# Patient Record
Sex: FEMALE | Race: WHITE | Employment: OTHER | URBAN - METROPOLITAN AREA
[De-identification: names, ages, dates, MRNs, and addresses within clinical notes are randomized per-mention and may not be internally consistent; named-entity substitution may affect disease eponyms.]

---

## 2017-07-17 PROBLEM — M54.41 MIDLINE LOW BACK PAIN WITH RIGHT-SIDED SCIATICA: Status: ACTIVE | Noted: 2017-07-17

## 2017-07-18 PROBLEM — R32 URINARY INCONTINENCE: Status: ACTIVE | Noted: 2017-07-18

## 2017-07-18 PROBLEM — I63.9 CVA (CEREBRAL VASCULAR ACCIDENT) (HCC): Status: ACTIVE | Noted: 2017-07-18

## 2017-07-18 PROBLEM — N39.0 UTI (URINARY TRACT INFECTION): Status: ACTIVE | Noted: 2017-07-18

## 2017-07-18 PROBLEM — E66.01 MORBID OBESITY WITH BMI OF 40.0-44.9, ADULT (HCC): Chronic | Status: ACTIVE | Noted: 2017-07-18

## 2017-10-21 PROBLEM — R41.82 ALTERED MENTAL STATUS: Status: ACTIVE | Noted: 2017-10-21

## 2018-03-10 ENCOUNTER — HOSPITAL ENCOUNTER (OUTPATIENT)
Age: 73
Setting detail: SPECIMEN
Discharge: HOME | End: 2018-03-10
Payer: MEDICARE

## 2018-03-10 LAB
INR BLD: 1.2
PROTHROMBIN TIME: 12.7 SEC (ref 9.3–12.4)

## 2018-03-10 PROCEDURE — 85610 PROTHROMBIN TIME: CPT

## 2018-03-10 PROCEDURE — 36415 COLL VENOUS BLD VENIPUNCTURE: CPT

## 2018-03-11 ENCOUNTER — HOSPITAL ENCOUNTER (OUTPATIENT)
Age: 73
Discharge: HOME | End: 2018-03-13
Payer: MEDICARE

## 2018-03-11 LAB
INR BLD: 1.2
PROTHROMBIN TIME: 12.8 SEC (ref 9.3–12.4)

## 2018-03-11 PROCEDURE — 85610 PROTHROMBIN TIME: CPT

## 2018-03-12 ENCOUNTER — HOSPITAL ENCOUNTER (OUTPATIENT)
Age: 73
Discharge: HOME | End: 2018-03-14
Payer: MEDICARE

## 2018-03-12 PROCEDURE — 85027 COMPLETE CBC AUTOMATED: CPT

## 2018-03-12 PROCEDURE — 85610 PROTHROMBIN TIME: CPT

## 2018-03-12 PROCEDURE — 36415 COLL VENOUS BLD VENIPUNCTURE: CPT

## 2018-03-15 ENCOUNTER — HOSPITAL ENCOUNTER (OUTPATIENT)
Age: 73
Discharge: HOME | End: 2018-03-17
Payer: MEDICARE

## 2018-03-15 LAB
INR BLD: 1.3
PROTHROMBIN TIME: 14.6 SEC (ref 9.3–12.4)

## 2018-03-15 PROCEDURE — 85610 PROTHROMBIN TIME: CPT

## 2018-03-15 PROCEDURE — 36415 COLL VENOUS BLD VENIPUNCTURE: CPT

## 2018-03-16 ENCOUNTER — HOSPITAL ENCOUNTER (OUTPATIENT)
Age: 73
Discharge: HOME | End: 2018-03-18
Payer: MEDICARE

## 2018-03-16 LAB
ANION GAP SERPL CALCULATED.3IONS-SCNC: 18 MMOL/L (ref 7–16)
BUN BLDV-MCNC: 32 MG/DL (ref 8–23)
CALCIUM SERPL-MCNC: 8.5 MG/DL (ref 8.6–10.2)
CHLORIDE BLD-SCNC: 101 MMOL/L (ref 98–107)
CO2: 25 MMOL/L (ref 22–29)
CREAT SERPL-MCNC: 1.9 MG/DL (ref 0.5–1)
FERRITIN: 117 NG/ML
GFR AFRICAN AMERICAN: 31
GFR NON-AFRICAN AMERICAN: 26 ML/MIN/1.73
GLUCOSE BLD-MCNC: 129 MG/DL (ref 74–109)
HCT VFR BLD CALC: 26.8 % (ref 34–48)
HCT VFR BLD CALC: 27.3 % (ref 34–48)
HEMOGLOBIN: 8.2 G/DL (ref 11.5–15.5)
HEMOGLOBIN: 8.8 G/DL (ref 11.5–15.5)
INR BLD: 1.2
IRON: 40 MCG/DL (ref 37–145)
MCH RBC QN AUTO: 28.4 PG (ref 26–35)
MCH RBC QN AUTO: 29 PG (ref 26–35)
MCHC RBC AUTO-ENTMCNC: 30.6 % (ref 32–34.5)
MCHC RBC AUTO-ENTMCNC: 32.2 % (ref 32–34.5)
MCV RBC AUTO: 90.1 FL (ref 80–99.9)
MCV RBC AUTO: 92.7 FL (ref 80–99.9)
PDW BLD-RTO: 15.3 FL (ref 11.5–15)
PDW BLD-RTO: 15.5 FL (ref 11.5–15)
PLATELET # BLD: 286 E9/L (ref 130–450)
PLATELET # BLD: 389 E9/L (ref 130–450)
PMV BLD AUTO: 8.9 FL (ref 7–12)
PMV BLD AUTO: 9.2 FL (ref 7–12)
POTASSIUM SERPL-SCNC: 4 MMOL/L (ref 3.5–5)
PROTHROMBIN TIME: 13.4 SEC (ref 9.3–12.4)
RBC # BLD: 2.89 E12/L (ref 3.5–5.5)
RBC # BLD: 3.03 E12/L (ref 3.5–5.5)
SODIUM BLD-SCNC: 144 MMOL/L (ref 132–146)
WBC # BLD: 7.2 E9/L (ref 4.5–11.5)
WBC # BLD: 7.2 E9/L (ref 4.5–11.5)

## 2018-03-16 PROCEDURE — 36415 COLL VENOUS BLD VENIPUNCTURE: CPT

## 2018-03-16 PROCEDURE — 82728 ASSAY OF FERRITIN: CPT

## 2018-03-16 PROCEDURE — 83540 ASSAY OF IRON: CPT

## 2018-03-16 PROCEDURE — 85027 COMPLETE CBC AUTOMATED: CPT

## 2018-03-16 PROCEDURE — 80048 BASIC METABOLIC PNL TOTAL CA: CPT

## 2018-03-19 ENCOUNTER — HOSPITAL ENCOUNTER (OUTPATIENT)
Age: 73
Discharge: HOME | End: 2018-03-21
Payer: MEDICARE

## 2018-03-19 LAB
ALBUMIN SERPL-MCNC: 3 G/DL (ref 3.5–5.2)
ALP BLD-CCNC: 111 U/L (ref 35–104)
ALT SERPL-CCNC: 10 U/L (ref 0–32)
ANION GAP SERPL CALCULATED.3IONS-SCNC: 14 MMOL/L (ref 7–16)
AST SERPL-CCNC: 12 U/L (ref 0–31)
BACTERIA: ABNORMAL /HPF
BASOPHILS ABSOLUTE: 0.05 E9/L (ref 0–0.2)
BASOPHILS RELATIVE PERCENT: 0.7 % (ref 0–2)
BILIRUB SERPL-MCNC: <0.2 MG/DL (ref 0–1.2)
BILIRUBIN URINE: NEGATIVE
BLOOD, URINE: NEGATIVE
BUN BLDV-MCNC: 33 MG/DL (ref 8–23)
CALCIUM SERPL-MCNC: 8.7 MG/DL (ref 8.6–10.2)
CHLORIDE BLD-SCNC: 102 MMOL/L (ref 98–107)
CLARITY: CLEAR
CO2: 29 MMOL/L (ref 22–29)
COLOR: YELLOW
CREAT SERPL-MCNC: 1.9 MG/DL (ref 0.5–1)
EOSINOPHILS ABSOLUTE: 0.5 E9/L (ref 0.05–0.5)
EOSINOPHILS RELATIVE PERCENT: 6.9 % (ref 0–6)
FERRITIN: 85 NG/ML
GFR AFRICAN AMERICAN: 31
GFR NON-AFRICAN AMERICAN: 26 ML/MIN/1.73
GLUCOSE BLD-MCNC: 116 MG/DL (ref 74–109)
GLUCOSE URINE: NEGATIVE MG/DL
HCT VFR BLD CALC: 27.9 % (ref 34–48)
HEMOGLOBIN: 8.8 G/DL (ref 11.5–15.5)
IMMATURE GRANULOCYTES #: 0.05 E9/L
IMMATURE GRANULOCYTES %: 0.7 % (ref 0–5)
INR BLD: 1.6
IRON SATURATION: 14 % (ref 15–50)
IRON: 34 MCG/DL (ref 37–145)
KETONES, URINE: NEGATIVE MG/DL
LEUKOCYTE ESTERASE, URINE: ABNORMAL
LYMPHOCYTES ABSOLUTE: 1.47 E9/L (ref 1.5–4)
LYMPHOCYTES RELATIVE PERCENT: 20.3 % (ref 20–42)
MAGNESIUM: 2 MG/DL (ref 1.6–2.6)
MCH RBC QN AUTO: 28.6 PG (ref 26–35)
MCHC RBC AUTO-ENTMCNC: 31.5 % (ref 32–34.5)
MCV RBC AUTO: 90.6 FL (ref 80–99.9)
MONOCYTES ABSOLUTE: 0.9 E9/L (ref 0.1–0.95)
MONOCYTES RELATIVE PERCENT: 12.4 % (ref 2–12)
NEUTROPHILS ABSOLUTE: 4.26 E9/L (ref 1.8–7.3)
NEUTROPHILS RELATIVE PERCENT: 59 % (ref 43–80)
NITRITE, URINE: POSITIVE
PDW BLD-RTO: 15.4 FL (ref 11.5–15)
PH UA: 6 (ref 5–9)
PHOSPHORUS: 4.3 MG/DL (ref 2.5–4.5)
PLATELET # BLD: 391 E9/L (ref 130–450)
PMV BLD AUTO: 9 FL (ref 7–12)
POTASSIUM SERPL-SCNC: 4.5 MMOL/L (ref 3.5–5)
PROTEIN PROTEIN: 21 MG/DL (ref 0–12)
PROTEIN UA: NEGATIVE MG/DL
PROTHROMBIN TIME: 17.9 SEC (ref 9.3–12.4)
RBC # BLD: 3.08 E12/L (ref 3.5–5.5)
RBC UA: ABNORMAL /HPF (ref 0–2)
SODIUM BLD-SCNC: 145 MMOL/L (ref 132–146)
SPECIFIC GRAVITY UA: 1.02 (ref 1–1.03)
TOTAL IRON BINDING CAPACITY: 241 MCG/DL (ref 250–450)
TOTAL PROTEIN: 6 G/DL (ref 6.4–8.3)
URIC ACID, SERUM: 8.1 MG/DL (ref 2.4–5.7)
UROBILINOGEN, URINE: 0.2 E.U./DL
VITAMIN D 25-HYDROXY: 34 NG/ML (ref 30–100)
WBC # BLD: 7.2 E9/L (ref 4.5–11.5)
WBC UA: ABNORMAL /HPF (ref 0–5)

## 2018-03-19 PROCEDURE — 84100 ASSAY OF PHOSPHORUS: CPT

## 2018-03-19 PROCEDURE — 36415 COLL VENOUS BLD VENIPUNCTURE: CPT

## 2018-03-19 PROCEDURE — 85025 COMPLETE CBC W/AUTO DIFF WBC: CPT

## 2018-03-19 PROCEDURE — 82306 VITAMIN D 25 HYDROXY: CPT

## 2018-03-19 PROCEDURE — 84156 ASSAY OF PROTEIN URINE: CPT

## 2018-03-19 PROCEDURE — 83540 ASSAY OF IRON: CPT

## 2018-03-19 PROCEDURE — 81001 URINALYSIS AUTO W/SCOPE: CPT

## 2018-03-19 PROCEDURE — 82728 ASSAY OF FERRITIN: CPT

## 2018-03-19 PROCEDURE — 85610 PROTHROMBIN TIME: CPT

## 2018-03-19 PROCEDURE — 83735 ASSAY OF MAGNESIUM: CPT

## 2018-03-19 PROCEDURE — 83550 IRON BINDING TEST: CPT

## 2018-03-19 PROCEDURE — 84550 ASSAY OF BLOOD/URIC ACID: CPT

## 2018-03-19 PROCEDURE — 80053 COMPREHEN METABOLIC PANEL: CPT

## 2018-03-22 ENCOUNTER — HOSPITAL ENCOUNTER (OUTPATIENT)
Age: 73
Discharge: HOME | End: 2018-03-24
Payer: MEDICARE

## 2018-03-22 LAB
INR BLD: 1.9
PARATHYROID HORMONE INTACT: 36 PG/ML (ref 15–65)
PROTHROMBIN TIME: 20.7 SEC (ref 9.3–12.4)

## 2018-03-22 PROCEDURE — 36415 COLL VENOUS BLD VENIPUNCTURE: CPT

## 2018-03-22 PROCEDURE — 83970 ASSAY OF PARATHORMONE: CPT

## 2018-03-22 PROCEDURE — 85610 PROTHROMBIN TIME: CPT

## 2018-03-24 ENCOUNTER — HOSPITAL ENCOUNTER (OUTPATIENT)
Age: 73
Discharge: HOME | End: 2018-03-26
Payer: MEDICARE

## 2018-03-24 LAB
INR BLD: 2.5
PROTHROMBIN TIME: 27.6 SEC (ref 9.3–12.4)

## 2018-03-24 PROCEDURE — 85610 PROTHROMBIN TIME: CPT

## 2018-03-24 PROCEDURE — 36415 COLL VENOUS BLD VENIPUNCTURE: CPT

## 2018-03-26 ENCOUNTER — HOSPITAL ENCOUNTER (OUTPATIENT)
Age: 73
Discharge: HOME | End: 2018-03-28
Payer: COMMERCIAL

## 2018-03-26 LAB
INR BLD: 2.6
PROTHROMBIN TIME: 29.2 SEC (ref 9.3–12.4)

## 2018-03-26 PROCEDURE — 85610 PROTHROMBIN TIME: CPT

## 2018-03-26 PROCEDURE — 36415 COLL VENOUS BLD VENIPUNCTURE: CPT

## 2018-03-29 ENCOUNTER — HOSPITAL ENCOUNTER (OUTPATIENT)
Age: 73
Discharge: HOME | End: 2018-03-31
Payer: MEDICARE

## 2018-03-29 LAB
INR BLD: 2.2
PROTHROMBIN TIME: 24.5 SEC (ref 9.3–12.4)

## 2018-03-29 PROCEDURE — 85610 PROTHROMBIN TIME: CPT

## 2018-03-29 PROCEDURE — 36415 COLL VENOUS BLD VENIPUNCTURE: CPT

## 2018-04-02 ENCOUNTER — HOSPITAL ENCOUNTER (OUTPATIENT)
Age: 73
Discharge: HOME | End: 2018-04-04
Payer: MEDICARE

## 2018-04-02 LAB
ANION GAP SERPL CALCULATED.3IONS-SCNC: 14 MMOL/L (ref 7–16)
BUN BLDV-MCNC: 29 MG/DL (ref 8–23)
CALCIUM SERPL-MCNC: 8.6 MG/DL (ref 8.6–10.2)
CHLORIDE BLD-SCNC: 97 MMOL/L (ref 98–107)
CO2: 30 MMOL/L (ref 22–29)
CREAT SERPL-MCNC: 1.7 MG/DL (ref 0.5–1)
GFR AFRICAN AMERICAN: 36
GFR NON-AFRICAN AMERICAN: 29 ML/MIN/1.73
GLUCOSE BLD-MCNC: 173 MG/DL (ref 74–109)
HCT VFR BLD CALC: 33.8 % (ref 34–48)
HEMOGLOBIN: 10.6 G/DL (ref 11.5–15.5)
INR BLD: 1.4
MAGNESIUM: 2.1 MG/DL (ref 1.6–2.6)
MCH RBC QN AUTO: 28.3 PG (ref 26–35)
MCHC RBC AUTO-ENTMCNC: 31.4 % (ref 32–34.5)
MCV RBC AUTO: 90.1 FL (ref 80–99.9)
PDW BLD-RTO: 14.7 FL (ref 11.5–15)
PLATELET # BLD: 246 E9/L (ref 130–450)
PMV BLD AUTO: 10.3 FL (ref 7–12)
POTASSIUM SERPL-SCNC: 3.6 MMOL/L (ref 3.5–5)
PROTHROMBIN TIME: 15.6 SEC (ref 9.3–12.4)
RBC # BLD: 3.75 E12/L (ref 3.5–5.5)
SODIUM BLD-SCNC: 141 MMOL/L (ref 132–146)
WBC # BLD: 6.7 E9/L (ref 4.5–11.5)

## 2018-04-02 PROCEDURE — 36415 COLL VENOUS BLD VENIPUNCTURE: CPT

## 2018-04-02 PROCEDURE — 85027 COMPLETE CBC AUTOMATED: CPT

## 2018-04-02 PROCEDURE — 85610 PROTHROMBIN TIME: CPT

## 2018-04-02 PROCEDURE — 80048 BASIC METABOLIC PNL TOTAL CA: CPT

## 2018-04-02 PROCEDURE — 83735 ASSAY OF MAGNESIUM: CPT

## 2018-04-05 ENCOUNTER — HOSPITAL ENCOUNTER (OUTPATIENT)
Age: 73
Discharge: HOME | End: 2018-04-07
Payer: MEDICARE

## 2018-04-05 LAB
ANION GAP SERPL CALCULATED.3IONS-SCNC: 14 MMOL/L (ref 7–16)
BUN BLDV-MCNC: 29 MG/DL (ref 8–23)
CALCIUM SERPL-MCNC: 8.7 MG/DL (ref 8.6–10.2)
CHLORIDE BLD-SCNC: 98 MMOL/L (ref 98–107)
CO2: 28 MMOL/L (ref 22–29)
CREAT SERPL-MCNC: 1.8 MG/DL (ref 0.5–1)
GFR AFRICAN AMERICAN: 33
GFR NON-AFRICAN AMERICAN: 28 ML/MIN/1.73
GLUCOSE BLD-MCNC: 112 MG/DL (ref 74–109)
INR BLD: 1.3
POTASSIUM SERPL-SCNC: 3.9 MMOL/L (ref 3.5–5)
PROTHROMBIN TIME: 14.6 SEC (ref 9.3–12.4)
SODIUM BLD-SCNC: 140 MMOL/L (ref 132–146)

## 2018-04-05 PROCEDURE — 80048 BASIC METABOLIC PNL TOTAL CA: CPT

## 2018-04-05 PROCEDURE — 85610 PROTHROMBIN TIME: CPT

## 2018-04-05 PROCEDURE — 36415 COLL VENOUS BLD VENIPUNCTURE: CPT

## 2018-04-09 ENCOUNTER — HOSPITAL ENCOUNTER (OUTPATIENT)
Age: 73
Discharge: HOME | End: 2018-04-11
Payer: MEDICARE

## 2018-04-09 LAB
INR BLD: 1.1
PROTHROMBIN TIME: 12.2 SEC (ref 9.3–12.4)

## 2018-04-09 PROCEDURE — 36415 COLL VENOUS BLD VENIPUNCTURE: CPT

## 2018-04-09 PROCEDURE — 85610 PROTHROMBIN TIME: CPT

## 2018-04-12 ENCOUNTER — HOSPITAL ENCOUNTER (OUTPATIENT)
Age: 73
Discharge: HOME | End: 2018-04-14
Payer: MEDICARE

## 2018-04-12 LAB
ANION GAP SERPL CALCULATED.3IONS-SCNC: 13 MMOL/L (ref 7–16)
BUN BLDV-MCNC: 30 MG/DL (ref 8–23)
CALCIUM SERPL-MCNC: 8.5 MG/DL (ref 8.6–10.2)
CHLORIDE BLD-SCNC: 102 MMOL/L (ref 98–107)
CO2: 27 MMOL/L (ref 22–29)
CREAT SERPL-MCNC: 1.8 MG/DL (ref 0.5–1)
GFR AFRICAN AMERICAN: 33
GFR NON-AFRICAN AMERICAN: 28 ML/MIN/1.73
GLUCOSE BLD-MCNC: 144 MG/DL (ref 74–109)
INR BLD: 1.2
POTASSIUM SERPL-SCNC: 4.1 MMOL/L (ref 3.5–5)
PROTHROMBIN TIME: 13.7 SEC (ref 9.3–12.4)
SODIUM BLD-SCNC: 142 MMOL/L (ref 132–146)

## 2018-04-12 PROCEDURE — 36415 COLL VENOUS BLD VENIPUNCTURE: CPT

## 2018-04-12 PROCEDURE — 85610 PROTHROMBIN TIME: CPT

## 2018-04-12 PROCEDURE — 80048 BASIC METABOLIC PNL TOTAL CA: CPT

## 2018-04-16 ENCOUNTER — HOSPITAL ENCOUNTER (OUTPATIENT)
Age: 73
Discharge: HOME | End: 2018-04-18
Payer: MEDICARE

## 2018-04-16 LAB
ANION GAP SERPL CALCULATED.3IONS-SCNC: 14 MMOL/L (ref 7–16)
BUN BLDV-MCNC: 29 MG/DL (ref 8–23)
CALCIUM SERPL-MCNC: 8.4 MG/DL (ref 8.6–10.2)
CHLORIDE BLD-SCNC: 99 MMOL/L (ref 98–107)
CO2: 28 MMOL/L (ref 22–29)
CREAT SERPL-MCNC: 1.7 MG/DL (ref 0.5–1)
GFR AFRICAN AMERICAN: 36
GFR NON-AFRICAN AMERICAN: 29 ML/MIN/1.73
GLUCOSE BLD-MCNC: 127 MG/DL (ref 74–109)
HCT VFR BLD CALC: 31.9 % (ref 34–48)
HEMOGLOBIN: 9.9 G/DL (ref 11.5–15.5)
INR BLD: 1.6
MCH RBC QN AUTO: 28 PG (ref 26–35)
MCHC RBC AUTO-ENTMCNC: 31 % (ref 32–34.5)
MCV RBC AUTO: 90.4 FL (ref 80–99.9)
PDW BLD-RTO: 14.3 FL (ref 11.5–15)
PLATELET # BLD: 244 E9/L (ref 130–450)
PMV BLD AUTO: 9.7 FL (ref 7–12)
POTASSIUM SERPL-SCNC: 3.9 MMOL/L (ref 3.5–5)
PROTHROMBIN TIME: 18.1 SEC (ref 9.3–12.4)
RBC # BLD: 3.53 E12/L (ref 3.5–5.5)
SODIUM BLD-SCNC: 141 MMOL/L (ref 132–146)
WBC # BLD: 6 E9/L (ref 4.5–11.5)

## 2018-04-16 PROCEDURE — 85027 COMPLETE CBC AUTOMATED: CPT

## 2018-04-16 PROCEDURE — 80048 BASIC METABOLIC PNL TOTAL CA: CPT

## 2018-04-16 PROCEDURE — 36415 COLL VENOUS BLD VENIPUNCTURE: CPT

## 2018-04-16 PROCEDURE — 85610 PROTHROMBIN TIME: CPT

## 2018-04-18 ENCOUNTER — HOSPITAL ENCOUNTER (OUTPATIENT)
Age: 73
Discharge: HOME | End: 2018-04-20
Payer: MEDICARE

## 2018-04-18 LAB
INR BLD: 2.1
PROTHROMBIN TIME: 23.6 SEC (ref 9.3–12.4)

## 2018-04-18 PROCEDURE — 36415 COLL VENOUS BLD VENIPUNCTURE: CPT

## 2018-04-18 PROCEDURE — 85610 PROTHROMBIN TIME: CPT

## 2018-04-19 ENCOUNTER — HOSPITAL ENCOUNTER (OUTPATIENT)
Age: 73
Discharge: HOME | End: 2018-04-21
Payer: MEDICARE

## 2018-04-19 LAB
ANION GAP SERPL CALCULATED.3IONS-SCNC: 11 MMOL/L (ref 7–16)
BASOPHILS ABSOLUTE: 0.05 E9/L (ref 0–0.2)
BASOPHILS RELATIVE PERCENT: 0.8 % (ref 0–2)
BUN BLDV-MCNC: 32 MG/DL (ref 8–23)
CALCIUM SERPL-MCNC: 8.4 MG/DL (ref 8.6–10.2)
CHLORIDE BLD-SCNC: 101 MMOL/L (ref 98–107)
CO2: 28 MMOL/L (ref 22–29)
CREAT SERPL-MCNC: 2 MG/DL (ref 0.5–1)
EOSINOPHILS ABSOLUTE: 0.44 E9/L (ref 0.05–0.5)
EOSINOPHILS RELATIVE PERCENT: 7 % (ref 0–6)
GFR AFRICAN AMERICAN: 29
GFR NON-AFRICAN AMERICAN: 24 ML/MIN/1.73
GLUCOSE BLD-MCNC: 172 MG/DL (ref 74–109)
HCT VFR BLD CALC: 31.4 % (ref 34–48)
HEMOGLOBIN: 9.6 G/DL (ref 11.5–15.5)
IMMATURE GRANULOCYTES #: 0.05 E9/L
IMMATURE GRANULOCYTES %: 0.8 % (ref 0–5)
LYMPHOCYTES ABSOLUTE: 1.27 E9/L (ref 1.5–4)
LYMPHOCYTES RELATIVE PERCENT: 20.1 % (ref 20–42)
MCH RBC QN AUTO: 27.4 PG (ref 26–35)
MCHC RBC AUTO-ENTMCNC: 30.6 % (ref 32–34.5)
MCV RBC AUTO: 89.7 FL (ref 80–99.9)
MONOCYTES ABSOLUTE: 0.95 E9/L (ref 0.1–0.95)
MONOCYTES RELATIVE PERCENT: 15.1 % (ref 2–12)
NEUTROPHILS ABSOLUTE: 3.55 E9/L (ref 1.8–7.3)
NEUTROPHILS RELATIVE PERCENT: 56.2 % (ref 43–80)
PDW BLD-RTO: 14.5 FL (ref 11.5–15)
PLATELET # BLD: 254 E9/L (ref 130–450)
PMV BLD AUTO: 9.7 FL (ref 7–12)
POTASSIUM SERPL-SCNC: 4.2 MMOL/L (ref 3.5–5)
RBC # BLD: 3.5 E12/L (ref 3.5–5.5)
SODIUM BLD-SCNC: 140 MMOL/L (ref 132–146)
WBC # BLD: 6.3 E9/L (ref 4.5–11.5)

## 2018-04-19 PROCEDURE — 80048 BASIC METABOLIC PNL TOTAL CA: CPT

## 2018-04-19 PROCEDURE — 85025 COMPLETE CBC W/AUTO DIFF WBC: CPT

## 2018-04-19 PROCEDURE — 36415 COLL VENOUS BLD VENIPUNCTURE: CPT

## 2018-04-21 ENCOUNTER — HOSPITAL ENCOUNTER (OUTPATIENT)
Age: 73
Discharge: HOME | End: 2018-04-23
Payer: MEDICARE

## 2018-04-21 LAB
INR BLD: 1.9
PROTHROMBIN TIME: 22.1 SEC (ref 9.3–12.4)

## 2018-04-21 PROCEDURE — 85610 PROTHROMBIN TIME: CPT

## 2018-04-21 PROCEDURE — 36415 COLL VENOUS BLD VENIPUNCTURE: CPT

## 2018-04-23 ENCOUNTER — HOSPITAL ENCOUNTER (OUTPATIENT)
Age: 73
Discharge: HOME | End: 2018-04-25
Payer: MEDICARE

## 2018-04-23 LAB
ANION GAP SERPL CALCULATED.3IONS-SCNC: 17 MMOL/L (ref 7–16)
BASOPHILS ABSOLUTE: 0.06 E9/L (ref 0–0.2)
BASOPHILS RELATIVE PERCENT: 1 % (ref 0–2)
BUN BLDV-MCNC: 31 MG/DL (ref 8–23)
CALCIUM SERPL-MCNC: 8.7 MG/DL (ref 8.6–10.2)
CHLORIDE BLD-SCNC: 99 MMOL/L (ref 98–107)
CO2: 27 MMOL/L (ref 22–29)
CREAT SERPL-MCNC: 1.8 MG/DL (ref 0.5–1)
EOSINOPHILS ABSOLUTE: 0.35 E9/L (ref 0.05–0.5)
EOSINOPHILS RELATIVE PERCENT: 5.9 % (ref 0–6)
GFR AFRICAN AMERICAN: 33
GFR NON-AFRICAN AMERICAN: 28 ML/MIN/1.73
GLUCOSE BLD-MCNC: 136 MG/DL (ref 74–109)
HCT VFR BLD CALC: 34.2 % (ref 34–48)
HEMOGLOBIN: 10.7 G/DL (ref 11.5–15.5)
IMMATURE GRANULOCYTES #: 0.05 E9/L
IMMATURE GRANULOCYTES %: 0.8 % (ref 0–5)
INR BLD: 2.4
LYMPHOCYTES ABSOLUTE: 1.17 E9/L (ref 1.5–4)
LYMPHOCYTES RELATIVE PERCENT: 19.8 % (ref 20–42)
MCH RBC QN AUTO: 28.1 PG (ref 26–35)
MCHC RBC AUTO-ENTMCNC: 31.3 % (ref 32–34.5)
MCV RBC AUTO: 89.8 FL (ref 80–99.9)
MONOCYTES ABSOLUTE: 0.82 E9/L (ref 0.1–0.95)
MONOCYTES RELATIVE PERCENT: 13.9 % (ref 2–12)
NEUTROPHILS ABSOLUTE: 3.46 E9/L (ref 1.8–7.3)
NEUTROPHILS RELATIVE PERCENT: 58.6 % (ref 43–80)
PDW BLD-RTO: 14.8 FL (ref 11.5–15)
PLATELET # BLD: 294 E9/L (ref 130–450)
PMV BLD AUTO: 9.4 FL (ref 7–12)
POTASSIUM SERPL-SCNC: 3.9 MMOL/L (ref 3.5–5)
PROTHROMBIN TIME: 27.1 SEC (ref 9.3–12.4)
RBC # BLD: 3.81 E12/L (ref 3.5–5.5)
SODIUM BLD-SCNC: 143 MMOL/L (ref 132–146)
WBC # BLD: 5.9 E9/L (ref 4.5–11.5)

## 2018-04-23 PROCEDURE — 85610 PROTHROMBIN TIME: CPT

## 2018-04-23 PROCEDURE — 85025 COMPLETE CBC W/AUTO DIFF WBC: CPT

## 2018-04-23 PROCEDURE — 80048 BASIC METABOLIC PNL TOTAL CA: CPT

## 2018-04-23 PROCEDURE — 36415 COLL VENOUS BLD VENIPUNCTURE: CPT

## 2018-04-26 ENCOUNTER — HOSPITAL ENCOUNTER (OUTPATIENT)
Age: 73
Discharge: HOME | End: 2018-04-28
Payer: MEDICARE

## 2018-04-26 LAB
INR BLD: 3
PROTHROMBIN TIME: 33.6 SEC (ref 9.3–12.4)

## 2018-04-26 PROCEDURE — 36415 COLL VENOUS BLD VENIPUNCTURE: CPT

## 2018-04-26 PROCEDURE — 85610 PROTHROMBIN TIME: CPT

## 2018-04-30 ENCOUNTER — HOSPITAL ENCOUNTER (OUTPATIENT)
Age: 73
Discharge: HOME | End: 2018-05-02
Payer: MEDICARE

## 2018-04-30 LAB
INR BLD: 2.2
PROTHROMBIN TIME: 24.8 SEC (ref 9.3–12.4)

## 2018-04-30 PROCEDURE — 85610 PROTHROMBIN TIME: CPT

## 2018-04-30 PROCEDURE — 36415 COLL VENOUS BLD VENIPUNCTURE: CPT

## 2018-05-03 ENCOUNTER — HOSPITAL ENCOUNTER (OUTPATIENT)
Age: 73
Discharge: HOME | End: 2018-05-05
Payer: MEDICARE

## 2018-09-26 PROBLEM — N39.0 UTI (URINARY TRACT INFECTION): Status: RESOLVED | Noted: 2017-07-18 | Resolved: 2018-09-26

## 2019-03-26 ENCOUNTER — HOSPITAL ENCOUNTER (OUTPATIENT)
Dept: INTERVENTIONAL RADIOLOGY/VASCULAR | Age: 74
Discharge: HOME | End: 2019-03-28
Payer: MEDICARE

## 2019-03-26 DIAGNOSIS — I73.9 PVD (PERIPHERAL VASCULAR DISEASE) (HCC): ICD-10-CM

## 2019-03-26 PROCEDURE — 93923 UPR/LXTR ART STDY 3+ LVLS: CPT

## 2019-08-08 ENCOUNTER — HOSPITAL ENCOUNTER (OUTPATIENT)
Age: 74
Discharge: HOME | End: 2019-08-10

## 2019-08-08 PROCEDURE — 88305 TISSUE EXAM BY PATHOLOGIST: CPT

## 2020-01-28 ENCOUNTER — HOSPITAL ENCOUNTER (EMERGENCY)
Age: 75
Discharge: HOME | End: 2020-01-28
Attending: EMERGENCY MEDICINE
Payer: MEDICARE

## 2020-01-28 ENCOUNTER — APPOINTMENT (OUTPATIENT)
Dept: CT IMAGING | Age: 75
End: 2020-01-28
Payer: MEDICARE

## 2020-01-28 VITALS
HEART RATE: 76 BPM | TEMPERATURE: 97.6 F | SYSTOLIC BLOOD PRESSURE: 141 MMHG | RESPIRATION RATE: 18 BRPM | OXYGEN SATURATION: 96 % | WEIGHT: 250 LBS | BODY MASS INDEX: 40.18 KG/M2 | DIASTOLIC BLOOD PRESSURE: 58 MMHG | HEIGHT: 66 IN

## 2020-01-28 LAB
ALBUMIN SERPL-MCNC: 3.7 G/DL (ref 3.5–5.2)
ALP BLD-CCNC: 167 U/L (ref 35–104)
ALT SERPL-CCNC: 27 U/L (ref 0–32)
ANION GAP SERPL CALCULATED.3IONS-SCNC: 11 MMOL/L (ref 7–16)
AST SERPL-CCNC: 26 U/L (ref 0–31)
BASOPHILS ABSOLUTE: 0.07 E9/L (ref 0–0.2)
BASOPHILS RELATIVE PERCENT: 0.7 % (ref 0–2)
BILIRUB SERPL-MCNC: 0.4 MG/DL (ref 0–1.2)
BUN BLDV-MCNC: 32 MG/DL (ref 8–23)
CALCIUM SERPL-MCNC: 8.7 MG/DL (ref 8.6–10.2)
CHLORIDE BLD-SCNC: 100 MMOL/L (ref 98–107)
CO2: 26 MMOL/L (ref 22–29)
CREAT SERPL-MCNC: 1.8 MG/DL (ref 0.5–1)
EOSINOPHILS ABSOLUTE: 0.36 E9/L (ref 0.05–0.5)
EOSINOPHILS RELATIVE PERCENT: 3.4 % (ref 0–6)
GFR AFRICAN AMERICAN: 33
GFR NON-AFRICAN AMERICAN: 27 ML/MIN/1.73
GLUCOSE BLD-MCNC: 158 MG/DL (ref 74–99)
HCT VFR BLD CALC: 42.2 % (ref 34–48)
HEMOGLOBIN: 13.3 G/DL (ref 11.5–15.5)
IMMATURE GRANULOCYTES #: 0.06 E9/L
IMMATURE GRANULOCYTES %: 0.6 % (ref 0–5)
LACTIC ACID: 1.4 MMOL/L (ref 0.5–2.2)
LIPASE: 17 U/L (ref 13–60)
LYMPHOCYTES ABSOLUTE: 1.24 E9/L (ref 1.5–4)
LYMPHOCYTES RELATIVE PERCENT: 11.6 % (ref 20–42)
MCH RBC QN AUTO: 28.2 PG (ref 26–35)
MCHC RBC AUTO-ENTMCNC: 31.5 % (ref 32–34.5)
MCV RBC AUTO: 89.4 FL (ref 80–99.9)
MONOCYTES ABSOLUTE: 1.09 E9/L (ref 0.1–0.95)
MONOCYTES RELATIVE PERCENT: 10.2 % (ref 2–12)
NEUTROPHILS ABSOLUTE: 7.87 E9/L (ref 1.8–7.3)
NEUTROPHILS RELATIVE PERCENT: 73.5 % (ref 43–80)
PDW BLD-RTO: 14.3 FL (ref 11.5–15)
PLATELET # BLD: 254 E9/L (ref 130–450)
PMV BLD AUTO: 9.4 FL (ref 7–12)
POTASSIUM SERPL-SCNC: 3.9 MMOL/L (ref 3.5–5)
RBC # BLD: 4.72 E12/L (ref 3.5–5.5)
SODIUM BLD-SCNC: 137 MMOL/L (ref 132–146)
TOTAL PROTEIN: 7.1 G/DL (ref 6.4–8.3)
WBC # BLD: 10.7 E9/L (ref 4.5–11.5)

## 2020-01-28 PROCEDURE — 74176 CT ABD & PELVIS W/O CONTRAST: CPT

## 2020-01-28 PROCEDURE — 85025 COMPLETE CBC W/AUTO DIFF WBC: CPT

## 2020-01-28 PROCEDURE — 83690 ASSAY OF LIPASE: CPT

## 2020-01-28 PROCEDURE — 99284 EMERGENCY DEPT VISIT MOD MDM: CPT

## 2020-01-28 PROCEDURE — 2580000003 HC RX 258: Performed by: PHYSICIAN ASSISTANT

## 2020-01-28 PROCEDURE — 83605 ASSAY OF LACTIC ACID: CPT

## 2020-01-28 PROCEDURE — 6360000002 HC RX W HCPCS: Performed by: PHYSICIAN ASSISTANT

## 2020-01-28 PROCEDURE — 80053 COMPREHEN METABOLIC PANEL: CPT

## 2020-01-28 PROCEDURE — 96374 THER/PROPH/DIAG INJ IV PUSH: CPT

## 2020-01-28 RX ORDER — POLYETHYLENE GLYCOL 3350 17 G/17G
17 POWDER, FOR SOLUTION ORAL 2 TIMES DAILY
Qty: 1000 G | Refills: 0 | Status: SHIPPED | OUTPATIENT
Start: 2020-01-28 | End: 2020-02-27

## 2020-01-28 RX ORDER — ACETAMINOPHEN 500 MG
1000 TABLET ORAL 3 TIMES DAILY
Qty: 42 TABLET | Refills: 0 | Status: ON HOLD | OUTPATIENT
Start: 2020-01-28 | End: 2020-11-01 | Stop reason: HOSPADM

## 2020-01-28 RX ORDER — DICYCLOMINE HYDROCHLORIDE 10 MG/1
20 CAPSULE ORAL
Qty: 15 CAPSULE | Refills: 0 | Status: ON HOLD | OUTPATIENT
Start: 2020-01-28 | End: 2020-11-01 | Stop reason: HOSPADM

## 2020-01-28 RX ORDER — 0.9 % SODIUM CHLORIDE 0.9 %
500 INTRAVENOUS SOLUTION INTRAVENOUS ONCE
Status: COMPLETED | OUTPATIENT
Start: 2020-01-28 | End: 2020-01-28

## 2020-01-28 RX ORDER — MORPHINE SULFATE 4 MG/ML
8 INJECTION, SOLUTION INTRAMUSCULAR; INTRAVENOUS ONCE
Status: COMPLETED | OUTPATIENT
Start: 2020-01-28 | End: 2020-01-28

## 2020-01-28 RX ADMIN — MORPHINE SULFATE 8 MG: 4 INJECTION, SOLUTION INTRAMUSCULAR; INTRAVENOUS at 15:05

## 2020-01-28 RX ADMIN — SODIUM CHLORIDE 500 ML: 9 INJECTION, SOLUTION INTRAVENOUS at 15:04

## 2020-01-28 ASSESSMENT — PAIN DESCRIPTION - ORIENTATION: ORIENTATION: LEFT;LOWER

## 2020-01-28 ASSESSMENT — PAIN SCALES - GENERAL
PAINLEVEL_OUTOF10: 5
PAINLEVEL_OUTOF10: 10
PAINLEVEL_OUTOF10: 8

## 2020-01-28 ASSESSMENT — PAIN DESCRIPTION - LOCATION
LOCATION: ABDOMEN
LOCATION: ABDOMEN

## 2020-01-28 ASSESSMENT — PAIN DESCRIPTION - DESCRIPTORS
DESCRIPTORS: ACHING;DISCOMFORT
DESCRIPTORS: ACHING

## 2020-01-28 ASSESSMENT — PAIN DESCRIPTION - FREQUENCY: FREQUENCY: CONTINUOUS

## 2020-01-28 ASSESSMENT — PAIN DESCRIPTION - PAIN TYPE
TYPE: ACUTE PAIN
TYPE: ACUTE PAIN

## 2020-01-28 NOTE — ED PROVIDER NOTES
ED Attending  CC: Margarita     Department of Emergency Medicine   ED  Provider Note  Admit Date/RoomTime: 1/28/2020  2:25 PM  ED Room: 11/11  Chief Complaint:       Abdominal Pain (LLQ pain started last week)    History of Present Illness   Source of history provided by:  patient. History/Exam Limitations: none. Lila Cornelius is a 75 y.o. old female who has a past medical history of:   Past Medical History:   Diagnosis Date    Asthma     Cerebral artery occlusion with cerebral infarction ([facility] [address] 07/2017    Degenerative disc disease     Degenerative joint disease     Diabetes mellitus ([facility] [address]     Glaucoma     HX OTHER MEDICAL 02/2017    right hip wound dehiscence; using rollator    Hypertension     Neurogenic bladder     Neuropathy     PONV (postoperative nausea and vomiting)     had trouble  waking up with past foot surgery    Sarcoidosis     Sleep apnea     cpap    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\"      presents to the emergency department by private vehicle, for complaints of gradual onset, still present, constant cramping, sharp pain in the LLQ without radiation which began 5 day(s) prior to arrival.   There has been no similar episodes in the past.  Since onset the symptoms have been persistent and worsening. The pain is associated with nothing pertinent. The pain is aggravated by palpation and relieved by nothing. There has been NO back pain, chills, cloudy urine, constipation, diarrhea, dysuria, headache, hematuria, sweating, urinary frequency, urinary incontinence, urinary urgency, vaginal discharge, vaginal itching or vomiting    ROS   Pertinent positives and negatives are stated within HPI, all other systems reviewed and are negative.     Past Surgical History:   Procedure Laterality Date    BACK SURGERY      laminectomy l3-4    BREAST SURGERY      biopsy    CHOLECYSTECTOMY      COLONOSCOPY      FOOT SURGERY Bilateral     right foot fracture; left patient has remained hemodynamically stable, improved and been closely monitored during their ED course. Plan   Discharge to home. Patient condition is good. New Medications     Discharge Medication List as of 1/28/2020  5:00 PM      START taking these medications    Details   dicyclomine (BENTYL) 10 MG capsule Take 2 capsules by mouth 4 times daily (before meals and nightly), Disp-15 capsule, R-nonePrint      acetaminophen (TYLENOL) 500 MG tablet Take 2 tablets by mouth 3 times daily for 7 days, Disp-42 tablet, R-0Print      polyethylene glycol (GLYCOLAX) powder Take 17 g by mouth 2 times daily, Disp-1000 g, R-0Print           Electronically signed by Nette Crisostomo PA-C   DD: 1/28/20  **This report was transcribed using voice recognition software. Every effort was made to ensure accuracy; however, inadvertent computerized transcription errors may be present.   END OF PROVIDER NOTE        Nette Crisostomo PA-C  01/28/20 6739

## 2020-02-12 ENCOUNTER — HOSPITAL ENCOUNTER (OUTPATIENT)
Dept: MAMMOGRAPHY | Age: 75
Discharge: HOME | End: 2020-02-14
Payer: MEDICARE

## 2020-02-12 PROCEDURE — 77063 BREAST TOMOSYNTHESIS BI: CPT

## 2020-10-23 PROBLEM — S82.831A CLOSED FRACTURE FIBULA, HEAD, RIGHT, INITIAL ENCOUNTER: Status: ACTIVE | Noted: 2020-10-23

## 2020-10-24 ENCOUNTER — HOSPITAL ENCOUNTER (INPATIENT)
Age: 75
LOS: 8 days | Discharge: NURSING, REHAB OR LONG-TERM CARE | DRG: 492 | End: 2020-11-01
Attending: INTERNAL MEDICINE | Admitting: FAMILY MEDICINE
Payer: MEDICARE

## 2020-10-24 ENCOUNTER — APPOINTMENT (OUTPATIENT)
Dept: GENERAL RADIOLOGY | Age: 75
DRG: 492 | End: 2020-10-24
Attending: INTERNAL MEDICINE
Payer: MEDICARE

## 2020-10-24 ENCOUNTER — ANESTHESIA (OUTPATIENT)
Dept: OPERATING ROOM | Age: 75
DRG: 492 | End: 2020-10-24
Payer: MEDICARE

## 2020-10-24 ENCOUNTER — ANESTHESIA EVENT (OUTPATIENT)
Dept: OPERATING ROOM | Age: 75
DRG: 492 | End: 2020-10-24
Payer: MEDICARE

## 2020-10-24 VITALS — SYSTOLIC BLOOD PRESSURE: 137 MMHG | DIASTOLIC BLOOD PRESSURE: 58 MMHG | OXYGEN SATURATION: 95 % | TEMPERATURE: 98.1 F

## 2020-10-24 PROBLEM — N18.4 CKD (CHRONIC KIDNEY DISEASE) STAGE 4, GFR 15-29 ML/MIN (HCC): Status: ACTIVE | Noted: 2017-01-01

## 2020-10-24 LAB
ANION GAP SERPL CALCULATED.3IONS-SCNC: 6 MMOL/L (ref 7–16)
BUN BLDV-MCNC: 35 MG/DL (ref 8–23)
CALCIUM SERPL-MCNC: 8.5 MG/DL (ref 8.6–10.2)
CHLORIDE BLD-SCNC: 105 MMOL/L (ref 98–107)
CO2: 27 MMOL/L (ref 22–29)
CREAT SERPL-MCNC: 1.9 MG/DL (ref 0.5–1)
FOLATE: 14.3 NG/ML (ref 4.8–24.2)
GFR AFRICAN AMERICAN: 31
GFR NON-AFRICAN AMERICAN: 26 ML/MIN/1.73
GLUCOSE BLD-MCNC: 143 MG/DL (ref 74–99)
HCT VFR BLD CALC: 41.3 % (ref 34–48)
HEMOGLOBIN: 12.9 G/DL (ref 11.5–15.5)
INR BLD: 1
MAGNESIUM: 2.3 MG/DL (ref 1.6–2.6)
MCH RBC QN AUTO: 28.1 PG (ref 26–35)
MCHC RBC AUTO-ENTMCNC: 31.2 % (ref 32–34.5)
MCV RBC AUTO: 90 FL (ref 80–99.9)
METER GLUCOSE: 108 MG/DL (ref 74–99)
METER GLUCOSE: 120 MG/DL (ref 74–99)
METER GLUCOSE: 148 MG/DL (ref 74–99)
METER GLUCOSE: 98 MG/DL (ref 74–99)
PDW BLD-RTO: 14.5 FL (ref 11.5–15)
PLATELET # BLD: 212 E9/L (ref 130–450)
PMV BLD AUTO: 9.6 FL (ref 7–12)
POTASSIUM SERPL-SCNC: 4.4 MMOL/L (ref 3.5–5)
PROTHROMBIN TIME: 11.3 SEC (ref 9.3–12.4)
RBC # BLD: 4.59 E12/L (ref 3.5–5.5)
SODIUM BLD-SCNC: 138 MMOL/L (ref 132–146)
VITAMIN B-12: 532 PG/ML (ref 211–946)
WBC # BLD: 7.1 E9/L (ref 4.5–11.5)

## 2020-10-24 PROCEDURE — 6370000000 HC RX 637 (ALT 250 FOR IP): Performed by: ORTHOPAEDIC SURGERY

## 2020-10-24 PROCEDURE — 2500000003 HC RX 250 WO HCPCS: Performed by: NURSE ANESTHETIST, CERTIFIED REGISTERED

## 2020-10-24 PROCEDURE — 2580000003 HC RX 258: Performed by: ORTHOPAEDIC SURGERY

## 2020-10-24 PROCEDURE — 36415 COLL VENOUS BLD VENIPUNCTURE: CPT

## 2020-10-24 PROCEDURE — 85610 PROTHROMBIN TIME: CPT

## 2020-10-24 PROCEDURE — 6360000002 HC RX W HCPCS: Performed by: INTERNAL MEDICINE

## 2020-10-24 PROCEDURE — 0QSJ04Z REPOSITION RIGHT FIBULA WITH INTERNAL FIXATION DEVICE, OPEN APPROACH: ICD-10-PCS | Performed by: ORTHOPAEDIC SURGERY

## 2020-10-24 PROCEDURE — 6360000002 HC RX W HCPCS: Performed by: ANESTHESIOLOGY

## 2020-10-24 PROCEDURE — 3600000014 HC SURGERY LEVEL 4 ADDTL 15MIN: Performed by: ORTHOPAEDIC SURGERY

## 2020-10-24 PROCEDURE — 87081 CULTURE SCREEN ONLY: CPT

## 2020-10-24 PROCEDURE — 2500000003 HC RX 250 WO HCPCS: Performed by: ORTHOPAEDIC SURGERY

## 2020-10-24 PROCEDURE — 7100000000 HC PACU RECOVERY - FIRST 15 MIN: Performed by: ORTHOPAEDIC SURGERY

## 2020-10-24 PROCEDURE — 1200000000 HC SEMI PRIVATE

## 2020-10-24 PROCEDURE — 6370000000 HC RX 637 (ALT 250 FOR IP): Performed by: INTERNAL MEDICINE

## 2020-10-24 PROCEDURE — 3700000000 HC ANESTHESIA ATTENDED CARE: Performed by: ORTHOPAEDIC SURGERY

## 2020-10-24 PROCEDURE — C1713 ANCHOR/SCREW BN/BN,TIS/BN: HCPCS | Performed by: ORTHOPAEDIC SURGERY

## 2020-10-24 PROCEDURE — 82962 GLUCOSE BLOOD TEST: CPT

## 2020-10-24 PROCEDURE — 2709999900 HC NON-CHARGEABLE SUPPLY: Performed by: ORTHOPAEDIC SURGERY

## 2020-10-24 PROCEDURE — 3700000001 HC ADD 15 MINUTES (ANESTHESIA): Performed by: ORTHOPAEDIC SURGERY

## 2020-10-24 PROCEDURE — 6360000002 HC RX W HCPCS: Performed by: ORTHOPAEDIC SURGERY

## 2020-10-24 PROCEDURE — 6360000002 HC RX W HCPCS: Performed by: NURSE ANESTHETIST, CERTIFIED REGISTERED

## 2020-10-24 PROCEDURE — 6360000002 HC RX W HCPCS: Performed by: PHYSICIAN ASSISTANT

## 2020-10-24 PROCEDURE — 2580000003 HC RX 258: Performed by: NURSE ANESTHETIST, CERTIFIED REGISTERED

## 2020-10-24 PROCEDURE — 93005 ELECTROCARDIOGRAM TRACING: CPT | Performed by: INTERNAL MEDICINE

## 2020-10-24 PROCEDURE — 82746 ASSAY OF FOLIC ACID SERUM: CPT

## 2020-10-24 PROCEDURE — 3209999900 FLUORO FOR SURGICAL PROCEDURES

## 2020-10-24 PROCEDURE — 73630 X-RAY EXAM OF FOOT: CPT

## 2020-10-24 PROCEDURE — 94640 AIRWAY INHALATION TREATMENT: CPT

## 2020-10-24 PROCEDURE — 7100000001 HC PACU RECOVERY - ADDTL 15 MIN: Performed by: ORTHOPAEDIC SURGERY

## 2020-10-24 PROCEDURE — 6370000000 HC RX 637 (ALT 250 FOR IP): Performed by: PHYSICIAN ASSISTANT

## 2020-10-24 PROCEDURE — 82607 VITAMIN B-12: CPT

## 2020-10-24 PROCEDURE — 80048 BASIC METABOLIC PNL TOTAL CA: CPT

## 2020-10-24 PROCEDURE — 73610 X-RAY EXAM OF ANKLE: CPT

## 2020-10-24 PROCEDURE — 2720000010 HC SURG SUPPLY STERILE: Performed by: ORTHOPAEDIC SURGERY

## 2020-10-24 PROCEDURE — 2580000003 HC RX 258: Performed by: PHYSICIAN ASSISTANT

## 2020-10-24 PROCEDURE — 94664 DEMO&/EVAL PT USE INHALER: CPT

## 2020-10-24 PROCEDURE — 3600000004 HC SURGERY LEVEL 4 BASE: Performed by: ORTHOPAEDIC SURGERY

## 2020-10-24 PROCEDURE — 83735 ASSAY OF MAGNESIUM: CPT

## 2020-10-24 PROCEDURE — 85027 COMPLETE CBC AUTOMATED: CPT

## 2020-10-24 DEVICE — LOCKING SCREW
Type: IMPLANTABLE DEVICE | Site: ANKLE | Status: FUNCTIONAL
Brand: VARIAX

## 2020-10-24 DEVICE — BONE SCREW
Type: IMPLANTABLE DEVICE | Site: ANKLE | Status: FUNCTIONAL
Brand: VARIAX

## 2020-10-24 DEVICE — DISTAL LATERAL FIBULA PLATE, 6 HOLE
Type: IMPLANTABLE DEVICE | Site: ANKLE | Status: FUNCTIONAL
Brand: VARIAX

## 2020-10-24 RX ORDER — ASPIRIN 81 MG/1
81 TABLET ORAL 2 TIMES DAILY
Status: DISCONTINUED | OUTPATIENT
Start: 2020-10-24 | End: 2020-10-27

## 2020-10-24 RX ORDER — IPRATROPIUM BROMIDE AND ALBUTEROL SULFATE 2.5; .5 MG/3ML; MG/3ML
1 SOLUTION RESPIRATORY (INHALATION)
Status: DISCONTINUED | OUTPATIENT
Start: 2020-10-24 | End: 2020-11-01 | Stop reason: HOSPADM

## 2020-10-24 RX ORDER — DICYCLOMINE HYDROCHLORIDE 10 MG/1
20 CAPSULE ORAL
Status: DISCONTINUED | OUTPATIENT
Start: 2020-10-24 | End: 2020-10-30

## 2020-10-24 RX ORDER — CELECOXIB 100 MG/1
200 CAPSULE ORAL DAILY
Status: DISCONTINUED | OUTPATIENT
Start: 2020-10-24 | End: 2020-10-25

## 2020-10-24 RX ORDER — SODIUM CHLORIDE 9 MG/ML
INJECTION, SOLUTION INTRAVENOUS CONTINUOUS
Status: DISCONTINUED | OUTPATIENT
Start: 2020-10-24 | End: 2020-10-27

## 2020-10-24 RX ORDER — BUDESONIDE 0.25 MG/2ML
250 INHALANT ORAL 2 TIMES DAILY
Status: DISCONTINUED | OUTPATIENT
Start: 2020-10-24 | End: 2020-11-01 | Stop reason: HOSPADM

## 2020-10-24 RX ORDER — ACETAMINOPHEN 650 MG/1
650 SUPPOSITORY RECTAL EVERY 6 HOURS PRN
Status: DISCONTINUED | OUTPATIENT
Start: 2020-10-24 | End: 2020-11-01 | Stop reason: HOSPADM

## 2020-10-24 RX ORDER — DEXTROSE MONOHYDRATE 25 G/50ML
12.5 INJECTION, SOLUTION INTRAVENOUS PRN
Status: DISCONTINUED | OUTPATIENT
Start: 2020-10-24 | End: 2020-11-01 | Stop reason: HOSPADM

## 2020-10-24 RX ORDER — NICOTINE POLACRILEX 4 MG
15 LOZENGE BUCCAL PRN
Status: DISCONTINUED | OUTPATIENT
Start: 2020-10-24 | End: 2020-11-01 | Stop reason: HOSPADM

## 2020-10-24 RX ORDER — MORPHINE SULFATE 2 MG/ML
2 INJECTION, SOLUTION INTRAMUSCULAR; INTRAVENOUS EVERY 4 HOURS PRN
Status: DISCONTINUED | OUTPATIENT
Start: 2020-10-24 | End: 2020-10-26

## 2020-10-24 RX ORDER — LATANOPROST 50 UG/ML
1 SOLUTION/ DROPS OPHTHALMIC NIGHTLY
Status: DISCONTINUED | OUTPATIENT
Start: 2020-10-24 | End: 2020-11-01 | Stop reason: HOSPADM

## 2020-10-24 RX ORDER — ROCURONIUM BROMIDE 10 MG/ML
INJECTION, SOLUTION INTRAVENOUS PRN
Status: DISCONTINUED | OUTPATIENT
Start: 2020-10-24 | End: 2020-10-24 | Stop reason: SDUPTHER

## 2020-10-24 RX ORDER — SODIUM CHLORIDE 0.9 % (FLUSH) 0.9 %
10 SYRINGE (ML) INJECTION EVERY 12 HOURS SCHEDULED
Status: DISCONTINUED | OUTPATIENT
Start: 2020-10-24 | End: 2020-11-01 | Stop reason: HOSPADM

## 2020-10-24 RX ORDER — ARFORMOTEROL TARTRATE 15 UG/2ML
15 SOLUTION RESPIRATORY (INHALATION) 2 TIMES DAILY
Status: DISCONTINUED | OUTPATIENT
Start: 2020-10-24 | End: 2020-11-01 | Stop reason: HOSPADM

## 2020-10-24 RX ORDER — SODIUM CHLORIDE 0.9 % (FLUSH) 0.9 %
10 SYRINGE (ML) INJECTION PRN
Status: DISCONTINUED | OUTPATIENT
Start: 2020-10-24 | End: 2020-10-24 | Stop reason: SDUPTHER

## 2020-10-24 RX ORDER — SUCCINYLCHOLINE CHLORIDE 20 MG/ML
INJECTION INTRAMUSCULAR; INTRAVENOUS PRN
Status: DISCONTINUED | OUTPATIENT
Start: 2020-10-24 | End: 2020-10-24 | Stop reason: SDUPTHER

## 2020-10-24 RX ORDER — ALBUTEROL SULFATE 2.5 MG/3ML
2.5 SOLUTION RESPIRATORY (INHALATION) EVERY 4 HOURS PRN
Status: DISCONTINUED | OUTPATIENT
Start: 2020-10-24 | End: 2020-10-24

## 2020-10-24 RX ORDER — BACLOFEN 10 MG/1
20 TABLET ORAL 3 TIMES DAILY
Status: DISCONTINUED | OUTPATIENT
Start: 2020-10-24 | End: 2020-10-31

## 2020-10-24 RX ORDER — SODIUM CHLORIDE 0.9 % (FLUSH) 0.9 %
10 SYRINGE (ML) INJECTION EVERY 12 HOURS SCHEDULED
Status: DISCONTINUED | OUTPATIENT
Start: 2020-10-24 | End: 2020-10-24 | Stop reason: SDUPTHER

## 2020-10-24 RX ORDER — OXYCODONE AND ACETAMINOPHEN 10; 325 MG/1; MG/1
1 TABLET ORAL EVERY 8 HOURS PRN
Status: DISCONTINUED | OUTPATIENT
Start: 2020-10-24 | End: 2020-10-25

## 2020-10-24 RX ORDER — ALBUTEROL SULFATE 90 UG/1
2 AEROSOL, METERED RESPIRATORY (INHALATION) PRN
Status: DISCONTINUED | OUTPATIENT
Start: 2020-10-24 | End: 2020-10-24 | Stop reason: CLARIF

## 2020-10-24 RX ORDER — PROMETHAZINE HYDROCHLORIDE 25 MG/ML
6.25 INJECTION, SOLUTION INTRAMUSCULAR; INTRAVENOUS
Status: DISCONTINUED | OUTPATIENT
Start: 2020-10-24 | End: 2020-10-24 | Stop reason: HOSPADM

## 2020-10-24 RX ORDER — LIDOCAINE HYDROCHLORIDE 20 MG/ML
INJECTION, SOLUTION EPIDURAL; INFILTRATION; INTRACAUDAL; PERINEURAL PRN
Status: DISCONTINUED | OUTPATIENT
Start: 2020-10-24 | End: 2020-10-24 | Stop reason: SDUPTHER

## 2020-10-24 RX ORDER — CLINDAMYCIN PHOSPHATE 900 MG/50ML
900 INJECTION INTRAVENOUS EVERY 8 HOURS
Status: COMPLETED | OUTPATIENT
Start: 2020-10-25 | End: 2020-10-25

## 2020-10-24 RX ORDER — DULOXETIN HYDROCHLORIDE 60 MG/1
60 CAPSULE, DELAYED RELEASE ORAL EVERY MORNING
Status: DISCONTINUED | OUTPATIENT
Start: 2020-10-24 | End: 2020-11-01 | Stop reason: HOSPADM

## 2020-10-24 RX ORDER — ATORVASTATIN CALCIUM 20 MG/1
20 TABLET, FILM COATED ORAL NIGHTLY
Status: DISCONTINUED | OUTPATIENT
Start: 2020-10-24 | End: 2020-10-27

## 2020-10-24 RX ORDER — HYDROXYCHLOROQUINE SULFATE 200 MG/1
200 TABLET, FILM COATED ORAL 2 TIMES DAILY
Status: DISCONTINUED | OUTPATIENT
Start: 2020-10-24 | End: 2020-11-01 | Stop reason: HOSPADM

## 2020-10-24 RX ORDER — ALBUTEROL SULFATE 90 UG/1
1 AEROSOL, METERED RESPIRATORY (INHALATION) EVERY 4 HOURS PRN
Status: DISCONTINUED | OUTPATIENT
Start: 2020-10-24 | End: 2020-10-24 | Stop reason: CLARIF

## 2020-10-24 RX ORDER — SODIUM CHLORIDE 0.9 % (FLUSH) 0.9 %
10 SYRINGE (ML) INJECTION PRN
Status: DISCONTINUED | OUTPATIENT
Start: 2020-10-24 | End: 2020-11-01 | Stop reason: HOSPADM

## 2020-10-24 RX ORDER — ACETAMINOPHEN 325 MG/1
650 TABLET ORAL EVERY 6 HOURS PRN
Status: DISCONTINUED | OUTPATIENT
Start: 2020-10-24 | End: 2020-11-01 | Stop reason: HOSPADM

## 2020-10-24 RX ORDER — POTASSIUM CHLORIDE 20 MEQ/1
40 TABLET, EXTENDED RELEASE ORAL PRN
Status: DISCONTINUED | OUTPATIENT
Start: 2020-10-24 | End: 2020-10-28

## 2020-10-24 RX ORDER — PREGABALIN 50 MG/1
100 CAPSULE ORAL 2 TIMES DAILY
Status: DISCONTINUED | OUTPATIENT
Start: 2020-10-24 | End: 2020-10-31

## 2020-10-24 RX ORDER — VERAPAMIL HYDROCHLORIDE 240 MG/1
240 TABLET, FILM COATED, EXTENDED RELEASE ORAL EVERY MORNING
Status: DISCONTINUED | OUTPATIENT
Start: 2020-10-24 | End: 2020-11-01 | Stop reason: HOSPADM

## 2020-10-24 RX ORDER — SODIUM CHLORIDE 9 MG/ML
INJECTION, SOLUTION INTRAVENOUS CONTINUOUS PRN
Status: DISCONTINUED | OUTPATIENT
Start: 2020-10-24 | End: 2020-10-24 | Stop reason: SDUPTHER

## 2020-10-24 RX ORDER — NEOSTIGMINE METHYLSULFATE 1 MG/ML
INJECTION, SOLUTION INTRAVENOUS PRN
Status: DISCONTINUED | OUTPATIENT
Start: 2020-10-24 | End: 2020-10-24 | Stop reason: SDUPTHER

## 2020-10-24 RX ORDER — DEXTROSE MONOHYDRATE 50 MG/ML
100 INJECTION, SOLUTION INTRAVENOUS PRN
Status: DISCONTINUED | OUTPATIENT
Start: 2020-10-24 | End: 2020-11-01 | Stop reason: HOSPADM

## 2020-10-24 RX ORDER — CLINDAMYCIN PHOSPHATE 900 MG/50ML
900 INJECTION INTRAVENOUS ONCE
Status: COMPLETED | OUTPATIENT
Start: 2020-10-24 | End: 2020-10-24

## 2020-10-24 RX ORDER — POTASSIUM CHLORIDE 7.45 MG/ML
10 INJECTION INTRAVENOUS PRN
Status: DISCONTINUED | OUTPATIENT
Start: 2020-10-24 | End: 2020-10-28

## 2020-10-24 RX ORDER — HYDROCODONE BITARTRATE AND ACETAMINOPHEN 5; 325 MG/1; MG/1
1 TABLET ORAL EVERY 4 HOURS PRN
Qty: 42 TABLET | Refills: 0 | Status: SHIPPED | OUTPATIENT
Start: 2020-10-24 | End: 2020-10-31

## 2020-10-24 RX ORDER — SENNA AND DOCUSATE SODIUM 50; 8.6 MG/1; MG/1
1 TABLET, FILM COATED ORAL 2 TIMES DAILY
Status: DISCONTINUED | OUTPATIENT
Start: 2020-10-24 | End: 2020-11-01 | Stop reason: HOSPADM

## 2020-10-24 RX ORDER — THEOPHYLLINE 300 MG/1
300 TABLET, EXTENDED RELEASE ORAL EVERY MORNING
Status: DISCONTINUED | OUTPATIENT
Start: 2020-10-24 | End: 2020-10-26

## 2020-10-24 RX ORDER — FENTANYL CITRATE 50 UG/ML
INJECTION, SOLUTION INTRAMUSCULAR; INTRAVENOUS PRN
Status: DISCONTINUED | OUTPATIENT
Start: 2020-10-24 | End: 2020-10-24 | Stop reason: SDUPTHER

## 2020-10-24 RX ORDER — OXYBUTYNIN CHLORIDE 10 MG/1
10 TABLET, EXTENDED RELEASE ORAL 2 TIMES DAILY
Status: DISCONTINUED | OUTPATIENT
Start: 2020-10-24 | End: 2020-10-30

## 2020-10-24 RX ORDER — GLYCOPYRROLATE 0.2 MG/ML
INJECTION INTRAMUSCULAR; INTRAVENOUS PRN
Status: DISCONTINUED | OUTPATIENT
Start: 2020-10-24 | End: 2020-10-24 | Stop reason: SDUPTHER

## 2020-10-24 RX ADMIN — IPRATROPIUM BROMIDE AND ALBUTEROL SULFATE 1 AMPULE: 2.5; .5 SOLUTION RESPIRATORY (INHALATION) at 20:24

## 2020-10-24 RX ADMIN — LATANOPROST 1 DROP: 50 SOLUTION OPHTHALMIC at 23:04

## 2020-10-24 RX ADMIN — OXYCODONE AND ACETAMINOPHEN 1 TABLET: 10; 325 TABLET ORAL at 21:10

## 2020-10-24 RX ADMIN — PREGABALIN 100 MG: 50 CAPSULE ORAL at 21:43

## 2020-10-24 RX ADMIN — ARFORMOTEROL TARTRATE 15 MCG: 15 SOLUTION RESPIRATORY (INHALATION) at 20:24

## 2020-10-24 RX ADMIN — FENTANYL CITRATE 50 MCG: 50 INJECTION, SOLUTION INTRAMUSCULAR; INTRAVENOUS at 17:29

## 2020-10-24 RX ADMIN — BACLOFEN 20 MG: 10 TABLET ORAL at 21:43

## 2020-10-24 RX ADMIN — ARFORMOTEROL TARTRATE 15 MCG: 15 SOLUTION RESPIRATORY (INHALATION) at 12:41

## 2020-10-24 RX ADMIN — Medication 3 MG: at 17:14

## 2020-10-24 RX ADMIN — ASPIRIN 81 MG: 81 TABLET, COATED ORAL at 21:44

## 2020-10-24 RX ADMIN — BUDESONIDE 250 MCG: 0.25 SUSPENSION RESPIRATORY (INHALATION) at 20:24

## 2020-10-24 RX ADMIN — BUDESONIDE 250 MCG: 0.25 SUSPENSION RESPIRATORY (INHALATION) at 12:41

## 2020-10-24 RX ADMIN — GLYCOPYRROLATE 0.6 MG: 0.2 INJECTION INTRAMUSCULAR; INTRAVENOUS at 17:14

## 2020-10-24 RX ADMIN — MORPHINE SULFATE 2 MG: 2 INJECTION, SOLUTION INTRAMUSCULAR; INTRAVENOUS at 10:42

## 2020-10-24 RX ADMIN — HYDROXYCHLOROQUINE SULFATE 200 MG: 200 TABLET ORAL at 21:43

## 2020-10-24 RX ADMIN — FENTANYL CITRATE 50 MCG: 50 INJECTION, SOLUTION INTRAMUSCULAR; INTRAVENOUS at 15:58

## 2020-10-24 RX ADMIN — VERAPAMIL HYDROCHLORIDE 240 MG: 240 TABLET, FILM COATED, EXTENDED RELEASE ORAL at 09:40

## 2020-10-24 RX ADMIN — Medication 10 ML: at 12:54

## 2020-10-24 RX ADMIN — DOCUSATE SODIUM AND SENNOSIDES 1 TABLET: 8.6; 5 TABLET, FILM COATED ORAL at 21:44

## 2020-10-24 RX ADMIN — SUCCINYLCHOLINE CHLORIDE 140 MG: 20 INJECTION, SOLUTION INTRAMUSCULAR; INTRAVENOUS at 15:51

## 2020-10-24 RX ADMIN — IPRATROPIUM BROMIDE AND ALBUTEROL SULFATE 1 AMPULE: 2.5; .5 SOLUTION RESPIRATORY (INHALATION) at 12:41

## 2020-10-24 RX ADMIN — SODIUM CHLORIDE: 9 INJECTION, SOLUTION INTRAVENOUS at 21:20

## 2020-10-24 RX ADMIN — FENTANYL CITRATE 50 MCG: 50 INJECTION, SOLUTION INTRAMUSCULAR; INTRAVENOUS at 15:42

## 2020-10-24 RX ADMIN — HYDROMORPHONE HYDROCHLORIDE 0.5 MG: 1 INJECTION, SOLUTION INTRAMUSCULAR; INTRAVENOUS; SUBCUTANEOUS at 18:05

## 2020-10-24 RX ADMIN — Medication 10 ML: at 09:45

## 2020-10-24 RX ADMIN — SODIUM CHLORIDE: 9 INJECTION, SOLUTION INTRAVENOUS at 15:43

## 2020-10-24 RX ADMIN — LIDOCAINE HYDROCHLORIDE 60 MG: 20 INJECTION, SOLUTION EPIDURAL; INFILTRATION; INTRACAUDAL; PERINEURAL at 15:51

## 2020-10-24 RX ADMIN — FENTANYL CITRATE 50 MCG: 50 INJECTION, SOLUTION INTRAMUSCULAR; INTRAVENOUS at 15:51

## 2020-10-24 RX ADMIN — CLINDAMYCIN IN 5 PERCENT DEXTROSE 900 MG: 18 INJECTION, SOLUTION INTRAVENOUS at 15:42

## 2020-10-24 RX ADMIN — ROCURONIUM BROMIDE 30 MG: 10 INJECTION, SOLUTION INTRAVENOUS at 16:04

## 2020-10-24 ASSESSMENT — PULMONARY FUNCTION TESTS
PIF_VALUE: 28
PIF_VALUE: 24
PIF_VALUE: 25
PIF_VALUE: 1
PIF_VALUE: 28
PIF_VALUE: 21
PIF_VALUE: 24
PIF_VALUE: 24
PIF_VALUE: 28
PIF_VALUE: 26
PIF_VALUE: 24
PIF_VALUE: 30
PIF_VALUE: 28
PIF_VALUE: 23
PIF_VALUE: 28
PIF_VALUE: 24
PIF_VALUE: 3
PIF_VALUE: 28
PIF_VALUE: 24
PIF_VALUE: 28
PIF_VALUE: 29
PIF_VALUE: 28
PIF_VALUE: 28
PIF_VALUE: 24
PIF_VALUE: 30
PIF_VALUE: 24
PIF_VALUE: 24
PIF_VALUE: 28
PIF_VALUE: 19
PIF_VALUE: 29
PIF_VALUE: 24
PIF_VALUE: 7
PIF_VALUE: 24
PIF_VALUE: 26
PIF_VALUE: 0
PIF_VALUE: 28
PIF_VALUE: 22
PIF_VALUE: 28
PIF_VALUE: 23
PIF_VALUE: 1
PIF_VALUE: 1
PIF_VALUE: 28
PIF_VALUE: 0
PIF_VALUE: 30
PIF_VALUE: 28
PIF_VALUE: 2
PIF_VALUE: 28
PIF_VALUE: 28
PIF_VALUE: 0
PIF_VALUE: 24
PIF_VALUE: 28
PIF_VALUE: 24
PIF_VALUE: 16
PIF_VALUE: 28
PIF_VALUE: 0
PIF_VALUE: 24
PIF_VALUE: 2
PIF_VALUE: 28
PIF_VALUE: 24
PIF_VALUE: 2
PIF_VALUE: 28
PIF_VALUE: 24
PIF_VALUE: 23
PIF_VALUE: 15
PIF_VALUE: 28
PIF_VALUE: 28
PIF_VALUE: 24
PIF_VALUE: 1
PIF_VALUE: 24
PIF_VALUE: 24
PIF_VALUE: 23
PIF_VALUE: 24
PIF_VALUE: 31
PIF_VALUE: 24
PIF_VALUE: 0
PIF_VALUE: 3
PIF_VALUE: 23
PIF_VALUE: 1
PIF_VALUE: 24
PIF_VALUE: 28
PIF_VALUE: 28
PIF_VALUE: 24
PIF_VALUE: 23
PIF_VALUE: 2
PIF_VALUE: 24
PIF_VALUE: 28
PIF_VALUE: 24
PIF_VALUE: 1
PIF_VALUE: 0
PIF_VALUE: 28
PIF_VALUE: 23
PIF_VALUE: 24
PIF_VALUE: 24
PIF_VALUE: 3
PIF_VALUE: 24
PIF_VALUE: 24
PIF_VALUE: 28
PIF_VALUE: 28
PIF_VALUE: 22
PIF_VALUE: 24
PIF_VALUE: 28
PIF_VALUE: 24
PIF_VALUE: 19
PIF_VALUE: 23

## 2020-10-24 ASSESSMENT — PAIN DESCRIPTION - PAIN TYPE
TYPE: SURGICAL PAIN
TYPE: SURGICAL PAIN
TYPE: ACUTE PAIN
TYPE: SURGICAL PAIN

## 2020-10-24 ASSESSMENT — PAIN DESCRIPTION - LOCATION
LOCATION: ANKLE

## 2020-10-24 ASSESSMENT — PAIN DESCRIPTION - DESCRIPTORS
DESCRIPTORS: ACHING;DISCOMFORT;SORE
DESCRIPTORS: DISCOMFORT
DESCRIPTORS: SORE;ACHING;DISCOMFORT
DESCRIPTORS: DISCOMFORT

## 2020-10-24 ASSESSMENT — PAIN DESCRIPTION - PROGRESSION
CLINICAL_PROGRESSION: GRADUALLY WORSENING
CLINICAL_PROGRESSION: GRADUALLY IMPROVING
CLINICAL_PROGRESSION: GRADUALLY WORSENING

## 2020-10-24 ASSESSMENT — PAIN DESCRIPTION - ONSET
ONSET: GRADUAL
ONSET: ON-GOING
ONSET: ON-GOING
ONSET: GRADUAL

## 2020-10-24 ASSESSMENT — PAIN DESCRIPTION - ORIENTATION
ORIENTATION: RIGHT

## 2020-10-24 ASSESSMENT — PAIN SCALES - GENERAL
PAINLEVEL_OUTOF10: 4
PAINLEVEL_OUTOF10: 4
PAINLEVEL_OUTOF10: 0
PAINLEVEL_OUTOF10: 4
PAINLEVEL_OUTOF10: 7
PAINLEVEL_OUTOF10: 0
PAINLEVEL_OUTOF10: 3
PAINLEVEL_OUTOF10: 7
PAINLEVEL_OUTOF10: 0
PAINLEVEL_OUTOF10: 0
PAINLEVEL_OUTOF10: 7
PAINLEVEL_OUTOF10: 7

## 2020-10-24 ASSESSMENT — PAIN - FUNCTIONAL ASSESSMENT
PAIN_FUNCTIONAL_ASSESSMENT: PREVENTS OR INTERFERES SOME ACTIVE ACTIVITIES AND ADLS
PAIN_FUNCTIONAL_ASSESSMENT: PREVENTS OR INTERFERES WITH MANY ACTIVE NOT PASSIVE ACTIVITIES
PAIN_FUNCTIONAL_ASSESSMENT: 0-10

## 2020-10-24 ASSESSMENT — PAIN DESCRIPTION - FREQUENCY
FREQUENCY: CONTINUOUS

## 2020-10-24 NOTE — ANESTHESIA POSTPROCEDURE EVALUATION
Department of Anesthesiology  Postprocedure Note    Patient: Patito Guevara  MRN: [id number]  [place]: 1945  Date of evaluation: 10/24/2020  Time:  6:22 PM     Procedure Summary     Date:  10/24/20 Room / Location:  [facility] 01 / [facility]    Anesthesia Start:  3322 Anesthesia Stop:  1329    Procedure:  RIGHT ANKLE OPEN REDUCTION INTERNAL FIXATION (Right ) Diagnosis:  (RIGHT ANKLE FRACTURE)    Surgeon:  Pal Alvarez MD Responsible Provider:  Severo Loser, MD    Anesthesia Type:  general ASA Status:  3          Anesthesia Type: general    Sharon Phase I: Sharon Score: 9    Sharon Phase II:      Last vitals: Reviewed and per EMR flowsheets.        Anesthesia Post Evaluation    Patient location during evaluation: PACU  Patient participation: complete - patient participated  Level of consciousness: awake and alert  Pain score: 4  Airway patency: patent  Nausea & Vomiting: no vomiting and no nausea  Complications: no  Cardiovascular status: hemodynamically stable  Respiratory status: spontaneous ventilation  Hydration status: stable

## 2020-10-24 NOTE — PROGRESS NOTES
Patient fell and fractured her right fibula and was  Transferred to [facility] from [facility]. Patient remained in good condition with stable vitals in the ER at [facility]. Dr Aidan Núñez was consulted and agreed to be consulted on the patient in [facility].

## 2020-10-24 NOTE — PROGRESS NOTES
Right ankle fracture  Needs clearance for surgery  NPO  NWB right leg  Likely surgery today with [facility]

## 2020-10-24 NOTE — ANESTHESIA PRE PROCEDURE
Breath 10/24/17   Trent Abdi MD   docusate sodium (COLACE) 100 MG capsule Take 100 mg by mouth daily    Historical Provider, MD   baclofen (LIORESAL) 20 MG tablet Take 20 mg by mouth 3 times daily    Historical Provider, MD   aspirin 81 MG EC tablet Take 1 tablet by mouth 2 times daily 7/19/17   Ailin Every, MD   atorvastatin (LIPITOR) 20 MG tablet Take 1 tablet by mouth nightly 7/19/17   Ailin Every, MD   metoprolol tartrate (LOPRESSOR) 25 MG tablet Take 1 tablet by mouth 2 times daily 7/19/17   Ailin Every, MD   latanoprost (XALATAN) 0.005 % ophthalmic solution Place 1 drop into both eyes nightly 2/9/17   Rhonda Christina MD   metFORMIN (GLUCOPHAGE) 1000 MG tablet Take 500 mg by mouth 2 times daily (with meals)     Historical Provider, MD   theophylline CR, 12 hour, (THEOCHRON) 300 MG CR tablet Take 300 mg by mouth every morning     Historical Provider, MD   Albuterol Sulfate (PROAIR HFA IN) Inhale 1 puff into the lungs as needed Use am dos, if needed and brin    Historical Provider, MD   multivitamin [facility]) per tablet Take 1 tablet by mouth every morning     Historical Provider, MD       Current medications:    Current Facility-Administered Medications   Medication Dose Route Frequency Provider Last Rate Last Dose    aspirin EC tablet 81 mg  81 mg Oral BID Donna Deeds, PA        atorvastatin (LIPITOR) tablet 20 mg  20 mg Oral Nightly Donna Deeds, PA        baclofen (LIORESAL) tablet 20 mg  20 mg Oral TID Donna Deeds, PA        celecoxib (CELEBREX) capsule 200 mg  200 mg Oral Daily Melba M Darrion, PA        dicyclomine (BENTYL) capsule 20 mg  20 mg Oral 4x Daily AC & HS Melba Jodene Brood, PA        DULoxetine (CYMBALTA) extended release capsule 60 mg  60 mg Oral QAM Donna Deeds, PA        hydroxychloroquine (PLAQUENIL) tablet 200 mg  200 mg Oral BID Melba M Darrion, PA        latanoprost (XALATAN) 0.005 % ophthalmic solution 1 drop  1 drop Both Eyes Nightly Kaycee Spoon M [place], [place]        metoprolol tartrate (LOPRESSOR) tablet 25 mg  25 mg Oral BID JUAN Edwards        oxybutynin (DITROPAN-XL) extended release tablet 10 mg  10 mg Oral BID JUAN Edwards   Stopped at 10/24/20 0944    oxyCODONE-acetaminophen (PERCOCET)  MG per tablet 1 tablet  1 tablet Oral Q8H PRN JUAN Edwards        pregabalin (LYRICA) capsule 100 mg  100 mg Oral BID JUAN Edwards        theophylline [place]) extended release tablet 300 mg  300 mg Oral QAM JUAN Edwards        verapamil (CALAN SR) extended release tablet 240 mg  240 mg Oral QAM JUAN Edwards   240 mg at 10/24/20 0940    morphine (PF) injection 2 mg  2 mg Intravenous Q4H PRN JUAN Edwards   2 mg at 10/24/20 1042    sodium chloride flush 0.9 % injection 10 mL  10 mL Intravenous 2 times per day JUAN Edwards   10 mL at 10/24/20 0945    sodium chloride flush 0.9 % injection 10 mL  10 mL Intravenous PRN JUAN Edwards   10 mL at 10/24/20 1254    potassium chloride (KLOR-CON M) extended release tablet 40 mEq  40 mEq Oral PRN JUAN Edwards        Or    potassium bicarb-citric acid (EFFER-K) effervescent tablet 40 mEq  40 mEq Oral PRN JUAN Edwards        Or    potassium chloride 10 mEq/100 mL IVPB (Peripheral Line)  10 mEq Intravenous PRN JUAN Edwards        acetaminophen (TYLENOL) tablet 650 mg  650 mg Oral Q6H PRN JUAN Edwards        Or    acetaminophen (TYLENOL) suppository 650 mg  650 mg Rectal Q6H PRN JUAN Edwards        magnesium hydroxide (MILK OF MAGNESIA) 400 MG/5ML suspension 30 mL  30 mL Oral Daily PRN JUAN Edwards        enoxaparin (LOVENOX) injection 40 mg  40 mg Subcutaneous Daily JUAN Edwards        insulin lispro (HUMALOG) injection vial 0-6 Units  0-6 Units Subcutaneous TID  JUAN Ceballos        insulin lispro (HUMALOG) injection vial 0-3 Units  0-3 Units Subcutaneous Nightly JUAN Edwards        glucose (GLUTOSE) 40 % oral gel 15 g  15 g Oral PRN Antonette Rasp, PA        dextrose 50 % IV solution  12.5 g Intravenous PRN Antonette Rasp, PA        glucagon (rDNA) injection 1 mg  1 mg Intramuscular PRN Antonette Rasp, PA        dextrose 5 % solution  100 mL/hr Intravenous PRN Antonette Rasp, PA        trimethobenzamide (TIGAN) injection 200 mg  200 mg Intramuscular Q6H PRN Antonette Rasp, PA        clindamycin (CLEOCIN) 900 mg in dextrose 5 % 50 mL IVPB  900 mg Intravenous Once Callie Lakhani MD        Arformoterol Tartrate [facility]) nebulizer solution 15 mcg  15 mcg Nebulization BID [place] Alex Mihok, DO   15 mcg at 10/24/20 1241    budesonide (PULMICORT) nebulizer suspension 250 mcg  250 mcg Nebulization BID [employer] Alex Mihok, DO   250 mcg at 10/24/20 1241    ipratropium-albuterol (DUONEB) nebulizer solution 1 ampule  1 ampule Inhalation Q4H [place] Jasper HERNANDEZ Mitaylak, DO   1 ampule at 10/24/20 1241       Allergies: Allergies   Allergen Reactions    Iodides Shortness Of Breath     CT Scan Dye (\"turned purple\")    Iodine Shortness Of Breath     Had trouble breathing and Skin color turned purple and stayed that way for  Hours.     Other Shortness Of Breath     Perfumes and smoke    Pcn [Penicillins] Itching       Problem List:    Patient Active Problem List   Diagnosis Code    Essential hypertension I10    PAULINA (obstructive sleep apnea) G47.33    Midline low back pain with right-sided sciatica M54.41    Morbid obesity with BMI of 40.0-44.9, adult ([facility]) E66.01, Z68.41    CVA (cerebral vascular accident) ([facility] [address] I63.9    Urinary incontinence R32    Altered mental status R41.82    Closed fracture fibula, head, right, initial encounter S82.831A    Diabetes mellitus ([facility] [address] E11.9    Neurogenic bladder N31.9    Sarcoidosis D86.9    Sleep apnea G47.30    Diffuse cerebral atrophy (HCC) G31.9    Asthma J45.909    Diabetic peripheral neuropathy (HCC) E11.42    Fibromyalgia M79.7    CKD (chronic

## 2020-10-24 NOTE — PROGRESS NOTES
[facility] Quality Flow/Interdisciplinary Rounds Progress Note        Quality Flow Rounds held on October 24, 2020    Disciplines Attending:  Bedside Nurse, ,  and Nursing Unit Leadership    Nevaeh Rader was admitted on 10/24/2020 12:34 AM    Anticipated Discharge Date:  Expected Discharge Date: 10/27/20    Disposition:    Jeremy Score:  Jeremy Scale Score: 19    Readmission Risk              Risk of Unplanned Readmission:        8           Discussed patient goal for the day, patient clinical progression, and barriers to discharge.   The following Goal(s) of the Day/Commitment(s) have been identified:  Pain control    [facility]  October 24, 2020

## 2020-10-24 NOTE — PROGRESS NOTES
[place] was ordered theophylline [place]) which is a nonformulary medication. The patient has indicated that the home supply of this medication will be brought in to the hospital for inpatient use. If the medication has not been administered by 1400 on the following day from the time the order was placed, a pharmacist will follow-up with the nurse of the patient to assess the capability of the patient to bring in the medication. If it is determined that the patient cannot supply the medication and it is not available to be dispensed from the pharmacy, a call will be placed to the ordering provider to discuss alternative options.     MARIAMA [facility]  10/24/2020  4:48 AM

## 2020-10-24 NOTE — PROGRESS NOTES
1743 admitted to pacu right foot dressing dry and intact , right foot elevated on pillows toes pink.  Warm  1755 x-rays done in pacu post op of left foot

## 2020-10-24 NOTE — PROGRESS NOTES
Patient requesting nursing staff to call Dr. Faustina Sutton because she has seen him in the past. Patient requesting to ask Dr. Faustina Sutton if he could do surgery today and if not she will stay with Dr. Abad Ornelas. Call placed to Dr. Keysha Wen answering service, awaiting call back.

## 2020-10-24 NOTE — CONSULTS
Consult Note      REASON FOR CONSULTATION: Medical management; clearance for surgery    HISTORY OF PRESENT ILLNESS: 70-year-old female patient of Dr. Elda Pride I am asked to manage medically and clear for surgery right ankle. Patient states she was visiting the [facility] area; had to present to the bathroom. Patient states following the above she stood but her legs \"gave way\" and she landed on the toilet seat with her buttocks however her right ankle became twisted beneath her. She presented to the [facility]; she requested transfer to this institution for corrective treatment. She is now scheduled for surgery approximately 1400 hrs. Today. --Patient states he has a history of asthma since  but seldom uses any nebulizer/inhaler for the last few years  --Patient did have previous CVA in 2017; still left with mild expressive aphasia; MRI revealed multiple infarctions likely embolic  --Patient diagnosed with chronic kidney disease stage IV 2017; she follows with local nephrologist Dr. Remedios John. Patient states she was never told to stop her Celebrex  --History of diabetic peripheral neuropathy   --History of sarcoidosis   --Chest pain which occurred in ; prompted stress test which was abnormal.  She then underwent cardiac catheterization which showed minimal disease in 2 vessels no intervention at that time. She denies any recent chest pain tightness shortness of breath  --She underwent right ASHTYN in 2016.   She was hospitalized seen by ID and believed to have a superficial right hip wound infection; incision and drainage with irrigation of seroma; placed on vancomycin and cefepime 2 to 3 weeks IV.  --Mother  80 pancreatic cancer; father  at 80 with Parkinson's  --She was in her normal state of health and felt well until the right ankle fracture  --X-ray done today with following results         Impression:          Mildly displaced distal fibular fracture involving the distal diaphysis   extending into the metaphysis. No other acute fracture is seen.         Past Medical History:   Diagnosis Date    Asthma 1993    Cerebral artery occlusion with cerebral infarction ([facility] [address] 07/2017    CKD (chronic kidney disease) stage 4, GFR 15-29 ml/min ([facility]) 2017    Degenerative disc disease     Degenerative joint disease     Diabetes mellitus ([facility] [address] 2008    Diabetic peripheral neuropathy ([facility] [address] 1998    Diffuse cerebral atrophy ([facility] [address] 2012    Fibromyalgia 01/2012    [facility]    Glaucoma     HX OTHER MEDICAL 02/2017    right hip wound dehiscence; using rollator    Hypertension     Neurogenic bladder     Neuropathy     PONV (postoperative nausea and vomiting)     had trouble  waking up with past foot surgery    Sarcoidosis 1993    Sleep apnea 2009    cpap; SEVERE    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\"           Past Surgical History:   Procedure Laterality Date    BACK SURGERY      laminectomy l3-4    BREAST SURGERY      biopsy    CARDIAC CATHETERIZATION  2012    MINIMAL CAD    CHOLECYSTECTOMY      COLONOSCOPY  08/2019    TUBULAR ADENOMA x 2    FOOT SURGERY Bilateral     right foot fracture; left foot charcot    HYSTERECTOMY      JOINT REPLACEMENT  12/16/2016    right hip arthroplasty    OTHER SURGICAL HISTORY Right 02/06/2017    I & D right hip wound vaccum placement    TOTAL KNEE ARTHROPLASTY Bilateral        Medications Prior to Admission:    Medications Prior to Admission: celecoxib (CELEBREX) 200 MG capsule, Take 200 mg by mouth daily  oxyCODONE-acetaminophen (PERCOCET)  MG per tablet, TAKE ONE TABLET BY MOUTH EVERY 8 HOURS AS NEEDED *PHARMACY RECOMMENDS MAX OF 3 GRAMS OF TYLENOL PER 24 HOURS*  pregabalin (LYRICA) 100 MG capsule, TAKE 1 CAPSULE BY MOUTH 3 TIMES A DAY SENT 14 DAY SUPPLY: **RE-ORDER ACCORDINGLY** (Patient taking differently: 100 mg 2 times daily TAKE 1 CAPSULE BY MOUTH 3 TIMES A DAY SENT 14 DAY SUPPLY: **RE-ORDER ACCORDINGLY**)  oxybutynin (DITROPAN-XL) 10 MG extended release tablet, Take 1 tablet by mouth 2 times daily  hydroxychloroquine (PLAQUENIL) 200 MG tablet, Take 200 mg by mouth 2 times daily   verapamil (CALAN SR) 240 MG extended release tablet, Take 240 mg by mouth every morning 240 mg in the Am and 120 mg Nightly  DULoxetine (CYMBALTA) 60 MG capsule, Take 60 mg by mouth every morning   dicyclomine (BENTYL) 10 MG capsule, Take 2 capsules by mouth 4 times daily (before meals and nightly)  acetaminophen (TYLENOL) 500 MG tablet, Take 2 tablets by mouth 3 times daily for 7 days  albuterol sulfate HFA (PROAIR HFA) 108 (90 Base) MCG/ACT inhaler, Inhale 1 puff into the lungs every 4 hours as needed for Wheezing or Shortness of Breath  docusate sodium (COLACE) 100 MG capsule, Take 100 mg by mouth daily  baclofen (LIORESAL) 20 MG tablet, Take 20 mg by mouth 3 times daily  aspirin 81 MG EC tablet, Take 1 tablet by mouth 2 times daily  atorvastatin (LIPITOR) 20 MG tablet, Take 1 tablet by mouth nightly  metoprolol tartrate (LOPRESSOR) 25 MG tablet, Take 1 tablet by mouth 2 times daily  latanoprost (XALATAN) 0.005 % ophthalmic solution, Place 1 drop into both eyes nightly  metFORMIN (GLUCOPHAGE) 1000 MG tablet, Take 500 mg by mouth 2 times daily (with meals)   theophylline CR, 12 hour, (THEOCHRON) 300 MG CR tablet, Take 300 mg by mouth every morning   Albuterol Sulfate (PROAIR HFA IN), Inhale 1 puff into the lungs as needed Use am dos, if needed and brin  multivitamin (THERAGRAN) per tablet, Take 1 tablet by mouth every morning     Allergies: Iodides; Iodine; Other; and Pcn [penicillins]    Social History:    reports that she has never smoked. She has never used smokeless tobacco. She reports that she does not drink alcohol or use drugs. Family History:   family history includes Cancer in her brother and mother; Diabetes in her maternal grandfather; Parkinsonism in her father.     REVIEW OF SYSTEMS:  As BASOSABS 0.07 01/28/2020     Hemoglobin/Hematocrit:    Lab Results   Component Value Date    HGB 13.3 01/28/2020    HCT 42.2 01/28/2020     CMP:    Lab Results   Component Value Date     01/28/2020    K 3.9 01/28/2020     01/28/2020    CO2 26 01/28/2020    BUN 32 01/28/2020    CREATININE 1.8 01/28/2020    GFRAA 33 01/28/2020    LABGLOM 27 01/28/2020    GLUCOSE 158 01/28/2020    GLUCOSE 82 12/09/2011    PROT 7.1 01/28/2020    LABALBU 3.7 01/28/2020    CALCIUM 8.7 01/28/2020    BILITOT 0.4 01/28/2020    ALKPHOS 167 01/28/2020    AST 26 01/28/2020    ALT 27 01/28/2020     BMP:    Lab Results   Component Value Date     01/28/2020    K 3.9 01/28/2020     01/28/2020    CO2 26 01/28/2020    BUN 32 01/28/2020    LABALBU 3.7 01/28/2020    CREATININE 1.8 01/28/2020    CALCIUM 8.7 01/28/2020    GFRAA 33 01/28/2020    LABGLOM 27 01/28/2020    GLUCOSE 158 01/28/2020    GLUCOSE 82 12/09/2011     Hepatic Function Panel:    Lab Results   Component Value Date    ALKPHOS 167 01/28/2020    ALT 27 01/28/2020    AST 26 01/28/2020    PROT 7.1 01/28/2020    BILITOT 0.4 01/28/2020    LABALBU 3.7 01/28/2020     Magnesium:    Lab Results   Component Value Date    MG 2.1 04/02/2018     Phosphorus:    Lab Results   Component Value Date    PHOS 4.3 03/19/2018     Uric Acid:    Lab Results   Component Value Date    LABURIC 8.1 03/19/2018     PT/INR:    Lab Results   Component Value Date    PROTIME 24.8 04/30/2018    PROTIME 12.0 12/09/2011    INR 2.2 04/30/2018     Warfarin PT/INR:  No components found for: PTPATWAR, PTINRWAR  U/A:    Lab Results   Component Value Date    COLORU Yellow 03/19/2018    PROTEINU Negative 03/19/2018    PHUR 6.0 03/19/2018    WBCUA 10-20 03/19/2018    RBCUA NONE 03/19/2018    BACTERIA MANY 03/19/2018    CLARITYU Clear 03/19/2018    SPECGRAV 1.025 03/19/2018    LEUKOCYTESUR SMALL 03/19/2018    UROBILINOGEN 0.2 03/19/2018    BILIRUBINUR Negative 03/19/2018    BLOODU Negative 03/19/2018 GLUCOSEU Negative 03/19/2018    AMORPHOUS RARE 12/19/2016     HgBA1c:  No results found for: LABA1C  FLP:    Lab Results   Component Value Date    TRIG 120 07/18/2017    HDL 45 07/18/2017    LDLCALC 57 07/18/2017    LABVLDL 24 07/18/2017     TSH:    Lab Results   Component Value Date    TSH 2.990 07/17/2017     VITAMIN B12: No components found for: B12  FOLATE:    Lab Results   Component Value Date    FOLATE 16.4 07/18/2017       ASSESSMENT:      Patient Active Problem List   Diagnosis    Essential hypertension    PAULINA (obstructive sleep apnea)    Midline low back pain with right-sided sciatica    Morbid obesity with BMI of 40.0-44.9, adult ([facility] [address]    CVA (cerebral vascular accident) ([facility] [address]    Urinary incontinence    Altered mental status    Closed fracture fibula, head, right, initial encounter    Diabetes mellitus ([facility] [address]    Neurogenic bladder    Sarcoidosis    Sleep apnea    Diffuse cerebral atrophy (HCC)    Asthma    Diabetic peripheral neuropathy (HCC)    Fibromyalgia    CKD (chronic kidney disease) stage 4, GFR 15-29 ml/min ([facility] [address]       Active Hospital Problems    Diagnosis    Diabetes mellitus ([facility] [address] [E11.9]    Neurogenic bladder [N31.9]    Sarcoidosis [D86.9]    Sleep apnea [G47.30]    Diffuse cerebral atrophy ([facility] [address] [G31.9]    Asthma [J45.909]    Closed fracture fibula, head, right, initial encounter [S82.831A]    Morbid obesity with BMI of 40.0-44.9, adult ([facility] [address] [E66.01, Z68.41]    CKD (chronic kidney disease) stage 4, GFR 15-29 ml/min (HCC) [N18.4]    Essential hypertension [I10]    Fibromyalgia [M79.7]    Diabetic peripheral neuropathy (HCC) [E11.42]       PLAN:  Medications discussed with patient  GI prophylaxis  DVT prophylaxis  Consultants notes reviewed  Medically stable for upcoming surgery  Nephrology consult, Dr. Baylee Grant regarding stage IV kidney disease and ongoing Celebrex  Aerosol treatments prophylactically  Pro time INR today, magnesium today  EKG has been ordered preop  Metformin has been discontinued  Sliding-scale insulin  Oxycodone 10 mg as needed pain  Morphine sulfate 2 mg IV every 4 hours as needed for severe pain  Potassium protocol  Appropriate labs for a.m.   Social service for discharge planning      Note that over 50 minutes was spent in evaluation of the patient, review of the chart and pertinent records, discussion with family/staff, etc    [address]  11:16 AM  10/24/2020    Voice recognition software use for dictation

## 2020-10-25 ENCOUNTER — APPOINTMENT (OUTPATIENT)
Dept: CT IMAGING | Age: 75
DRG: 492 | End: 2020-10-25
Attending: INTERNAL MEDICINE
Payer: MEDICARE

## 2020-10-25 PROBLEM — D50.9 IRON DEFICIENCY ANEMIA: Status: ACTIVE | Noted: 2020-10-25

## 2020-10-25 LAB
AADO2: 107.7 MMHG
ALBUMIN SERPL-MCNC: 3 G/DL (ref 3.5–5.2)
ALBUMIN SERPL-MCNC: 3.4 G/DL (ref 3.5–5.2)
ALP BLD-CCNC: 111 U/L (ref 35–104)
ALP BLD-CCNC: 133 U/L (ref 35–104)
ALT SERPL-CCNC: 26 U/L (ref 0–32)
ALT SERPL-CCNC: 41 U/L (ref 0–32)
ANION GAP SERPL CALCULATED.3IONS-SCNC: 8 MMOL/L (ref 7–16)
ANION GAP SERPL CALCULATED.3IONS-SCNC: 9 MMOL/L (ref 7–16)
APTT: 29.5 SEC (ref 24.5–35.1)
AST SERPL-CCNC: 26 U/L (ref 0–31)
AST SERPL-CCNC: 47 U/L (ref 0–31)
B.E.: -2.2 MMOL/L (ref -3–3)
B.E.: -3.4 MMOL/L (ref -3–3)
BASOPHILS ABSOLUTE: 0.04 E9/L (ref 0–0.2)
BASOPHILS RELATIVE PERCENT: 0.5 % (ref 0–2)
BILIRUB SERPL-MCNC: 0.8 MG/DL (ref 0–1.2)
BILIRUB SERPL-MCNC: 1.3 MG/DL (ref 0–1.2)
BILIRUBIN DIRECT: 0.4 MG/DL (ref 0–0.3)
BILIRUBIN, INDIRECT: 0.4 MG/DL (ref 0–1)
BUN BLDV-MCNC: 32 MG/DL (ref 8–23)
BUN BLDV-MCNC: 33 MG/DL (ref 8–23)
CALCIUM SERPL-MCNC: 8 MG/DL (ref 8.6–10.2)
CALCIUM SERPL-MCNC: 8.4 MG/DL (ref 8.6–10.2)
CHLORIDE BLD-SCNC: 102 MMOL/L (ref 98–107)
CHLORIDE BLD-SCNC: 105 MMOL/L (ref 98–107)
CHOLESTEROL, TOTAL: 146 MG/DL (ref 0–199)
CK MB: 3.6 NG/ML (ref 0–4.3)
CO2: 23 MMOL/L (ref 22–29)
CO2: 25 MMOL/L (ref 22–29)
COHB: 1.8 % (ref 0–1.5)
COHB: 2 % (ref 0–1.5)
CREAT SERPL-MCNC: 1.9 MG/DL (ref 0.5–1)
CREAT SERPL-MCNC: 2 MG/DL (ref 0.5–1)
CRITICAL: ABNORMAL
CRITICAL: ABNORMAL
DATE ANALYZED: ABNORMAL
DATE ANALYZED: ABNORMAL
DATE OF COLLECTION: ABNORMAL
DATE OF COLLECTION: ABNORMAL
EOSINOPHILS ABSOLUTE: 0.16 E9/L (ref 0.05–0.5)
EOSINOPHILS RELATIVE PERCENT: 1.8 % (ref 0–6)
FERRITIN: 168 NG/ML
FIO2: 35 %
GFR AFRICAN AMERICAN: 29
GFR AFRICAN AMERICAN: 31
GFR NON-AFRICAN AMERICAN: 24 ML/MIN/1.73
GFR NON-AFRICAN AMERICAN: 26 ML/MIN/1.73
GLUCOSE BLD-MCNC: 117 MG/DL (ref 74–99)
GLUCOSE BLD-MCNC: 118 MG/DL (ref 74–99)
HBA1C MFR BLD: 5.9 % (ref 4–5.6)
HCO3: 22.2 MMOL/L (ref 22–26)
HCO3: 24 MMOL/L (ref 22–26)
HCT VFR BLD CALC: 39.9 % (ref 34–48)
HCT VFR BLD CALC: 41.1 % (ref 34–48)
HDLC SERPL-MCNC: 62 MG/DL
HEMOGLOBIN: 12.2 G/DL (ref 11.5–15.5)
HEMOGLOBIN: 12.8 G/DL (ref 11.5–15.5)
HHB: 2.9 % (ref 0–5)
HHB: 3.7 % (ref 0–5)
IMMATURE GRANULOCYTES #: 0.05 E9/L
IMMATURE GRANULOCYTES %: 0.6 % (ref 0–5)
IRON SATURATION: 15 % (ref 15–50)
IRON: 37 MCG/DL (ref 37–145)
LAB: ABNORMAL
LAB: ABNORMAL
LDL CHOLESTEROL CALCULATED: 71 MG/DL (ref 0–99)
LYMPHOCYTES ABSOLUTE: 1.03 E9/L (ref 1.5–4)
LYMPHOCYTES RELATIVE PERCENT: 11.8 % (ref 20–42)
Lab: ABNORMAL
Lab: ABNORMAL
MAGNESIUM: 2.2 MG/DL (ref 1.6–2.6)
MAGNESIUM: 2.3 MG/DL (ref 1.6–2.6)
MCH RBC QN AUTO: 28.2 PG (ref 26–35)
MCH RBC QN AUTO: 28.4 PG (ref 26–35)
MCHC RBC AUTO-ENTMCNC: 30.6 % (ref 32–34.5)
MCHC RBC AUTO-ENTMCNC: 31.1 % (ref 32–34.5)
MCV RBC AUTO: 91.3 FL (ref 80–99.9)
MCV RBC AUTO: 92.1 FL (ref 80–99.9)
METER GLUCOSE: 104 MG/DL (ref 74–99)
METER GLUCOSE: 106 MG/DL (ref 74–99)
METER GLUCOSE: 119 MG/DL (ref 74–99)
METER GLUCOSE: 121 MG/DL (ref 74–99)
METER GLUCOSE: 90 MG/DL (ref 74–99)
METER GLUCOSE: 97 MG/DL (ref 74–99)
METHB: 0.3 % (ref 0–1.5)
METHB: 0.4 % (ref 0–1.5)
MODE: ABNORMAL
MODE: ABNORMAL
MONOCYTES ABSOLUTE: 1.19 E9/L (ref 0.1–0.95)
MONOCYTES RELATIVE PERCENT: 13.6 % (ref 2–12)
NEUTROPHILS ABSOLUTE: 6.29 E9/L (ref 1.8–7.3)
NEUTROPHILS RELATIVE PERCENT: 71.7 % (ref 43–80)
O2 CONTENT: 17.7 ML/DL
O2 CONTENT: 17.7 ML/DL
O2 SATURATION: 96.2 % (ref 92–98.5)
O2 SATURATION: 97 % (ref 92–98.5)
O2HB: 94.2 % (ref 94–97)
O2HB: 94.7 % (ref 94–97)
OPERATOR ID: 166
OPERATOR ID: ABNORMAL
PATIENT TEMP: 37 C
PATIENT TEMP: 37 C
PCO2: 42.1 MMHG (ref 35–45)
PCO2: 46.5 MMHG (ref 35–45)
PDW BLD-RTO: 14.4 FL (ref 11.5–15)
PDW BLD-RTO: 14.6 FL (ref 11.5–15)
PEEP/CPAP: 6 CMH2O
PFO2: 2.41 MMHG/%
PH BLOOD GAS: 7.33 (ref 7.35–7.45)
PH BLOOD GAS: 7.34 (ref 7.35–7.45)
PHOSPHORUS: 4.7 MG/DL (ref 2.5–4.5)
PHOSPHORUS: 4.8 MG/DL (ref 2.5–4.5)
PIP: 12 CMH2O
PLATELET # BLD: 197 E9/L (ref 130–450)
PLATELET # BLD: 203 E9/L (ref 130–450)
PMV BLD AUTO: 9.4 FL (ref 7–12)
PMV BLD AUTO: 9.4 FL (ref 7–12)
PO2: 84.2 MMHG (ref 75–100)
PO2: 93.8 MMHG (ref 75–100)
POTASSIUM SERPL-SCNC: 4.5 MMOL/L (ref 3.5–5)
POTASSIUM SERPL-SCNC: 4.6 MMOL/L (ref 3.5–5)
PRO-BNP: 967 PG/ML (ref 0–450)
RBC # BLD: 4.33 E12/L (ref 3.5–5.5)
RBC # BLD: 4.5 E12/L (ref 3.5–5.5)
RI(T): 128 %
SODIUM BLD-SCNC: 136 MMOL/L (ref 132–146)
SODIUM BLD-SCNC: 136 MMOL/L (ref 132–146)
SOURCE, BLOOD GAS: ABNORMAL
SOURCE, BLOOD GAS: ABNORMAL
THB: 13.2 G/DL (ref 11.5–16.5)
THB: 13.3 G/DL (ref 11.5–16.5)
TIME ANALYZED: 1344
TIME ANALYZED: 1659
TOTAL CK: 102 U/L (ref 20–180)
TOTAL IRON BINDING CAPACITY: 243 MCG/DL (ref 250–450)
TOTAL PROTEIN: 6.3 G/DL (ref 6.4–8.3)
TOTAL PROTEIN: 6.6 G/DL (ref 6.4–8.3)
TRIGL SERPL-MCNC: 66 MG/DL (ref 0–149)
TROPONIN: 0.05 NG/ML (ref 0–0.03)
TSH SERPL DL<=0.05 MIU/L-ACNC: 2.67 UIU/ML (ref 0.27–4.2)
URIC ACID, SERUM: 7.1 MG/DL (ref 2.4–5.7)
VLDLC SERPL CALC-MCNC: 13 MG/DL
WBC # BLD: 8.8 E9/L (ref 4.5–11.5)
WBC # BLD: 9 E9/L (ref 4.5–11.5)

## 2020-10-25 PROCEDURE — 2500000003 HC RX 250 WO HCPCS: Performed by: ORTHOPAEDIC SURGERY

## 2020-10-25 PROCEDURE — 80048 BASIC METABOLIC PNL TOTAL CA: CPT

## 2020-10-25 PROCEDURE — 6360000002 HC RX W HCPCS: Performed by: INTERNAL MEDICINE

## 2020-10-25 PROCEDURE — 84484 ASSAY OF TROPONIN QUANT: CPT

## 2020-10-25 PROCEDURE — 82550 ASSAY OF CK (CPK): CPT

## 2020-10-25 PROCEDURE — 80076 HEPATIC FUNCTION PANEL: CPT

## 2020-10-25 PROCEDURE — 83880 ASSAY OF NATRIURETIC PEPTIDE: CPT

## 2020-10-25 PROCEDURE — 6360000002 HC RX W HCPCS: Performed by: ORTHOPAEDIC SURGERY

## 2020-10-25 PROCEDURE — 6370000000 HC RX 637 (ALT 250 FOR IP): Performed by: ORTHOPAEDIC SURGERY

## 2020-10-25 PROCEDURE — 94640 AIRWAY INHALATION TREATMENT: CPT

## 2020-10-25 PROCEDURE — 82805 BLOOD GASES W/O2 SATURATION: CPT

## 2020-10-25 PROCEDURE — 6360000002 HC RX W HCPCS

## 2020-10-25 PROCEDURE — 87081 CULTURE SCREEN ONLY: CPT

## 2020-10-25 PROCEDURE — 85730 THROMBOPLASTIN TIME PARTIAL: CPT

## 2020-10-25 PROCEDURE — 84443 ASSAY THYROID STIM HORMONE: CPT

## 2020-10-25 PROCEDURE — 2580000003 HC RX 258: Performed by: ORTHOPAEDIC SURGERY

## 2020-10-25 PROCEDURE — 97161 PT EVAL LOW COMPLEX 20 MIN: CPT

## 2020-10-25 PROCEDURE — 83735 ASSAY OF MAGNESIUM: CPT

## 2020-10-25 PROCEDURE — 83550 IRON BINDING TEST: CPT

## 2020-10-25 PROCEDURE — 99291 CRITICAL CARE FIRST HOUR: CPT | Performed by: INTERNAL MEDICINE

## 2020-10-25 PROCEDURE — 36600 WITHDRAWAL OF ARTERIAL BLOOD: CPT

## 2020-10-25 PROCEDURE — 80053 COMPREHEN METABOLIC PANEL: CPT

## 2020-10-25 PROCEDURE — 84550 ASSAY OF BLOOD/URIC ACID: CPT

## 2020-10-25 PROCEDURE — 83540 ASSAY OF IRON: CPT

## 2020-10-25 PROCEDURE — 85025 COMPLETE CBC W/AUTO DIFF WBC: CPT

## 2020-10-25 PROCEDURE — 70450 CT HEAD/BRAIN W/O DYE: CPT

## 2020-10-25 PROCEDURE — 97530 THERAPEUTIC ACTIVITIES: CPT

## 2020-10-25 PROCEDURE — 2580000003 HC RX 258: Performed by: INTERNAL MEDICINE

## 2020-10-25 PROCEDURE — 36415 COLL VENOUS BLD VENIPUNCTURE: CPT

## 2020-10-25 PROCEDURE — 2000000000 HC ICU R&B

## 2020-10-25 PROCEDURE — 82553 CREATINE MB FRACTION: CPT

## 2020-10-25 PROCEDURE — 94660 CPAP INITIATION&MGMT: CPT

## 2020-10-25 PROCEDURE — 84100 ASSAY OF PHOSPHORUS: CPT

## 2020-10-25 PROCEDURE — 51702 INSERT TEMP BLADDER CATH: CPT

## 2020-10-25 PROCEDURE — 85027 COMPLETE CBC AUTOMATED: CPT

## 2020-10-25 PROCEDURE — 82728 ASSAY OF FERRITIN: CPT

## 2020-10-25 PROCEDURE — 83036 HEMOGLOBIN GLYCOSYLATED A1C: CPT

## 2020-10-25 PROCEDURE — 80061 LIPID PANEL: CPT

## 2020-10-25 PROCEDURE — 82962 GLUCOSE BLOOD TEST: CPT

## 2020-10-25 RX ORDER — NALOXONE HYDROCHLORIDE 0.4 MG/ML
0.4 INJECTION, SOLUTION INTRAMUSCULAR; INTRAVENOUS; SUBCUTANEOUS ONCE
Status: COMPLETED | OUTPATIENT
Start: 2020-10-25 | End: 2020-10-25

## 2020-10-25 RX ORDER — NALOXONE HYDROCHLORIDE 0.4 MG/ML
INJECTION, SOLUTION INTRAMUSCULAR; INTRAVENOUS; SUBCUTANEOUS
Status: COMPLETED
Start: 2020-10-25 | End: 2020-10-25

## 2020-10-25 RX ORDER — OXYCODONE HYDROCHLORIDE AND ACETAMINOPHEN 5; 325 MG/1; MG/1
1 TABLET ORAL EVERY 6 HOURS PRN
Status: DISCONTINUED | OUTPATIENT
Start: 2020-10-25 | End: 2020-10-26

## 2020-10-25 RX ADMIN — HYDROXYCHLOROQUINE SULFATE 200 MG: 200 TABLET ORAL at 09:45

## 2020-10-25 RX ADMIN — DOCUSATE SODIUM AND SENNOSIDES 1 TABLET: 8.6; 5 TABLET, FILM COATED ORAL at 09:43

## 2020-10-25 RX ADMIN — ASPIRIN 81 MG: 81 TABLET, COATED ORAL at 09:44

## 2020-10-25 RX ADMIN — IPRATROPIUM BROMIDE AND ALBUTEROL SULFATE 1 AMPULE: 2.5; .5 SOLUTION RESPIRATORY (INHALATION) at 12:02

## 2020-10-25 RX ADMIN — CLINDAMYCIN IN 5 PERCENT DEXTROSE 900 MG: 18 INJECTION, SOLUTION INTRAVENOUS at 09:50

## 2020-10-25 RX ADMIN — CLINDAMYCIN IN 5 PERCENT DEXTROSE 900 MG: 18 INJECTION, SOLUTION INTRAVENOUS at 18:56

## 2020-10-25 RX ADMIN — IPRATROPIUM BROMIDE AND ALBUTEROL SULFATE 1 AMPULE: 2.5; .5 SOLUTION RESPIRATORY (INHALATION) at 07:37

## 2020-10-25 RX ADMIN — BUDESONIDE 250 MCG: 0.25 SUSPENSION RESPIRATORY (INHALATION) at 19:40

## 2020-10-25 RX ADMIN — BACLOFEN 20 MG: 10 TABLET ORAL at 09:43

## 2020-10-25 RX ADMIN — SODIUM CHLORIDE 100 MG: 9 INJECTION, SOLUTION INTRAVENOUS at 13:47

## 2020-10-25 RX ADMIN — BUDESONIDE 250 MCG: 0.25 SUSPENSION RESPIRATORY (INHALATION) at 07:37

## 2020-10-25 RX ADMIN — ARFORMOTEROL TARTRATE 15 MCG: 15 SOLUTION RESPIRATORY (INHALATION) at 07:37

## 2020-10-25 RX ADMIN — NALOXONE HYDROCHLORIDE: 0.4 INJECTION, SOLUTION INTRAMUSCULAR; INTRAVENOUS; SUBCUTANEOUS at 17:00

## 2020-10-25 RX ADMIN — Medication 10 ML: at 21:26

## 2020-10-25 RX ADMIN — ENOXAPARIN SODIUM 40 MG: 40 INJECTION SUBCUTANEOUS at 09:45

## 2020-10-25 RX ADMIN — NALOXONE HYDROCHLORIDE 0.4 MG: 0.4 INJECTION, SOLUTION INTRAMUSCULAR; INTRAVENOUS; SUBCUTANEOUS at 17:15

## 2020-10-25 RX ADMIN — SODIUM CHLORIDE 25 MG: 9 INJECTION, SOLUTION INTRAVENOUS at 12:24

## 2020-10-25 RX ADMIN — IPRATROPIUM BROMIDE AND ALBUTEROL SULFATE 1 AMPULE: 2.5; .5 SOLUTION RESPIRATORY (INHALATION) at 19:40

## 2020-10-25 RX ADMIN — ARFORMOTEROL TARTRATE 15 MCG: 15 SOLUTION RESPIRATORY (INHALATION) at 19:40

## 2020-10-25 RX ADMIN — CLINDAMYCIN IN 5 PERCENT DEXTROSE 900 MG: 18 INJECTION, SOLUTION INTRAVENOUS at 00:36

## 2020-10-25 RX ADMIN — SODIUM CHLORIDE: 9 INJECTION, SOLUTION INTRAVENOUS at 09:41

## 2020-10-25 RX ADMIN — METOPROLOL TARTRATE 25 MG: 25 TABLET, FILM COATED ORAL at 09:43

## 2020-10-25 RX ADMIN — OXYCODONE AND ACETAMINOPHEN 1 TABLET: 10; 325 TABLET ORAL at 09:43

## 2020-10-25 RX ADMIN — BISACODYL 5 MG: 5 TABLET, COATED ORAL at 09:43

## 2020-10-25 RX ADMIN — OXYBUTYNIN CHLORIDE 10 MG: 10 TABLET, EXTENDED RELEASE ORAL at 09:46

## 2020-10-25 RX ADMIN — IPRATROPIUM BROMIDE AND ALBUTEROL SULFATE 1 AMPULE: 2.5; .5 SOLUTION RESPIRATORY (INHALATION) at 16:40

## 2020-10-25 RX ADMIN — DICYCLOMINE HYDROCHLORIDE 20 MG: 10 CAPSULE ORAL at 12:31

## 2020-10-25 RX ADMIN — DULOXETINE HYDROCHLORIDE 60 MG: 60 CAPSULE, DELAYED RELEASE ORAL at 09:42

## 2020-10-25 RX ADMIN — PREGABALIN 100 MG: 50 CAPSULE ORAL at 09:42

## 2020-10-25 RX ADMIN — VERAPAMIL HYDROCHLORIDE 240 MG: 240 TABLET, FILM COATED, EXTENDED RELEASE ORAL at 09:45

## 2020-10-25 ASSESSMENT — PAIN - FUNCTIONAL ASSESSMENT: PAIN_FUNCTIONAL_ASSESSMENT: PREVENTS OR INTERFERES WITH ALL ACTIVE AND SOME PASSIVE ACTIVITIES

## 2020-10-25 ASSESSMENT — PAIN DESCRIPTION - ONSET: ONSET: ON-GOING

## 2020-10-25 ASSESSMENT — PAIN DESCRIPTION - PAIN TYPE: TYPE: ACUTE PAIN

## 2020-10-25 ASSESSMENT — PAIN DESCRIPTION - ORIENTATION: ORIENTATION: RIGHT

## 2020-10-25 ASSESSMENT — PAIN DESCRIPTION - PROGRESSION: CLINICAL_PROGRESSION: GRADUALLY WORSENING

## 2020-10-25 ASSESSMENT — PAIN SCALES - GENERAL
PAINLEVEL_OUTOF10: 9
PAINLEVEL_OUTOF10: 0

## 2020-10-25 ASSESSMENT — PAIN DESCRIPTION - LOCATION: LOCATION: ANKLE

## 2020-10-25 ASSESSMENT — PAIN DESCRIPTION - FREQUENCY: FREQUENCY: INTERMITTENT

## 2020-10-25 ASSESSMENT — PAIN DESCRIPTION - DESCRIPTORS: DESCRIPTORS: TENDER;THROBBING;SORE

## 2020-10-25 NOTE — PROGRESS NOTES
Date: 10/25/2020    Time: 1:41 PM    Patient Placed On BIPAP/CPAP/ Non-Invasive Ventilation? Yes    If no must comment. Facial area red/color change? No           If YES are Blister/Lesion present? No   If yes must notify nursing staff  BIPAP/CPAP skin barrier?   No    Skin barrier type:mepilex       Comments:        Sampson Ormond

## 2020-10-25 NOTE — CONSULTS
[id number] [address]                                  CONSULTATION    PATIENT NAME: Nathan Maldonado                       :        1945  MED REC NO:   [id number]                            ROOM:       0720  ACCOUNT NO:   [de-identified]                           ADMIT DATE: 10/24/2020  PROVIDER:     Lawyer Alcaraz    CONSULT DATE:  10/24/2020    ORTHOPEDIC CONSULTATION    REASON FOR CONSULTATION:  Right ankle fracture. CHIEF COMPLAINT:  Right ankle pain. PAIN SCORE:  6/10. REQUESTING PROVIDER:  Wesley Ackerman MD.    HISTORY OF PRESENT ILLNESS:  This is a 67-year-old female who had a fall  yesterday. She presented to an outside hospital.  She had x-rays taken  at the ER and was diagnosed with right ankle fracture. She was  transferred to [facility].  She is admitted to Medicine. Orthopedics was consulted. She has a right lateral sided ankle pain. This began yesterday. She has pain with activities. She has less pain  at rest.  Her pain is an ache. Her pain is the same. She is taking  pain medication. She has been nonweightbearing. She has a history of a  left foot fracture that has been treated for nonoperatively. This  occurred a couple of weeks ago. MEDICATIONS:  See list.    PAST MEDICAL HISTORY:  Hypertension, sleep apnea, back pain, foot  fracture, diabetes, sarcoidosis, asthma, peripheral neuropathy,  fibromyalgia, kidney disease. PAST SURGICAL HISTORY:  Back surgery, breast surgery, cardiac  catheterization, cholecystectomy, colonoscopy, foot surgery,  hysterectomy, joint replacement. SOCIAL HISTORY:  Denies smoking. Denies alcohol. Denies drugs. PHYSICAL EXAMINATION:  GENERAL:  No acute distress. Awake, alert. Responds to commands. HEENT:  Head:  Normocephalic, atraumatic. Eyes:  Extraocular movements  intact. Sclerae clear. NECK:  Midline. Supple. CARDIOVASCULAR:  No apparent abnormalities. CHEST:  No acute distress. ABDOMEN:  Nontender to palpation. EXTREMITIES:  Right lower extremity is seen in a splint. She has  tenderness about the lateral aspect of her ankle. Sensation and motor  are grossly intact to her toes. She is able to move her toes up and  down. She is nontender about her knee or hip. Left lower extremity:   Some tenderness about her foot. She is nontender about her knee or hip. Sensation and motor are grossly intact distally. Her lower extremities  are warm and well perfused. Bilateral upper extremities demonstrate  sensation and motor grossly intact with nontender to palpation. Extremities are warm and well perfused. X-RAY:  Right ankle demonstrates displaced lateral malleolus ankle  fracture. ASSESSMENT:  This is a 77-year-old female with right lateral malleolus  fracture. PLAN:  The patient will be admitted to Medicine. Orthopedics was  consulted. She will be nonweightbearing, right leg. She will have  x-rays taken of the left foot to evaluate for weightbearing and healing  status. She will proceed with surgery on the right side. This will be  a right ankle open reduction and internal fixation. She will need  medical clearance. She will be given pain control. Of note, greater than 50 minutes was spent on the floor and with the  patient performing history and physical, reviewing labs and imaging, and  discussing plan. Half the time was spent counseling and coordinating  care.         Torin Torrez    D: 10/24/2020 15:35:44       T: 10/24/2020 15:46:29     [email or web address]  Job#: [id number]     Doc#: [id number]    CC:

## 2020-10-25 NOTE — CONSULTS
[facility] MD Tobin Mcleod MD Bevely Lord, MD Myla Munster, MD Durward Kand, TANJA Eagle  Consultation  Patient's Name: Merline Sinclair  12:54 PM  10/25/2020    Nephrologist: Tobin Luis MD    Reason for Consult: Chronic kidney disease  Requesting Physician:  Jeremy Zhang MD    Chief Complaint: Fractured ankle    History Obtained From: Chart, patient    History of Present Ilness:    Ms. Rosario Peguero is a pleasant 79-year-old woman known to me from the outpatient office for follow her for chronic kidney disease due to diabetic nephropathy. Her baseline serum creatinine is 1.8 to 1.9 mg/dL she is admitted on 10/23 status post fall in which she fractured her right ankle. She underwent right malleolar ORIF this morning per Dr. Phuc Dhillon. The surgery was without complication. She received IV normal saline preoperatively, which is currently running. At the time my evaluation she was resting comfortably, still somnolent and sedated from the anesthesia, though denies pain or dyspnea. BUN/creatinine presentation were 35 and 1.9, respectively, remaining stable at 33 and 1.9, respectively, as of this morning. Serum electrolytes are within normal limits.     Past Medical History:   Diagnosis Date    Asthma 1993    Cerebral artery occlusion with cerebral infarction ([address] 07/2017    CKD (chronic kidney disease) stage 4, GFR 15-29 ml/min ([address] 2017    Degenerative disc disease     Degenerative joint disease     Diabetes mellitus ([address] 2008    Diabetic peripheral neuropathy ([address] 1998    Diffuse cerebral atrophy ([address] 2012    Fibromyalgia 01/2012    [facility]    Glaucoma     HX OTHER MEDICAL 02/2017    right hip wound dehiscence; using rollator    Hypertension     Iron deficiency anemia 10/25/2020    Neurogenic bladder     Neuropathy     PONV (postoperative nausea and vomiting)     had trouble  waking up with past foot surgery    Sarcoidosis PRN  pregabalin (LYRICA) capsule 100 mg, BID  theophylline (THEODUR) extended release tablet 300 mg, QAM  verapamil (CALAN SR) extended release tablet 240 mg, QAM  morphine (PF) injection 2 mg, Q4H PRN  potassium chloride (KLOR-CON M) extended release tablet 40 mEq, PRN    Or  potassium bicarb-citric acid (EFFER-K) effervescent tablet 40 mEq, PRN    Or  potassium chloride 10 mEq/100 mL IVPB (Peripheral Line), PRN  acetaminophen (TYLENOL) tablet 650 mg, Q6H PRN    Or  acetaminophen (TYLENOL) suppository 650 mg, Q6H PRN  magnesium hydroxide (MILK OF MAGNESIA) 400 MG/5ML suspension 30 mL, Daily PRN  enoxaparin (LOVENOX) injection 40 mg, Daily  insulin lispro (HUMALOG) injection vial 0-6 Units, TID WC  insulin lispro (HUMALOG) injection vial 0-3 Units, Nightly  glucose (GLUTOSE) 40 % oral gel 15 g, PRN  dextrose 50 % IV solution, PRN  glucagon (rDNA) injection 1 mg, PRN  dextrose 5 % solution, PRN  trimethobenzamide (TIGAN) injection 200 mg, Q6H PRN  Arformoterol Tartrate (BROVANA) nebulizer solution 15 mcg, BID  budesonide (PULMICORT) nebulizer suspension 250 mcg, BID  ipratropium-albuterol (DUONEB) nebulizer solution 1 ampule, Q4H WA  0.9 % sodium chloride infusion, Continuous  sodium chloride flush 0.9 % injection 10 mL, 2 times per day  sodium chloride flush 0.9 % injection 10 mL, PRN  sennosides-docusate sodium (SENOKOT-S) 8.6-50 MG tablet 1 tablet, BID  clindamycin (CLEOCIN) 900 mg in dextrose 5 % 50 mL IVPB, Q8H  bisacodyl (DULCOLAX) EC tablet 5 mg, Daily PRN        Review of Systems:   Review of systems not obtained due to patient factors.   (Somnolent due to sedation post anesthesia for ankle surgery)    Physical exam:   BP (!) 126/59   Pulse 77   Temp 98.7 °F (37.1 °C) (Oral)   Resp 16   Ht 5' 6\" (1.676 m)   Wt 271 lb (122.9 kg)   SpO2 93%   BMI 43.74 kg/m²   Morbidly obese age-appropriate white woman laying supine breathing comfortably in no apparent distress  NC/AT EOMI sclera conjunctiva clear and anicteric mucous membranes are bit dry  Neck soft supple trachea midline no carotid bruit no JVD though she is laying supine  CTA bilaterally mostly anterior laterally is unable to rollover at this time  Regular rhythm normal S1 and S2 no murmur no rub  Abdomen soft nontender nondistended normal active bowel sounds could not appreciate hepatosplenomegaly or masses  Distal extremities: Left distal lower extremity without edema popliteal pulse 1+1 dorsalis pedis 1+. Right distal lower extremity has a thick splint, cast and dressing.   Popliteal pulse 1+  Moves all 4 extremities  Cranial nerves II through XII grossly intact does answer a few questions but not appropriately and nods off to sleep while attempting to answer them    Data:   Labs:  CBC with Differential:    Lab Results   Component Value Date    WBC 8.8 10/25/2020    RBC 4.33 10/25/2020    HGB 12.2 10/25/2020    HCT 39.9 10/25/2020     10/25/2020    MCV 92.1 10/25/2020    MCH 28.2 10/25/2020    MCHC 30.6 10/25/2020    RDW 14.4 10/25/2020    LYMPHOPCT 11.8 10/25/2020    MONOPCT 13.6 10/25/2020    BASOPCT 0.5 10/25/2020    MONOSABS 1.19 10/25/2020    LYMPHSABS 1.03 10/25/2020    EOSABS 0.16 10/25/2020    BASOSABS 0.04 10/25/2020     CMP:    Lab Results   Component Value Date     10/25/2020    K 4.6 10/25/2020     10/25/2020    CO2 23 10/25/2020    BUN 33 10/25/2020    CREATININE 1.9 10/25/2020    GFRAA 31 10/25/2020    LABGLOM 26 10/25/2020    GLUCOSE 118 10/25/2020    GLUCOSE 82 12/09/2011    PROT 6.3 10/25/2020    LABALBU 3.0 10/25/2020    CALCIUM 8.0 10/25/2020    BILITOT 0.8 10/25/2020    ALKPHOS 111 10/25/2020    AST 26 10/25/2020    ALT 26 10/25/2020     Ionized Calcium:  No results found for: IONCA  Magnesium:    Lab Results   Component Value Date    MG 2.2 10/25/2020     Phosphorus:    Lab Results   Component Value Date    PHOS 4.8 10/25/2020     U/A:    Lab Results   Component Value Date    COLORU Yellow 03/19/2018    PHUR 6.0 03/19/2018    WBCUA 10-20 03/19/2018    RBCUA NONE 03/19/2018    BACTERIA MANY 03/19/2018    CLARITYU Clear 03/19/2018    SPECGRAV 1.025 03/19/2018    LEUKOCYTESUR SMALL 03/19/2018    UROBILINOGEN 0.2 03/19/2018    BILIRUBINUR Negative 03/19/2018    BLOODU Negative 03/19/2018    GLUCOSEU Negative 03/19/2018    AMORPHOUS RARE 12/19/2016     Microalbumen/Creatinine ratio:  No components found for: RUCREAT  Iron Saturation:  No components found for: PERCENTFE  TIBC:    Lab Results   Component Value Date    TIBC 243 10/25/2020     FERRITIN:    Lab Results   Component Value Date    FERRITIN 168 10/25/2020        Imaging:  XR FOOT LEFT (MIN 3 VIEWS)   Final Result   1. No acute fracture. 2.  Chronic fracture involving 5th metatarsal bone. 3.  Stable, satisfactory alignment status post internal fixation at level of   distal calcaneus and tarsal navicular. FLUORO FOR SURGICAL PROCEDURES   Final Result   Intraprocedural fluoroscopic spot images as above. See separate procedure   report for more information. XR ANKLE RIGHT (MIN 3 VIEWS)   Final Result   Mildly displaced distal fibular fracture involving the distal diaphysis   extending into the metaphysis. No other acute fracture is seen. Assessment  1. Chronic kidney disease stage IV, baseline creatinine 1.8 to 1.9 mg/dL, current estimated GFR 26 mL/min secondary to diabetic nephropathy likely exacerbated by renal microvascular atherosclerotic disease in setting of longstanding hypertension, as well. Current creatinine is at her baseline, which has been stable now for a number of years. 2. Hypertension well-controlled on current medication regimen. ACE/ARB none. Her medication list.  I will check my records at the office to see why not. I would not start 1 at this time  3. Osteoarthritis, multifactorial  4. Morbid obesity, BMI 44  5. Diabetic retinopathy, diabetic neuropathy  6. Hyperphosphatemia, mild, secondary to CKD. Would not start phosphate binder at level     Recommendations  1. Stop Celebrex  2. Continue IV normal saline at current rate for now  3. When able, encourage oral intake  4. Otherwise continue current aspects of renal management  5. Follow labs, UO  6. Avoid nephrotoxins including contrast except as absolutely necessary      Thank you for the opportunity to participate in the care of your pleasant patient. We look forward to following along with you.       Electronically signed by Colleen Garibay MD on 10/25/2020 at 12:54 PM

## 2020-10-25 NOTE — PROGRESS NOTES
Patient lethargic, unable to stay awake to answer questions, Dr. Davey Swartz notified, see new orders.

## 2020-10-25 NOTE — PROGRESS NOTES
Placed patient on CPAP 5 per order, did not seem to tolerate cpap, patient's tidal volume 150 to 200s. Switched patient to BIPAP 12/6 to get tidal volumes of  400- 500s. Please advise. Thank you.

## 2020-10-25 NOTE — OP NOTE
[address] [address]                                OPERATIVE REPORT    PATIENT NAME: Karlos Aguilar                       :        1945  MED REC NO:   [id number]                            ROOM:       0720  ACCOUNT NO:   [de-identified]                           ADMIT DATE: 10/24/2020  PROVIDER:     Juan Borden    DATE OF PROCEDURE:  10/24/2020    PREOPERATIVE DIAGNOSIS:  Right lateral malleolus ankle fracture. POSTOPERATIVE DIAGNOSIS:  Right lateral malleolus ankle fracture. PROCEDURE PERFORMED:  Right lateral malleolus open reduction and  internal fixation. COMPLICATIONS:  None. ANTIBIOTICS:  See record. ESTIMATED BLOOD LOSS:  Minimal.    ANESTHESIA:  General.    TOURNIQUET TIME:  See record. EQUIPMENT USED:  Shen VariAx lateral malleolus anatomic plate. INDICATIONS:  This is a 74-year-old female diagnosed with a right ankle  fracture, displaced based on history and physical and x-ray. After  discussing the risks and benefits, she wished to undergo procedure  listed above. DESCRIPTION OF PROCEDURE:  The patient was brought to the operating room  on her cart. She was transferred to the operating room table. All  extremities were well padded. She underwent general anesthesia. She  was prepped and draped in sterile fashion using chlorhexidine naila wipes  and a scrub brush. Timeout was performed. Skin was incised with a  knife at the lateral aspect of the ankle. Subcutaneous tissues were  dissected with tenotomy scissors. Fibula was identified. Fracture site  was identified. Rather, fracture site was then debrided using a knife. Periosteum was incised with a knife. Fracture site was then manipulated  and reduced using a lion-jaw clamp. The fit was provisionally fixed  with a K-wire. Next, the plate was selected. This was a Landers VariAx  anatomic distal fibular plate.   This was then placed _____. This was  fixed proximally and distally with the K-wires. Next, appropriate  nonlocking and locking screws were placed proximal and distal to the  fracture site. Locking screws were placed across the fracture site. This was a sagittal split in the distal fibula. Images were taken  demonstrating good reduction of fracture site as well as good placement  of hardware. Wound underwent copious irrigation with saline. Periosteum was reapproximated using 2-0 Vicryl. Tourniquet was  deflated. Adequate hemostasis was obtained. Skin was then closed with  2-0 Vicryl and staples. Wound was dressed with Xeroform, 4x4s, Kerlix,  splint, and cast padding. Prior to splint, the ankle stability  assessment was performed. External rotation stress test did not result  in clear space widening. The patient was then awoken and brought to  PACU in good condition. POSTOPERATIVE PLAN:  The patient will be readmitted to Medicine. She  will be nonweightbearing, right leg. She will stay in her splint. She  is on antibiotics for 24 hours. She will be on aspirin as she is  previously prescribed for DVT prophylaxis. She will be given pain  control. She will follow up with Dr. Shilpi Del Valle in two weeks. Any  questions, feel free to call the office. X-rays of the left foot will  be checked to determine healing status of her prior fracture.         Maximo Zapien    D: 10/24/2020 17:38:05       T: 10/25/2020 2:30:15     [email or web address]  Job#: [id number]     Doc#: [id number]    CC:

## 2020-10-25 NOTE — PROGRESS NOTES
10/25/2020    MCH 28.2 10/25/2020    MCHC 30.6 10/25/2020    RDW 14.4 10/25/2020    LYMPHOPCT 11.8 10/25/2020    MONOPCT 13.6 10/25/2020    BASOPCT 0.5 10/25/2020    MONOSABS 1.19 10/25/2020    LYMPHSABS 1.03 10/25/2020    EOSABS 0.16 10/25/2020    BASOSABS 0.04 10/25/2020     Hemoglobin/Hematocrit:    Lab Results   Component Value Date    HGB 12.2 10/25/2020    HCT 39.9 10/25/2020     CMP:    Lab Results   Component Value Date     10/25/2020    K 4.6 10/25/2020     10/25/2020    CO2 23 10/25/2020    BUN 33 10/25/2020    CREATININE 1.9 10/25/2020    GFRAA 31 10/25/2020    LABGLOM 26 10/25/2020    GLUCOSE 118 10/25/2020    GLUCOSE 82 12/09/2011    PROT 6.3 10/25/2020    LABALBU 3.0 10/25/2020    CALCIUM 8.0 10/25/2020    BILITOT 0.8 10/25/2020    ALKPHOS 111 10/25/2020    AST 26 10/25/2020    ALT 26 10/25/2020     BMP:    Lab Results   Component Value Date     10/25/2020    K 4.6 10/25/2020     10/25/2020    CO2 23 10/25/2020    BUN 33 10/25/2020    LABALBU 3.0 10/25/2020    CREATININE 1.9 10/25/2020    CALCIUM 8.0 10/25/2020    GFRAA 31 10/25/2020    LABGLOM 26 10/25/2020    GLUCOSE 118 10/25/2020    GLUCOSE 82 12/09/2011     Hepatic Function Panel:    Lab Results   Component Value Date    ALKPHOS 111 10/25/2020    ALT 26 10/25/2020    AST 26 10/25/2020    PROT 6.3 10/25/2020    BILITOT 0.8 10/25/2020    BILIDIR 0.4 10/25/2020    IBILI 0.4 10/25/2020    LABALBU 3.0 10/25/2020     Ionized Calcium:  No results found for: IONCA  Magnesium:    Lab Results   Component Value Date    MG 2.2 10/25/2020     Phosphorus:    Lab Results   Component Value Date    PHOS 4.8 10/25/2020     Uric Acid:    Lab Results   Component Value Date    LABURIC 7.1 10/25/2020     PT/INR:    Lab Results   Component Value Date    PROTIME 11.3 10/24/2020    PROTIME 12.0 12/09/2011    INR 1.0 10/24/2020     U/A:    Lab Results   Component Value Date    COLORU Yellow 03/19/2018    PROTEINU Negative 03/19/2018    PHUR 6.0 03/19/2018    WBCUA 10-20 03/19/2018    RBCUA NONE 03/19/2018    BACTERIA MANY 03/19/2018    CLARITYU Clear 03/19/2018    SPECGRAV 1.025 03/19/2018    LEUKOCYTESUR SMALL 03/19/2018    UROBILINOGEN 0.2 03/19/2018    BILIRUBINUR Negative 03/19/2018    BLOODU Negative 03/19/2018    GLUCOSEU Negative 03/19/2018    AMORPHOUS RARE 12/19/2016     HgBA1c:    Lab Results   Component Value Date    LABA1C 5.9 10/25/2020     Microalbumen/Creatinine ratio:  No components found for: RUCREAT  FLP:    Lab Results   Component Value Date    TRIG 66 10/25/2020    HDL 62 10/25/2020    LDLCALC 71 10/25/2020    LABVLDL 13 10/25/2020     TSH:    Lab Results   Component Value Date    TSH 2.670 10/25/2020     VITAMIN B12: No components found for: B12  FOLATE:    Lab Results   Component Value Date    FOLATE 14.3 10/24/2020     IRON:    Lab Results   Component Value Date    IRON 37 10/25/2020     Iron Saturation:  No components found for: PERCENTFE  TIBC:    Lab Results   Component Value Date    TIBC 243 10/25/2020     FERRITIN:    Lab Results   Component Value Date    FERRITIN 168 10/25/2020     24 Hour Urine for Protein:  No components found for: RAWUPRO, UHRS3, HQIA51UB, UTV3  24 Hour Urine for Creatinine Clearance:  No components found for: CREAT4, UHRS10, UTV10     General appearance: Alert, Awake, Oriented times 3, no distress; mild expressive aphasia, somewhat slow with answers  Skin: Warm and dry ; no rashes  Head: Normocephalic. No masses, lesions or tenderness noted  Eyes: Conjunctivae pink, sclera white. PERRL,EOM-I  Ears: External ears normal  Nose/Sinuses: Nares normal. Septum midline. Mucosa normal. No drainage  Oropharynx: Oropharynx clear with no exudate seen  Neck: Supple. No jugular venous distension, lymphadenopathy or thyromegaly Trachea midline  Lungs: Clear to auscultation bilaterally. No rhonchi, crackles or wheezes  Heart: S1 S2  Regular rate and rhythm. No rub, murmur or gallop  Abdomen: Soft, non-tender.  BS normal. No masses, organomegaly; no rebound or guarding  Extremities: No edema, Peripheral pulses palpable; splint right ankle  Musculoskeletal: Muscular strength appears intact. Neuro:  No focal motor defects ; II-XII grossly intact . ANDERSENJJ schulz      ASSESSMENT:   Principal Problem:    Closed fracture fibula, head, right, initial encounter  Active Problems:    Essential hypertension    Morbid obesity with BMI of 40.0-44.9, adult ([facility])    Diabetes mellitus ([facility]    Neurogenic bladder    Sarcoidosis    Sleep apnea    Diffuse cerebral atrophy ([facility])    Asthma    Diabetic peripheral neuropathy ([facility])    Fibromyalgia    CKD (chronic kidney disease) stage 4, GFR 15-29 ml/min ([facility])  Resolved Problems:    * No resolved hospital problems. *      PLAN:  SEE ORDERS      RE  CHANGES AND FINDINGS   Medications reviewed with patient  GI prophylaxis  DVT prophylaxis  Consultants notes reviewed  Await nephrology input  Ferrlecit protocol due to iron deficiency  Cleocin 900 mg IV every 8 hours x2 days  Hydroxychloroquine 200 mg 3 times daily  Sliding-scale insulin  Continue aerosol treatments  We will likely need subacute rehab referral since she is nonweightbearing  Morphine 2 mg IV every 4 hours as needed for severe pain  Percocet 10, every 8 hours as needed      Discussed with patient and nursing. Terrie Dewitt DO     10:11 AM     10/25/2020    TIME > 25 MINUTES    >  50 %  OF  TIME  DISCUSSION               ------------  INFORMATION  -----------      DIET:Dietary Nutrition Supplements: Standard High Calorie Oral Supplement  DIET CARB CONTROL; Carb Control: 4 carbs/meal (approximate 1800 kcals/day)        Allergies   Allergen Reactions    Iodides Shortness Of Breath     CT Scan Dye (\"turned purple\")    Iodine Shortness Of Breath     Had trouble breathing and Skin color turned purple and stayed that way for  Hours.     Other Shortness Of Breath     Perfumes and smoke    Pcn [Penicillins] Itching         MEDICATION SIDE EFFECTS:none       SCHEDULED MEDS:                                 Scheduled Meds:   aspirin  81 mg Oral BID    atorvastatin  20 mg Oral Nightly    baclofen  20 mg Oral TID    [Held by provider] celecoxib  200 mg Oral Daily    dicyclomine  20 mg Oral 4x Daily AC & HS    DULoxetine  60 mg Oral QAM    hydroxychloroquine  200 mg Oral BID    latanoprost  1 drop Both Eyes Nightly    metoprolol tartrate  25 mg Oral BID    oxybutynin  10 mg Oral BID    pregabalin  100 mg Oral BID    theophylline CR (12 hour)  300 mg Oral QAM    verapamil  240 mg Oral QAM    enoxaparin  40 mg Subcutaneous Daily    insulin lispro  0-6 Units Subcutaneous TID WC    insulin lispro  0-3 Units Subcutaneous Nightly    Arformoterol Tartrate  15 mcg Nebulization BID    budesonide  250 mcg Nebulization BID    ipratropium-albuterol  1 ampule Inhalation Q4H WA    sodium chloride flush  10 mL Intravenous 2 times per day    sennosides-docusate sodium  1 tablet Oral BID    clindamycin (CLEOCIN) IV  900 mg Intravenous Q8H       Continuous Infusions:   dextrose      sodium chloride 75 mL/hr at 10/25/20 0941         Data:       Intake/Output Summary (Last 24 hours) at 10/25/2020 1011  Last data filed at 10/24/2020 1902  Gross per 24 hour   Intake 700 ml   Output 325 ml   Net 375 ml       Wt Readings from Last 3 Encounters:   10/24/20 271 lb (122.9 kg)   01/28/20 250 lb (113.4 kg)   10/24/17 265 lb (120.2 kg)       Labs: Additional    GLUCOSE:No results for input(s): POCGLU in the last 72 hours. BNP:No results found for: BNP    CRP: No results for input(s): CRP in the last 72 hours. ESR:No results for input(s): SEDRATE in the last 72 hours. RADIOLOGY: REVIEWED AVAILABLE REPORT  XR FOOT LEFT (MIN 3 VIEWS)   Final Result   1. No acute fracture. 2.  Chronic fracture involving 5th metatarsal bone. 3.  Stable, satisfactory alignment status post internal fixation at level of   distal calcaneus and tarsal navicular. FLUORO FOR SURGICAL PROCEDURES   Final Result   Intraprocedural fluoroscopic spot images as above. See separate procedure   report for more information. XR ANKLE RIGHT (MIN 3 VIEWS)   Final Result   Mildly displaced distal fibular fracture involving the distal diaphysis   extending into the metaphysis. No other acute fracture is seen.                    [address] [place] [facility]   10:11 AM     10/25/2020      Voice recognition software used for dictation

## 2020-10-25 NOTE — PROGRESS NOTES
Name: Yolanda Miguel  : 1945  MRN: [id number]    Referring Provider:  Dr Abad Ornelas    Date of Service: 10/25/2020    Evaluating PT:  Latrice Briggs PT     Room #:  1151/5486-J  Diagnosis:  RT lateral ankle Fx, closed fx RT fibular head  PMHx/PSHx:  DM, HTN, Fibro, B/L TKA, RT ASHTYN, Asthma, peripheral neuropathy, DJD, DDD, Neurogenic bladder, sarcoidosis, cerebral artery occlusion with infarction. Procedure/Surgery:  RT ankle ORIF  Precautions:  NWB RTLE, Can WT bear LTLE in CAM Boot, General, Falls,   Equipment Needs:  [employer], WC    SUBJECTIVE:    Pt lives with ? in a ? story home with ? stairs to enter and ? rail. Bed is on ? floor and bath is on ? floor. Pt ambulated with ? PTA. Diana Bolaños is alert to herself only. Uncertain to date, place, home setting, steps to from home, what AD if any she uses. OBJECTIVE:   Initial Evaluation  Date: 10/25/20 Treatment Short Term/ Long Term   Goals   AM-PAC 6 Clicks      Was pt agreeable to Eval/treatment? Yes     Does pt have pain? Yes RT leg/ankle     Bed Mobility  Rolling: Mod/Max  Supine to sit: Max  Sit to supine: Max/dep  Scooting: Max/dep   Improved all levels to Min-Mod x 2-1   Transfers Sit to stand: Max/dep assist attempted; unsuccessful-unacceptabrisk for injury       Mod x 2 transfers   Ambulation   SHAHRZAD    WW with Min/Mod x 2 assist 5'    Stair negotiation: ascended and descended SHAHRZAD        ROM  WFL x RT ankle NA     Strength  BLE 2-3-/5 hip, knee 3/5   Imp MMT 1 grade globally BLE   Balance Sitting EOB:  CGA  Dynamic Standing:  Zero  Sitting EOB:  Indep/S  Dynamic Standing: Mod x 2-1 [employer]     Pt is A & O x 1. Diana Bolaños is alert to herself only. Uncertain to date, place, home setting, steps to from home, what AD if any she uses.      Sensation:  Pt denies numbness and tingling to extremities  Edema:  RT distal LE    Therapeutic Exercises:  NA    Patient education  Pt educated on NWB status, bed mobility techniques, transfer NWB techniques, RTLE elevated 74086  [] Gait training 23420  minutes  [] Manual therapy 75570  minutes  [x] Therapeutic activities 87053 10 minutes  [] Therapeutic exercises 94540  minutes  [] Neuromuscular reeducation 77963  minutes     Laurence Cervantes   [id number]

## 2020-10-25 NOTE — SIGNIFICANT EVENT
RRT note    CTSP for change of status    Pt has been having difficulty maintaining wakefulness during the day but has become more obtunded. Attending has talked to nursing and obtained abg, head ct and placed on bipap. PHYSICAL EXAM:    Vitals:  BP (!) 149/87   Pulse 70   Temp 98.8 °F (37.1 °C)   Resp 16   Ht 5' 6\" (1.676 m)   Wt 271 lb (122.9 kg)   SpO2 93%   BMI 43.74 kg/m²     General:  Appears comfortable. HEENT:  Mucous membranes moist. No erythema, rhinorrhea, or post-nasal drip noted. Neck:  No carotid bruits. Heart:  Rhythm regular at rate of 72  Lungs:  CTA. No wheeze, rales, or rhonchi  Abdomen:  Positive bowel sounds positive. Soft. Non-tender. No guarding, rebound or rigidity. Breast/Rectal/Genitourinary: not pertinent. Extremities:  Negative for lower extremity edema  Skin:  Warm and dry  Vascular: 2/4 Dorsalis Pedis pulses bilaterally. Neuro:  Limited due to pt's status. Pt appearing to move extremities. Pt responds to noxious stimuli only prior to narcan but starting to make purposeful movement and open eyes spontaneously, and able to answer brief questions after narcan given. Chart reviewed  VS obtained. bs checked. Workup ordered. Pt noted to have narcotic somewhat remotely and creat was noted to be 1.9. decision made to give narcan. Pt had brief initial response and with a follow up dose of narcan, pt responded more and for longer. Discussed with Dr Lissette Hilton and noted to me that she has a hx of strokes in past. Discussed further workup and tx. Dr Maryanne Tovar to be consulted. Discussed event with Dr Maryanne Tovar who accepted pt to unit.  Possible need to monitor respiratory status with possible initiation of a narcan gtt with a background hx of cva    Encephalopathy possibly multifactorial with combination of narcotic, mild co2 retention on background of hx of cva.   ckd IV  htn  Morbid obesity  Sarcoid  Sleep apnea  Dm type 2 controlled      Critical care time is 35 min

## 2020-10-25 NOTE — PROGRESS NOTES
[facility] Quality Flow/Interdisciplinary Rounds Progress Note        Quality Flow Rounds held on October 25, 2020    Disciplines Attending:  Bedside Nurse, ,  and Nursing Unit Leadership    Shreyas Phan was admitted on 10/24/2020 12:34 AM    Anticipated Discharge Date:  Expected Discharge Date: 10/27/20    Disposition:    Jeremy Score:  Jeremy Scale Score: 19    Readmission Risk              Risk of Unplanned Readmission:        16           Discussed patient goal for the day, patient clinical progression, and barriers to discharge.   The following Goal(s) of the Day/Commitment(s) have been identified:  Pain control     [facility]  October 25, 2020

## 2020-10-26 ENCOUNTER — APPOINTMENT (OUTPATIENT)
Dept: GENERAL RADIOLOGY | Age: 75
DRG: 492 | End: 2020-10-26
Attending: INTERNAL MEDICINE
Payer: MEDICARE

## 2020-10-26 LAB
AADO2: 115.7 MMHG
ACETAMINOPHEN LEVEL: <5 MCG/ML (ref 10–30)
ADENOVIRUS BY PCR: NOT DETECTED
ALBUMIN SERPL-MCNC: 2.7 G/DL (ref 3.5–5.2)
ALP BLD-CCNC: 127 U/L (ref 35–104)
ALT SERPL-CCNC: 35 U/L (ref 0–32)
AMMONIA: 11.8 UMOL/L (ref 11–51)
AMPHETAMINE SCREEN, URINE: NOT DETECTED
ANION GAP SERPL CALCULATED.3IONS-SCNC: 10 MMOL/L (ref 7–16)
APTT: 32 SEC (ref 24.5–35.1)
AST SERPL-CCNC: 36 U/L (ref 0–31)
B.E.: -4.1 MMOL/L (ref -3–3)
BACTERIA: ABNORMAL /HPF
BARBITURATE SCREEN URINE: NOT DETECTED
BASOPHILS ABSOLUTE: 0.04 E9/L (ref 0–0.2)
BASOPHILS RELATIVE PERCENT: 0.5 % (ref 0–2)
BENZODIAZEPINE SCREEN, URINE: NOT DETECTED
BILIRUB SERPL-MCNC: 0.9 MG/DL (ref 0–1.2)
BILIRUBIN DIRECT: 0.3 MG/DL (ref 0–0.3)
BILIRUBIN URINE: NEGATIVE
BILIRUBIN, INDIRECT: 0.6 MG/DL (ref 0–1)
BLOOD, URINE: ABNORMAL
BORDETELLA PARAPERTUSSIS BY PCR: NOT DETECTED
BORDETELLA PERTUSSIS BY PCR: NOT DETECTED
BUN BLDV-MCNC: 27 MG/DL (ref 8–23)
CALCIUM SERPL-MCNC: 8.1 MG/DL (ref 8.6–10.2)
CANNABINOID SCREEN URINE: NOT DETECTED
CHLAMYDOPHILIA PNEUMONIAE BY PCR: NOT DETECTED
CHLORIDE BLD-SCNC: 108 MMOL/L (ref 98–107)
CLARITY: CLEAR
CO2: 21 MMOL/L (ref 22–29)
COCAINE METABOLITE SCREEN URINE: NOT DETECTED
COHB: 1.6 % (ref 0–1.5)
COLOR: YELLOW
CORONAVIRUS 229E BY PCR: NOT DETECTED
CORONAVIRUS HKU1 BY PCR: NOT DETECTED
CORONAVIRUS NL63 BY PCR: NOT DETECTED
CORONAVIRUS OC43 BY PCR: NOT DETECTED
CREAT SERPL-MCNC: 1.7 MG/DL (ref 0.5–1)
CRITICAL: ABNORMAL
DATE ANALYZED: ABNORMAL
DATE OF COLLECTION: ABNORMAL
EKG ATRIAL RATE: 71 BPM
EKG P AXIS: 43 DEGREES
EKG P-R INTERVAL: 190 MS
EKG Q-T INTERVAL: 430 MS
EKG QRS DURATION: 98 MS
EKG QTC CALCULATION (BAZETT): 467 MS
EKG R AXIS: 3 DEGREES
EKG T AXIS: 89 DEGREES
EKG VENTRICULAR RATE: 71 BPM
EOSINOPHILS ABSOLUTE: 0.23 E9/L (ref 0.05–0.5)
EOSINOPHILS RELATIVE PERCENT: 2.7 % (ref 0–6)
ETHANOL: <10 MG/DL (ref 0–0.08)
FENTANYL SCREEN, URINE: POSITIVE
FIO2: 35 %
GFR AFRICAN AMERICAN: 35
GFR NON-AFRICAN AMERICAN: 29 ML/MIN/1.73
GLUCOSE BLD-MCNC: 88 MG/DL (ref 74–99)
GLUCOSE URINE: NEGATIVE MG/DL
HCO3: 20.9 MMOL/L (ref 22–26)
HCT VFR BLD CALC: 37.4 % (ref 34–48)
HEMOGLOBIN: 11.4 G/DL (ref 11.5–15.5)
HHB: 4 % (ref 0–5)
HUMAN METAPNEUMOVIRUS BY PCR: NOT DETECTED
HUMAN RHINOVIRUS/ENTEROVIRUS BY PCR: NOT DETECTED
IMMATURE GRANULOCYTES #: 0.12 E9/L
IMMATURE GRANULOCYTES %: 1.4 % (ref 0–5)
INFLUENZA A BY PCR: NOT DETECTED
INFLUENZA B BY PCR: NOT DETECTED
KETONES, URINE: NEGATIVE MG/DL
LAB: ABNORMAL
LEUKOCYTE ESTERASE, URINE: ABNORMAL
LYMPHOCYTES ABSOLUTE: 1.12 E9/L (ref 1.5–4)
LYMPHOCYTES RELATIVE PERCENT: 13.1 % (ref 20–42)
Lab: ABNORMAL
Lab: ABNORMAL
MCH RBC QN AUTO: 28.1 PG (ref 26–35)
MCHC RBC AUTO-ENTMCNC: 30.5 % (ref 32–34.5)
MCV RBC AUTO: 92.3 FL (ref 80–99.9)
METER GLUCOSE: 69 MG/DL (ref 74–99)
METER GLUCOSE: 80 MG/DL (ref 74–99)
METER GLUCOSE: 81 MG/DL (ref 74–99)
METER GLUCOSE: 81 MG/DL (ref 74–99)
METER GLUCOSE: 82 MG/DL (ref 74–99)
METER GLUCOSE: 88 MG/DL (ref 74–99)
METHADONE SCREEN, URINE: NOT DETECTED
METHB: 0.1 % (ref 0–1.5)
MODE: ABNORMAL
MONOCYTES ABSOLUTE: 1.13 E9/L (ref 0.1–0.95)
MONOCYTES RELATIVE PERCENT: 13.2 % (ref 2–12)
MRSA CULTURE ONLY: NORMAL
MYCOPLASMA PNEUMONIAE BY PCR: NOT DETECTED
NEUTROPHILS ABSOLUTE: 5.89 E9/L (ref 1.8–7.3)
NEUTROPHILS RELATIVE PERCENT: 69.1 % (ref 43–80)
NITRITE, URINE: NEGATIVE
O2 CONTENT: 16.4 ML/DL
O2 SATURATION: 95.9 % (ref 92–98.5)
O2HB: 94.3 % (ref 94–97)
OPERATOR ID: ABNORMAL
OPIATE SCREEN URINE: NOT DETECTED
OXYCODONE URINE: POSITIVE
PARAINFLUENZA VIRUS 1 BY PCR: NOT DETECTED
PARAINFLUENZA VIRUS 2 BY PCR: NOT DETECTED
PARAINFLUENZA VIRUS 3 BY PCR: NOT DETECTED
PARAINFLUENZA VIRUS 4 BY PCR: NOT DETECTED
PATIENT TEMP: 37 C
PCO2: 38.1 MMHG (ref 35–45)
PDW BLD-RTO: 14.3 FL (ref 11.5–15)
PEEP/CPAP: 6 CMH2O
PFO2: 2.31 MMHG/%
PH BLOOD GAS: 7.36 (ref 7.35–7.45)
PH UA: 5 (ref 5–9)
PHENCYCLIDINE SCREEN URINE: NOT DETECTED
PHOSPHORUS: 4.3 MG/DL (ref 2.5–4.5)
PLATELET # BLD: 153 E9/L (ref 130–450)
PMV BLD AUTO: 9.7 FL (ref 7–12)
PO2: 80.8 MMHG (ref 75–100)
POTASSIUM SERPL-SCNC: 4.8 MMOL/L (ref 3.5–5)
PROTEIN UA: NEGATIVE MG/DL
PS: 12 CMH20
RBC # BLD: 4.05 E12/L (ref 3.5–5.5)
RBC UA: ABNORMAL /HPF (ref 0–2)
REASON FOR REJECTION: NORMAL
REJECTED TEST: NORMAL
RESPIRATORY SYNCYTIAL VIRUS BY PCR: NOT DETECTED
RI(T): 143 %
SALICYLATE, SERUM: <0.3 MG/DL (ref 0–30)
SARS-COV-2, PCR: NOT DETECTED
SODIUM BLD-SCNC: 139 MMOL/L (ref 132–146)
SOURCE, BLOOD GAS: ABNORMAL
SPECIFIC GRAVITY UA: >=1.03 (ref 1–1.03)
THB: 12.3 G/DL (ref 11.5–16.5)
THEOPHYLLINE LEVEL: <0.8 MCG/ML (ref 10–20)
TIME ANALYZED: 607
TOTAL PROTEIN: 6.3 G/DL (ref 6.4–8.3)
TRICYCLIC ANTIDEPRESSANTS SCREEN SERUM: NEGATIVE NG/ML
TROPONIN: 0.05 NG/ML (ref 0–0.03)
UROBILINOGEN, URINE: 1 E.U./DL
WBC # BLD: 8.5 E9/L (ref 4.5–11.5)
WBC UA: ABNORMAL /HPF (ref 0–5)

## 2020-10-26 PROCEDURE — C9113 INJ PANTOPRAZOLE SODIUM, VIA: HCPCS | Performed by: STUDENT IN AN ORGANIZED HEALTH CARE EDUCATION/TRAINING PROGRAM

## 2020-10-26 PROCEDURE — 80307 DRUG TEST PRSMV CHEM ANLYZR: CPT

## 2020-10-26 PROCEDURE — 84484 ASSAY OF TROPONIN QUANT: CPT

## 2020-10-26 PROCEDURE — 6360000002 HC RX W HCPCS: Performed by: INTERNAL MEDICINE

## 2020-10-26 PROCEDURE — 82140 ASSAY OF AMMONIA: CPT

## 2020-10-26 PROCEDURE — 36415 COLL VENOUS BLD VENIPUNCTURE: CPT

## 2020-10-26 PROCEDURE — 81001 URINALYSIS AUTO W/SCOPE: CPT

## 2020-10-26 PROCEDURE — 85730 THROMBOPLASTIN TIME PARTIAL: CPT

## 2020-10-26 PROCEDURE — 94660 CPAP INITIATION&MGMT: CPT

## 2020-10-26 PROCEDURE — 80053 COMPREHEN METABOLIC PANEL: CPT

## 2020-10-26 PROCEDURE — 80198 ASSAY OF THEOPHYLLINE: CPT

## 2020-10-26 PROCEDURE — 82805 BLOOD GASES W/O2 SATURATION: CPT

## 2020-10-26 PROCEDURE — 94640 AIRWAY INHALATION TREATMENT: CPT

## 2020-10-26 PROCEDURE — 2580000003 HC RX 258: Performed by: STUDENT IN AN ORGANIZED HEALTH CARE EDUCATION/TRAINING PROGRAM

## 2020-10-26 PROCEDURE — 6370000000 HC RX 637 (ALT 250 FOR IP): Performed by: ORTHOPAEDIC SURGERY

## 2020-10-26 PROCEDURE — 82962 GLUCOSE BLOOD TEST: CPT

## 2020-10-26 PROCEDURE — 97530 THERAPEUTIC ACTIVITIES: CPT

## 2020-10-26 PROCEDURE — 85025 COMPLETE CBC W/AUTO DIFF WBC: CPT

## 2020-10-26 PROCEDURE — 2580000003 HC RX 258: Performed by: INTERNAL MEDICINE

## 2020-10-26 PROCEDURE — 6360000002 HC RX W HCPCS: Performed by: ORTHOPAEDIC SURGERY

## 2020-10-26 PROCEDURE — 0202U NFCT DS 22 TRGT SARS-COV-2: CPT

## 2020-10-26 PROCEDURE — 2000000000 HC ICU R&B

## 2020-10-26 PROCEDURE — 6360000002 HC RX W HCPCS: Performed by: STUDENT IN AN ORGANIZED HEALTH CARE EDUCATION/TRAINING PROGRAM

## 2020-10-26 PROCEDURE — 84100 ASSAY OF PHOSPHORUS: CPT

## 2020-10-26 PROCEDURE — 71045 X-RAY EXAM CHEST 1 VIEW: CPT

## 2020-10-26 PROCEDURE — 82248 BILIRUBIN DIRECT: CPT

## 2020-10-26 PROCEDURE — G0480 DRUG TEST DEF 1-7 CLASSES: HCPCS

## 2020-10-26 PROCEDURE — 2580000003 HC RX 258: Performed by: ORTHOPAEDIC SURGERY

## 2020-10-26 PROCEDURE — 97165 OT EVAL LOW COMPLEX 30 MIN: CPT

## 2020-10-26 PROCEDURE — 87088 URINE BACTERIA CULTURE: CPT

## 2020-10-26 RX ORDER — PANTOPRAZOLE SODIUM 40 MG/10ML
40 INJECTION, POWDER, LYOPHILIZED, FOR SOLUTION INTRAVENOUS DAILY
Status: DISCONTINUED | OUTPATIENT
Start: 2020-10-26 | End: 2020-11-01 | Stop reason: HOSPADM

## 2020-10-26 RX ORDER — NALOXONE HYDROCHLORIDE 1 MG/ML
1 INJECTION INTRAMUSCULAR; INTRAVENOUS; SUBCUTANEOUS ONCE
Status: DISCONTINUED | OUTPATIENT
Start: 2020-10-26 | End: 2020-10-26 | Stop reason: CLARIF

## 2020-10-26 RX ORDER — NALOXONE HYDROCHLORIDE 0.4 MG/ML
1 INJECTION, SOLUTION INTRAMUSCULAR; INTRAVENOUS; SUBCUTANEOUS ONCE
Status: COMPLETED | OUTPATIENT
Start: 2020-10-26 | End: 2020-10-26

## 2020-10-26 RX ORDER — 0.9 % SODIUM CHLORIDE 0.9 %
500 INTRAVENOUS SOLUTION INTRAVENOUS ONCE
Status: COMPLETED | OUTPATIENT
Start: 2020-10-26 | End: 2020-10-26

## 2020-10-26 RX ADMIN — SODIUM CHLORIDE: 9 INJECTION, SOLUTION INTRAVENOUS at 17:58

## 2020-10-26 RX ADMIN — WATER 1 G: 1 INJECTION INTRAMUSCULAR; INTRAVENOUS; SUBCUTANEOUS at 12:42

## 2020-10-26 RX ADMIN — IPRATROPIUM BROMIDE AND ALBUTEROL SULFATE 1 AMPULE: 2.5; .5 SOLUTION RESPIRATORY (INHALATION) at 16:29

## 2020-10-26 RX ADMIN — SODIUM CHLORIDE, PRESERVATIVE FREE 10 ML: 5 INJECTION INTRAVENOUS at 13:07

## 2020-10-26 RX ADMIN — NALOXONE HYDROCHLORIDE 1 MG: 0.4 INJECTION, SOLUTION INTRAMUSCULAR; INTRAVENOUS; SUBCUTANEOUS at 11:08

## 2020-10-26 RX ADMIN — Medication 10 ML: at 20:52

## 2020-10-26 RX ADMIN — SODIUM CHLORIDE, PRESERVATIVE FREE 10 ML: 5 INJECTION INTRAVENOUS at 16:37

## 2020-10-26 RX ADMIN — IPRATROPIUM BROMIDE AND ALBUTEROL SULFATE 1 AMPULE: 2.5; .5 SOLUTION RESPIRATORY (INHALATION) at 13:37

## 2020-10-26 RX ADMIN — ARFORMOTEROL TARTRATE 15 MCG: 15 SOLUTION RESPIRATORY (INHALATION) at 20:27

## 2020-10-26 RX ADMIN — SODIUM CHLORIDE, PRESERVATIVE FREE 10 ML: 5 INJECTION INTRAVENOUS at 13:08

## 2020-10-26 RX ADMIN — SODIUM CHLORIDE 500 ML: 9 INJECTION, SOLUTION INTRAVENOUS at 01:48

## 2020-10-26 RX ADMIN — DEXTROSE MONOHYDRATE 12.5 G: 25 INJECTION, SOLUTION INTRAVENOUS at 16:34

## 2020-10-26 RX ADMIN — ENOXAPARIN SODIUM 40 MG: 40 INJECTION SUBCUTANEOUS at 09:00

## 2020-10-26 RX ADMIN — ARFORMOTEROL TARTRATE 15 MCG: 15 SOLUTION RESPIRATORY (INHALATION) at 08:10

## 2020-10-26 RX ADMIN — IPRATROPIUM BROMIDE AND ALBUTEROL SULFATE 1 AMPULE: 2.5; .5 SOLUTION RESPIRATORY (INHALATION) at 20:27

## 2020-10-26 RX ADMIN — PANTOPRAZOLE SODIUM 40 MG: 40 INJECTION, POWDER, FOR SOLUTION INTRAVENOUS at 13:07

## 2020-10-26 RX ADMIN — IPRATROPIUM BROMIDE AND ALBUTEROL SULFATE 1 AMPULE: 2.5; .5 SOLUTION RESPIRATORY (INHALATION) at 07:59

## 2020-10-26 RX ADMIN — BUDESONIDE 250 MCG: 0.25 SUSPENSION RESPIRATORY (INHALATION) at 20:27

## 2020-10-26 RX ADMIN — SODIUM CHLORIDE: 9 INJECTION, SOLUTION INTRAVENOUS at 06:20

## 2020-10-26 RX ADMIN — SODIUM CHLORIDE 125 MG: 900 INJECTION INTRAVENOUS at 10:15

## 2020-10-26 RX ADMIN — Medication 10 ML: at 09:00

## 2020-10-26 RX ADMIN — BUDESONIDE 250 MCG: 0.25 SUSPENSION RESPIRATORY (INHALATION) at 08:30

## 2020-10-26 ASSESSMENT — PAIN SCALES - GENERAL
PAINLEVEL_OUTOF10: 0
PAINLEVEL_OUTOF10: 2
PAINLEVEL_OUTOF10: 0
PAINLEVEL_OUTOF10: 2
PAINLEVEL_OUTOF10: 0
PAINLEVEL_OUTOF10: 2

## 2020-10-26 ASSESSMENT — PAIN DESCRIPTION - ORIENTATION: ORIENTATION: RIGHT

## 2020-10-26 ASSESSMENT — PAIN DESCRIPTION - LOCATION: LOCATION: ANKLE;LEG

## 2020-10-26 NOTE — PROGRESS NOTES
Critical Care Team - Daily Progress Note      Date and time: 10/26/2020 11:26 AM  Patient's name:  Frank Agee  Medical Record Number: [id number]  Patient's account/billing number: [de-identified]  Patient's YOB: 1945  Age: 67 y.o. Date of Admission: 10/24/2020 12:34 AM  Length of stay during current admission: 2      Primary Care Physician: Claria Scheuermann, MD  ICU Attending Physician: Dr. Yara Funes Status: Full Code    Reason for ICU admission: Decreased level of consciousness      SUBJECTIVE:     OVERNIGHT EVENTS:       Patient remains at her baseline of decreased level of consciousness since she was transferred here. No changes.     AWAKE & FOLLOWING COMMANDS:  [x] No   [] Yes    CURRENT VENTILATION STATUS:     [] Ventilator  [x] BIPAP  [] Nasal Cannula [] Room Air      IF INTUBATED, ET TUBE MARKING AT LOWER LIP:       cms    SECRETIONS Amount:  [] Small [] Moderate  [] Large  [] None  Color:     [] White [] Colored  [] Bloody    SEDATION:  RAAS Score:  [] Propofol gtt  [] Versed gtt  [] Ativan gtt   [x] No Sedation    PARALYZED:  [x] No    [] Yes    DIARRHEA:                [x] No                [] Yes  (C. Difficile status: [] positive                                                                                                                       [] negative                                                                                                                     [] pending)    VASOPRESSORS:  [x] No    [] Yes    If yes -   [] Levophed       [] Dopamine     [] Vasopressin       [] Dobutamine  [] Phenylephrine         [] Epinephrine    CENTRAL LINES:     [x] No   [] Yes   (Date of Insertion:   )           If yes -     [] Right IJ     [] Left IJ [] Right Femoral [] Left Femoral                   [] Right Subclavian [] Left Subclavian       ADAM'S CATHETER:   [] No   [x] Yes         OBJECTIVE:     VITAL SIGNS:  /61   Pulse 64   Temp 98.7 °F (37.1 °C) (Axillary)   Resp 27   Ht 5' 6\" (1.676 m)   Wt 271 lb (122.9 kg)   SpO2 97%   BMI 43.74 kg/m²   Tmax over 24 hours:  Temp (24hrs), Av.9 °F (37.2 °C), Min:98.7 °F (37.1 °C), Max:99.6 °F (37.6 °C)      Patient Vitals for the past 6 hrs:   BP Pulse Resp SpO2   10/26/20 1045 -- -- 27 --   10/26/20 1000 137/61 64 10 97 %   10/26/20 0900 113/63 69 18 97 %   10/26/20 0830 -- -- 9 (!) 79 %   10/26/20 0810 -- -- 14 --   10/26/20 0800 (!) 140/66 63 16 --   10/26/20 0755 -- -- 11 --   10/26/20 0700 (!) 142/56 67 14 99 %   10/26/20 0600 (!) 127/56 68 19 100 %         Intake/Output Summary (Last 24 hours) at 10/26/2020 1126  Last data filed at 10/26/2020 0800  Gross per 24 hour   Intake --   Output 663 ml   Net -663 ml     Wt Readings from Last 2 Encounters:   10/24/20 271 lb (122.9 kg)   20 250 lb (113.4 kg)     Body mass index is 43.74 kg/m². PHYSICAL EXAMINATION:    General appearance -patient sleeping in bed on BiPAP  Eyes - pupils equal and reactive,  sclera anicteric  Ears - external ear normal  Nose - normal and patent, no erythema, discharge or polyps  Mouth -next membranes dry no visible lesions  Neck - supple, no significant adenopathy  Chest - clear to auscultation, no wheezes,symmetric air entry  Heart - normal rate, regular rhythm,no murmurs, rubs, clicks or gallops  Abdomen - soft, nontender, nondistended, no masses or organomegaly  Neurological -patient opens eyes and grimaces to pain. Does not follow commands. Left upper and lower extremities. Extremities - peripheral pulses normal, no clubbing or cyanosis. No edema.   Skin - normal coloration and turgor, no rashes, no suspicious skin lesions noted     MEDICATIONS:    Scheduled Meds:   cefTRIAXone (ROCEPHIN) IV  1 g Intravenous Once    aspirin  81 mg Oral BID    atorvastatin  20 mg Oral Nightly    baclofen  20 mg Oral TID    dicyclomine  20 mg Oral 4x Daily AC & HS    DULoxetine  60 mg Oral QAM    hydroxychloroquine  200 mg Oral BID    latanoprost  1 drop Both Eyes Nightly    metoprolol tartrate  25 mg Oral BID    oxybutynin  10 mg Oral BID    pregabalin  100 mg Oral BID    theophylline CR (12 hour)  300 mg Oral QAM    verapamil  240 mg Oral QAM    enoxaparin  40 mg Subcutaneous Daily    insulin lispro  0-6 Units Subcutaneous TID WC    insulin lispro  0-3 Units Subcutaneous Nightly    Arformoterol Tartrate  15 mcg Nebulization BID    budesonide  250 mcg Nebulization BID    ipratropium-albuterol  1 ampule Inhalation Q4H WA    sodium chloride flush  10 mL Intravenous 2 times per day    sennosides-docusate sodium  1 tablet Oral BID     Continuous Infusions:   dextrose      sodium chloride 75 mL/hr at 10/26/20 0620     PRN Meds:   potassium chloride, 40 mEq, PRN    Or  potassium alternative oral replacement, 40 mEq, PRN    Or  potassium chloride, 10 mEq, PRN  acetaminophen, 650 mg, Q6H PRN    Or  acetaminophen, 650 mg, Q6H PRN  magnesium hydroxide, 30 mL, Daily PRN  glucose, 15 g, PRN  dextrose, 12.5 g, PRN  glucagon (rDNA), 1 mg, PRN  dextrose, 100 mL/hr, PRN  trimethobenzamide, 200 mg, Q6H PRN  sodium chloride flush, 10 mL, PRN  bisacodyl, 5 mg, Daily PRN          VENT SETTINGS (Comprehensive) (if applicable):  Vent Information  Skin Assessment: Clean, dry, & intact  Equipment Changed: Mask(changed to nonventedmask and  filtered ircuit)  FiO2 : 35 %  SpO2: 97 %  I Time/ I Time %: 0.9 s  Mask Type: Full face mask  Mask Size: Small  Additional Respiratory  Assessments  Pulse: 64  Resp: 27  SpO2: 97 %  Oral Care: Mouth swabbed    ABG  Lab Results   Component Value Date    PH 7.358 10/26/2020    PCO2 38.1 10/26/2020    PO2 80.8 10/26/2020    HCO3 20.9 10/26/2020    O2SAT 95.9 10/26/2020         Laboratory findings:    Complete Blood Count:   Recent Labs     10/25/20  0518 10/25/20  1710 10/26/20  0600   WBC 8.8 9.0 8.5   HGB 12.2 12.8 11.4*   HCT 39.9 41.1 37.4    203 153        Lactic Acid  Lab Results   Component Value Date    LACTA 1.4 01/28/2020 evidence of intracranial mass, hemorrhage, acute territorial infarction, or hydrocephalus. Intracranial arteries are symmetric in density. ORBITS: The visualized portion of the orbits demonstrate no acute abnormality. SINUSES: The visualized paranasal sinuses and mastoid air cells demonstrate no acute abnormality. SOFT TISSUES/SKULL:  No acute abnormality of the visualized skull or soft tissues. No acute intracranial abnormality. Chronic involutional changes and chronic microvascular ischemic changes are noted. Xr Chest Portable    Result Date: 10/26/2020  EXAMINATION: ONE XRAY VIEW OF THE CHEST 10/26/2020 7:09 am COMPARISON: 10/21/2017 HISTORY: ORDERING SYSTEM PROVIDED HISTORY: respiratory failure TECHNOLOGIST PROVIDED HISTORY: Reason for exam:->respiratory failure FINDINGS: The heart is enlarged. There are findings of mild vascular congestion. There is no lung consolidation to suggest pneumonia. There is no pleural effusion. Cardiomegaly with findings of mild CHF. Fluoro For Surgical Procedures    Result Date: 10/24/2020  EXAMINATION: SPOT FLUOROSCOPIC IMAGES 10/24/2020 5:12 pm TECHNIQUE: Fluoroscopy was provided by the radiology department for procedure. Radiologist was not present during examination. FLUOROSCOPY DOSE AND TYPE OR TIME AND EXPOSURES: Fluoroscopy time: 65.7 seconds Cumulative dose: 2.77 mGy COMPARISON: Right ankle injury radiographs 10/24/2020 at 8:14 a.m. HISTORY: ORDERING SYSTEM PROVIDED HISTORY: RT ankle TECHNOLOGIST PROVIDED HISTORY: Reason for exam:->RT ankle Intraprocedural imaging. FINDINGS: Three spot images of the right ankle were obtained. Interval lateral plate and screw fixation of distal fibular fracture. Alignment appears anatomic. Intraprocedural fluoroscopic spot images as above. See separate procedure report for more information. Chest Xray (10/26/2020): Impression    Cardiomegaly with findings of mild CHF.         SYSTEMS ASSESSMENT    Neuro History of CVA  Altered mental status  Patient is somnolent, opens eyes to voice and painful stimuli  Not following commands  -Opioids and sedative medications discontinued  -Multiple doses of Narcan given with some response yesterday  -Another dose of Narcan ordered today    Respiratory   Acidosis with CO2 retention  -Placed on BiPAP 12/6  -ABG this morning 7.358/38.1/80.8/20.9  -Repeat ABG and chest x-ray tomorrow  Wean oxygen as tolerated. Keep O2 sat 90-92%    Asthma  -Continue nebulizers    Cardiovascular   Hypertension  -Continue metoprolol    Hyperlipidemia  -Continue atorvastatin    Telemetry monitoring    Gastrointestinal   No acute issues  GI prophylaxis: Protonix    Renal   Chronic kidney disease  -Creatinine 1.7, BUN 27  -Decreased opiate metabolism  -Monitor CMP daily     Infectious Disease   Urinary tract infection, Anaya catheter associated  -Possible cause of decreased level of consciousness  -Start empiric Rocephin 1 g IV every 24 hours    Hematology/Oncology   No acute hematology issues  -Hemoglobin 11.4 stable  -Continue monitoring    DVT prophylaxis: Lovenox    Endocrine   History of diabetes  -Sliding scale insulin  -Keep glucose below 180  -Continue monitoring glucose    Social/Spiritual/DNR/Other   Full code  Status post open reduction internal fixation right malleolus D1    Jared Bennett DO, PGY1.                       10/26/2020, 11:26 AM    Attending Physician Attestation: Dr. Andrei Walton    Thank you very much for allowing me to see this patient in consultation and follow up. I personally saw, examined and provided care for the patient. Radiographs, labs and medication list were reviewed by me independently. I spoke with bedside nursing, respiratory therapists and consultants. Critical care services and times documented are independent of procedures and multidisciplinary rounds with Residents.  Additionally comprehensive, multidisciplinary rounds were conducted with the MICU team. The case was discussed in detail and plans for care were established. Review of Residents documentation was conducted and revisions were made as appropriate. I agree with the the above documented information. Physical Examination:  Vitals:   Vitals:    10/26/20 0830 10/26/20 0900 10/26/20 1000 10/26/20 1045   BP:  113/63 137/61    Pulse:  69 64    Resp: 9 18 10 27   Temp:       TempSrc:       SpO2: (!) 79% 97% 97%    Weight:       Height:          General: No Acute Distress  HEENT: normocephalic, atruamatic  CVS: RRR, S1, S2, No S3 or S4  Respiratory: decreased breath sounds at lung bases, no focal wheezes noted  Extremities: no clubbing/cyanosis/edema    ASSESSMENT:  1.) Acute Hypoxic and Hypercapnic Respiratory Failure   2.) Opiate-Induced Encephalopathy   3.) UTI  4.) Right Lateral Malleolus Ankle Fracture - s/p Right lateral malleolus open reduction and  internal fixation  5.) Acute Kidney Injury   6.) Hypoalbuminemia     In addition the following applies:    Check: theophylline level, respiratory panel    Medication Alterations: hold all opiates/sedatives, narcan 1 mg IV x 1  Procedures: N/A  Imaging: reviewed  New Consultations: N/A  VENT: N/A    Access: Peripheral   Consults: Nephrology, Orthopedic Surgery, Pulmonary   Drips: N/A  Nutrition: PO  ABX: N/A  Completed ABX:    Thank you for allowing me to participate in the care of this patient. Care reviewed with nursing staff, medical and surgical specialty care, primary care and the patient's family as available. Restraints are ordered when the patient can do harm to him/herself by pulling out devices. Critical Care Time: > 35 minutes excluding procedures    GLORIA Carter  10/26/2020  12:39 PM

## 2020-10-26 NOTE — PLAN OF CARE
Problem: Falls - Risk of:  Goal: Absence of physical injury  Description: Absence of physical injury  Outcome: Met This Shift     Problem: Pain:  Goal: Control of acute pain  Description: Control of acute pain  Outcome: Met This Shift     Problem: Skin Integrity:  Goal: Will show no infection signs and symptoms  Description: Will show no infection signs and symptoms  Outcome: Met This Shift

## 2020-10-26 NOTE — CARE COORDINATION
COVID pending 10/26. On continuous Bipap. POD # 2 ORIF R ankle. PT am-pac 6. Tentative referral made to [facility] as per JOCELYNN note.  Will follow William Keane

## 2020-10-26 NOTE — CARE COORDINATION
Social Work:    Social service reviewed chart notes. Mrs. Jade Ales Sharmaine Goldberg) was admitted due to a fall at home. She underwent an ORIF of her right lateral malleolus. Patient is presently only alert to herself per PT notes. Social service placed a call to James, patient's friend & POA. James advised that Josafat Bedoya resides with another friend/POA Gaurav Burks in a one-story home and used a rollator and CPAP prior to admission. James states that Josafat Bedoya has used [facility] and has gone into skilled rehab at [facility] in the past. James explained that Josafat Bedoya will prefer [facility], as she was not happy at [facility]. Social work made a tentative referral to [facility] and will follow-up with Josafat Bedoya when she is more alert. Voice message was left with Gaurav Burks as well.      Electronically signed by ANTHONY Teixeira on 10/26/2020 at 12:28 PM

## 2020-10-26 NOTE — PROGRESS NOTES
[facility] MD Dalton Mcledo MD Bevely Lord, MD Myla Munster, MD Durward Kand, AILIN Mckeon, TANJA  Progress Note    10/26/2020    SUBJECTIVE:   10/26:   MS declined yesterday evening -- unresponsive, RRT called, transferred to MICU. On cpap. Sleeping, appears comfortable. Difficult to arouse. Hypotensive in the early am -- improved, now BP stable x > 6 hrs. PROBLEM LIST:    Principal Problem:    Closed fracture fibula, head, right, initial encounter  Active Problems:    Essential hypertension    Morbid obesity with BMI of 40.0-44.9, adult (HCC)    Diabetes mellitus ([facility] [facility]    Neurogenic bladder    Sarcoidosis    Sleep apnea    Diffuse cerebral atrophy ([facility])    Asthma    Diabetic peripheral neuropathy ([facility])    Fibromyalgia    CKD (chronic kidney disease) stage 4, GFR 15-29 ml/min ([facility])    Iron deficiency anemia    Encephalopathy    Sarcoid  Resolved Problems:    * No resolved hospital problems.  *         DIET:    Dietary Nutrition Supplements: Standard High Calorie Oral Supplement  DIET CARB CONTROL; Carb Control: 4 carbs/meal (approximate 1800 kcals/day)     MEDS (scheduled):    cefTRIAXone (ROCEPHIN) IV  1 g Intravenous Q24H    pantoprazole  40 mg Intravenous Daily    aspirin  81 mg Oral BID    atorvastatin  20 mg Oral Nightly    baclofen  20 mg Oral TID    dicyclomine  20 mg Oral 4x Daily AC & HS    DULoxetine  60 mg Oral QAM    hydroxychloroquine  200 mg Oral BID    latanoprost  1 drop Both Eyes Nightly    metoprolol tartrate  25 mg Oral BID    oxybutynin  10 mg Oral BID    pregabalin  100 mg Oral BID    theophylline CR (12 hour)  300 mg Oral QAM    verapamil  240 mg Oral QAM    enoxaparin  40 mg Subcutaneous Daily    insulin lispro  0-6 Units Subcutaneous TID WC    insulin lispro  0-3 Units Subcutaneous Nightly    Arformoterol Tartrate  15 mcg Nebulization BID    budesonide  250 mcg Nebulization BID    ipratropium-albuterol 1 ampule Inhalation Q4H WA    sodium chloride flush  10 mL Intravenous 2 times per day    sennosides-docusate sodium  1 tablet Oral BID       MEDS (infusions):   dextrose      sodium chloride 75 mL/hr at 10/26/20 0620       MEDS (prn):  potassium chloride **OR** potassium alternative oral replacement **OR** potassium chloride, acetaminophen **OR** acetaminophen, magnesium hydroxide, glucose, dextrose, glucagon (rDNA), dextrose, trimethobenzamide, sodium chloride flush, bisacodyl    PHYSICAL EXAM:     Patient Vitals for the past 24 hrs:   BP Temp Temp src Pulse Resp SpO2   10/26/20 1045 -- -- -- -- 27 --   10/26/20 1000 137/61 -- -- 64 10 97 %   10/26/20 0900 113/63 -- -- 69 18 97 %   10/26/20 0830 -- -- -- -- 9 (!) 79 %   10/26/20 0810 -- -- -- -- 14 --   10/26/20 0800 (!) 140/66 -- -- 63 16 --   10/26/20 0755 -- -- -- -- 11 --   10/26/20 0700 (!) 142/56 -- -- 67 14 99 %   10/26/20 0600 (!) 127/56 -- -- 68 19 100 %   10/26/20 0500 (!) 127/56 -- -- 62 16 99 %   10/26/20 0400 (!) 135/53 -- -- 63 18 98 %   10/26/20 0355 -- -- -- -- 14 --   10/26/20 0300 133/63 -- -- 65 13 98 %   10/26/20 0200 133/63 -- -- 65 12 99 %   10/26/20 0100 (!) 82/34 -- -- 68 14 97 %   10/26/20 0020 -- -- -- -- 14 --   10/26/20 0000 (!) 89/39 98.7 °F (37.1 °C) Axillary 66 13 99 %   10/25/20 2300 (!) 86/34 -- -- 67 12 97 %   10/25/20 2200 (!) 81/46 -- -- 67 11 98 %   10/25/20 2100 (!) 92/38 -- -- 69 13 100 %   10/25/20 2000 (!) 92/38 98.8 °F (37.1 °C) Axillary 67 12 100 %   10/25/20 1942 -- -- -- -- 13 98 %   10/25/20 1941 -- -- -- -- 13 99 %   10/25/20 1940 -- -- -- -- 13 99 %   10/25/20 1935 -- -- -- -- 17 --   10/25/20 1900 128/77 -- -- 68 13 97 %   10/25/20 1800 128/77 99.6 °F (37.6 °C) Oral 69 12 99 %   10/25/20 1653 (!) 149/87 98.8 °F (37.1 °C) -- 70 16 --   10/25/20 1647 -- -- -- -- 18 --   10/25/20 1439 -- -- -- -- 12 --   10/25/20 1338 -- -- -- -- 12 --   @      Intake/Output Summary (Last 24 hours) at 10/26/2020 1217  Last data filed at 10/26/2020 0800  Gross per 24 hour   Intake --   Output 663 ml   Net -663 ml         Wt Readings from Last 3 Encounters:   10/24/20 271 lb (122.9 kg)   01/28/20 250 lb (113.4 kg)   10/24/17 265 lb (120.2 kg)       Constitutional:  in no acute distress, on cpap  HEENT: NC/AT, mucus membranes dry on cpap  Neck: Trachea midline, no JVD  Cardiovascular: S1, S2 regular rhythm, no murmur,or rub  Respiratory:  No crackles, no wheeze  Gastrointestinal:  Soft, nontender, nondistended, NABS  Ext: 1/2 dependent chronic-type edema, feet warm  Skin: dry, no rash  Neuro: awake, alert, interactive      DATA:    Recent Labs     10/25/20  0518 10/25/20  1710 10/26/20  0600   WBC 8.8 9.0 8.5   HGB 12.2 12.8 11.4*   HCT 39.9 41.1 37.4   MCV 92.1 91.3 92.3    203 153     Recent Labs     10/24/20  1120 10/25/20  0518 10/25/20  1710 10/26/20  0600    136 136 139   K 4.4 4.6 4.5 4.8    105 102 108*   CO2 27 23 25 21*   MG 2.3 2.2 2.3  --    PHOS  --  4.8* 4.7* 4.3   BUN 35* 33* 32* 27*   CREATININE 1.9* 1.9* 2.0* 1.7*   ALT  --  26 41* 35*   AST  --  26 47* 36*   BILIDIR  --  0.4*  --  0.3   BILITOT  --  0.8 1.3* 0.9   ALKPHOS  --  111* 133* 127*       Lab Results   Component Value Date    LABPROT 0.1 01/13/2018    LABPROT 0.1 01/13/2018          Assessment  1. Chronic kidney disease stage IV, baseline creatinine 1.7 - 1.9 mg/dL, current estimated GFR 26 mL/min secondary to diabetic nephropathy likely exacerbated by renal microvascular atherosclerotic disease in setting of longstanding hypertension, as well. Current creatinine is at her baseline, which has been stable now for a number of years. 2. Hypertension well-controlled on current medication regimen. ACE/ARB none. Her medication list.  I will check my records at the office to see why not. I would not start 1 at this time  3. Osteoarthritis, multifactorial  4. Morbid obesity, BMI 44  5. PAULINA, on cpap while asleep  6.  Diabetic retinopathy, diabetic neuropathy  7. Hyperphosphatemia, mild, secondary to CKD. Would not start phosphate binder at level      Recommendations  1. Stop Celebrex (done)  2. Continue IV normal saline at current conservative rate until po improves  3. When able, encourage oral intake  4. Otherwise continue current aspects of renal management  5. Follow labs, UO  6.  Avoid nephrotoxins including contrast except as absolutely necessary      Electronically signed by Kathi Estes MD on 10/26/2020

## 2020-10-26 NOTE — PROGRESS NOTES
Date: 10/25/2020    Time: 1935      Patient Placed On BIPAP/CPAP/ Non-Invasive Ventilation? No    If no must comment. Facial area red/color change? If YES are Blister/Lesion present? If yes must notify nursing staff  BIPAP/CPAP skin barrier?   Yes    Skin barrier type:mepilex       Comments:  RED OUTLET  ALREADY ON UNABLE TO SEE UNDER MEPILEX      Luciana Haskins RRT

## 2020-10-26 NOTE — PROGRESS NOTES
Occupational Therapy  OCCUPATIONAL THERAPY INITIAL EVALUATION      Date:10/26/2020  Patient Name: Connie Almazan  MRN: [id number]  : 1945  Room: [address] [place] [address]-A    OK per nursing for OT evaluation    Referring Provider: Christina Giles MD    Evaluating OT: Nabeel Zechariah OTR/L [id number]    AM-PAC Daily Activity Raw Score:     Recommended Adaptive Equipment: TBD    Diagnosis: R fibula fracture. Pt presents to this hospital post fall. Surgery: 10/25 Right lateral malleolus open reduction and  internal fixation   Pertinent Medical History: HTN, diabetic peripheral neuropathy, DM, DJD, asthma, neurogenic bladder, fibromyalgia, glaucoma, CKD   Precautions:  Falls, NWB R LE, WBAT L LE with CAM boot, Bipap     Home Living: Pt is a poor historian unable to provide PLOF. Pain Level: pt appears to be in pain with movement of R LE    Cognition: A&O: . Pt responds to her name and opens her eyes. Lethargic throughout session. No further interaction. Has pain response to movement of R LE. Nursing aware. Problem solving:  poor   Judgement/safety:  Poor   Direction following: Pt does not follow any 1 step commands, max verbal/tactile/visual cues     Functional Assessment:   Initial Eval Status  Date: 10/26/20 Treatment session:  Short Term Goals     Feeding Dependent  Mod A   Grooming Dependent  Mod A   UB Dressing Dependent  Mod A   LB Dressing Dependent  Mod A    Bathing Dependent  Mod A   Toileting Dependent  Mod A   Bed Mobility  Supine <> sit: Dependent x2  Rolling: Dependent     Functional Transfers STS: unable to complete d/t poor trunk control and lethargy  Max A   Functional Mobility NT  Max A during ADLs   Balance Sitting: poor, total assist to maintain sitting balance.  Max attempts to engage pt in task  Sitting EOB: x6 min    Standing: unable to attempt     Activity Tolerance Poor  bipap on throughout session  sitting chavez x10-15 min with fair/fair minus balance during self care tasks    Standing chavez x1-3 min with poor plus balance             Treatment: Patient educated on techniques for completion of ADL, safe functional transfers and functional mobility. Patient required cues for follow through with proper hand/foot placement, pacing, safety, attention, sequencing, precautions and technique in bed mobility, UE ROM and LB dressing in preparation for maximum independence in all self care tasks. Hand Dominance: Right []  Left []   Strength ROM Additional Info:    BUE  Does not follow formal commands for MMT   No functional attempts to utilize UEs PROM functional mild limitations end range shoulder  Keeps hand in flexion at rest but able to extend poor  and FMC/dexterity noted during ADL tasks         Hearing: appears functional  Vision: primarily keeps eyes closed. Does open minimally to verbal cues.  Does not attend or track in environment  Sensation:  No c/o numbness or tingling                             Long Term Goal (1-3 wks): Pt will maximize functional performance in all self care tasks/functional transfers with good follow through of all trained techniques for safe transition to next level of care    Assessment of current deficits   Functional mobility [x]  ADLs [x] Strength [x]  Cognition []  Functional transfers  [x] IADLs [x] Safety Awareness [x]  Endurance [x]  Fine Motor Coordination [] Balance [x] Vision/perception [] Sensation []   Gross Motor Coordination [] ROM [] Delirium []                  Motor Control []    Plan of Care: 2-5 days/week for 1-2 weeks PRN   [x]ADL retraining/adaptive techniques and AE recommendations to increase functional independence within precautions                    [x]Energy conservation techniques to improve tolerance for ADL/IADLs  [x]Functional transfer/mobility training/DME recommendations for increased independence, safety and fall prevention         [x]Patient/family education to increase safety and functional independence during daily routine [x]Environmental modifications for safe mobility and completion of ADLs                             []Cognitive retraining to improve problem solving skills & safe participation in ADLs/IADLs     []Sensory re-education techniques to improve extremity awareness, maintain skin integrity and improve hand function                             []Visual/Perceptual retraining to improve body awareness and safety during transfers and ADLs  []Splinting/positioning needs to maintain joint/skin integrity and contracture prevention  [x]Therapeutic activity to improve functional performance during ADLs                                        [x]Therapeutic exercise to improve tolerance and functional strength for ADLs   [x]Balance retraining/tolerance tasks for facilitation of postural control with dynamic challenges during ADLs  []Neuromuscular re-education to facilitate righting/equilibrium reactions, midline orientation, scapular stability/mobility, normalize muscle tone and facilitate active functional movement                        []Delirium prevention/treatment    [x]Positioning to improve functional independence and decrease risk of skin breakdown  []Other:     Rehab Potential: Guarded for established goals     Patient/Family Goal: Pt does not state goal.      Patient and/or family were instructed on functional diagnosis, prognosis/goals and OT plan of care. Pt verbalized poor understanding. Upon arrival, patient supine in bed. At end of session, patient supine in bed with call light and phone within reach, all lines and tubes intact. Pt would benefit from continued skilled OT to increase safety and independence with completion of ADL/IADL tasks for functional independence and quality of life.  Bed/chair alarm: ON    Low Evaluation 37982  Time In: 858   [facility]   Therapeutic Ex 20164     Therapeutic Activities 31014 10 1   ADL/Self Care [facility]     Neuro Re-Ed 08736 TOTAL TIMED TREATMENT 10 1       Evaluation time includes thorough review of current medical information, gathering information on past medical history/social history and prior level of function, completion of standardized testing/informal observation of tasks, assessment of data, and development of POC/Goals    Pattie Pierson OTR/L  [id number]

## 2020-10-26 NOTE — PROGRESS NOTES
Family Medicine    Subjective: The patient is lethargic this AM. BiPAP in place. Objective:  BP (!) 127/56   Pulse 68   Temp 98.7 °F (37.1 °C) (Axillary)   Resp 19   Ht 5' 6\" (1.676 m)   Wt 271 lb (122.9 kg)   SpO2 100%   BMI 43.74 kg/m²     HEENT: MMM, BiPAP. Heart: RRR,. Lungs: CTA bilaterally. Abd: bowel sounds present, nontender, nondistended, no masses. Extrem:  No clubbing, cyanosis, or edema. Neuro: Lethargic. No obvious deficits. CBC with Differential:    Lab Results   Component Value Date    WBC 8.5 10/26/2020    RBC 4.05 10/26/2020    HGB 11.4 10/26/2020    HCT 37.4 10/26/2020     10/26/2020    MCV 92.3 10/26/2020    MCH 28.1 10/26/2020    MCHC 30.5 10/26/2020    RDW 14.3 10/26/2020    LYMPHOPCT 13.1 10/26/2020    MONOPCT 13.2 10/26/2020    BASOPCT 0.5 10/26/2020    MONOSABS 1.13 10/26/2020    LYMPHSABS 1.12 10/26/2020    EOSABS 0.23 10/26/2020    BASOSABS 0.04 10/26/2020     CMP:    Lab Results   Component Value Date     10/26/2020    K 4.8 10/26/2020     10/26/2020    CO2 21 10/26/2020    BUN 27 10/26/2020    CREATININE 1.7 10/26/2020    GFRAA 35 10/26/2020    LABGLOM 29 10/26/2020    GLUCOSE 88 10/26/2020    GLUCOSE 82 12/09/2011    PROT 6.3 10/26/2020    LABALBU 2.7 10/26/2020    CALCIUM 8.1 10/26/2020    BILITOT 0.9 10/26/2020    ALKPHOS 127 10/26/2020    AST 36 10/26/2020    ALT 35 10/26/2020     Assessment/Plan:  1. Altered mental status - Appears to be secondary to CNS depression from narcotic medication. Stable. 2. Acute hypoxic respiratory failure - BiPAP. Stable. 3. Right fibula fracture status post ORIF - Orthopedics on this. Stable. 4. Hypertension - Stable. 5. Morbid obesity - Stable. 6. Diabetes mellitus type 2 - Stable. 7. Chronic kidney disease - Nephrology and case. Stable. 8. Obstructive sleep apnea - stable. 9. History of CVA - stable. 10. Sarcoidosis - stable. 11. Fibromyalgia - stable. 12. Asthma - stable.   13. Hyperlipidemia - statin. 14. UTI - on Rocephin. 15. Disposition - as above. Continue same.     Deann Cisneros  7:22 AM  10/26/2020

## 2020-10-26 NOTE — PROGRESS NOTES
Date: 10/26/2020    Time: 4109      Patient Placed On BIPAP/CPAP/ Non-Invasive Ventilation? No    If no must comment. Facial area red/color change? If YES are Blister/Lesion present? If yes must notify nursing staff  BIPAP/CPAP skin barrier?   Yes    Skin barrier type:mepilex       Comments:    Red outlet already on  Unable to see under mepilex    Luciana Alaniz

## 2020-10-26 NOTE — PROGRESS NOTES
Physical Therapy  Facility/Department: [facility] 3R ICU  Daily Treatment Note  NAME: Ines Zavala  : 1945  MRN: [id number]    Date of Service: 10/26/2020    Patient Diagnosis(es): The encounter diagnosis was Closed right ankle fracture, initial encounter. has a past medical history of Asthma, Cerebral artery occlusion with cerebral infarction ([address] CKD (chronic kidney disease) stage 4, GFR 15-29 ml/min (HCC), Degenerative disc disease, Degenerative joint disease, Diabetes mellitus ([address] Diabetic peripheral neuropathy ([address] Diffuse cerebral atrophy ([address] Fibromyalgia, Glaucoma, HX OTHER MEDICAL, Hypertension, Iron deficiency anemia, Neurogenic bladder, Neuropathy, PONV (postoperative nausea and vomiting), Sarcoidosis, Sleep apnea, and Spinal cord and nerve root disorder. has a past surgical history that includes Foot surgery (Bilateral); Hysterectomy; back surgery; Total knee arthroplasty (Bilateral); Cholecystectomy; Colonoscopy (2019); other surgical history (Right, 2017); Breast surgery; joint replacement (2016); Cardiac catheterization (); and Ankle fracture surgery (Right, 10/24/2020). Evaluating PT:  Alexis Hyatt PT      Room #:  2502/6171-G  Diagnosis:  RT lateral ankle Fx, closed fx RT fibular head  PMHx/PSHx:  DM, HTN, Fibro, B/L TKA, RT ASHTYN, Asthma, peripheral neuropathy, DJD, DDD, Neurogenic bladder, sarcoidosis, cerebral artery occlusion with infarction. Procedure/Surgery:  RT ankle ORIF  Precautions:  NWB RTLE, Can WT bear LTLE in CAM Boot, General, Falls,   Equipment Needs:  [employer], WC     SUBJECTIVE:     Pt lives with ? in a ? story home with ? stairs to enter and ? rail. Bed is on ? floor and bath is on ? floor. Pt ambulated with ? PTA. Francisca Landry is alert to herself only.   Uncertain to date, place, home setting, steps to from home, what AD if any she uses.      OBJECTIVE:    Initial Evaluation  Date: 10/25/20 Treatment  10/26 Short Term/ Long Term   Goals   AM-PAC 6 Clicks 0/38 4/50      Was pt agreeable to Eval/treatment? Yes       Does pt have pain? Yes RT leg/ankle Grimaces in pain with R LE moved      Bed Mobility  Rolling: Mod/Max  Supine to sit: Max  Sit to supine: Max/dep  Scooting: Max/dep Rolling dependent  Scooting dependent of 2  Supine <> sit dependent of 2   Improved all levels to Min-Mod x 2-1   Transfers Sit to stand: Max/dep assist attempted; unsuccessful-unacceptabrisk for injury      NT   Mod x 2 transfers   Ambulation   SHAHRZAD    NT  WW with Min/Mod x 2 assist 5'    Stair negotiation: ascended and descended SHAHRZAD   NT      ROM  WFL x RT ankle NA       Strength  BLE 2-3-/5 hip, knee 3/5    Imp MMT 1 grade globally BLE   Balance Sitting EOB:  CGA  Dynamic Standing:  Zero   Sitting EOB:  Indep/S  Dynamic Standing: Mod x 2-1 [employer]        Additional Comments/treatment: Pt minimally responsive during session. Does not follow commands. Did open eyes slightly when seated edge of bed. Sitting balance edge of bed dependent. Grimaces in pain when R LE moved    Pt was left in bed with call light left by patient. Time in: 0900  Time out: 0915    Total treatment time 15 minutes      Continue with physical therapy current plan of care.     Dionna PT [id number]      CPT codes:  [] Low Complexity PT evaluation 30629  [] Moderate Complexity PT evaluation 98708  [] High Complexity PT evaluation 20146  [] PT Re-evaluation 30907  [] Gait training 31153  minutes  [] Manual therapy 75654    minutes  [x] Therapeutic activities 62539   15  minutes  [] Therapeutic exercises 13890     minutes  [] Neuromuscular reeducation 05115     minutes

## 2020-10-26 NOTE — PROGRESS NOTES
Critical Care Team: Daily Progress Note         Date and time: 10/25/2020 8:54 PM    Patient's name:  Cynthia Rey    Medical Record Number: [id number]    Patient's account/billing number: [de-identified]    Patient's YOB: 1945    Age: 67 y.o. Date of Admission: 10/24/2020 12:34 AM    Length of stay during current admission: 1    Primary Care Physician: Cass Amaya MD    Previous Pulmonary: [facility] consult)    Code Status: Full Code    PMH:    Past Medical History:   Diagnosis Date    Asthma 1993    Cerebral artery occlusion with cerebral infarction ([address] 07/2017    CKD (chronic kidney disease) stage 4, GFR 15-29 ml/min ([address] 2017    Degenerative disc disease     Degenerative joint disease     Diabetes mellitus ([address] 2008    Diabetic peripheral neuropathy ([address] 1998    Diffuse cerebral atrophy ([address] 2012    Fibromyalgia 01/2012    [facility]    Glaucoma     HX OTHER MEDICAL 02/2017    right hip wound dehiscence; using rollator    Hypertension     Iron deficiency anemia 10/25/2020    Neurogenic bladder     Neuropathy     PONV (postoperative nausea and vomiting)     had trouble  waking up with past foot surgery    Sarcoidosis 1993    Sleep apnea 2009    cpap; SEVERE    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\"       PSH:   Past Surgical History:   Procedure Laterality Date    BACK SURGERY      laminectomy l3-4    BREAST SURGERY      biopsy    CARDIAC CATHETERIZATION  2012    MINIMAL CAD    CHOLECYSTECTOMY      COLONOSCOPY  08/2019    TUBULAR ADENOMA x 2    FOOT SURGERY Bilateral     right foot fracture; left foot charcot    HYSTERECTOMY      JOINT REPLACEMENT  12/16/2016    right hip arthroplasty    OTHER SURGICAL HISTORY Right 02/06/2017    I & D right hip wound vaccum placement    TOTAL KNEE ARTHROPLASTY Bilateral        Reason for ICU admission:   1.  Decreased level of consciousness      Subjective:     Events in the past 24 Hours:   1. The patient was admitted to the hospital for open reduction internal fixation of an ankle fracture. Postoperatively, she is recovered on the seventh floor. This p.m., the patient was noted to be poorly responsive and an RRT was called. Narcan was given by the responding physician after ABG showed mild hypercapnia with respiratory respiratory acidosis. Response was modest.  A second dose was given, arterial blood gases showed new correction of the respiratory acidosis but the patient's mentation remained poor. She had been seen earlier in the day by nephrology. She had received doses of Percocet. Other potentially sedating medicines were held by nursing. 2. Because of the decreased mentation the decision was made to monitor her closely in the intensive care unit. There was no hypoxia or hypotension with the transfer.       Current ventilation:     [] Ventilator  [x] BIPAP/AVAPS  [] Nasal Cannula [] Room Air      Secretions      Amount:  [] Small [] Moderate  _ Large  [x] None  Color:     - White - Colored  - Bloody    Sedation:  RAAS Score:  [] Propofol gtt  [] Fentanyl gtt  [] Ativan gtt   [x] No Sedation    Paralyzed?:  [x] No    [] Yes      Vasopressors:  [x] No    [] Yes    If yes -   [] Levophed       [] Dopamine     [] Vasopressin       [] Dobutamine  [] Phenylephrine         [] Epinephrine    Central line:     [x] No   [] Yes         If yes -       [] Right IJ     [] Left IJ [] Right Femoral [] Left Femoral                   [] Right Subclavian [] Left Subclavian       Anaya catheter:   [] No   [] Yes     Urine output:            [] Good   [] Low              [] Anuric      Objective:     Vital signs:  BP (!) 92/38   Pulse 67   Temp 99.6 °F (37.6 °C) (Oral)   Resp 12   Ht 5' 6\" (1.676 m)   Wt 271 lb (122.9 kg)   SpO2 100%   BMI 43.74 kg/m²   Tmax over 24 hours:  Temp (24hrs), Av.5 °F (36.9 °C), Min:98 °F (36.7 °C), Max:99.6 °F (37.6 °C)      Patient Vitals for the past 6 CR (12 hour)  300 mg Oral QAM    verapamil  240 mg Oral QAM    enoxaparin  40 mg Subcutaneous Daily    insulin lispro  0-6 Units Subcutaneous TID WC    insulin lispro  0-3 Units Subcutaneous Nightly    Arformoterol Tartrate  15 mcg Nebulization BID    budesonide  250 mcg Nebulization BID    ipratropium-albuterol  1 ampule Inhalation Q4H WA    sodium chloride flush  10 mL Intravenous 2 times per day    sennosides-docusate sodium  1 tablet Oral BID     Continuous Infusions:   dextrose      sodium chloride 75 mL/hr at 10/25/20 0941     PRN Meds:   oxyCODONE-acetaminophen, 1 tablet, Q6H PRN  morphine, 2 mg, Q4H PRN  potassium chloride, 40 mEq, PRN    Or  potassium alternative oral replacement, 40 mEq, PRN    Or  potassium chloride, 10 mEq, PRN  acetaminophen, 650 mg, Q6H PRN    Or  acetaminophen, 650 mg, Q6H PRN  magnesium hydroxide, 30 mL, Daily PRN  glucose, 15 g, PRN  dextrose, 12.5 g, PRN  glucagon (rDNA), 1 mg, PRN  dextrose, 100 mL/hr, PRN  trimethobenzamide, 200 mg, Q6H PRN  sodium chloride flush, 10 mL, PRN  bisacodyl, 5 mg, Daily PRN          Vent Settings (Comprehensive) (if applicable):  Vent Information  Skin Assessment: Clean, dry, & intact  FiO2 : 35 %  SpO2: 100 %  I Time/ I Time %: 0.9 s  Mask Type: Full face mask  Mask Size: Small  Additional Respiratory  Assessments  Pulse: 67  Resp: 12  SpO2: 100 %    ABGs:   Recent Labs     10/25/20  1659   PH 7.340*   PCO2 42.1   PO2 84.2   HCO3 22.2   BE -3.4*   O2SAT 96.2       Laboratory findings:    Complete Blood Count:   Recent Labs     10/24/20  1120 10/25/20  0518 10/25/20  1710   WBC 7.1 8.8 9.0   HGB 12.9 12.2 12.8   HCT 41.3 39.9 41.1    197 203        Last 3 Blood Glucose:   Recent Labs     10/24/20  1120 10/25/20  0518 10/25/20  1710   GLUCOSE 143* 118* 117*        PT/INR:    Lab Results   Component Value Date    PROTIME 11.3 10/24/2020    PROTIME 12.0 12/09/2011    INR 1.0 10/24/2020     PTT:    Lab Results   Component Value Date APTT 29.5 10/25/2020       Comprehensive Metabolic Profile:   Recent Labs     10/24/20  1120  10/25/20  0518 10/25/20  1710     --  136 136   K 4.4  --  4.6 4.5     --  105 102   CO2 27  --  23 25   BUN 35*  --  33* 32*   CREATININE 1.9*  --  1.9* 2.0*   GLUCOSE 143*  --  118* 117*   CALCIUM 8.5*  --  8.0* 8.4*   PROT  --    < > 6.3* 6.6   LABALBU  --    < > 3.0* 3.4*   BILITOT  --    < > 0.8 1.3*   ALKPHOS  --    < > 111* 133*   AST  --    < > 26 47*   ALT  --    < > 26 41*    < > = values in this interval not displayed. Magnesium:   Lab Results   Component Value Date    MG 2.3 10/25/2020     Phosphorus:   Lab Results   Component Value Date    PHOS 4.7 10/25/2020     Ionized Calcium: No results found for: CAION     Urinalysis:     Troponin:   Recent Labs     10/25/20  1710   TROPONINI 0.05*       Microbiology past 24h:    Blood cultures:                 [x] None drawn      [] Negative             []  Positive (Details:  )  Urine Culture:                   [x] None drawn      [] Negative             []  Positive (Details:  )  Sputum Culture:               [x] None drawn       [] Negative             []  Positive (Details:  )   Endotracheal aspirate:     [x] None drawn       [] Negative             []  Positive (Details:  )     Other Pertinent Labs and Pathology Results:   1. Radiology/Imaging:     CT HEAD WO CONTRAST   Final Result   No acute intracranial abnormality. Chronic involutional changes and chronic   microvascular ischemic changes are noted. XR FOOT LEFT (MIN 3 VIEWS)   Final Result   1. No acute fracture. 2.  Chronic fracture involving 5th metatarsal bone. 3.  Stable, satisfactory alignment status post internal fixation at level of   distal calcaneus and tarsal navicular. FLUORO FOR SURGICAL PROCEDURES   Final Result   Intraprocedural fluoroscopic spot images as above. See separate procedure   report for more information.          XR ANKLE RIGHT (MIN 3 VIEWS)   Final Result   Mildly displaced distal fibular fracture involving the distal diaphysis   extending into the metaphysis. No other acute fracture is seen. Assessment:     Principal Problem:    Closed fracture fibula, head, right, initial encounter  Active Problems:    Essential hypertension    Morbid obesity with BMI of 40.0-44.9, adult ([facility])    Diabetes mellitus ([facility]    Neurogenic bladder    Sarcoidosis    Sleep apnea    Diffuse cerebral atrophy ([facility])    Asthma    Diabetic peripheral neuropathy ([facility])    Fibromyalgia    CKD (chronic kidney disease) stage 4, GFR 15-29 ml/min ([facility])    Iron deficiency anemia    Encephalopathy    Sarcoid  Resolved Problems:    * No resolved hospital problems. *      Additional assessment:    1. Decreased responsiveness: CT of the head without any acute injury. Likely a combination of medications a lack of rest and possible underlying dementia. 2. Day 0 open reduction internal fixation of right malleolus fracture        Plan:     Wean per protocol:  [] No   [] Yes  [x] N/A    ICU Prophylaxis:  Stress ulcer:  [x] PPI Agent  [] [id number] [] Sucralfate  [] Other:  VTE:   [x] Enoxaparin  [] Unfract. Heparin Subcut  [] EPC Cuffs    Nutrition:  [] NPO [] Tube Feeding (Specify: ) [] TPN  [x] PO (Diet: Dietary Nutrition Supplements: Standard High Calorie Oral Supplement  DIET CARB CONTROL; Carb Control: 4 carbs/meal (approximate 1800 kcals/day))    Home medications reconciled: [] No  [x] Yes    Insulin drip:   [x] No   [] Yes    Family updated:    [] No   [x] Yes    Transfer Candidate?:   [x] No   [] Yes    Additional plans:  1. Observe in the ICU through the night  2. Avoid any sedating medications  3.  Vigilance for any neuro changes          Chart review/lab review/X-ray viewing/documentation: 10 minutes  Assessment: 10 minutes  Conversation patient/family re: prognosis, care options and any end of life issues: 10 minutes  Conversation with staff: 10

## 2020-10-26 NOTE — PROGRESS NOTES
Pulmonary Progress Note    Admit Date: 10/24/2020  Hospital day                               PCP: Fazal Bojorquez MD    Chief Complaint (s):  Patient Active Problem List   Diagnosis    Essential hypertension    PAULINA (obstructive sleep apnea)    Midline low back pain with right-sided sciatica    Morbid obesity with BMI of 40.0-44.9, adult ([facility]    CVA (cerebral vascular accident) ([facility]    Urinary incontinence    Altered mental status    Closed fracture fibula, head, right, initial encounter    Diabetes mellitus ([facility]    Neurogenic bladder    Sarcoidosis    Sleep apnea    Diffuse cerebral atrophy ([facility])    Asthma    Diabetic peripheral neuropathy ([facility])    Fibromyalgia    CKD (chronic kidney disease) stage 4, GFR 15-29 ml/min ([facility])    Iron deficiency anemia    Encephalopathy    Sarcoid       Subjective:  · On BiPAP this a.m. Toxicology screen noted.       Vitals:  VITALS:  BP (!) 145/75   Pulse 73   Temp 98.4 °F (36.9 °C) (Axillary)   Resp 15   Ht 5' 6\" (1.676 m)   Wt 271 lb (122.9 kg)   SpO2 97%   BMI 43.74 kg/m²     24HR INTAKE/OUTPUT:      Intake/Output Summary (Last 24 hours) at 10/26/2020 1338  Last data filed at 10/26/2020 1300  Gross per 24 hour   Intake --   Output 903 ml   Net -903 ml       24HR PULSE OXIMETRY RANGE:    SpO2  Av.2 %  Min: 92 %  Max: 100 %    Medications:  IV:   dextrose      sodium chloride 75 mL/hr at 10/26/20 0620       Scheduled Meds:   cefTRIAXone (ROCEPHIN) IV  1 g Intravenous Q24H    pantoprazole  40 mg Intravenous Daily    aspirin  81 mg Oral BID    atorvastatin  20 mg Oral Nightly    baclofen  20 mg Oral TID    dicyclomine  20 mg Oral 4x Daily AC & HS    DULoxetine  60 mg Oral QAM    hydroxychloroquine  200 mg Oral BID    latanoprost  1 drop Both Eyes Nightly    metoprolol tartrate  25 mg Oral BID    oxybutynin  10 mg Oral BID    pregabalin  100 mg Oral BID    theophylline CR (12 hour)  300 mg Oral QAM    verapamil  240 mg Oral QAM    enoxaparin  40 mg Subcutaneous Daily    insulin lispro  0-6 Units Subcutaneous TID WC    insulin lispro  0-3 Units Subcutaneous Nightly    Arformoterol Tartrate  15 mcg Nebulization BID    budesonide  250 mcg Nebulization BID    ipratropium-albuterol  1 ampule Inhalation Q4H WA    sodium chloride flush  10 mL Intravenous 2 times per day    sennosides-docusate sodium  1 tablet Oral BID       Diet:   Dietary Nutrition Supplements: Standard High Calorie Oral Supplement  DIET CARB CONTROL; Carb Control: 4 carbs/meal (approximate 1800 kcals/day)     EXAM:  General: No distress. Alert. Eyes: PERRL. No sclera icterus. No conjunctival injection. ENT: No discharge. Pharynx clear. Neck: Trachea midline. Normal thyroid. Resp: No accessory muscle use. No rales. No wheezing. No rhonchi. CV: Regular rate. Regular rhythm. No murmur or rub. Abd: Non-tender. Non-distended. No masses. No organomegaly. Normal bowel sounds. Skin: Warm and dry. No nodule on exposed extremities. No rash on exposed extremities. Ext: No cyanosis, clubbing, edema  Lymph: No cervical LAD. No supraclavicular LAD. M/S: No cyanosis. No joint deformity. No clubbing. Neuro: Awake. Follows commands. Positive pupils/gag/corneals. Normal pain response.        Results:  CBC:   Recent Labs     10/25/20  0518 10/25/20  1710 10/26/20  0600   WBC 8.8 9.0 8.5   HGB 12.2 12.8 11.4*   HCT 39.9 41.1 37.4   MCV 92.1 91.3 92.3    203 153     BMP:   Recent Labs     10/25/20  0518 10/25/20  1710 10/26/20  0600    136 139   K 4.6 4.5 4.8    102 108*   CO2 23 25 21*   PHOS 4.8* 4.7* 4.3   BUN 33* 32* 27*   CREATININE 1.9* 2.0* 1.7*     LIVER PROFILE:   Recent Labs     10/25/20  0518 10/25/20  1710 10/26/20  0600   AST 26 47* 36*   ALT 26 41* 35*   BILIDIR 0.4*  --  0.3   BILITOT 0.8 1.3* 0.9   ALKPHOS 111* 133* 127*     PT/INR:   Recent Labs     10/24/20  1245   PROTIME 11.3   INR 1.0     APTT:   Recent Labs     10/25/20  0518 10/26/20  0657   APTT 29.5 32.0         Pathology:  1. N/A      Microbiology:  1. None    Recent ABG:   Recent Labs     10/26/20  0607   PH 7.358   PO2 80.8   PCO2 38.1   HCO3 20.9*   BE -4.1*   O2SAT 95.9   METHB 0.1   O2HB 94.3   COHB 1.6*   O2CON 16.4   HHB 4.0   THB 12.3     FiO2 : 35 %       Recent Films:  XR CHEST PORTABLE   Preliminary Result   Cardiomegaly with findings of mild CHF. CT HEAD WO CONTRAST   Final Result   No acute intracranial abnormality. Chronic involutional changes and chronic   microvascular ischemic changes are noted. XR FOOT LEFT (MIN 3 VIEWS)   Final Result   1. No acute fracture. 2.  Chronic fracture involving 5th metatarsal bone. 3.  Stable, satisfactory alignment status post internal fixation at level of   distal calcaneus and tarsal navicular. FLUORO FOR SURGICAL PROCEDURES   Final Result   Intraprocedural fluoroscopic spot images as above. See separate procedure   report for more information. XR ANKLE RIGHT (MIN 3 VIEWS)   Final Result   Mildly displaced distal fibular fracture involving the distal diaphysis   extending into the metaphysis. No other acute fracture is seen. XR CHEST PORTABLE    (Results Pending)       Assessment:  1. 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 2. Day 1 ORIF    Plan:  1. Transfer soon    Time at the bedside, reviewing labs and radiographs, reviewing updated notes and consultations, discussing with staff and family was more than 35 minutes. Please note that voice recognition technology was used in the preparation of this note and make therefore it may contain inadvertent transcription errors. If the patient is a COVID 19 isolation patient, the above physical exam reflects that of the examining physician for the day. Luci Moon M.D., F.C.C.P.     [facility] [address] [facility], [address], [address], [facility], [place] [address]

## 2020-10-26 NOTE — PROGRESS NOTES
This nurse spoke with Dr. Jaz Samuel regarding patient blood pressure. New orders for a one time 500cc bolus of 0.9 NS.

## 2020-10-26 NOTE — PATIENT CARE CONFERENCE
Intensive Care Daily Quality Rounding Checklist      ICU Team Members: Dr. Adrian Mcconnell, Zheng Salazar, Radha Malloy (residents), clinical pharmacist, charge nurse, bedside nurse    ICU Day #: NUMBER: 2    Intubation Date: n/a     Ventilator Day #: n/a    Central Line Insertion Date:  n/a        Day #: n/a     Arterial Line Insertion Date:  n/a      Day #: n/a    DVT Prophylaxis: Lovenox     GI Prophylaxis: on diet    Anaya Catheter Insertion Date: October 25,2020       Day #: 2      Continued need (if yes, reason documented and discussed with physician): yes, strict Is and Os    Skin Issues/ Wounds and ordered treatment discussed on rounds: no issues, has surgical incision    Goals/ Plans for the Day: Daily labs, wean 02/ Bipap as able, wait for increased LOC

## 2020-10-26 NOTE — PROGRESS NOTES
When patient arrived to unit, this nurse looked over belongings. At this time patient has clothes, glasses, and hygiene products, and medical boot with her.

## 2020-10-26 NOTE — PLAN OF CARE
Problem: Falls - Risk of:  Goal: Will remain free from falls  Description: Will remain free from falls  Outcome: Met This Shift  Goal: Absence of physical injury  Description: Absence of physical injury  10/26/2020 1254 by Tyler Whitmore RN  Outcome: Met This Shift  10/26/2020 0332 by Kevin Kellogg RN  Outcome: Met This Shift     Problem: Pain:  Goal: Pain level will decrease  Description: Pain level will decrease  Outcome: Met This Shift  Goal: Control of acute pain  Description: Control of acute pain  10/26/2020 1254 by Tyler Whitmore RN  Outcome: Met This Shift  10/26/2020 0332 by Kevin Kellogg RN  Outcome: Met This Shift  Goal: Control of chronic pain  Description: Control of chronic pain  Outcome: Met This Shift     Problem: Skin Integrity:  Goal: Will show no infection signs and symptoms  Description: Will show no infection signs and symptoms  10/26/2020 1254 by Tyler Whitmore RN  Outcome: Met This Shift  10/26/2020 0332 by Kevin Kellogg RN  Outcome: Met This Shift  Goal: Absence of new skin breakdown  Description: Absence of new skin breakdown  Outcome: Met This Shift

## 2020-10-27 ENCOUNTER — APPOINTMENT (OUTPATIENT)
Dept: CT IMAGING | Age: 75
DRG: 492 | End: 2020-10-27
Attending: INTERNAL MEDICINE
Payer: MEDICARE

## 2020-10-27 ENCOUNTER — APPOINTMENT (OUTPATIENT)
Dept: GENERAL RADIOLOGY | Age: 75
DRG: 492 | End: 2020-10-27
Attending: INTERNAL MEDICINE
Payer: MEDICARE

## 2020-10-27 ENCOUNTER — APPOINTMENT (OUTPATIENT)
Dept: ULTRASOUND IMAGING | Age: 75
DRG: 492 | End: 2020-10-27
Attending: INTERNAL MEDICINE
Payer: MEDICARE

## 2020-10-27 LAB
AADO2: 110.3 MMHG
ALBUMIN SERPL-MCNC: 2.9 G/DL (ref 3.5–5.2)
ALP BLD-CCNC: 123 U/L (ref 35–104)
ALT SERPL-CCNC: 26 U/L (ref 0–32)
ANION GAP SERPL CALCULATED.3IONS-SCNC: 10 MMOL/L (ref 7–16)
AST SERPL-CCNC: 8 U/L (ref 0–31)
B.E.: -2.8 MMOL/L (ref -3–3)
BASOPHILS ABSOLUTE: 0.05 E9/L (ref 0–0.2)
BASOPHILS RELATIVE PERCENT: 0.6 % (ref 0–2)
BILIRUB SERPL-MCNC: 0.5 MG/DL (ref 0–1.2)
BUN BLDV-MCNC: 25 MG/DL (ref 8–23)
CALCIUM SERPL-MCNC: 8.4 MG/DL (ref 8.6–10.2)
CHLORIDE BLD-SCNC: 110 MMOL/L (ref 98–107)
CO2: 22 MMOL/L (ref 22–29)
COHB: 0.8 % (ref 0–1.5)
CREAT SERPL-MCNC: 1.6 MG/DL (ref 0.5–1)
CRITICAL: ABNORMAL
DATE ANALYZED: ABNORMAL
DATE OF COLLECTION: ABNORMAL
EOSINOPHILS ABSOLUTE: 0.26 E9/L (ref 0.05–0.5)
EOSINOPHILS RELATIVE PERCENT: 3.3 % (ref 0–6)
FIO2: 35 %
GFR AFRICAN AMERICAN: 38
GFR NON-AFRICAN AMERICAN: 31 ML/MIN/1.73
GLUCOSE BLD-MCNC: 152 MG/DL (ref 74–99)
HCO3: 21.4 MMOL/L (ref 22–26)
HCT VFR BLD CALC: 37.5 % (ref 34–48)
HEMOGLOBIN: 11.8 G/DL (ref 11.5–15.5)
HHB: 2.9 % (ref 0–5)
IMMATURE GRANULOCYTES #: 0.13 E9/L
IMMATURE GRANULOCYTES %: 1.6 % (ref 0–5)
LAB: ABNORMAL
LV EF: 63 %
LVEF MODALITY: NORMAL
LYMPHOCYTES ABSOLUTE: 0.81 E9/L (ref 1.5–4)
LYMPHOCYTES RELATIVE PERCENT: 10.2 % (ref 20–42)
Lab: ABNORMAL
MAGNESIUM: 2.1 MG/DL (ref 1.6–2.6)
MCH RBC QN AUTO: 28.5 PG (ref 26–35)
MCHC RBC AUTO-ENTMCNC: 31.5 % (ref 32–34.5)
MCV RBC AUTO: 90.6 FL (ref 80–99.9)
METER GLUCOSE: 105 MG/DL (ref 74–99)
METER GLUCOSE: 107 MG/DL (ref 74–99)
METER GLUCOSE: 79 MG/DL (ref 74–99)
METER GLUCOSE: 90 MG/DL (ref 74–99)
METER GLUCOSE: 92 MG/DL (ref 74–99)
METHB: 0.4 % (ref 0–1.5)
MODE: ABNORMAL
MONOCYTES ABSOLUTE: 1.04 E9/L (ref 0.1–0.95)
MONOCYTES RELATIVE PERCENT: 13.1 % (ref 2–12)
MRSA CULTURE ONLY: NORMAL
NEUTROPHILS ABSOLUTE: 5.66 E9/L (ref 1.8–7.3)
NEUTROPHILS RELATIVE PERCENT: 71.2 % (ref 43–80)
O2 CONTENT: 16.9 ML/DL
O2 SATURATION: 97.1 % (ref 92–98.5)
O2HB: 95.9 % (ref 94–97)
OPERATOR ID: 789
PATIENT TEMP: 37 C
PCO2: 35.2 MMHG (ref 35–45)
PDW BLD-RTO: 14.3 FL (ref 11.5–15)
PEEP/CPAP: 6 CMH2O
PFO2: 2.56 MMHG/%
PH BLOOD GAS: 7.4 (ref 7.35–7.45)
PHOSPHORUS: 3.6 MG/DL (ref 2.5–4.5)
PIP: 12 CMH2O
PLATELET # BLD: 196 E9/L (ref 130–450)
PMV BLD AUTO: 9.4 FL (ref 7–12)
PO2: 89.6 MMHG (ref 75–100)
POTASSIUM SERPL-SCNC: 4.4 MMOL/L (ref 3.5–5)
RBC # BLD: 4.14 E12/L (ref 3.5–5.5)
RI(T): 123 %
SODIUM BLD-SCNC: 142 MMOL/L (ref 132–146)
SOURCE, BLOOD GAS: ABNORMAL
THB: 12.5 G/DL (ref 11.5–16.5)
TIME ANALYZED: 617
TOTAL PROTEIN: 6 G/DL (ref 6.4–8.3)
WBC # BLD: 8 E9/L (ref 4.5–11.5)

## 2020-10-27 PROCEDURE — 94660 CPAP INITIATION&MGMT: CPT

## 2020-10-27 PROCEDURE — 93306 TTE W/DOPPLER COMPLETE: CPT

## 2020-10-27 PROCEDURE — 82962 GLUCOSE BLOOD TEST: CPT

## 2020-10-27 PROCEDURE — 99291 CRITICAL CARE FIRST HOUR: CPT | Performed by: PSYCHIATRY & NEUROLOGY

## 2020-10-27 PROCEDURE — 2580000003 HC RX 258: Performed by: ORTHOPAEDIC SURGERY

## 2020-10-27 PROCEDURE — 2000000000 HC ICU R&B

## 2020-10-27 PROCEDURE — 6360000002 HC RX W HCPCS: Performed by: INTERNAL MEDICINE

## 2020-10-27 PROCEDURE — 6370000000 HC RX 637 (ALT 250 FOR IP): Performed by: ORTHOPAEDIC SURGERY

## 2020-10-27 PROCEDURE — 94640 AIRWAY INHALATION TREATMENT: CPT

## 2020-10-27 PROCEDURE — 6360000002 HC RX W HCPCS: Performed by: ORTHOPAEDIC SURGERY

## 2020-10-27 PROCEDURE — 92523 SPEECH SOUND LANG COMPREHEN: CPT | Performed by: SPEECH-LANGUAGE PATHOLOGIST

## 2020-10-27 PROCEDURE — 85025 COMPLETE CBC W/AUTO DIFF WBC: CPT

## 2020-10-27 PROCEDURE — 93880 EXTRACRANIAL BILAT STUDY: CPT

## 2020-10-27 PROCEDURE — 71045 X-RAY EXAM CHEST 1 VIEW: CPT

## 2020-10-27 PROCEDURE — 92610 EVALUATE SWALLOWING FUNCTION: CPT | Performed by: SPEECH-LANGUAGE PATHOLOGIST

## 2020-10-27 PROCEDURE — 83735 ASSAY OF MAGNESIUM: CPT

## 2020-10-27 PROCEDURE — 92526 ORAL FUNCTION THERAPY: CPT | Performed by: SPEECH-LANGUAGE PATHOLOGIST

## 2020-10-27 PROCEDURE — C9113 INJ PANTOPRAZOLE SODIUM, VIA: HCPCS | Performed by: STUDENT IN AN ORGANIZED HEALTH CARE EDUCATION/TRAINING PROGRAM

## 2020-10-27 PROCEDURE — 92507 TX SP LANG VOICE COMM INDIV: CPT | Performed by: SPEECH-LANGUAGE PATHOLOGIST

## 2020-10-27 PROCEDURE — 80053 COMPREHEN METABOLIC PANEL: CPT

## 2020-10-27 PROCEDURE — 2580000003 HC RX 258: Performed by: STUDENT IN AN ORGANIZED HEALTH CARE EDUCATION/TRAINING PROGRAM

## 2020-10-27 PROCEDURE — 97530 THERAPEUTIC ACTIVITIES: CPT

## 2020-10-27 PROCEDURE — 82805 BLOOD GASES W/O2 SATURATION: CPT

## 2020-10-27 PROCEDURE — 6360000002 HC RX W HCPCS: Performed by: STUDENT IN AN ORGANIZED HEALTH CARE EDUCATION/TRAINING PROGRAM

## 2020-10-27 PROCEDURE — 84100 ASSAY OF PHOSPHORUS: CPT

## 2020-10-27 PROCEDURE — 70450 CT HEAD/BRAIN W/O DYE: CPT

## 2020-10-27 PROCEDURE — 36415 COLL VENOUS BLD VENIPUNCTURE: CPT

## 2020-10-27 PROCEDURE — 2580000003 HC RX 258: Performed by: INTERNAL MEDICINE

## 2020-10-27 RX ORDER — SODIUM CHLORIDE, SODIUM LACTATE, POTASSIUM CHLORIDE, CALCIUM CHLORIDE 600; 310; 30; 20 MG/100ML; MG/100ML; MG/100ML; MG/100ML
INJECTION, SOLUTION INTRAVENOUS CONTINUOUS
Status: DISCONTINUED | OUTPATIENT
Start: 2020-10-27 | End: 2020-10-28

## 2020-10-27 RX ORDER — KETOROLAC TROMETHAMINE 30 MG/ML
15 INJECTION, SOLUTION INTRAMUSCULAR; INTRAVENOUS EVERY 8 HOURS PRN
Status: COMPLETED | OUTPATIENT
Start: 2020-10-27 | End: 2020-10-28

## 2020-10-27 RX ORDER — ASPIRIN 81 MG/1
81 TABLET, CHEWABLE ORAL DAILY
Status: DISCONTINUED | OUTPATIENT
Start: 2020-10-28 | End: 2020-11-01 | Stop reason: HOSPADM

## 2020-10-27 RX ORDER — ATORVASTATIN CALCIUM 40 MG/1
40 TABLET, FILM COATED ORAL NIGHTLY
Status: DISCONTINUED | OUTPATIENT
Start: 2020-10-27 | End: 2020-11-01 | Stop reason: HOSPADM

## 2020-10-27 RX ORDER — CLOPIDOGREL BISULFATE 75 MG/1
75 TABLET ORAL DAILY
Status: DISCONTINUED | OUTPATIENT
Start: 2020-10-27 | End: 2020-11-01 | Stop reason: HOSPADM

## 2020-10-27 RX ADMIN — BUDESONIDE 250 MCG: 0.25 SUSPENSION RESPIRATORY (INHALATION) at 08:59

## 2020-10-27 RX ADMIN — ARFORMOTEROL TARTRATE 15 MCG: 15 SOLUTION RESPIRATORY (INHALATION) at 08:59

## 2020-10-27 RX ADMIN — SODIUM CHLORIDE, POTASSIUM CHLORIDE, SODIUM LACTATE AND CALCIUM CHLORIDE 110 ML/HR: 600; 310; 30; 20 INJECTION, SOLUTION INTRAVENOUS at 23:26

## 2020-10-27 RX ADMIN — IPRATROPIUM BROMIDE AND ALBUTEROL SULFATE 1 AMPULE: 2.5; .5 SOLUTION RESPIRATORY (INHALATION) at 20:16

## 2020-10-27 RX ADMIN — ARFORMOTEROL TARTRATE 15 MCG: 15 SOLUTION RESPIRATORY (INHALATION) at 20:16

## 2020-10-27 RX ADMIN — SODIUM CHLORIDE 75 ML/HR: 9 INJECTION, SOLUTION INTRAVENOUS at 06:18

## 2020-10-27 RX ADMIN — SODIUM CHLORIDE, PRESERVATIVE FREE 20 ML: 5 INJECTION INTRAVENOUS at 13:00

## 2020-10-27 RX ADMIN — IPRATROPIUM BROMIDE AND ALBUTEROL SULFATE 1 AMPULE: 2.5; .5 SOLUTION RESPIRATORY (INHALATION) at 08:59

## 2020-10-27 RX ADMIN — PANTOPRAZOLE SODIUM 40 MG: 40 INJECTION, POWDER, FOR SOLUTION INTRAVENOUS at 09:39

## 2020-10-27 RX ADMIN — Medication 10 ML: at 21:03

## 2020-10-27 RX ADMIN — ENOXAPARIN SODIUM 40 MG: 40 INJECTION SUBCUTANEOUS at 09:39

## 2020-10-27 RX ADMIN — IPRATROPIUM BROMIDE AND ALBUTEROL SULFATE 1 AMPULE: 2.5; .5 SOLUTION RESPIRATORY (INHALATION) at 16:41

## 2020-10-27 RX ADMIN — KETOROLAC TROMETHAMINE 15 MG: 30 INJECTION, SOLUTION INTRAMUSCULAR at 18:37

## 2020-10-27 RX ADMIN — WATER 1 G: 1 INJECTION INTRAMUSCULAR; INTRAVENOUS; SUBCUTANEOUS at 12:01

## 2020-10-27 RX ADMIN — SODIUM CHLORIDE, POTASSIUM CHLORIDE, SODIUM LACTATE AND CALCIUM CHLORIDE: 600; 310; 30; 20 INJECTION, SOLUTION INTRAVENOUS at 14:00

## 2020-10-27 RX ADMIN — Medication 10 ML: at 09:45

## 2020-10-27 RX ADMIN — BUDESONIDE 250 MCG: 0.25 SUSPENSION RESPIRATORY (INHALATION) at 20:16

## 2020-10-27 RX ADMIN — IPRATROPIUM BROMIDE AND ALBUTEROL SULFATE 1 AMPULE: 2.5; .5 SOLUTION RESPIRATORY (INHALATION) at 14:07

## 2020-10-27 ASSESSMENT — PAIN SCALES - GENERAL
PAINLEVEL_OUTOF10: 0

## 2020-10-27 ASSESSMENT — PAIN SCALES - WONG BAKER
WONGBAKER_NUMERICALRESPONSE: 0

## 2020-10-27 NOTE — PROGRESS NOTES
Orthopaedic Surgery Progress Note  Gregor Bueno MD    Date of Encounter: 10/27/20    Time of Encounter: 1500    Subjective: Unable to respond verbally. Objective:  General - NAD, awake  Extremity - right leg - in splint. Unable to evaluate sensation and motor due to mental status.     Assessment: 66 yo female POD 3 right ankle ORIF    Plan:  Pain control  NWB right leg  WBAT left leg in boot  PT  Follow up with Adryan in 2 weeks for staple removal    Plate and screws are made of titanium

## 2020-10-27 NOTE — PROGRESS NOTES
Pt. Remains awake, repeating some words. Following few commands, pt. Took water from a straw, still refusing meds, violently shaking head side to side. But willingly took water.  Swallowed without difficulty

## 2020-10-27 NOTE — PROGRESS NOTES
Critical Care Team - Daily Progress Note      Date and time: 10/27/2020 8:43 AM  Patient's name:  Nito Metz  Medical Record Number: [id number]  Patient's account/billing number: [de-identified]  Patient's YOB: 1945  Age: 67 y.o. Date of Admission: 10/24/2020 12:34 AM  Length of stay during current admission: 3      Primary Care Physician: Zi Cevallos MD  ICU Attending Physician: Dr. Barb Galvan Status: Full Code    Reason for ICU admission: Decreased level of consciousness      SUBJECTIVE:     OVERNIGHT EVENTS:      Remained stable overnight. This morning patient was taken off BiPAP and left on room air. Saturating well.     AWAKE & FOLLOWING COMMANDS:  [x] No   [] Yes    CURRENT VENTILATION STATUS:     [] Ventilator  [] BIPAP  [] Nasal Cannula [] Room Air      IF INTUBATED, ET TUBE MARKING AT LOWER LIP:       cms    SECRETIONS Amount:  [] Small [] Moderate  [] Large  [x] None  Color:     [] White [] Colored  [] Bloody    SEDATION:  RAAS Score:  [] Propofol gtt  [] Versed gtt  [] Ativan gtt   [x] No Sedation    PARALYZED:  [x] No    [] Yes    DIARRHEA:                [x] No                [] Yes  (C. Difficile status: [] positive                                                                                                                       [] negative                                                                                                                     [] pending)    VASOPRESSORS:  [x] No    [] Yes    If yes -   [] Levophed       [] Dopamine     [] Vasopressin       [] Dobutamine  [] Phenylephrine         [] Epinephrine    CENTRAL LINES:     [x] No   [] Yes   (Date of Insertion:   )           If yes -     [] Right IJ     [] Left IJ [] Right Femoral [] Left Femoral                   [] Right Subclavian [] Left Subclavian       ADAM'S CATHETER:   [] No   [x] Yes         OBJECTIVE:     VITAL SIGNS:  BP (!) 140/55   Pulse 68   Temp 99.4 °F (37.4 °C) (Oral)   Resp 17   Ht 5' 6\" (1.676 m)   Wt 291 lb 14.2 oz (132.4 kg)   SpO2 99%   BMI 47.11 kg/m²   Tmax over 24 hours:  Temp (24hrs), Av.8 °F (37.1 °C), Min:98.4 °F (36.9 °C), Max:99.4 °F (37.4 °C)      Patient Vitals for the past 6 hrs:   BP Temp Temp src Pulse Resp SpO2   10/27/20 0700 (!) 140/55 -- -- 68 17 99 %   10/27/20 0600 (!) 127/50 -- -- 66 20 100 %   10/27/20 0558 (!) 127/50 -- -- 68 16 99 %   10/27/20 0500 134/61 -- -- 68 16 99 %   10/27/20 0400 136/69 99.4 °F (37.4 °C) Oral 68 20 --   10/27/20 0343 -- -- -- -- 22 --   10/27/20 0300 (!) 154/61 -- -- 75 24 99 %         Intake/Output Summary (Last 24 hours) at 10/27/2020 0843  Last data filed at 10/27/2020 0558  Gross per 24 hour   Intake 1561 ml   Output 1080 ml   Net 481 ml     Wt Readings from Last 2 Encounters:   10/27/20 291 lb 14.2 oz (132.4 kg)   20 250 lb (113.4 kg)     Body mass index is 47.11 kg/m². PHYSICAL EXAMINATION:    General appearance -awake and alert  Eyes - pupils equal and reactive,  sclera anicteric  Ears - external ear normal  Nose - normal and patent, no erythema, discharge or polyps  Mouth -no visible lesions,mucous membranes moist.  Neck - supple, no significant adenopathy  Chest - clear to auscultation, no wheezes,symmetric air entry  Heart - normal rate, regular rhythm,no murmurs, rubs, clicks or gallops  Abdomen - soft, nontender, nondistended, no masses or organomegaly  Neurological -she is awake and alert. Was able to squeeze my fingers with right hand not left. Unable to follow any other commands. Unable to keep arms up against gravity. Aphasia. Extremities - peripheral pulses normal, no clubbing or cyanosis. No edema.   Skin - normal coloration and turgor, no rashes, no suspicious skin lesions noted     MEDICATIONS:    Scheduled Meds:   cefTRIAXone (ROCEPHIN) IV  1 g Intravenous Q24H    pantoprazole  40 mg Intravenous Daily    aspirin  81 mg Oral BID    atorvastatin  20 mg Oral Nightly    baclofen  20 mg Oral TID   Lucinda See dicyclomine  20 mg Oral 4x Daily AC & HS    DULoxetine  60 mg Oral QAM    hydroxychloroquine  200 mg Oral BID    latanoprost  1 drop Both Eyes Nightly    metoprolol tartrate  25 mg Oral BID    oxybutynin  10 mg Oral BID    pregabalin  100 mg Oral BID    verapamil  240 mg Oral QAM    enoxaparin  40 mg Subcutaneous Daily    insulin lispro  0-6 Units Subcutaneous TID WC    insulin lispro  0-3 Units Subcutaneous Nightly    Arformoterol Tartrate  15 mcg Nebulization BID    budesonide  250 mcg Nebulization BID    ipratropium-albuterol  1 ampule Inhalation Q4H WA    sodium chloride flush  10 mL Intravenous 2 times per day    sennosides-docusate sodium  1 tablet Oral BID     Continuous Infusions:   dextrose      sodium chloride 75 mL/hr (10/27/20 0618)     PRN Meds:   potassium chloride, 40 mEq, PRN    Or  potassium alternative oral replacement, 40 mEq, PRN    Or  potassium chloride, 10 mEq, PRN  acetaminophen, 650 mg, Q6H PRN    Or  acetaminophen, 650 mg, Q6H PRN  magnesium hydroxide, 30 mL, Daily PRN  glucose, 15 g, PRN  dextrose, 12.5 g, PRN  glucagon (rDNA), 1 mg, PRN  dextrose, 100 mL/hr, PRN  trimethobenzamide, 200 mg, Q6H PRN  sodium chloride flush, 10 mL, PRN  bisacodyl, 5 mg, Daily PRN          VENT SETTINGS (Comprehensive) (if applicable):  Vent Information  Skin Assessment: Clean, dry, & intact  Equipment Changed: Mask  FiO2 : 35 %  SpO2: 99 %  SpO2/FiO2 ratio: 285.71  I Time/ I Time %: 0.9 s  Mask Type: Full face mask  Mask Size: Small  Additional Respiratory  Assessments  Pulse: 68  Resp: 17  SpO2: 99 %  Oral Care: Mouth swabbed    ABG  Lab Results   Component Value Date    PH 7.402 10/27/2020    PCO2 35.2 10/27/2020    PO2 89.6 10/27/2020    HCO3 21.4 10/27/2020    O2SAT 97.1 10/27/2020         Laboratory findings:    Complete Blood Count:   Recent Labs     10/25/20  1710 10/26/20  0600 10/27/20  0500   WBC 9.0 8.5 8.0   HGB 12.8 11.4* 11.8   HCT 41.1 37.4 37.5    153 196        Lactic Acid  Lab Results   Component Value Date    LACTA 1.4 01/28/2020    LACTA 1.5 10/21/2017        Last 3 Blood Glucose:   Recent Labs     10/25/20  1710 10/26/20  0600 10/27/20  0500   GLUCOSE 117* 88 152*        PT/INR:    Lab Results   Component Value Date    PROTIME 11.3 10/24/2020    PROTIME 12.0 12/09/2011    INR 1.0 10/24/2020     PTT:    Lab Results   Component Value Date    APTT 32.0 10/26/2020       Comprehensive Metabolic Profile:   Recent Labs     10/25/20  1710 10/26/20  0600 10/27/20  0500    139 142   K 4.5 4.8 4.4    108* 110*   CO2 25 21* 22   BUN 32* 27* 25*   CREATININE 2.0* 1.7* 1.6*   GLUCOSE 117* 88 152*   CALCIUM 8.4* 8.1* 8.4*   PROT 6.6 6.3* 6.0*   LABALBU 3.4* 2.7* 2.9*   BILITOT 1.3* 0.9 0.5   ALKPHOS 133* 127* 123*   AST 47* 36* 8   ALT 41* 35* 26      Magnesium:   Lab Results   Component Value Date    MG 2.1 10/27/2020     Phosphorus:   Lab Results   Component Value Date    PHOS 3.6 10/27/2020     Ionized Calcium: No results found for: CAION       Urinalysis:     Troponin:   Recent Labs     10/25/20  1710 10/26/20  0600   TROPONINI 0.05* 0.05*         Cultures     Cultures during this admission:       No results for input(s): BC in the last 72 hours. No results for input(s): Alexis Ditty in the last 72 hours. No results for input(s): LABURIN in the last 72 hours. Other pertinent Labs:       Radiology/Imaging:   Xr Ankle Right (min 3 Views)    Result Date: 10/24/2020  EXAMINATION: THREE XRAY VIEWS OF THE RIGHT ANKLE 10/24/2020 8:31 am COMPARISON: None. HISTORY: ORDERING SYSTEM PROVIDED HISTORY: Fibula fracture TECHNOLOGIST PROVIDED HISTORY: With external rotation stress view Reason for exam:->Fibula fracture FINDINGS: Postoperative change seen related to cannulated screw fixation through the base of the 5th metatarsal.  There is a mildly displaced fracture of the distal fibular shaft extending into the proximal metaphysis.   Fine detail obscured by overlying cast material. No definite distal tibial fracture is identified. Diffuse osteopenia. Peripheral arterial disease is noted. Mildly displaced distal fibular fracture involving the distal diaphysis extending into the metaphysis. No other acute fracture is seen. Xr Foot Left (min 3 Views)    Result Date: 10/24/2020  EXAMINATION: THREE XRAY VIEWS OF THE LEFT FOOT 10/24/2020 5:50 pm COMPARISON: December 14, 2009 HISTORY: ORDERING SYSTEM PROVIDED HISTORY: left foot fracture TECHNOLOGIST PROVIDED HISTORY: Reason for exam:->left foot fracture FINDINGS: There is a healed fracture involving the distal aspect of the 5th metacarpal bone. This fracture appears new compared to prior from December 14, 2009. No acute fracture or dislocation. There is normal carpal bone alignment. Internal fixating hardware seen involving the medial aspect of the left foot at level of distal calcaneus and tarsal navicular bone. Internal fixating hardware appears intact. No erosive changes to suggest osteomyelitis. 1.  No acute fracture. 2.  Chronic fracture involving 5th metatarsal bone. 3.  Stable, satisfactory alignment status post internal fixation at level of distal calcaneus and tarsal navicular. Ct Head Wo Contrast    Result Date: 10/25/2020  EXAMINATION: CT OF THE HEAD WITHOUT CONTRAST  10/25/2020 1:34 pm TECHNIQUE: CT of the head was performed without the administration of intravenous contrast. Dose modulation, iterative reconstruction, and/or weight based adjustment of the mA/kV was utilized to reduce the radiation dose to as low as reasonably achievable. COMPARISON: 10/21/2017 HISTORY: ORDERING SYSTEM PROVIDED HISTORY: Rule out new CVA TECHNOLOGIST PROVIDED HISTORY: Reason for exam:->Rule out new CVA Has a \"code stroke\" or \"stroke alert\" been called? ->No FINDINGS: BRAIN/VENTRICLES: There are diffuse involutional changes, with prominence of the ventricles and sulci.   There is diffuse stable white matter low attenuation, compatible with chronic microvascular ischemic changes. There is no CT evidence of intracranial mass, hemorrhage, acute territorial infarction, or hydrocephalus. Intracranial arteries are symmetric in density. ORBITS: The visualized portion of the orbits demonstrate no acute abnormality. SINUSES: The visualized paranasal sinuses and mastoid air cells demonstrate no acute abnormality. SOFT TISSUES/SKULL:  No acute abnormality of the visualized skull or soft tissues. No acute intracranial abnormality. Chronic involutional changes and chronic microvascular ischemic changes are noted. Xr Chest Portable    Result Date: 10/26/2020  EXAMINATION: ONE XRAY VIEW OF THE CHEST 10/26/2020 7:09 am COMPARISON: 10/21/2017 HISTORY: ORDERING SYSTEM PROVIDED HISTORY: respiratory failure TECHNOLOGIST PROVIDED HISTORY: Reason for exam:->respiratory failure FINDINGS: The heart is enlarged. There are findings of mild vascular congestion. There is no lung consolidation to suggest pneumonia. There is no pleural effusion. Cardiomegaly with findings of mild CHF. Fluoro For Surgical Procedures    Result Date: 10/24/2020  EXAMINATION: SPOT FLUOROSCOPIC IMAGES 10/24/2020 5:12 pm TECHNIQUE: Fluoroscopy was provided by the radiology department for procedure. Radiologist was not present during examination. FLUOROSCOPY DOSE AND TYPE OR TIME AND EXPOSURES: Fluoroscopy time: 65.7 seconds Cumulative dose: 2.77 mGy COMPARISON: Right ankle injury radiographs 10/24/2020 at 8:14 a.m. HISTORY: ORDERING SYSTEM PROVIDED HISTORY: RT ankle TECHNOLOGIST PROVIDED HISTORY: Reason for exam:->RT ankle Intraprocedural imaging. FINDINGS: Three spot images of the right ankle were obtained. Interval lateral plate and screw fixation of distal fibular fracture. Alignment appears anatomic. Intraprocedural fluoroscopic spot images as above. See separate procedure report for more information. Chest Xray (10/26/20):   Impression    Cardiomegaly with findings of mild CHF. 10/27/2020  Impression    Borderline CHF, unchanged          SYSTEMS ASSESSMENT    Neuro   History of CVA  Altered mental status  Alert awake today but aphasic  Able to squeeze with right hand  -Opioids and sedative medications discontinued  -Multiple doses of Narcan given with some minimal response yesterday  -Another dose of Narcan ordered today no change    Respiratory   Acidosis with CO2 retention  -Placed on BiPAP 12/6 on 10/25--> switched to room air today saturating well  -ABG this morning 7.4/35.2/89.6/21.4  -Hold repeat ABG and chest x-ray  Wean oxygen as tolerated. Keep O2 sat 90-92%    Asthma  -Continue nebulizers  -Theophyline level <0.8    Cardiovascular   Hypertension  -Continue metoprolol    Hyperlipidemia  -Continue atorvastatin    Telemetry monitoring    Gastrointestinal   No acute issues  GI prophylaxis: Protonix    Renal   Chronic kidney disease  -Creatinine 1.6, BUN 25  -Monitor CMP daily     Infectious Disease   Urinary tract infection, Anaya catheter associated  -Possible cause of decreased level of consciousness, would not expect extremity weakness  -Rocephin day 2  -Negative respiratory panel    Hematology/Oncology   No acute hematology issues  -Hemoglobin 11.4 stable  -Continue monitoring    DVT prophylaxis: Lovenox    Endocrine   History of diabetes  -Sliding scale insulin  -Keep glucose below 180  -Continue monitoring glucose    Social/Spiritual/DNR/Other   Full code  Status post open reduction internal fixation right malleolus D2    Mehradd Benton DO, PGY1.                       10/27/2020, 8:43 AM    Attending Physician Attestation: Dr. Cecy Newman    Thank you very much for allowing me to see this patient in consultation and follow up. I personally saw, examined and provided care for the patient. Radiographs, labs and medication list were reviewed by me independently. I spoke with bedside nursing, respiratory therapists and consultants.  Critical care services and times documented are independent of procedures and multidisciplinary rounds with Residents. Additionally comprehensive, multidisciplinary rounds were conducted with the MICU team. The case was discussed in detail and plans for care were established. Review of Residents documentation was conducted and revisions were made as appropriate. I agree with the the above documented information. Physical Examination:  Vitals:   Vitals:    10/27/20 0900 10/27/20 0901 10/27/20 0902 10/27/20 1000   BP: (!) 160/77   (!) 122/49   Pulse: 76   85   Resp: 23 20 18 (!) 6   Temp:       TempSrc:       SpO2: 92% 94% 94% 96%   Weight:       Height:          General: No Acute Distress  HEENT: normocephalic, atruamatic  CVS: RRR, S1, S2, No S3 or S4  Respiratory: decreased breath sounds at lung bases, no focal wheezes noted  Extremities: no clubbing/cyanosis/edema  Neuro: 3/5 strength RUE with no movement APOLINAR, LLE, RLE, expressive aphasia     ASSESSMENT:  1.) Acute Hypoxic and Hypercapnic Respiratory Failure   2.) Acute Encephalopathy - Opiate-Induced Encephalopathy vs CVA  3.) UTI  4.) Right Lateral Malleolus Ankle Fracture - s/p Right lateral malleolus open reduction and  internal fixation  5.) Acute Kidney Injury   6.) Hypoalbuminemia      In addition the following applies:     Check: theophylline level, respiratory panel    Medication Alterations: hold all opiates/sedatives  Procedures: N/A  Imaging: reviewed, CT Head  New Consultations: N/A  VENT: N/A     Access: Peripheral   Consults: Nephrology, Orthopedic Surgery, Pulmonary, Neurology   Drips: N/A  Nutrition: PO  ABX: N/A    - will contact neurology to assess next steps in management  - patient to remain in ICU level of care at this time  - off NIV tolerating supplemental oxygen well at this time     Thank you for allowing me to participate in the care of this patient.     Care reviewed with nursing staff, medical and surgical specialty care, primary care and the patient's family as available. Restraints are ordered when the patient can do harm to him/herself by pulling out devices. Critical Care Time: > 35 minutes excluding procedures    Jose Alejandro Rushing M.D.     [employer]  10/27/2020  11:26 AM

## 2020-10-27 NOTE — PROGRESS NOTES
Pulmonary Progress Note    Admit Date: 10/24/2020  Hospital day                               PCP: Jeannette Coreas MD    Chief Complaint (s):  Patient Active Problem List   Diagnosis    Essential hypertension    PAULINA (obstructive sleep apnea)    Midline low back pain with right-sided sciatica    Morbid obesity with BMI of 40.0-44.9, adult [facility]    CVA (cerebral vascular accident) ([facility]    Urinary incontinence    Altered mental status    Closed fracture fibula, head, right, initial encounter    Diabetes mellitus ([facility] [address]    Neurogenic bladder    Sarcoidosis    Sleep apnea    Diffuse cerebral atrophy (HCC)    Asthma    Diabetic peripheral neuropathy (HCC)    Fibromyalgia    CKD (chronic kidney disease) stage 4, GFR 15-29 ml/min (HCC)    Iron deficiency anemia    Encephalopathy    Sarcoid       Subjective:  · Continues  to be somnolent on BiPAP. CT scan of the head without change.       Vitals:  VITALS:  BP (!) 154/70   Pulse 83   Temp 98.4 °F (36.9 °C) (Axillary)   Resp 28   Ht 5' 6\" (1.676 m)   Wt 291 lb 14.2 oz (132.4 kg)   SpO2 99%   BMI 47.11 kg/m²     24HR INTAKE/OUTPUT:      Intake/Output Summary (Last 24 hours) at 10/27/2020 1232  Last data filed at 10/27/2020 0558  Gross per 24 hour   Intake 1461 ml   Output 890 ml   Net 571 ml       24HR PULSE OXIMETRY RANGE:    SpO2  Av.4 %  Min: 92 %  Max: 100 %    Medications:  IV:   dextrose      sodium chloride 75 mL/hr (10/27/20 0618)       Scheduled Meds:   [START ON 10/28/2020] aspirin  81 mg Oral Daily    clopidogrel  75 mg Oral Daily    atorvastatin  40 mg Oral Nightly    cefTRIAXone (ROCEPHIN) IV  1 g Intravenous Q24H    pantoprazole  40 mg Intravenous Daily    baclofen  20 mg Oral TID    dicyclomine  20 mg Oral 4x Daily AC & HS    DULoxetine  60 mg Oral QAM    hydroxychloroquine  200 mg Oral BID    latanoprost  1 drop Both Eyes Nightly    metoprolol tartrate  25 mg Oral BID    oxybutynin  10 mg Oral BID    pregabalin  100 mg Oral BID    verapamil  240 mg Oral QAM    enoxaparin  40 mg Subcutaneous Daily    insulin lispro  0-6 Units Subcutaneous TID WC    insulin lispro  0-3 Units Subcutaneous Nightly    Arformoterol Tartrate  15 mcg Nebulization BID    budesonide  250 mcg Nebulization BID    ipratropium-albuterol  1 ampule Inhalation Q4H WA    sodium chloride flush  10 mL Intravenous 2 times per day    sennosides-docusate sodium  1 tablet Oral BID       Diet:   Dietary Nutrition Supplements: Standard High Calorie Oral Supplement  DIET CARB CONTROL; Carb Control: 4 carbs/meal (approximate 1800 kcals/day)     EXAM:  General: No distress. Alert. Eyes: PERRL. No sclera icterus. No conjunctival injection. ENT: No discharge. Pharynx clear. Neck: Trachea midline. Normal thyroid. Resp: No accessory muscle use. No rales. No wheezing. No rhonchi. CV: Regular rate. Regular rhythm. No murmur or rub. Abd: Non-tender. Non-distended. No masses. No organomegaly. Normal bowel sounds. Skin: Warm and dry. No nodule on exposed extremities. No rash on exposed extremities. Ext: No cyanosis, clubbing, edema  Lymph: No cervical LAD. No supraclavicular LAD. M/S: No cyanosis. No joint deformity. No clubbing. Neuro: Awake. Follows commands. Positive pupils/gag/corneals. Normal pain response.        Results:  CBC:   Recent Labs     10/25/20  1710 10/26/20  0600 10/27/20  0500   WBC 9.0 8.5 8.0   HGB 12.8 11.4* 11.8   HCT 41.1 37.4 37.5   MCV 91.3 92.3 90.6    153 196     BMP:   Recent Labs     10/25/20  1710 10/26/20  0600 10/27/20  0500    139 142   K 4.5 4.8 4.4    108* 110*   CO2 25 21* 22   PHOS 4.7* 4.3 3.6   BUN 32* 27* 25*   CREATININE 2.0* 1.7* 1.6*     LIVER PROFILE:   Recent Labs     10/25/20  0518 10/25/20  1710 10/26/20  0600 10/27/20  0500   AST 26 47* 36* 8   ALT 26 41* 35* 26   BILIDIR 0.4*  --  0.3  --    BILITOT 0.8 1.3* 0.9 0.5   ALKPHOS 111* 133* 127* 123*     PT/INR:   Recent Labs

## 2020-10-27 NOTE — PROGRESS NOTES
Physical Therapy  Facility/Department: [address] ICU  Daily Treatment Note  NAME: Rajani Melchor  : 1945  MRN: [id number]    Date of Service: 10/27/2020    Patient Diagnosis(es): The encounter diagnosis was Closed right ankle fracture, initial encounter. has a past medical history of Asthma, Cerebral artery occlusion with cerebral infarction ([address] CKD (chronic kidney disease) stage 4, GFR 15-29 ml/min (HCC), Degenerative disc disease, Degenerative joint disease, Diabetes mellitus ([address] Diabetic peripheral neuropathy ([address] Diffuse cerebral atrophy ([address] Fibromyalgia, Glaucoma, HX OTHER MEDICAL, Hypertension, Iron deficiency anemia, Neurogenic bladder, Neuropathy, PONV (postoperative nausea and vomiting), Sarcoidosis, Sleep apnea, and Spinal cord and nerve root disorder. has a past surgical history that includes Foot surgery (Bilateral); Hysterectomy; back surgery; Total knee arthroplasty (Bilateral); Cholecystectomy; Colonoscopy (2019); other surgical history (Right, 2017); Breast surgery; joint replacement (2016); Cardiac catheterization (); and Ankle fracture surgery (Right, 10/24/2020).        Evaluating PT:  Lalo Thomson PT      Room #:  0720/0720-A  Diagnosis:  RT lateral ankle Fx, closed fx RT fibular head  PMHx/PSHx:  DM, HTN, Fibro, B/L TKA, RT ASHTYN, Asthma, peripheral neuropathy, DJD, DDD, Neurogenic bladder, sarcoidosis, cerebral artery occlusion with infarction.   Procedure/Surgery:  RT ankle ORIF  Precautions:  NWB RTLE, Can WT bear LTLE in CAM Boot, General, Falls,   Equipment Needs:  WW, WC     SUBJECTIVE:     Pt lives with ? in a ? story home with ? stairs to enter and ? rail.  Bed is on ? floor and bath is on ? floor.  Pt ambulated with ?Frida Morrow is alert to herself only.  Uncertain to date, place, home setting, steps to from home, what AD if any she uses.      OBJECTIVE:    Initial Evaluation  Date: 10/25/20 Treatment  10/27 Short Term/ Long Term   Goals   AM-PAC 6 Clicks 4/75 3/92      Was pt agreeable to Eval/treatment? Yes       Does pt have pain? Yes RT leg/ankle Grimaces in pain with R LE moved      Bed Mobility  Rolling: Mod/Max  Supine to sit: Max  Sit to supine: Max/dep  Scooting: Max/dep Rolling dependent  Scooting dependent of 2  Supine <> sit dependent of 2   Improved all levels to Min-Mod x 2-1   Transfers Sit to stand: Max/dep assist attempted; unsuccessful-unacceptabrisk for injury      NT   Mod x 2 transfers   Ambulation   SHAHRZAD    NT  WW with Min/Mod x 2 assist 5'    Stair negotiation: ascended and descended SHAHRZAD   NT      ROM  WFL x RT ankle NA       Strength  BLE 2-3-/5 hip, knee 3/5    Imp MMT 1 grade globally BLE   Balance Sitting EOB:  CGA  Dynamic Standing:  Zero   Sitting EOB:  Indep/S  Dynamic Standing:  Mod x 2-1 [employer]          Additional Comments: Pt more alert today. Responds to name but does not follow commands. Sitting balance edge of bed max assist progressing to CGA    Pt was left in bed with call light left by patient. Time in: 0925  Time out: 0940    Total treatment time 15 minutes    Pt is making  progress toward established Physical Therapy goals. Continue with physical therapy current plan of care.     Dionna PT [id number]      CPT codes:  [] Low Complexity PT evaluation 58757  [] Moderate Complexity PT evaluation 38530  [] High Complexity PT evaluation 45214  [] PT Re-evaluation 65770  [] Gait training 87710  minutes  [] Manual therapy 43563    minutes  [x] Therapeutic activities 01673  15  minutes  [] Therapeutic exercises 20463     minutes  [] Neuromuscular reeducation 30544     minutes

## 2020-10-27 NOTE — PROGRESS NOTES
[facility] MD Jeramie Mcmullen, MD Yovany Boss, MD Ady Steele, AILIN Mckeon, TANJA  Progress Note    10/27/2020    SUBJECTIVE:   10/26:   MS declined yesterday evening -- unresponsive, RRT called, transferred to MICU. On cpap. Sleeping, appears comfortable. Difficult to arouse. Hypotensive in the early am -- improved, now BP stable x > 6 hrs. 10/27: Awake, minimally alert, eyes open, smiling, though does not answer questions nor follow commands. Sister at bedside notes this is been the case since she arrived this morning. Hemodynamically stable. Not eating or drinking. PROBLEM LIST:    Principal Problem:    Closed fracture fibula, head, right, initial encounter  Active Problems:    Essential hypertension    Morbid obesity with BMI of 40.0-44.9, adult (HCC)    Diabetes mellitus ([facility] [address]    Neurogenic bladder    Sarcoidosis    Sleep apnea    Diffuse cerebral atrophy (HCC)    Asthma    Diabetic peripheral neuropathy (HCC)    Fibromyalgia    CKD (chronic kidney disease) stage 4, GFR 15-29 ml/min (HCC)    Iron deficiency anemia    Encephalopathy    Sarcoid  Resolved Problems:    * No resolved hospital problems.  *         DIET:    Dietary Nutrition Supplements: Standard High Calorie Oral Supplement  DIET CARB CONTROL; Carb Control: 4 carbs/meal (approximate 1800 kcals/day)     MEDS (scheduled):    [START ON 10/28/2020] aspirin  81 mg Oral Daily    clopidogrel  75 mg Oral Daily    atorvastatin  40 mg Oral Nightly    cefTRIAXone (ROCEPHIN) IV  1 g Intravenous Q24H    pantoprazole  40 mg Intravenous Daily    baclofen  20 mg Oral TID    dicyclomine  20 mg Oral 4x Daily AC & HS    DULoxetine  60 mg Oral QAM    hydroxychloroquine  200 mg Oral BID    latanoprost  1 drop Both Eyes Nightly    metoprolol tartrate  25 mg Oral BID    oxybutynin  10 mg Oral BID    pregabalin  100 mg Oral BID    verapamil  240 mg Oral QAM    10/26/20 2029 -- -- -- -- 21 100 % --   10/26/20 2028 -- -- -- -- 25 100 % --   10/26/20 2027 -- -- -- -- 23 100 % --   10/26/20 2000 (!) 140/61 98.9 °F (37.2 °C) Axillary 68 20 100 % --   10/26/20 1900 (!) 147/81 -- -- 80 (!) 6 100 % --   10/26/20 1800 (!) 136/50 -- -- 70 16 100 % --   10/26/20 1740 (!) 136/50 -- -- 72 20 100 % --   10/26/20 1700 (!) 144/53 -- -- 68 19 100 % --   10/26/20 1600 (!) 134/52 98.6 °F (37 °C) Axillary 69 20 100 % --   10/26/20 1500 (!) 136/52 -- -- 68 10 99 % --   10/26/20 1400 (!) 146/58 -- -- 64 10 100 % --   10/26/20 1338 -- -- -- -- 17 -- --   10/26/20 1337 -- -- -- -- 15 97 % --   10/26/20 1300 (!) 145/75 -- -- 73 19 92 % --   @      Intake/Output Summary (Last 24 hours) at 10/27/2020 1237  Last data filed at 10/27/2020 0558  Gross per 24 hour   Intake 1461 ml   Output 890 ml   Net 571 ml         Wt Readings from Last 3 Encounters:   10/27/20 291 lb 14.2 oz (132.4 kg)   01/28/20 250 lb (113.4 kg)   10/24/17 265 lb (120.2 kg)       Constitutional:  in no acute distress, on cpap  HEENT: NC/AT, mucus membranes dry on cpap  Neck: Trachea midline, no JVD  Cardiovascular: S1, S2 regular rhythm, no murmur,or rub  Respiratory:  No crackles, no wheeze  Gastrointestinal:  Soft, nontender, nondistended, NABS  Ext: 1/2 dependent chronic-type edema, no change, feet warm  Skin: dry, no rash  Neuro: awake, alert, interactive      DATA:    Recent Labs     10/25/20  1710 10/26/20  0600 10/27/20  0500   WBC 9.0 8.5 8.0   HGB 12.8 11.4* 11.8   HCT 41.1 37.4 37.5   MCV 91.3 92.3 90.6    153 196     Recent Labs     10/25/20  0518 10/25/20  1710 10/26/20  0600 10/27/20  0500    136 139 142   K 4.6 4.5 4.8 4.4    102 108* 110*   CO2 23 25 21* 22   MG 2.2 2.3  --  2.1   PHOS 4.8* 4.7* 4.3 3.6   BUN 33* 32* 27* 25*   CREATININE 1.9* 2.0* 1.7* 1.6*   ALT 26 41* 35* 26   AST 26 47* 36* 8   BILIDIR 0.4*  --  0.3  --    BILITOT 0.8 1.3* 0.9 0.5   ALKPHOS 111* 133* 127* 123*       Lab Results   Component Value Date    LABPROT 0.1 01/13/2018    LABPROT 0.1 01/13/2018          Assessment  1. Chronic kidney disease stage IV, baseline creatinine 1.7 - 1.9 mg/dL, current estimated GFR 26 mL/min secondary to diabetic nephropathy likely exacerbated by renal microvascular atherosclerotic disease in setting of longstanding hypertension, as well. Current creatinine is at her baseline, which has been stable now for a number of years. 2. Hypertension well-controlled on current medication regimen. ACE/ARB none. Her medication list.  I will check my records at the office to see why not. I would not start 1 at this time  3. Osteoarthritis, multifactorial  4. Morbid obesity, BMI 44  5. PAULINA, on cpap while asleep  6. Diabetic retinopathy, diabetic neuropathy  7. Hyperphosphatemia, mild, secondary to CKD. Would not start phosphate binder at level      Azotemia stable  Not eating or drinking. Chloride mildly elevated indicative mild intravascular dehydration    Recommendations  1. Stop Celebrex (done)  2. Continue IVF, will change to LR and increase the rate  3. When able, encourage oral intake  4. Otherwise continue current aspects of renal management  5. Follow labs, UO  6.  Avoid nephrotoxins including contrast except as absolutely necessary      Electronically signed by Shala De MD on 10/27/2020

## 2020-10-27 NOTE — PROGRESS NOTES
In to assess pt. Bi-pap removed. Pt. Oxygen saturation on room air is 96% pt. In no acute distress. Pt. Eyes are open, however, she is unable to say her name, speak at all and only laughs inappropriately. Pt. Is unable to follow commands. She will look at you when you speak but is unable to follow your finger when checking neuro. Pupils are responsive. Bilateral arms are flaccid she is unable to hold arms up, or stop them from falling. She does slightly pull away with painful stimuli. Pt. Is not moving lower extremities at all. Cast intact to right lower leg. Discussed with resident. He consulted neuro and perfect served the Doctor. Attempted to give the pt. Water and she shakes head from side to side refusing. Pt. Has no noticeable facial droop.

## 2020-10-27 NOTE — PROGRESS NOTES
Occupational Therapy  OT BEDSIDE TREATMENT NOTE      Date:10/27/2020  Patient Name: Melo Ramírez  MRN: [id number]  : 1945  Room: [address] [place] [address]-A     Referring Provider: Ju Natarajan MD     Evaluating OT: Caren Trujillo OTR/L [id number]     AM-PAC Daily Activity Raw Score:      Recommended Adaptive Equipment: TBD     Diagnosis: R fibula fracture. Pt presents to this hospital post fall. Surgery: 10/25 Right lateral malleolus open reduction and  internal fixation   Pertinent Medical History: HTN, diabetic peripheral neuropathy, DM, DJD, asthma, neurogenic bladder, fibromyalgia, glaucoma, CKD   Precautions:  Falls, NWB R LE, WBAT L LE with CAM boot, Bipap     Home Living: Pt is a poor historian unable to provide PLOF.      Pain Level: Pt indicates pain with movement of R LE.      Cognition: Awake. Eyes open. Will turn her head when she hears her name being called. Did not follow any commands for exercise or ADL. Did not verbalize during this session. Functional Assessment:    Initial Eval Status  Date: 10/26/20 Treatment session:  Short Term Goals      Feeding Dependent   Mod A   Grooming Dependent Dependent   Mod A   UB Dressing Dependent   Mod A   LB Dressing Dependent   Mod A    Bathing Dependent   Mod A   Toileting Dependent   Mod A   Bed Mobility  Supine <> sit: Dependent x2  Rolling: Dependent Dependent x2 supine <> sit       Functional Transfers STS: unable to complete d/t poor trunk control and lethargy   Max A   Functional Mobility NT   Max A during ADLs   Balance Sitting: poor, total assist to maintain sitting balance.  Max attempts to engage pt in task  Sitting EOB: x6 min     Standing: unable to attempt Sit balance on the EOB max A initially however progressed to min A       Activity Tolerance Poor  bipap on throughout session Pt tolerated sitting on the EOB 10 minutes.   sitting chavez x10-15 min with fair/fair minus balance during self care tasks     Standing chavez x1-3 min with poor plus balance           Exercise: Pt does not demonstrate any functional movement of L UE during session. Did use R UE for support when sitting on the EOB. Also automatically used R UE to scratch face. Pt did not follow directions for ROM however did demonstrate movement of R UE during guided ROM to facilitate increased function for ADL activity. Education/treatment:  Therapeutic activity to address balance, strength, and endurance for ADL and core strengthening. Pt education of daily orientation. Other: cognition limiting progress. · Pt has made limited  progress towards set goals.        Time In: 9:25   Time Out: 9:40      Min Units   Therapeutic Ex [id number]    Therapeutic Activities [id number] 8   ADL/Self Care 30269     Orthotic Management 27626     Neuro Re-Ed [id number]     Non-Billable Time     TOTAL TIMED TREATMENT 15 [address] [facility] MARYAN/L [id number]

## 2020-10-27 NOTE — PROGRESS NOTES
I updated sister More Sampson (sister) at number [phone] with regards to questions. Lab work and imaging reviewed with patient. Conversation lasted about 20 minutes.     Andrei Walton  10/27/2020  3:16 PM

## 2020-10-27 NOTE — PROGRESS NOTES
Dr. Abhay Ibrahim at bedside to evaluate pt. Pt. Remains unchanged. VSS on room air in no acute distress. Life partner Braydon Mercado at bedside. Upset. Pt. Will not follow commands for her either, will not take liquids or allow mouth care by Braydon Mercado either. Called pt. Brother Erika Richards and updated him on her condition.   12:20 IV team at bedside to place new IV site

## 2020-10-27 NOTE — PROGRESS NOTES
Family Medicine    Subjective: The patient is awake and alert. Unable to answer questions and does not speak. Does not follow commands. Decreased movement of left UE. Objective:  BP (!) 127/50   Pulse 66   Temp 99.4 °F (37.4 °C) (Oral)   Resp 20   Ht 5' 6\" (1.676 m)   Wt 291 lb 14.2 oz (132.4 kg)   SpO2 100%   BMI 47.11 kg/m²     HEENT: MMM. Heart: RRR,. Lungs: CTA bilaterally. Abd: bowel sounds present, nontender, nondistended, no masses. Extrem:  No clubbing, cyanosis, or edema. Neuro: Awake. Not following commands. Nonverbal. Decreased use of left UE.     CBC with Differential:    Lab Results   Component Value Date    WBC 8.0 10/27/2020    RBC 4.14 10/27/2020    HGB 11.8 10/27/2020    HCT 37.5 10/27/2020     10/27/2020    MCV 90.6 10/27/2020    MCH 28.5 10/27/2020    MCHC 31.5 10/27/2020    RDW 14.3 10/27/2020    LYMPHOPCT 10.2 10/27/2020    MONOPCT 13.1 10/27/2020    BASOPCT 0.6 10/27/2020    MONOSABS 1.04 10/27/2020    LYMPHSABS 0.81 10/27/2020    EOSABS 0.26 10/27/2020    BASOSABS 0.05 10/27/2020     CMP:    Lab Results   Component Value Date     10/27/2020    K 4.4 10/27/2020     10/27/2020    CO2 22 10/27/2020    BUN 25 10/27/2020    CREATININE 1.6 10/27/2020    GFRAA 38 10/27/2020    LABGLOM 31 10/27/2020    GLUCOSE 152 10/27/2020    GLUCOSE 82 12/09/2011    PROT 6.0 10/27/2020    LABALBU 2.9 10/27/2020    CALCIUM 8.4 10/27/2020    BILITOT 0.5 10/27/2020    ALKPHOS 123 10/27/2020    AST 8 10/27/2020    ALT 26 10/27/2020     Assessment/Plan:  1. Altered mental status - etiology unclear. ? secondary to CNS depression from narcotic medication - however minimal improvement from Narcan. ? UTI. ? CVA. CT head WO contrast without acute changes. Neurology consulted. Will continue to monitor. 2. Acute hypoxic respiratory failure - Stable. 3. Right fibula fracture status post ORIF - Orthopedics on this. Stable. 4. Hypertension - Stable. 5. Morbid obesity - Stable.   6. Diabetes

## 2020-10-27 NOTE — PATIENT CARE CONFERENCE
Intensive Care Daily Quality Rounding Checklist        ICU Team Members: Dr. Rogers Ma, Constantino Kennedy (residents), clinical pharmacist, charge nurse, bedside nurse     ICU Day #: NUMBER: 3     Intubation Date: n/a      Ventilator Day #: n/a     Central Line Insertion Date:  n/a                                                    Day #: n/a      Arterial Line Insertion Date:  n/a                             Day #: n/a     DVT Prophylaxis: Lovenox     GI Prophylaxis: on diet     Anaya Catheter Insertion Date: October 25,2020                                        Day #: 3                             Continued need (if yes, reason documented and discussed with physician): yes, strict Is and Os     Skin Issues/ Wounds and ordered treatment discussed on rounds: no issues, has surgical incision     Goals/ Plans for the Day: Daily labs, wean 02/ Bipap as able, wait for increased LOC

## 2020-10-27 NOTE — PROGRESS NOTES
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 Oral groping noted. THERAPY RECOMMENDATIONS:   Speech Pathology intervention is recommended 3-6 times per week for LOS or when goals are met with emphasis on the following:     Expressive language skills to improve accuracy of completion of automatic speech tasks, object/picture naming, phrase completion, and phrasal expression of basic wants and needs with 50% accuracy    Receptive language skills for response to [facility]- questions and follow through of simple to multi-step directions with 50% accuracy. Continue to assess for apraxia and add goals as warranted. MOTOR SPEECH       Oral Peripheral Examination   Could not test    Parameters of Speech Production    No verbalizations during eval    RECEPTIVE LANGUAGE    Comprehension of Yes/No Questions:   Unable    Process  Simple Verbal Commands:   Unable with multiple reps or written and visual cues    Comprehension of Conversation:      Impaired      EXPRESSIVE LANGUAGE     Serials: Impaired    Imitation:  Words   Impaired   Sentences Impaired    Naming:  (Modality used:  Verbal)  Confrontation Naming  Impaired  Functional Description  Impaired  Response Naming: Impaired    Conversation:      No verbalizations, POA reports pt yelled \"ouch: today.  Possible \"yeah\" X1 and laughing noted during evaluation     Pt did not attempt singing as cued    COGNITION      Could not be assessed due to aphasia/apraxia         CLINICAL OBSERVATIONS NOTED DURING THE EVALUATION  Pt responds to name with eye contact                   The Speech Language Pathologist (SLP) completed education with the patient regarding results of evaluation. Explained that Speech Pathology intervention is warranted  at this time   Prognosis for improvements is fair +     This plan will be re-evaluated and revised in 1 week  if warranted. Patient stated goals: Did not state,   Treatment goals discussed with Patient and POA   The POA understand(s) the diagnosis, prognosis and plan of care       INTERVENTION/EDUCATION    Pt/POA educated on above results and plan of care. POA ways to encourage verbal language. POA encouraged to engage in question/answer session. All questions answered and pt verbalized understanding of above. CPT code:    16999  eval speech sound lang comprehension, Speech tx 15 mins      The admitting diagnosis and active problem list, as listed below have been reviewed prior to initiation of this evaluation.         ACTIVE PROBLEM LIST:   Patient Active Problem List   Diagnosis    Essential hypertension    PAULINA (obstructive sleep apnea)    Midline low back pain with right-sided sciatica    Morbid obesity with BMI of 40.0-44.9, adult ([address]    CVA (cerebral vascular accident) ([address]    Urinary incontinence    Altered mental status    Closed fracture fibula, head, right, initial encounter    Diabetes mellitus ([address]    Neurogenic bladder    Sarcoidosis    Sleep apnea    Diffuse cerebral atrophy (HCC)    Asthma    Diabetic peripheral neuropathy (HCC)    Fibromyalgia    CKD (chronic kidney disease) stage 4, GFR 15-29 ml/min (HCC)    Iron deficiency anemia    Encephalopathy    Sarcoid

## 2020-10-27 NOTE — PLAN OF CARE
Intensive Care Daily Quality Rounding Checklist      ICU Team Members:     ICU Day #: 3        Ventilator Day #:  Bipap    Central Line Insertion Date: n/a        Day #:      Arterial Line Insertion Date:  n/a      Day #:     DVT Prophylaxis: Lovenox    GI Prophylaxis:    Anyaa Catheter Insertion Date: Oct 25 2020       Day #: 3      Continued need (if yes, reason documented and discussed with physician):     Skin Issues/ Wounds and ordered treatment discussed on rounds:     Goals/ Plans for the Day:

## 2020-10-27 NOTE — CONSULTS
NEUROLOGY CONSULT NOTE       Requesting Physician: Lowell Elias MD / Danyelle Due, DO    Reason for Consult:  Evaluate for \"CVA\"  (I was notified this AM without being given a reason for the consult, nor was it called \"stat\")    History of Present Illness:  Abdifatah Martin is a 67 y.o. female admitted to [facility] on 10/24/2020. She came in to have an open reduction internal fixation of a right lateral malleolus fracture, which was done the day of her admission. She was obtunded later that day which seemed to increase in severity. She was given Narcan with minimal improvement. There was some CO2 retention and she was placed on BiPAP. This has resolved and the BiPAP is off today. She is more alert but is not speaking and she is exhibiting some extremity weakness. She has a history of a right anterior cerebral artery stroke. Her power of , Nathan Cunningham, says she had no residual deficits from this.     Past Medical History:        Diagnosis Date    Asthma 1993    Cerebral artery occlusion with cerebral infarction ([address] 07/2017    CKD (chronic kidney disease) stage 4, GFR 15-29 ml/min ([address] 2017    Degenerative disc disease     Degenerative joint disease     Diabetes mellitus ([address] 2008    Diabetic peripheral neuropathy ([address] 1998    Diffuse cerebral atrophy ([address] 2012    [facility]romyalgia 01/2012    [facility]    Glaucoma     HX OTHER MEDICAL 02/2017    right hip wound dehiscence; using rollator    Hypertension     Iron deficiency anemia 10/25/2020    Neurogenic bladder     Neuropathy     PONV (postoperative nausea and vomiting)     had trouble  waking up with past foot surgery    Sarcoidosis 1993    Sleep apnea 2009    cpap; SEVERE    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\"           Procedure Laterality Date    ANKLE FRACTURE SURGERY Right 10/24/2020    RIGHT ANKLE OPEN REDUCTION INTERNAL FIXATION performed by Andreina Melgar MD at [address] [place] [address] SURGERY      laminectomy l3-4    BREAST SURGERY      biopsy    CARDIAC CATHETERIZATION  2012    MINIMAL CAD    CHOLECYSTECTOMY      COLONOSCOPY  08/2019    TUBULAR ADENOMA x 2    FOOT SURGERY Bilateral     right foot fracture; left foot charcot    HYSTERECTOMY      JOINT REPLACEMENT  12/16/2016    right hip arthroplasty    OTHER SURGICAL HISTORY Right 02/06/2017    I & D right hip wound vaccum placement    TOTAL KNEE ARTHROPLASTY Bilateral        Social History:  Social History     Tobacco Use   Smoking Status Never Smoker   Smokeless Tobacco Never Used     Social History     Substance and Sexual Activity   Alcohol Use No    Alcohol/week: 0.0 standard drinks     Social History     Substance and Sexual Activity   Drug Use No     Single    Family History:       Problem Relation Age of Onset    Cancer Mother         pancreatic    Parkinsonism Father     Diabetes Maternal Grandfather     Cancer Brother         esophageal       Review of Systems:  Unable-patient is aphasic    Allergies: Allergies   Allergen Reactions    Iodides Shortness Of Breath     CT Scan Dye (\"turned purple\")    Iodine Shortness Of Breath     Had trouble breathing and Skin color turned purple and stayed that way for  Hours.     Other Shortness Of Breath     Perfumes and smoke    Pcn [Penicillins] Itching        Current Medications:   cefTRIAXone (ROCEPHIN) 1 g in sterile water 10 mL IV syringe, Q24H  pantoprazole (PROTONIX) injection 40 mg, Daily  aspirin EC tablet 81 mg, BID  atorvastatin (LIPITOR) tablet 20 mg, Nightly  baclofen (LIORESAL) tablet 20 mg, TID  dicyclomine (BENTYL) capsule 20 mg, 4x Daily AC & HS  DULoxetine (CYMBALTA) extended release capsule 60 mg, QAM  hydroxychloroquine (PLAQUENIL) tablet 200 mg, BID  latanoprost (XALATAN) 0.005 % ophthalmic solution 1 drop, Nightly  metoprolol tartrate (LOPRESSOR) tablet 25 mg, BID  oxybutynin (DITROPAN-XL) extended release tablet 10 mg, BID  pregabalin (LYRICA) capsule 100 mg, BID  verapamil (CALAN SR) extended release tablet 240 mg, QAM  potassium chloride (KLOR-CON M) extended release tablet 40 mEq, PRN    Or  potassium bicarb-citric acid (EFFER-K) effervescent tablet 40 mEq, PRN    Or  potassium chloride 10 mEq/100 mL IVPB (Peripheral Line), PRN  acetaminophen (TYLENOL) tablet 650 mg, Q6H PRN    Or  acetaminophen (TYLENOL) suppository 650 mg, Q6H PRN  magnesium hydroxide (MILK OF MAGNESIA) 400 MG/5ML suspension 30 mL, Daily PRN  enoxaparin (LOVENOX) injection 40 mg, Daily  insulin lispro (HUMALOG) injection vial 0-6 Units, TID WC  insulin lispro (HUMALOG) injection vial 0-3 Units, Nightly  glucose (GLUTOSE) 40 % oral gel 15 g, PRN  dextrose 50 % IV solution, PRN  glucagon (rDNA) injection 1 mg, PRN  dextrose 5 % solution, PRN  trimethobenzamide (TIGAN) injection 200 mg, Q6H PRN  Arformoterol Tartrate (BROVANA) nebulizer solution 15 mcg, BID  budesonide (PULMICORT) nebulizer suspension 250 mcg, BID  ipratropium-albuterol (DUONEB) nebulizer solution 1 ampule, Q4H WA  0.9 % sodium chloride infusion, Continuous  sodium chloride flush 0.9 % injection 10 mL, 2 times per day  sodium chloride flush 0.9 % injection 10 mL, PRN  sennosides-docusate sodium (SENOKOT-S) 8.6-50 MG tablet 1 tablet, BID  bisacodyl (DULCOLAX) EC tablet 5 mg, Daily PRN       Medications Prior to Admission: celecoxib (CELEBREX) 200 MG capsule, Take 200 mg by mouth daily  oxyCODONE-acetaminophen (PERCOCET)  MG per tablet, TAKE ONE TABLET BY MOUTH EVERY 8 HOURS AS NEEDED *PHARMACY RECOMMENDS MAX OF 3 GRAMS OF TYLENOL PER 24 HOURS*  pregabalin (LYRICA) 100 MG capsule, TAKE 1 CAPSULE BY MOUTH 3 TIMES A DAY SENT 14 DAY SUPPLY: **RE-ORDER ACCORDINGLY** (Patient taking differently: 100 mg 2 times daily TAKE 1 CAPSULE BY MOUTH 3 TIMES A DAY SENT 14 DAY SUPPLY: **RE-ORDER ACCORDINGLY**)  oxybutynin (DITROPAN-XL) 10 MG extended release tablet, Take 1 tablet by mouth 2 times daily  hydroxychloroquine (PLAQUENIL) 200 MG tablet, Take 200 mg by mouth 2 times daily   verapamil (CALAN SR) 240 MG extended release tablet, Take 240 mg by mouth every morning 240 mg in the Am and 120 mg Nightly  DULoxetine (CYMBALTA) 60 MG capsule, Take 60 mg by mouth every morning   dicyclomine (BENTYL) 10 MG capsule, Take 2 capsules by mouth 4 times daily (before meals and nightly)  acetaminophen (TYLENOL) 500 MG tablet, Take 2 tablets by mouth 3 times daily for 7 days  albuterol sulfate HFA (PROAIR HFA) 108 (90 Base) MCG/ACT inhaler, Inhale 1 puff into the lungs every 4 hours as needed for Wheezing or Shortness of Breath  docusate sodium (COLACE) 100 MG capsule, Take 100 mg by mouth daily  baclofen (LIORESAL) 20 MG tablet, Take 20 mg by mouth 3 times daily  aspirin 81 MG EC tablet, Take 1 tablet by mouth 2 times daily  atorvastatin (LIPITOR) 20 MG tablet, Take 1 tablet by mouth nightly  metoprolol tartrate (LOPRESSOR) 25 MG tablet, Take 1 tablet by mouth 2 times daily  latanoprost (XALATAN) 0.005 % ophthalmic solution, Place 1 drop into both eyes nightly  metFORMIN (GLUCOPHAGE) 1000 MG tablet, Take 500 mg by mouth 2 times daily (with meals)   theophylline CR, 12 hour, (THEOCHRON) 300 MG CR tablet, Take 300 mg by mouth every morning   Albuterol Sulfate (PROAIR HFA IN), Inhale 1 puff into the lungs as needed Use am dos, if needed and brin  multivitamin (THERAGRAN) per tablet, Take 1 tablet by mouth every morning     Physical Exam:  BP (!) 151/64   Pulse 82   Temp 98.6 °F (37 °C) (Axillary)   Resp 21   Ht 5' 6\" (1.676 m)   Wt 291 lb 14.2 oz (132.4 kg)   SpO2 96%   BMI 47.11 kg/m²  I Body mass index is 47.11 kg/m². I   Wt Readings from Last 1 Encounters:   10/27/20 291 lb 14.2 oz (132.4 kg)        GENERAL: Patient does not appear to be in any acute distress and appears her stated age. She said no words. Giggled readily. EYE:  Fundi not examined due to the Covid-19 outbreak  CARDIOVASCULAR:  Heart regular rate and rhythm.   No carotid bruits detected. NEUROLOGIC:  Level of Alertness: alert  Language: Patient would frequently laugh but said no words. Could not follow even simple midline commands. Cranial Nerves: pupils are equal; extraocular muscles intact; her face looks symmetric. Could not test sensation or hearing. Hearing is intact to soft voice; I chose not to gag her to test lower cranial nerves. Motor Exam: Normal mildly increased tone in all extremities. (Right lower extremity not tested due to surgery). She cannot cooperate for confrontational strength testing but to passively moving her limbs about it feels as if she is weaker in the left upper extremity than the right. Hard to tell with the lower extremity.   Sensory and cerebellar not testable due to patient's inability to cooperate  Deep Tendon Reflexes: Reflexes are intact in the upper extremities and bilaterally symmetric  Plantar Responses: No response left not tested right  Abnormal movements: none  Station and gait: Unsafe to get up; right leg surgery    Diagnostics:  CBC:   Lab Results   Component Value Date    WBC 8.0 10/27/2020    RBC 4.14 10/27/2020    HGB 11.8 10/27/2020    HCT 37.5 10/27/2020    MCV 90.6 10/27/2020    MCH 28.5 10/27/2020    MCHC 31.5 10/27/2020    RDW 14.3 10/27/2020     10/27/2020    MPV 9.4 10/27/2020     CMP:    Lab Results   Component Value Date     10/27/2020    K 4.4 10/27/2020     10/27/2020    CO2 22 10/27/2020    BUN 25 10/27/2020    CREATININE 1.6 10/27/2020    GFRAA 38 10/27/2020    LABGLOM 31 10/27/2020    GLUCOSE 152 10/27/2020    GLUCOSE 82 12/09/2011    PROT 6.0 10/27/2020    LABALBU 2.9 10/27/2020    CALCIUM 8.4 10/27/2020    BILITOT 0.5 10/27/2020    ALKPHOS 123 10/27/2020    AST 8 10/27/2020    ALT 26 10/27/2020     PT/INR:    Lab Results   Component Value Date    PROTIME 11.3 10/24/2020    PROTIME 12.0 12/09/2011    INR 1.0 10/24/2020       CT brain images reviewed and compared to her first CT this admission-there is significant small vessel disease and probable old infarctions but I cannot clearly see anything new. Impression:  Probable acute ischemic left cerebral artery stroke with aphasia. She does have a left hemiparesis, and I forgot to ask her friend if she is left-handed, so there is a chance this is a right MCA stroke. Possible the left hemiparesis is old from her prior right hemispheric infarction. Bilateral strokes unfortunately cannot be excluded. It's probably has been more than 24 hours since this occurred, so we do not really have an option of giving TPA or even doing a thrombectomy. As discussed, old deficits are sometimes brought out during medically stressful situations and there is a slight chance this is what we are seeing, though her friend denies any previous aphasia. Differential diagnosis would also include a seizure with Robert's paralysis. Recommendations :  Her nurse called MRI to see if we could possibly do a scan in light of the surgery she just had. They stated that she would have to be able to tell them if her ankle were getting hot, so we're going to defer this study. She has significant renal disease so we probably can't get a CTA, but we can get MR angiography with MRI at some point. It might be worthwhile to get an EEG. In the meantime we must treat her as if this is a CVA. I will add some Plavix to aspirin but without a loading dose due to her recent surgery. We can give her just 1 baby aspirin per day with that. Increase her statin and check a lipid profile. Keep her well-hydrated and avoid hypotension. DVT prophylaxis of course. Therapies when able medically; will add speech therapy for the aphasia and her swallowing. Will probably need rehab. I appreciate the opportunity to participate in this interesting patient's care, and will follow with you. The case was discussed with Dr. Margot Shabazz and the patient's power of .   Over 1 hour critical care time was spent on this patient's case today    Electronically signed by Keyla Parekh, DO on 10/27/2020 at 11:34 AM    (lipid panel already done with LDL 71; will increase statin only temporarily; speech assess swallowing -> NG if needed)

## 2020-10-27 NOTE — PROGRESS NOTES
Echo in progress, pt. Answered \"ok\" and said \"well\" and repeated it twice. Pt. Also was able to squeezed my hand with her right hand on command.

## 2020-10-28 ENCOUNTER — APPOINTMENT (OUTPATIENT)
Dept: NEUROLOGY | Age: 75
DRG: 492 | End: 2020-10-28
Attending: INTERNAL MEDICINE
Payer: MEDICARE

## 2020-10-28 ENCOUNTER — APPOINTMENT (OUTPATIENT)
Dept: GENERAL RADIOLOGY | Age: 75
DRG: 492 | End: 2020-10-28
Attending: INTERNAL MEDICINE
Payer: MEDICARE

## 2020-10-28 LAB
ALBUMIN SERPL-MCNC: 2.8 G/DL (ref 3.5–5.2)
ALP BLD-CCNC: 120 U/L (ref 35–104)
ALT SERPL-CCNC: 24 U/L (ref 0–32)
ANION GAP SERPL CALCULATED.3IONS-SCNC: 10 MMOL/L (ref 7–16)
AST SERPL-CCNC: 26 U/L (ref 0–31)
BASOPHILS ABSOLUTE: 0.05 E9/L (ref 0–0.2)
BASOPHILS RELATIVE PERCENT: 0.6 % (ref 0–2)
BILIRUB SERPL-MCNC: 0.7 MG/DL (ref 0–1.2)
BUN BLDV-MCNC: 27 MG/DL (ref 8–23)
CALCIUM SERPL-MCNC: 8.4 MG/DL (ref 8.6–10.2)
CHLORIDE BLD-SCNC: 109 MMOL/L (ref 98–107)
CO2: 22 MMOL/L (ref 22–29)
CREAT SERPL-MCNC: 1.4 MG/DL (ref 0.5–1)
EOSINOPHILS ABSOLUTE: 0.07 E9/L (ref 0.05–0.5)
EOSINOPHILS RELATIVE PERCENT: 0.8 % (ref 0–6)
GFR AFRICAN AMERICAN: 44
GFR NON-AFRICAN AMERICAN: 37 ML/MIN/1.73
GLUCOSE BLD-MCNC: 119 MG/DL (ref 74–99)
HCT VFR BLD CALC: 35.4 % (ref 34–48)
HEMOGLOBIN: 11.1 G/DL (ref 11.5–15.5)
IMMATURE GRANULOCYTES #: 0.12 E9/L
IMMATURE GRANULOCYTES %: 1.4 % (ref 0–5)
LYMPHOCYTES ABSOLUTE: 0.62 E9/L (ref 1.5–4)
LYMPHOCYTES RELATIVE PERCENT: 7 % (ref 20–42)
MAGNESIUM: 2.1 MG/DL (ref 1.6–2.6)
MCH RBC QN AUTO: 28.1 PG (ref 26–35)
MCHC RBC AUTO-ENTMCNC: 31.4 % (ref 32–34.5)
MCV RBC AUTO: 89.6 FL (ref 80–99.9)
METER GLUCOSE: 107 MG/DL (ref 74–99)
METER GLUCOSE: 110 MG/DL (ref 74–99)
METER GLUCOSE: 122 MG/DL (ref 74–99)
METER GLUCOSE: 130 MG/DL (ref 74–99)
METER GLUCOSE: 99 MG/DL (ref 74–99)
MONOCYTES ABSOLUTE: 1.09 E9/L (ref 0.1–0.95)
MONOCYTES RELATIVE PERCENT: 12.4 % (ref 2–12)
NEUTROPHILS ABSOLUTE: 6.86 E9/L (ref 1.8–7.3)
NEUTROPHILS RELATIVE PERCENT: 77.8 % (ref 43–80)
PDW BLD-RTO: 14.2 FL (ref 11.5–15)
PHOSPHORUS: 3.9 MG/DL (ref 2.5–4.5)
PLATELET # BLD: 192 E9/L (ref 130–450)
PMV BLD AUTO: 9.8 FL (ref 7–12)
POTASSIUM SERPL-SCNC: 4 MMOL/L (ref 3.5–5)
RBC # BLD: 3.95 E12/L (ref 3.5–5.5)
SODIUM BLD-SCNC: 141 MMOL/L (ref 132–146)
TOTAL PROTEIN: 6.1 G/DL (ref 6.4–8.3)
URINE CULTURE, ROUTINE: NORMAL
WBC # BLD: 8.8 E9/L (ref 4.5–11.5)

## 2020-10-28 PROCEDURE — 6370000000 HC RX 637 (ALT 250 FOR IP): Performed by: ORTHOPAEDIC SURGERY

## 2020-10-28 PROCEDURE — 99222 1ST HOSP IP/OBS MODERATE 55: CPT | Performed by: PHYSICAL MEDICINE & REHABILITATION

## 2020-10-28 PROCEDURE — 99232 SBSQ HOSP IP/OBS MODERATE 35: CPT | Performed by: PSYCHIATRY & NEUROLOGY

## 2020-10-28 PROCEDURE — 80053 COMPREHEN METABOLIC PANEL: CPT

## 2020-10-28 PROCEDURE — 94660 CPAP INITIATION&MGMT: CPT

## 2020-10-28 PROCEDURE — 84100 ASSAY OF PHOSPHORUS: CPT

## 2020-10-28 PROCEDURE — 95816 EEG AWAKE AND DROWSY: CPT

## 2020-10-28 PROCEDURE — 97530 THERAPEUTIC ACTIVITIES: CPT

## 2020-10-28 PROCEDURE — 1200000000 HC SEMI PRIVATE

## 2020-10-28 PROCEDURE — 2580000003 HC RX 258: Performed by: INTERNAL MEDICINE

## 2020-10-28 PROCEDURE — 6360000002 HC RX W HCPCS: Performed by: INTERNAL MEDICINE

## 2020-10-28 PROCEDURE — 85025 COMPLETE CBC W/AUTO DIFF WBC: CPT

## 2020-10-28 PROCEDURE — 92526 ORAL FUNCTION THERAPY: CPT | Performed by: SPEECH-LANGUAGE PATHOLOGIST

## 2020-10-28 PROCEDURE — 92610 EVALUATE SWALLOWING FUNCTION: CPT | Performed by: SPEECH-LANGUAGE PATHOLOGIST

## 2020-10-28 PROCEDURE — 82962 GLUCOSE BLOOD TEST: CPT

## 2020-10-28 PROCEDURE — 36415 COLL VENOUS BLD VENIPUNCTURE: CPT

## 2020-10-28 PROCEDURE — 6370000000 HC RX 637 (ALT 250 FOR IP): Performed by: INTERNAL MEDICINE

## 2020-10-28 PROCEDURE — 71045 X-RAY EXAM CHEST 1 VIEW: CPT

## 2020-10-28 PROCEDURE — 6370000000 HC RX 637 (ALT 250 FOR IP): Performed by: PSYCHIATRY & NEUROLOGY

## 2020-10-28 PROCEDURE — C9113 INJ PANTOPRAZOLE SODIUM, VIA: HCPCS | Performed by: STUDENT IN AN ORGANIZED HEALTH CARE EDUCATION/TRAINING PROGRAM

## 2020-10-28 PROCEDURE — 6360000002 HC RX W HCPCS: Performed by: ORTHOPAEDIC SURGERY

## 2020-10-28 PROCEDURE — 92507 TX SP LANG VOICE COMM INDIV: CPT | Performed by: SPEECH-LANGUAGE PATHOLOGIST

## 2020-10-28 PROCEDURE — 6360000002 HC RX W HCPCS: Performed by: STUDENT IN AN ORGANIZED HEALTH CARE EDUCATION/TRAINING PROGRAM

## 2020-10-28 PROCEDURE — 94640 AIRWAY INHALATION TREATMENT: CPT

## 2020-10-28 PROCEDURE — 83735 ASSAY OF MAGNESIUM: CPT

## 2020-10-28 PROCEDURE — 95816 EEG AWAKE AND DROWSY: CPT | Performed by: PSYCHIATRY & NEUROLOGY

## 2020-10-28 RX ORDER — FUROSEMIDE 10 MG/ML
20 INJECTION INTRAMUSCULAR; INTRAVENOUS ONCE
Status: COMPLETED | OUTPATIENT
Start: 2020-10-28 | End: 2020-10-28

## 2020-10-28 RX ADMIN — KETOROLAC TROMETHAMINE 15 MG: 30 INJECTION, SOLUTION INTRAMUSCULAR at 05:12

## 2020-10-28 RX ADMIN — ATORVASTATIN CALCIUM 40 MG: 40 TABLET, FILM COATED ORAL at 20:49

## 2020-10-28 RX ADMIN — BACLOFEN 20 MG: 10 TABLET ORAL at 09:38

## 2020-10-28 RX ADMIN — OXYBUTYNIN CHLORIDE 10 MG: 10 TABLET, EXTENDED RELEASE ORAL at 16:53

## 2020-10-28 RX ADMIN — ARFORMOTEROL TARTRATE 15 MCG: 15 SOLUTION RESPIRATORY (INHALATION) at 21:01

## 2020-10-28 RX ADMIN — DOCUSATE SODIUM AND SENNOSIDES 1 TABLET: 8.6; 5 TABLET, FILM COATED ORAL at 20:49

## 2020-10-28 RX ADMIN — PREGABALIN 100 MG: 50 CAPSULE ORAL at 09:38

## 2020-10-28 RX ADMIN — DICYCLOMINE HYDROCHLORIDE 20 MG: 10 CAPSULE ORAL at 16:54

## 2020-10-28 RX ADMIN — ENOXAPARIN SODIUM 40 MG: 40 INJECTION SUBCUTANEOUS at 09:00

## 2020-10-28 RX ADMIN — WATER 1 G: 1 INJECTION INTRAMUSCULAR; INTRAVENOUS; SUBCUTANEOUS at 11:50

## 2020-10-28 RX ADMIN — BACLOFEN 20 MG: 10 TABLET ORAL at 20:49

## 2020-10-28 RX ADMIN — HYDROXYCHLOROQUINE SULFATE 200 MG: 200 TABLET ORAL at 20:49

## 2020-10-28 RX ADMIN — IPRATROPIUM BROMIDE AND ALBUTEROL SULFATE 1 AMPULE: 2.5; .5 SOLUTION RESPIRATORY (INHALATION) at 21:01

## 2020-10-28 RX ADMIN — DOCUSATE SODIUM AND SENNOSIDES 1 TABLET: 8.6; 5 TABLET, FILM COATED ORAL at 09:41

## 2020-10-28 RX ADMIN — BACLOFEN 20 MG: 10 TABLET ORAL at 16:54

## 2020-10-28 RX ADMIN — IPRATROPIUM BROMIDE AND ALBUTEROL SULFATE 1 AMPULE: 2.5; .5 SOLUTION RESPIRATORY (INHALATION) at 12:50

## 2020-10-28 RX ADMIN — CLOPIDOGREL BISULFATE 75 MG: 75 TABLET ORAL at 09:38

## 2020-10-28 RX ADMIN — PREGABALIN 100 MG: 50 CAPSULE ORAL at 20:49

## 2020-10-28 RX ADMIN — DICYCLOMINE HYDROCHLORIDE 20 MG: 10 CAPSULE ORAL at 11:50

## 2020-10-28 RX ADMIN — FUROSEMIDE 20 MG: 20 INJECTION, SOLUTION INTRAMUSCULAR; INTRAVENOUS at 11:50

## 2020-10-28 RX ADMIN — ASPIRIN 81 MG: 81 TABLET, CHEWABLE ORAL at 09:00

## 2020-10-28 RX ADMIN — DICYCLOMINE HYDROCHLORIDE 20 MG: 10 CAPSULE ORAL at 20:49

## 2020-10-28 RX ADMIN — METOPROLOL TARTRATE 25 MG: 25 TABLET, FILM COATED ORAL at 20:49

## 2020-10-28 RX ADMIN — Medication 10 ML: at 20:50

## 2020-10-28 RX ADMIN — LATANOPROST 1 DROP: 50 SOLUTION OPHTHALMIC at 20:49

## 2020-10-28 RX ADMIN — HYDROXYCHLOROQUINE SULFATE 200 MG: 200 TABLET ORAL at 09:38

## 2020-10-28 RX ADMIN — PANTOPRAZOLE SODIUM 40 MG: 40 INJECTION, POWDER, FOR SOLUTION INTRAVENOUS at 09:54

## 2020-10-28 RX ADMIN — METOPROLOL TARTRATE 25 MG: 25 TABLET, FILM COATED ORAL at 09:38

## 2020-10-28 RX ADMIN — BUDESONIDE 250 MCG: 0.25 SUSPENSION RESPIRATORY (INHALATION) at 21:01

## 2020-10-28 RX ADMIN — VERAPAMIL HYDROCHLORIDE 240 MG: 240 TABLET, FILM COATED, EXTENDED RELEASE ORAL at 09:41

## 2020-10-28 RX ADMIN — IPRATROPIUM BROMIDE AND ALBUTEROL SULFATE 1 AMPULE: 2.5; .5 SOLUTION RESPIRATORY (INHALATION) at 16:02

## 2020-10-28 RX ADMIN — DULOXETINE HYDROCHLORIDE 60 MG: 60 CAPSULE, DELAYED RELEASE ORAL at 09:39

## 2020-10-28 RX ADMIN — OXYBUTYNIN CHLORIDE 10 MG: 10 TABLET, EXTENDED RELEASE ORAL at 09:41

## 2020-10-28 ASSESSMENT — PAIN SCALES - GENERAL
PAINLEVEL_OUTOF10: 7
PAINLEVEL_OUTOF10: 0

## 2020-10-28 ASSESSMENT — PAIN SCALES - WONG BAKER
WONGBAKER_NUMERICALRESPONSE: 0

## 2020-10-28 NOTE — PROCEDURES
ELECTROENCEPHALOGRAPHY REPORT     PATIENT NAME: Merline Sinclair   DATE:10/28/2020    MEDICAL RECORD NO: [id number]   ROOM NO: 0714/0714-[id number]   ACCOUNT NO: [de-identified]   REFERRING DOCTOR:  Bret Dowling MD       CLINICAL HISTORY:  This is a 67 y.o. female with confusion and aphasia. PMH:   has a past medical history of Asthma, Cerebral artery occlusion with cerebral infarction ([facility] [address] CKD (chronic kidney disease) stage 4, GFR 15-29 ml/min ([facility]), Degenerative disc disease, Degenerative joint disease, Diabetes mellitus ([address] Diabetic peripheral neuropathy ([address] Diffuse cerebral atrophy ([facility] [address] [facility] Fibromyalgia, Glaucoma, HX OTHER MEDICAL, Hypertension, Iron deficiency anemia, Neurogenic bladder, Neuropathy, PONV (postoperative nausea and vomiting), Sarcoidosis, Sleep apnea, and Spinal cord and nerve root disorder.       MEDICATIONS: aspirin chewable tablet 81 mg, Daily  clopidogrel (PLAVIX) tablet 75 mg, Daily  atorvastatin (LIPITOR) tablet 40 mg, Nightly  cefTRIAXone (ROCEPHIN) 1 g in sterile water 10 mL IV syringe, Q24H  pantoprazole (PROTONIX) injection 40 mg, Daily  baclofen (LIORESAL) tablet 20 mg, TID  dicyclomine (BENTYL) capsule 20 mg, 4x Daily AC & HS  DULoxetine (CYMBALTA) extended release capsule 60 mg, QAM  hydroxychloroquine (PLAQUENIL) tablet 200 mg, BID  latanoprost (XALATAN) 0.005 % ophthalmic solution 1 drop, Nightly  metoprolol tartrate (LOPRESSOR) tablet 25 mg, BID  oxybutynin (DITROPAN-XL) extended release tablet 10 mg, BID  pregabalin (LYRICA) capsule 100 mg, BID  verapamil (CALAN SR) extended release tablet 240 mg, QAM  acetaminophen (TYLENOL) tablet 650 mg, Q6H PRN    Or  acetaminophen (TYLENOL) suppository 650 mg, Q6H PRN  magnesium hydroxide (MILK OF MAGNESIA) 400 MG/5ML suspension 30 mL, Daily PRN  enoxaparin (LOVENOX) injection 40 mg, Daily  insulin lispro (HUMALOG) injection vial 0-6 Units, TID WC  insulin lispro (HUMALOG) injection vial 0-3 Units, Nightly  glucose (GLUTOSE) 40 % oral gel 15 g, PRN  dextrose 50 % IV solution, PRN  glucagon (rDNA) injection 1 mg, PRN  dextrose 5 % solution, PRN  trimethobenzamide (TIGAN) injection 200 mg, Q6H PRN  Arformoterol Tartrate (BROVANA) nebulizer solution 15 mcg, BID  budesonide (PULMICORT) nebulizer suspension 250 mcg, BID  ipratropium-albuterol (DUONEB) nebulizer solution 1 ampule, Q4H WA  sodium chloride flush 0.9 % injection 10 mL, 2 times per day  sodium chloride flush 0.9 % injection 10 mL, PRN  sennosides-docusate sodium (SENOKOT-S) 8.6-50 MG tablet 1 tablet, BID  bisacodyl (DULCOLAX) EC tablet 5 mg, Daily PRN         DESCRIPTION OF RECORD:    Waking background consisted of 5-6 Hertz theta activity, which was bilaterally symmetrical, and somewhat centrally predominant. Activity was sharply contoured at times. The patient had difficulty keeping her eyes closed, and eye movement artifacts were often superimposed on the tracing. Sleep was not achieved. Photic stimulation produced following responses at one of the flash frequencies. There were no areas of focal asymmetry. No clearly epileptiform activity was seen. No abnormalities were noted on the EKG lead. IMPRESSION:  This is an abnormal EEG. A moderate degree of diffuse slowing of the background activity was seen, consistent with an encephalopathic process of a generalized nature. Possible causes include toxic/metabolic and hypoxic/ischemic encephalopathies, among other things.         Baron Mcdaniel DO  10/28/2020   1:55 PM

## 2020-10-28 NOTE — PROGRESS NOTES
Critical Care Team - Daily Progress Note      Date and time: 10/28/2020 12:39 PM  Patient's name:  Elida Tabor  Medical Record Number: [id number]  Patient's account/billing number: [de-identified]  Patient's YOB: 1945  Age: 67 y.o. Date of Admission: 10/24/2020 12:34 AM  Length of stay during current admission: 4      Primary Care Physician: Mary Powell MD  ICU Attending Physician: Dr. Clovis Martinez Status: Full Code    Reason for ICU admission: Decreased level of consciousness      SUBJECTIVE:     OVERNIGHT EVENTS:      Remained stable overnight. Patient following commands and speaking few words. Appears to be improving.   AWAKE & FOLLOWING COMMANDS:  [] No   [x] Yes    CURRENT VENTILATION STATUS:     [] Ventilator  [] BIPAP  [] Nasal Cannula [] Room Air      IF INTUBATED, ET TUBE MARKING AT LOWER LIP:       cms    SECRETIONS Amount:  [] Small [] Moderate  [] Large  [x] None  Color:     [] White [] Colored  [] Bloody    SEDATION:  RAAS Score:  [] Propofol gtt  [] Versed gtt  [] Ativan gtt   [x] No Sedation    PARALYZED:  [x] No    [] Yes    DIARRHEA:                [x] No                [] Yes  (C. Difficile status: [] positive                                                                                                                       [] negative                                                                                                                     [] pending)    VASOPRESSORS:  [x] No    [] Yes    If yes -   [] Levophed       [] Dopamine     [] Vasopressin       [] Dobutamine  [] Phenylephrine         [] Epinephrine    CENTRAL LINES:     [x] No   [] Yes   (Date of Insertion:   )           If yes -     [] Right IJ     [] Left IJ [] Right Femoral [] Left Femoral                   [] Right Subclavian [] Left Subclavian       ADAM'S CATHETER:   [] No   [x] Yes         OBJECTIVE:     VITAL SIGNS:  BP (!) 168/64   Pulse 70   Temp 98.5 °F (36.9 °C) (Axillary)   Resp 24   Ht 5' 6\" (1.676 m)   Wt 291 lb 14.2 oz (132.4 kg)   SpO2 92%   BMI 47.11 kg/m²   Tmax over 24 hours:  Temp (24hrs), Av.8 °F (37.1 °C), Min:98.3 °F (36.8 °C), Max:99.3 °F (37.4 °C)      Patient Vitals for the past 6 hrs:   BP Temp Temp src Pulse Resp SpO2   10/28/20 1200 -- -- -- -- -- 92 %   10/28/20 1100 (!) 168/64 -- -- 70 24 96 %   10/28/20 1000 (!) 168/61 -- -- 73 12 93 %   10/28/20 0900 (!) 168/84 -- -- 73 10 --   10/28/20 0800 (!) 159/64 98.5 °F (36.9 °C) Axillary 76 23 94 %   10/28/20 0700 (!) 145/55 -- -- 68 (!) 6 --         Intake/Output Summary (Last 24 hours) at 10/28/2020 1239  Last data filed at 10/28/2020 0500  Gross per 24 hour   Intake 2231 ml   Output 1325 ml   Net 906 ml     Wt Readings from Last 2 Encounters:   10/28/20 291 lb 14.2 oz (132.4 kg)   20 250 lb (113.4 kg)     Body mass index is 47.11 kg/m². PHYSICAL EXAMINATION:    General appearance -awake and alert  Eyes - pupils equal and reactive,  sclera anicteric  Ears - external ear normal  Nose - normal and patent, no erythema, discharge or polyps  Mouth -no visible lesions,mucous membranes moist.  Neck - supple, no significant adenopathy  Chest - clear to auscultation, no wheezes,symmetric air entry  Heart - normal rate, regular rhythm,no murmurs, rubs, clicks or gallops  Abdomen - soft, nontender, nondistended, no masses or organomegaly  Neurological -she is awake and alert. Moving all 4 extremities. Following commands. Answering in few word sentences. Extremities - peripheral pulses normal, no clubbing or cyanosis. No edema.  Splint on right leg  Skin - normal coloration and turgor, no rashes, no suspicious skin lesions noted     MEDICATIONS:    Scheduled Meds:   aspirin  81 mg Oral Daily    clopidogrel  75 mg Oral Daily    atorvastatin  40 mg Oral Nightly    cefTRIAXone (ROCEPHIN) IV  1 g Intravenous Q24H    pantoprazole  40 mg Intravenous Daily    baclofen  20 mg Oral TID    dicyclomine  20 mg Oral 4x Daily AC & HS    DULoxetine  60 mg Oral QAM    hydroxychloroquine  200 mg Oral BID    latanoprost  1 drop Both Eyes Nightly    metoprolol tartrate  25 mg Oral BID    oxybutynin  10 mg Oral BID    pregabalin  100 mg Oral BID    verapamil  240 mg Oral QAM    enoxaparin  40 mg Subcutaneous Daily    insulin lispro  0-6 Units Subcutaneous TID WC    insulin lispro  0-3 Units Subcutaneous Nightly    Arformoterol Tartrate  15 mcg Nebulization BID    budesonide  250 mcg Nebulization BID    ipratropium-albuterol  1 ampule Inhalation Q4H WA    sodium chloride flush  10 mL Intravenous 2 times per day    sennosides-docusate sodium  1 tablet Oral BID     Continuous Infusions:   lactated ringers 110 mL/hr (10/27/20 8036)    dextrose       PRN Meds:   acetaminophen, 650 mg, Q6H PRN    Or  acetaminophen, 650 mg, Q6H PRN  magnesium hydroxide, 30 mL, Daily PRN  glucose, 15 g, PRN  dextrose, 12.5 g, PRN  glucagon (rDNA), 1 mg, PRN  dextrose, 100 mL/hr, PRN  trimethobenzamide, 200 mg, Q6H PRN  sodium chloride flush, 10 mL, PRN  bisacodyl, 5 mg, Daily PRN          VENT SETTINGS (Comprehensive) (if applicable):  Vent Information  Skin Assessment: Clean, dry, & intact  Equipment Changed: Mask  FiO2 : 35 %  SpO2: 92 %  SpO2/FiO2 ratio: 285.71  I Time/ I Time %: 0.9 s  Mask Type: Full face mask  Mask Size: Small  Additional Respiratory  Assessments  Pulse: 70  Resp: 24  SpO2: 92 %  Oral Care: Mouth swabbed(as best possible)    ABG  Lab Results   Component Value Date    PH 7.402 10/27/2020    PCO2 35.2 10/27/2020    PO2 89.6 10/27/2020    HCO3 21.4 10/27/2020    O2SAT 97.1 10/27/2020         Laboratory findings:    Complete Blood Count:   Recent Labs     10/26/20  0600 10/27/20  0500 10/28/20  0615   WBC 8.5 8.0 8.8   HGB 11.4* 11.8 11.1*   HCT 37.4 37.5 35.4    196 192        Lactic Acid  Lab Results   Component Value Date    LACTA 1.4 01/28/2020    LACTA 1.5 10/21/2017        Last 3 Blood Glucose:   Recent Labs     10/26/20  0600 10/27/20  0500 10/28/20  0615   GLUCOSE 88 152* 119*        PT/INR:    Lab Results   Component Value Date    PROTIME 11.3 10/24/2020    PROTIME 12.0 12/09/2011    INR 1.0 10/24/2020     PTT:    Lab Results   Component Value Date    APTT 32.0 10/26/2020       Comprehensive Metabolic Profile:   Recent Labs     10/26/20  0600 10/27/20  0500 10/28/20  0615    142 141   K 4.8 4.4 4.0   * 110* 109*   CO2 21* 22 22   BUN 27* 25* 27*   CREATININE 1.7* 1.6* 1.4*   GLUCOSE 88 152* 119*   CALCIUM 8.1* 8.4* 8.4*   PROT 6.3* 6.0* 6.1*   LABALBU 2.7* 2.9* 2.8*   BILITOT 0.9 0.5 0.7   ALKPHOS 127* 123* 120*   AST 36* 8 26   ALT 35* 26 24      Magnesium:   Lab Results   Component Value Date    MG 2.1 10/28/2020     Phosphorus:   Lab Results   Component Value Date    PHOS 3.9 10/28/2020     Ionized Calcium: No results found for: CAION       Urinalysis:     Troponin:   Recent Labs     10/25/20  1710 10/26/20  0600   TROPONINI 0.05* 0.05*         Cultures     Cultures during this admission:       No results for input(s): BC in the last 72 hours. No results for input(s): Tasia Karen in the last 72 hours. Recent Labs     10/26/20  1320   LABURIN Growth not present       Other pertinent Labs:       Radiology/Imaging:   Xr Ankle Right (min 3 Views)    Result Date: 10/24/2020  EXAMINATION: THREE XRAY VIEWS OF THE RIGHT ANKLE 10/24/2020 8:31 am COMPARISON: None. HISTORY: ORDERING SYSTEM PROVIDED HISTORY: Fibula fracture TECHNOLOGIST PROVIDED HISTORY: With external rotation stress view Reason for exam:->Fibula fracture FINDINGS: Postoperative change seen related to cannulated screw fixation through the base of the 5th metatarsal.  There is a mildly displaced fracture of the distal fibular shaft extending into the proximal metaphysis. Fine detail obscured by overlying cast material.  No definite distal tibial fracture is identified. Diffuse osteopenia. Peripheral arterial disease is noted.      Mildly displaced distal fibular fracture involving the distal diaphysis extending into the metaphysis. No other acute fracture is seen. Xr Foot Left (min 3 Views)    Result Date: 10/24/2020  EXAMINATION: THREE XRAY VIEWS OF THE LEFT FOOT 10/24/2020 5:50 pm COMPARISON: December 14, 2009 HISTORY: ORDERING SYSTEM PROVIDED HISTORY: left foot fracture TECHNOLOGIST PROVIDED HISTORY: Reason for exam:->left foot fracture FINDINGS: There is a healed fracture involving the distal aspect of the 5th metacarpal bone. This fracture appears new compared to prior from December 14, 2009. No acute fracture or dislocation. There is normal carpal bone alignment. Internal fixating hardware seen involving the medial aspect of the left foot at level of distal calcaneus and tarsal navicular bone. Internal fixating hardware appears intact. No erosive changes to suggest osteomyelitis. 1.  No acute fracture. 2.  Chronic fracture involving 5th metatarsal bone. 3.  Stable, satisfactory alignment status post internal fixation at level of distal calcaneus and tarsal navicular. Ct Head Wo Contrast    Result Date: 10/25/2020  EXAMINATION: CT OF THE HEAD WITHOUT CONTRAST  10/25/2020 1:34 pm TECHNIQUE: CT of the head was performed without the administration of intravenous contrast. Dose modulation, iterative reconstruction, and/or weight based adjustment of the mA/kV was utilized to reduce the radiation dose to as low as reasonably achievable. COMPARISON: 10/21/2017 HISTORY: ORDERING SYSTEM PROVIDED HISTORY: Rule out new CVA TECHNOLOGIST PROVIDED HISTORY: Reason for exam:->Rule out new CVA Has a \"code stroke\" or \"stroke alert\" been called? ->No FINDINGS: BRAIN/VENTRICLES: There are diffuse involutional changes, with prominence of the ventricles and sulci. There is diffuse stable white matter low attenuation, compatible with chronic microvascular ischemic changes.   There is no CT evidence of intracranial mass, hemorrhage, acute territorial infarction, or hydrocephalus. Intracranial arteries are symmetric in density. ORBITS: The visualized portion of the orbits demonstrate no acute abnormality. SINUSES: The visualized paranasal sinuses and mastoid air cells demonstrate no acute abnormality. SOFT TISSUES/SKULL:  No acute abnormality of the visualized skull or soft tissues. No acute intracranial abnormality. Chronic involutional changes and chronic microvascular ischemic changes are noted. Xr Chest Portable    Result Date: 10/26/2020  EXAMINATION: ONE XRAY VIEW OF THE CHEST 10/26/2020 7:09 am COMPARISON: 10/21/2017 HISTORY: ORDERING SYSTEM PROVIDED HISTORY: respiratory failure TECHNOLOGIST PROVIDED HISTORY: Reason for exam:->respiratory failure FINDINGS: The heart is enlarged. There are findings of mild vascular congestion. There is no lung consolidation to suggest pneumonia. There is no pleural effusion. Cardiomegaly with findings of mild CHF. Fluoro For Surgical Procedures    Result Date: 10/24/2020  EXAMINATION: SPOT FLUOROSCOPIC IMAGES 10/24/2020 5:12 pm TECHNIQUE: Fluoroscopy was provided by the radiology department for procedure. Radiologist was not present during examination. FLUOROSCOPY DOSE AND TYPE OR TIME AND EXPOSURES: Fluoroscopy time: 65.7 seconds Cumulative dose: 2.77 mGy COMPARISON: Right ankle injury radiographs 10/24/2020 at 8:14 a.m. HISTORY: ORDERING SYSTEM PROVIDED HISTORY: RT ankle TECHNOLOGIST PROVIDED HISTORY: Reason for exam:->RT ankle Intraprocedural imaging. FINDINGS: Three spot images of the right ankle were obtained. Interval lateral plate and screw fixation of distal fibular fracture. Alignment appears anatomic. Intraprocedural fluoroscopic spot images as above. See separate procedure report for more information. Chest Xray (10/26/20): Impression    Cardiomegaly with findings of mild CHF. 10/27/2020  Impression    Borderline CHF, unchanged      10/28/2020 EEG report  IMPRESSION:  This is an abnormal EEG. A moderate degree of diffuse slowing of the background activity was seen, consistent with an encephalopathic process of a generalized nature. Possible causes include toxic/metabolic and hypoxic/ischemic encephalopathies, among other things. SYSTEMS ASSESSMENT    Neuro   History of CVA  Altered mental status  -improving  -Alert awake following commands  -Speaking in short sentences  -As per neurology patient may have had another stroke  -Started plavix and ASA  -Negative bilateral carotid dopplers  -EEG completed    Respiratory   Acidosis with CO2 retention  -Placed on BiPAP 12/6 on 10/25  -Day 2 on room air saturating well    Asthma  -Continue nebulizers  -Theophyline level <0.8    Cardiovascular   Hypertension  -Continue metoprolol    Hyperlipidemia  -Continue atorvastatin    Telemetry monitoring    Gastrointestinal   No acute issues  GI prophylaxis: Protonix    Renal   Chronic kidney disease  -Creatinine 1.4, BUN 27  -Monitor CMP daily  -Nephrology consulted     Infectious Disease   Urinary tract infection, Anaya catheter associated  -Rocephin day 3  Negative respiratory panel    Hematology/Oncology   No acute hematology issues  -Hemoglobin 11.1 stable  -Continue monitoring    DVT prophylaxis: Lovenox    Endocrine   History of diabetes  -Sliding scale insulin  -Keep glucose below 180  -Continue monitoring glucose    Social/Spiritual/DNR/Other   Full code  Status post open reduction internal fixation right malleolus D3    Nathan Velásquez DO, PGY1.                       10/28/2020, 12:39 PM    Attending Physician Attestation: Dr. Hector Pederson     Thank you very much for allowing me to see this patient in consultation and follow up.     I personally saw, examined and provided care for the patient. Radiographs, labs and medication list were reviewed by me independently. I spoke with bedside nursing, respiratory therapists and consultants.  Critical care services and times documented are independent of procedures and multidisciplinary rounds with Residents. Additionally comprehensive, multidisciplinary rounds were conducted with the MICU team. The case was discussed in detail and plans for care were established. Review of Residents documentation was conducted and revisions were made as appropriate.  I agree with the the above documented information.      Physical Examination:  Vitals:   Vitals          Vitals:     10/28/20 1000 10/28/20 1100 10/28/20 1200 10/28/20 1250   BP: (!) 168/61 (!) 168/64       Pulse: 73 70       Resp: 12 24   16   Temp:           TempSrc:           SpO2: 93% 96% 92% 93%   Weight:           Height:                 General: No Acute Distress  HEENT: normocephalic, atruamatic  CVS: RRR, S1, S2, No S3 or S4  Respiratory: decreased breath sounds at lung bases, no focal wheezes noted  Extremities: no clubbing/cyanosis/edema  Neuro: phonates, awake, follows commands       ASSESSMENT:  1.) Acute Hypoxic and Hypercapnic Respiratory Failure   2.) Acute Encephalopathy - Opiate-Induced Encephalopathy vs CVA  3.) UTI  4.) Right Lateral Malleolus Ankle Fracture - s/p Right lateral malleolus open reduction and  internal fixation  5.) Acute Kidney Injury   6.) Hypoalbuminemia      In addition the following applies:     Check: N/A    Medication Alterations: hold all opiates/sedatives  Procedures: EEG  Imaging: reviewed  New Consultations: N/A  VENT: N/A     Access: Peripheral   Consults: Nephrology, Orthopedic Surgery, Pulmonary, Neurology   Drips: N/A  Nutrition: PO  ABX: N/A     - patient OK to transfer out of ICU level of care  - she is more responsive today and answers some questions appropriately  - oriented to person, place  - family to be updated   - patient to have further pulmonary management deferred to Dr. Yesica Daniels as patient is established with his services, thank you     Thank you for allowing me to participate in the care of this patient.     Care reviewed with nursing staff, medical and surgical specialty care, primary care and the patient's family as available. Restraints are ordered when the patient can do harm to him/herself by pulling out devices.     Magdalene Gallo M.D.  Ezequiel Gallo  10/28/2020  2:58 PM

## 2020-10-28 NOTE — PROGRESS NOTES
transferred to bed with alarm on, all lines and tubes intact and call light within reach. Treatment: Pt was able to be seen for OOB activity per RN. Pt displayed posterior lean upon transfer to EOB. Pt unable to correct posture by self requiring assistance, max vc and assistance with hand placement on EOB. Gently passive weight shifting exercises were performed to decrease tone and improve sitting balance. Pt appeared distressed but was unable to verbalize symptoms. Pt declined SOB however when transferred back to bed SpO2 was 87%. O2 recovered to 90% with rest break. Education: Techniques to improve sitting balance, benefits of EOB activity and safety training to maximize independence with ADLs. · Pt has made good progress towards set goals. Plan of Care: 2-5 days/week for 1-2 weeks PRN   [x]? ?ADL retraining/adaptive techniques and AE recommendations to increase functional independence within precautions                    [x]? ? Energy conservation techniques to improve tolerance for ADL/IADLs  [x]? ? Functional transfer/mobility training/DME recommendations for increased independence, safety and fall prevention         [x]? ?Patient/family education to increase safety and functional independence during daily routine          [x]? ? Environmental modifications for safe mobility and completion of ADLs                             []? ? Cognitive retraining to improve problem solving skills & safe participation in ADLs/IADLs     []? ?Sensory re-education techniques to improve extremity awareness, maintain skin integrity and improve hand function                             []? ? Visual/Perceptual retraining to improve body awareness and safety during transfers and ADLs  []? ? Splinting/positioning needs to maintain joint/skin integrity and contracture prevention  [x]? ? Therapeutic activity to improve functional performance during ADLs                                        [x]? ? Therapeutic exercise to improve

## 2020-10-28 NOTE — PLAN OF CARE
Problem: Falls - Risk of:  Goal: Will remain free from falls  Description: Will remain free from falls  10/28/2020 1713 by Joon Kat RN  Outcome: Met This Shift     Problem: Falls - Risk of:  Goal: Absence of physical injury  Description: Absence of physical injury  10/28/2020 1713 by Joon Kat RN  Outcome: Met This Shift     Problem: Pain:  Goal: Pain level will decrease  Description: Pain level will decrease  10/28/2020 1713 by Joon Kat RN  Outcome: Met This Shift

## 2020-10-28 NOTE — PROGRESS NOTES
Attending Physician Attestation: Dr. Shelley Bryant    Thank you very much for allowing me to see this patient in consultation and follow up. I personally saw, examined and provided care for the patient. Radiographs, labs and medication list were reviewed by me independently. I spoke with bedside nursing, respiratory therapists and consultants. Critical care services and times documented are independent of procedures and multidisciplinary rounds with Residents. Additionally comprehensive, multidisciplinary rounds were conducted with the MICU team. The case was discussed in detail and plans for care were established. Review of Residents documentation was conducted and revisions were made as appropriate. I agree with the the above documented information.      Physical Examination:  Vitals:   Vitals:    10/28/20 1000 10/28/20 1100 10/28/20 1200 10/28/20 1250   BP: (!) 168/61 (!) 168/64     Pulse: 73 70     Resp: 12 24  16   Temp:       TempSrc:       SpO2: 93% 96% 92% 93%   Weight:       Height:          General: No Acute Distress  HEENT: normocephalic, atruamatic  CVS: RRR, S1, S2, No S3 or S4  Respiratory: decreased breath sounds at lung bases, no focal wheezes noted  Extremities: no clubbing/cyanosis/edema  Neuro: phonates, awake, follows commands      ASSESSMENT:  1.) Acute Hypoxic and Hypercapnic Respiratory Failure   2.) Acute Encephalopathy - Opiate-Induced Encephalopathy vs CVA  3.) UTI  4.) Right Lateral Malleolus Ankle Fracture - s/p Right lateral malleolus open reduction and  internal fixation  5.) Acute Kidney Injury   6.) Hypoalbuminemia      In addition the following applies:     Check: N/A    Medication Alterations: hold all opiates/sedatives  Procedures: EEG  Imaging: reviewed  New Consultations: N/A  VENT: N/A     Access: Peripheral   Consults: Nephrology, Orthopedic Surgery, Pulmonary, Neurology   Drips: N/A  Nutrition: PO  ABX: N/A     - patient OK to transfer out of ICU level of care  - she is

## 2020-10-28 NOTE — PROGRESS NOTES
Physical Therapy  Facility/Department: [facility]  Daily Treatment Note  NAME: Ines Zavala  : 1945  MRN: [id number]    Date of Service: 10/28/2020    Patient Diagnosis(es): The encounter diagnosis was Closed right ankle fracture, initial encounter. has a past medical history of Asthma, Cerebral artery occlusion with cerebral infarction ([address] CKD (chronic kidney disease) stage 4, GFR 15-29 ml/min (HCC), Degenerative disc disease, Degenerative joint disease, Diabetes mellitus ([address] Diabetic peripheral neuropathy ([address] Diffuse cerebral atrophy ([address] Fibromyalgia, Glaucoma, HX OTHER MEDICAL, Hypertension, Iron deficiency anemia, Neurogenic bladder, Neuropathy, PONV (postoperative nausea and vomiting), Sarcoidosis, Sleep apnea, and Spinal cord and nerve root disorder. has a past surgical history that includes Foot surgery (Bilateral); Hysterectomy; back surgery; Total knee arthroplasty (Bilateral); Cholecystectomy; Colonoscopy (2019); other surgical history (Right, 2017); Breast surgery; joint replacement (2016); Cardiac catheterization (); and Ankle fracture surgery (Right, 10/24/2020).        Evaluating PT:  Lalo Thomson PT      Room #:  0720/0720-A  Diagnosis:  RT lateral ankle Fx, closed fx RT fibular head  PMHx/PSHx:  DM, HTN, Fibro, B/L TKA, RT ASHTYN, Asthma, peripheral neuropathy, DJD, DDD, Neurogenic bladder, sarcoidosis, cerebral artery occlusion with infarction.   Procedure/Surgery:  RT ankle ORIF  Precautions:  NWB RTLE, Can WT bear LTLE in CAM Boot, General, Falls,   Equipment Needs:  WW, WC     SUBJECTIVE:     Pt lives with ? in a ? story home with ? stairs to enter and ? rail.  Bed is on ? floor and bath is on ? floor.  Pt ambulated with ?Red Virgilio is alert to herself only.  Uncertain to date, place, home setting, steps to from home, what AD if any she uses.      OBJECTIVE:    Initial Evaluation  Date: 10/25/20 Treatment  10/28 Short Term/ Long Term   Goals   AM-PAC 6 Clicks 5/10 8/06      Was pt agreeable to Eval/treatment? Yes       Does pt have pain? Yes RT leg/ankle R LE      Bed Mobility  Rolling: Mod/Max  Supine to sit: Max  Sit to supine: Max/dep  Scooting: Max/dep Rolling dependent  Scooting dependent of 2  Supine <> sit dependent of 2   Improved all levels to Min-Mod x 2-1   Transfers Sit to stand: Max/dep assist attempted; unsuccessful-unacceptabrisk for injury      NT   Mod x 2 transfers   Ambulation   SHAHRZAD    NT  WW with Min/Mod x 2 assist 5'    Stair negotiation: ascended and descended SHAHRZAD   NT      ROM  WFL x RT ankle NA       Strength  BLE 2-3-/5 hip, knee 3/5    Imp MMT 1 grade globally BLE   Balance Sitting EOB:  CGA  Dynamic Standing:  Zero   Sitting EOB:  Indep/S  Dynamic Standing:  Mod x 2-1 [employer]      Patient education  Pt was educated on NWB R LE    Patient response to education:   Pt verbalized understanding Pt demonstrated skill Pt requires further education in this area   yes   yes     Additional Comments/treatment: Pt more alert, conversing. Confused but able to follow commands. Pt remains dependent of 2 to sit up to edge of bed. Posterior lean in sitting. Sitting balance edge of bed varies mod assist to dependent. Seated LAQ x10. Pt short of breath. SpO2 on room air when returned to supine 87%. Recovered to 90%    Pt was left in bed with call light left by patient. Time in: 1045  Time out: 1100    Total treatment time 15 minutes    Pt is making fair progress toward established Physical Therapy goals. Continue with physical therapy current plan of care.     Dionna PT [id number]      CPT codes:  [] Low Complexity PT evaluation 05893  [] Moderate Complexity PT evaluation 88318  [] High Complexity PT evaluation 99886  [] PT Re-evaluation 90404  [] Gait training 20372  minutes  [] Manual therapy 67289    minutes  [x] Therapeutic activities 26450  15  minutes  [] Therapeutic exercises 44562     minutes  [] Neuromuscular reeducation 83998     minutes

## 2020-10-28 NOTE — CARE COORDINATION
COVID negative 10/26. POD # 4  ORIF R ankle. Treating for possible CVA. PMR consulted to evaluate for possible acute rehab on discharge. COLEMAN Hickman reviewing  [facility] options( [facility] unable to accept). PT am-pac 6.  Will follow Liz Thomas

## 2020-10-28 NOTE — PROGRESS NOTES
Neurology Progress Note    Patient:  Jess Ervin      Unit/Bed:0714/0714-A    YOB: 1945    MRN: [id number]     Acct: [de-identified]     Admit date: 10/24/2020    No chief complaint on file. Patient Seen, Chart, Physician notes, Labs, Radiology studies reviewed. Subjective:  Patient unaware of the events. Typically is not answering. Often her response is \"Well. ... \". did not answer when asked about dyspnea. Did admit to a slight headache at times. She does not seem aware of any hemiparesis. Does admit to some language difficulties. Did not answer when I asked which side had been weak with her prior stroke. Past, Family, Social History unchanged from admission.     Diet:  DIET CARB CONTROL; Carb Control: 4 carbs/meal (approximate 1800 kcals/day)  Dietary Nutrition Supplements: Diabetic Oral Supplement    Medications:  Scheduled Meds:   aspirin  81 mg Oral Daily    clopidogrel  75 mg Oral Daily    atorvastatin  40 mg Oral Nightly    cefTRIAXone (ROCEPHIN) IV  1 g Intravenous Q24H    pantoprazole  40 mg Intravenous Daily    baclofen  20 mg Oral TID    dicyclomine  20 mg Oral 4x Daily AC & HS    DULoxetine  60 mg Oral QAM    hydroxychloroquine  200 mg Oral BID    latanoprost  1 drop Both Eyes Nightly    metoprolol tartrate  25 mg Oral BID    oxybutynin  10 mg Oral BID    pregabalin  100 mg Oral BID    verapamil  240 mg Oral QAM    enoxaparin  40 mg Subcutaneous Daily    insulin lispro  0-6 Units Subcutaneous TID WC    insulin lispro  0-3 Units Subcutaneous Nightly    Arformoterol Tartrate  15 mcg Nebulization BID    budesonide  250 mcg Nebulization BID    ipratropium-albuterol  1 ampule Inhalation Q4H WA    sodium chloride flush  10 mL Intravenous 2 times per day    sennosides-docusate sodium  1 tablet Oral BID     Continuous Infusions:   dextrose       PRN Meds:acetaminophen **OR** acetaminophen, magnesium hydroxide, glucose, dextrose, glucagon (rDNA), dextrose, trimethobenzamide, sodium chloride flush, bisacodyl    Objective:    Vitals: BP (!) 168/64   Pulse 70   Temp 98.5 °F (36.9 °C) (Axillary)   Resp 16   Ht 5' 6\" (1.676 m)   Wt 291 lb 14.2 oz (132.4 kg)   SpO2 93%   BMI 47.11 kg/m²   Physical Exam:  Looks alert but seems inattentive. A little tachypneic at this time. Puffs her cheeks out when she blows out. Correctly gave the month and able to say that Nina Owen is running for president. Often just looked at me instead of answering questions or said \"Well. ... \". able to repeat a short sentence. Facial strength seems to be intact. Extraocular muscles intact. Pupils equal.  She seems to be very slightly weaker in the left upper extremity than the right though hard to be sure because she could not cooperate for confrontational strength testing. Left plantar response equivocal.  Right not checked due to her surgery. 24 hour intake/output:    Intake/Output Summary (Last 24 hours) at 10/28/2020 1530  Last data filed at 10/28/2020 0500  Gross per 24 hour   Intake 1654 ml   Output 725 ml   Net 929 ml     Last 3 weights: Wt Readings from Last 3 Encounters:   10/28/20 291 lb 14.2 oz (132.4 kg)   01/28/20 250 lb (113.4 kg)   10/24/17 265 lb (120.2 kg)         CBC:   Recent Labs     10/26/20  0600 10/27/20  0500 10/28/20  0615   WBC 8.5 8.0 8.8   HGB 11.4* 11.8 11.1*    196 192     BMP:    Recent Labs     10/26/20  0600 10/27/20  0500 10/28/20  0615    142 141   K 4.8 4.4 4.0   * 110* 109*   CO2 21* 22 22   BUN 27* 25* 27*   CREATININE 1.7* 1.6* 1.4*   GLUCOSE 88 152* 119*     Calcium:  Recent Labs     10/28/20  0615   CALCIUM 8.4*     Magnesium:  Recent Labs     10/28/20  0615   MG 2.1       Radiology reports as per the Radiologist  Radiology: Xr Ankle Right (min 3 Views)    Result Date: 10/24/2020  EXAMINATION: THREE XRAY VIEWS OF THE RIGHT ANKLE 10/24/2020 8:31 am COMPARISON: None.  HISTORY: ORDERING SYSTEM PROVIDED HISTORY: Fibula fracture TECHNOLOGIST PROVIDED HISTORY: With external rotation stress view Reason for exam:->Fibula fracture FINDINGS: Postoperative change seen related to cannulated screw fixation through the base of the 5th metatarsal.  There is a mildly displaced fracture of the distal fibular shaft extending into the proximal metaphysis. Fine detail obscured by overlying cast material.  No definite distal tibial fracture is identified. Diffuse osteopenia. Peripheral arterial disease is noted. Mildly displaced distal fibular fracture involving the distal diaphysis extending into the metaphysis. No other acute fracture is seen. Ct Head Wo Contrast    Result Date: 10/25/2020  EXAMINATION: CT OF THE HEAD WITHOUT CONTRAST  10/25/2020 1:34 pm TECHNIQUE: CT of the head was performed without the administration of intravenous contrast. Dose modulation, iterative reconstruction, and/or weight based adjustment of the mA/kV was utilized to reduce the radiation dose to as low as reasonably achievable. COMPARISON: 10/21/2017 HISTORY: ORDERING SYSTEM PROVIDED HISTORY: Rule out new CVA TECHNOLOGIST PROVIDED HISTORY: Reason for exam:->Rule out new CVA Has a \"code stroke\" or \"stroke alert\" been called? ->No FINDINGS: BRAIN/VENTRICLES: There are diffuse involutional changes, with prominence of the ventricles and sulci. There is diffuse stable white matter low attenuation, compatible with chronic microvascular ischemic changes. There is no CT evidence of intracranial mass, hemorrhage, acute territorial infarction, or hydrocephalus. Intracranial arteries are symmetric in density. ORBITS: The visualized portion of the orbits demonstrate no acute abnormality. SINUSES: The visualized paranasal sinuses and mastoid air cells demonstrate no acute abnormality. SOFT TISSUES/SKULL:  No acute abnormality of the visualized skull or soft tissues. No acute intracranial abnormality.   Chronic involutional changes and chronic microvascular ischemic changes are noted. Assessment:    Principal Problem:    Closed fracture fibula, head, right, initial encounter  Active Problems:    Essential hypertension    Morbid obesity with BMI of 40.0-44.9, adult (HCC)    Diabetes mellitus ([facility]    Neurogenic bladder    Sarcoidosis    Sleep apnea    Diffuse cerebral atrophy (HCC)    Asthma    Diabetic peripheral neuropathy (HCC)    Fibromyalgia    CKD (chronic kidney disease) stage 4, GFR 15-29 ml/min (HCC)    Iron deficiency anemia    Encephalopathy    Sarcoid  Resolved Problems:    * No resolved hospital problems. *    Acute encephalopathy, with possible aphasia and perhaps mild left-sided weakness. Plan: At this point she should be able to indicate if she is having discomfort during an MRI scan. According to MRI, the only concern would be if she could communicate if she felt the hardware was getting hot. We will go ahead and proceed with that study, and consider further evaluation depending on those results.       Electronically signed by Tony Whitt DO on 10/28/2020 at 3:30 PM    Neurology

## 2020-10-28 NOTE — PROGRESS NOTES
[facility] MD Kia Weinstein MD Leonora Mooring, MD Bhavna Salguero, MD Brennon Vera, AILIN Mckeon, TANJA  Progress Note    10/28/2020    SUBJECTIVE:   10/26:   MS declined yesterday evening -- unresponsive, RRT called, transferred to MICU. On cpap. Sleeping, appears comfortable. Difficult to arouse. Hypotensive in the early am -- improved, now BP stable x > 6 hrs. 10/27: Awake, minimally alert, eyes open, smiling, though does not answer questions nor follow commands. Sister at bedside notes this is been the case since she arrived this morning. Hemodynamically stable. Not eating or drinking. 10/28: More awake and alert, cognitive function improved markedly though still having some difficulty with word finding. Hemodynamically stable. Mild peripheral edema. Denies dyspnea. No chest pain. No pain. Good appetite and intake. PROBLEM LIST:    Principal Problem:    Closed fracture fibula, head, right, initial encounter  Active Problems:    Essential hypertension    Morbid obesity with BMI of 40.0-44.9, adult (HCC)    Diabetes mellitus ([facility]    Neurogenic bladder    Sarcoidosis    Sleep apnea    Diffuse cerebral atrophy (HCC)    Asthma    Diabetic peripheral neuropathy (HCC)    Fibromyalgia    CKD (chronic kidney disease) stage 4, GFR 15-29 ml/min ([facility])    Iron deficiency anemia    Encephalopathy    Sarcoid  Resolved Problems:    * No resolved hospital problems.  *         DIET:    Dietary Nutrition Supplements: Standard High Calorie Oral Supplement  DIET CARB CONTROL; Carb Control: 4 carbs/meal (approximate 1800 kcals/day)     MEDS (scheduled):    aspirin  81 mg Oral Daily    clopidogrel  75 mg Oral Daily    atorvastatin  40 mg Oral Nightly    cefTRIAXone (ROCEPHIN) IV  1 g Intravenous Q24H    pantoprazole  40 mg Intravenous Daily    baclofen  20 mg Oral TID    dicyclomine  20 mg Oral 4x Daily AC & HS    DULoxetine  60 mg Oral QAM    hydroxychloroquine  200 mg Oral BID    latanoprost  1 drop Both Eyes Nightly    metoprolol tartrate  25 mg Oral BID    oxybutynin  10 mg Oral BID    pregabalin  100 mg Oral BID    verapamil  240 mg Oral QAM    enoxaparin  40 mg Subcutaneous Daily    insulin lispro  0-6 Units Subcutaneous TID WC    insulin lispro  0-3 Units Subcutaneous Nightly    Arformoterol Tartrate  15 mcg Nebulization BID    budesonide  250 mcg Nebulization BID    ipratropium-albuterol  1 ampule Inhalation Q4H WA    sodium chloride flush  10 mL Intravenous 2 times per day    sennosides-docusate sodium  1 tablet Oral BID       MEDS (infusions):   lactated ringers 110 mL/hr (10/27/20 2706)    dextrose         MEDS (prn):  acetaminophen **OR** acetaminophen, magnesium hydroxide, glucose, dextrose, glucagon (rDNA), dextrose, trimethobenzamide, sodium chloride flush, bisacodyl    PHYSICAL EXAM:     Patient Vitals for the past 24 hrs:   BP Temp Temp src Pulse Resp SpO2 Weight   10/28/20 1250 -- -- -- -- 16 93 % --   10/28/20 1200 -- -- -- -- -- 92 % --   10/28/20 1100 (!) 168/64 -- -- 70 24 96 % --   10/28/20 1000 (!) 168/61 -- -- 73 12 93 % --   10/28/20 0900 (!) 168/84 -- -- 73 10 -- --   10/28/20 0800 (!) 159/64 98.5 °F (36.9 °C) Axillary 76 23 94 % --   10/28/20 0700 (!) 145/55 -- -- 68 (!) 6 -- --   10/28/20 0600 (!) 134/49 -- -- 81 22 -- --   10/28/20 0500 (!) 140/50 -- -- 81 28 -- --   10/28/20 0430 (!) 140/50 -- -- 65 13 -- --   10/28/20 0400 (!) 132/47 98.8 °F (37.1 °C) Axillary 77 20 -- --   10/28/20 0300 (!) 145/50 -- -- 81 28 -- --   10/28/20 0200 (!) 119/42 -- -- 76 12 -- --   10/28/20 0100 (!) 106/35 -- -- 76 15 -- --   10/28/20 0031 (!) 106/35 -- -- 80 -- -- --   10/28/20 0000 (!) 151/58 99.3 °F (37.4 °C) Axillary 80 20 96 % 291 lb 14.2 oz (132.4 kg)   10/27/20 2300 (!) 146/70 -- -- 82 13 96 % --   10/27/20 2200 (!) 145/62 -- -- 90 16 94 % --   10/27/20 2100 (!) 152/75 -- -- 89 17 93 % --   10/27/20 2000 (!) 147/65 99.3 °F (37.4 °C) Axillary 84 17 92 % --   10/27/20 1700 (!) 168/77 -- -- 84 13 95 % --   10/27/20 1600 (!) 158/65 98.3 °F (36.8 °C) Axillary 81 17 94 % --   10/27/20 1500 (!) 158/67 -- -- 84 25 95 % --   10/27/20 1407 -- -- -- -- 22 -- --   10/27/20 1400 (!) 151/67 -- -- 78 16 96 % --   @      Intake/Output Summary (Last 24 hours) at 10/28/2020 1307  Last data filed at 10/28/2020 0500  Gross per 24 hour   Intake 2231 ml   Output 1325 ml   Net 906 ml         Wt Readings from Last 3 Encounters:   10/28/20 291 lb 14.2 oz (132.4 kg)   01/28/20 250 lb (113.4 kg)   10/24/17 265 lb (120.2 kg)       Constitutional:  in no acute distress, on cpap  HEENT: NC/AT, mucus membranes dry on cpap  Neck: Trachea midline, no JVD  Cardiovascular: S1, S2 regular rhythm, no murmur,or rub  Respiratory:  No crackles, no wheeze  Gastrointestinal:  Soft, nontender, nondistended, NABS  Ext: 1/2 dependent chronic-type edema, no change, feet warm  Skin: dry, no rash  Neuro: awake, alert, interactive      DATA:    Recent Labs     10/26/20  0600 10/27/20  0500 10/28/20  0615   WBC 8.5 8.0 8.8   HGB 11.4* 11.8 11.1*   HCT 37.4 37.5 35.4   MCV 92.3 90.6 89.6    196 192     Recent Labs     10/25/20  1710 10/26/20  0600 10/27/20  0500 10/28/20  0615    139 142 141   K 4.5 4.8 4.4 4.0    108* 110* 109*   CO2 25 21* 22 22   MG 2.3  --  2.1 2.1   PHOS 4.7* 4.3 3.6 3.9   BUN 32* 27* 25* 27*   CREATININE 2.0* 1.7* 1.6* 1.4*   ALT 41* 35* 26 24   AST 47* 36* 8 26   BILIDIR  --  0.3  --   --    BILITOT 1.3* 0.9 0.5 0.7   ALKPHOS 133* 127* 123* 120*       Lab Results   Component Value Date    LABPROT 0.1 01/13/2018    LABPROT 0.1 01/13/2018          Assessment  1. Chronic kidney disease stage IV, baseline creatinine 1.7 - 1.9 mg/dL, current estimated GFR 26 mL/min secondary to diabetic nephropathy likely exacerbated by renal microvascular atherosclerotic disease in setting of longstanding hypertension, as well.   Current creatinine is at her baseline, which has been stable now for a number of years. 2. Hypertension well-controlled on current medication regimen. ACE/ARB none. Her medication list.  I will check my records at the office to see why not. I would not start 1 at this time  3. Osteoarthritis, multifactorial  4. Morbid obesity, BMI 44  5. PAULINA, on cpap while asleep  6. Diabetic retinopathy, diabetic neuropathy  7. Hyperphosphatemia, mild, secondary to CKD. Would not start phosphate binder at level      Azotemia level improved  Ate all of her breakfast and lunch  Cognitive function improving    Recommendations  1. Stop Celebrex (done)  2. Stop IV fluid  3. encourage oral intake  4. Otherwise continue current aspects of renal management  5. Follow labs, UO  6.  Avoid nephrotoxins including contrast except as absolutely necessary      Electronically signed by Melvin Pierce MD on 10/28/2020

## 2020-10-28 NOTE — PROGRESS NOTES
SPEECH/LANGUAGE PATHOLOGY  CLINICAL ASSESSMENT OF SWALLOWING FUNCTION    PATIENT NAME:  [facility]      :  1945      TODAY'S DATE:  10/28/2020  ROOM:  9131/2572-E    SUMMARY OF EVALUATION    Repeat clinical swallow eval completed due to increased cooperation per staff. DYSPHAGIA DIAGNOSIS:  Oropharyngeal swallow WNL clinically at time of evaluation; silent aspiration can not be r/o bedside. If silent aspiration is suspected, recommend video swallow eval to further assess. DIET RECOMMENDATIONS:  Regular consistency solids with  thin liquids as tolerated     FEEDING RECOMMENDATIONS:       Assistance level:  No assistance needed      Compensatory strategies recommended: Small bites/sips    THERAPY RECOMMENDATIONS:      Meal endurance analysis 1-2 sessions to provide diet modification and compensatory strategy implementation due to need to ensure proper implementation of compensatory strategies during PO intake                    PROCEDURE     Consistencies Administered During the Evaluation   Liquids: thin liquid   Solids:  pureed foods and solid foods      Method of Intake:   cup, straw, spoon  Self fed, Fed by clinician      Position:   Seated, upright                  RESULTS     Oral Stage: The oral stage of swallowing was within functional limits      Pharyngeal Stage:      No signs of aspiration were noted during this evaluation however, silent aspiration cannot be ruled out at bedside. If silent aspiration is suspected, a Videofluoroscopic Study of Swallowing (MBS) is recommended and requires a physician order. The Speech Language Pathologist (SLP) completed education with the patient regarding results of evaluation. Explained that Speech Pathology intervention is warranted  at this time   Prognosis for improvements is good     This plan will be re-evaluated and revised in 1 week  if warranted.     Patient stated goals: Agreed with above,   Treatment goals discussed with Patient and POA  The POA understand(s) the diagnosis, prognosis and plan of care         INTERVENTION/EDUCATION    Pt/POA educated on above results and plan of care. Pt/POA trained on compensatory strategies for safe swallow with good outcome. Pt/POA encouraged to engage in question/answer session. All questions answered and pt verbalized understanding of above. CPT code:  32534  bedside swallow eval, 06757 dysphagia therapy 15 mins      [x]The admitting diagnosis and active problem list, as listed below have been reviewed prior to initiation of this evaluation.      ADMITTING DIAGNOSIS: Closed fracture fibula, head, right, initial encounter [S82.831A]     ACTIVE PROBLEM LIST:   Patient Active Problem List   Diagnosis    Essential hypertension    PAULINA (obstructive sleep apnea)    Midline low back pain with right-sided sciatica    Morbid obesity with BMI of 40.0-44.9, adult ([address]    CVA (cerebral vascular accident) ([address]    Urinary incontinence    Altered mental status    Closed fracture fibula, head, right, initial encounter    Diabetes mellitus ([address]    Neurogenic bladder    Sarcoidosis    Sleep apnea    Diffuse cerebral atrophy (HCC)    Asthma    Diabetic peripheral neuropathy (HCC)    Fibromyalgia    CKD (chronic kidney disease) stage 4, GFR 15-29 ml/min (HCC)    Iron deficiency anemia    Encephalopathy    Sarcoid

## 2020-10-28 NOTE — CONSULTS
PM&R Consult Note  Patient: Abdifatah Martin  Age/sex: 67 y.o. female  Medical Record #: [id number]      Referring physician: Lowell Elias MD  Consulting physician: Dr. Gricelda Weiss MD  Primary care provider: Lowell Elias MD        Chief complaint:   Impairments and acitivities limitations in ADLs, mobility, functional communication, and cognition secondary to probable acute CVA    HPI:   Abdifatah Martin is a 67 y.o. female with past medical history significant for prior right anterior cerebral artery stroke, DM, HTN, sarcoidosis, CKD IV, who presented to [facility] as a transfer from [address] on 10/24/2020 s/p ground level fall. She apparently was in the restroom and attempted to stand and her legs gave way. She was found to have a mildly displaced right distal fibular fracture. She underwent right lateral malleolus ORIF on 10/24/2020 by Dr. Brennon Gordon. She is NWB RLE. She was lethargic that day, felt to be likely due to medications. RRT was called on 10/25 due to change in mental status / decreased responsiveness. Head CT showed no acute intracranial process. She temporarily required BiPAP due to CO2 retention, now on room air. Hospital course noted for UTI, on Rocephin. She was evaluated by Neurology and left hemiparesis was noted. Per Neurology likely acute CVA, though MRI was deferred as she was not alert enough to tell them if her ankle was getting hot, in light of her recent surgery. She is on DAPT and statin. She is more alert this am and following commands. She participated in therapy evaluations.           Past Medical History:   Diagnosis Date    Asthma 1993    Cerebral artery occlusion with cerebral infarction ([address] 07/2017    CKD (chronic kidney disease) stage 4, GFR 15-29 ml/min ([address] 2017    Degenerative disc disease     Degenerative joint disease     Diabetes mellitus ([address] 2008    Diabetic peripheral neuropathy ([address] 1998    Diffuse cerebral atrophy ([address] 2012    Fibromyalgia 01/2012 [facility]    Glaucoma     HX OTHER MEDICAL 02/2017    right hip wound dehiscence; using rollator    Hypertension     Iron deficiency anemia 10/25/2020    Neurogenic bladder     Neuropathy     PONV (postoperative nausea and vomiting)     had trouble  waking up with past foot surgery    Sarcoidosis 1993    Sleep apnea 2009    cpap; SEVERE    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\"     Past Surgical History:   Procedure Laterality Date    ANKLE FRACTURE SURGERY Right 10/24/2020    RIGHT ANKLE OPEN REDUCTION INTERNAL FIXATION [facility]      laminectomy l3-4    BREAST SURGERY      biopsy    CARDIAC CATHETERIZATION  2012    MINIMAL CAD    CHOLECYSTECTOMY      COLONOSCOPY  08/2019    TUBULAR ADENOMA x 2    FOOT SURGERY Bilateral     right foot fracture; left foot charcot    HYSTERECTOMY      JOINT REPLACEMENT  12/16/2016    right hip arthroplasty    OTHER SURGICAL HISTORY Right 02/06/2017    I & D right hip wound vaccum placement    TOTAL KNEE ARTHROPLASTY Bilateral        Family History   Problem Relation Age of Onset    Cancer Mother         pancreatic    Parkinsonism Father     Diabetes Maternal Grandfather     Cancer Brother         esophageal       Allergies   Allergen Reactions    Iodides Shortness Of Breath     CT Scan Dye (\"turned purple\")    Iodine Shortness Of Breath     Had trouble breathing and Skin color turned purple and stayed that way for  Hours.     Other Shortness Of Breath     Perfumes and smoke    Pcn [Penicillins] Itching       Scheduled Meds:  furosemide, 20 mg, Once  aspirin, 81 mg, Daily  clopidogrel, 75 mg, Daily  atorvastatin, 40 mg, Nightly  cefTRIAXone (ROCEPHIN) IV, 1 g, Q24H  pantoprazole, 40 mg, Daily  baclofen, 20 mg, TID  dicyclomine, 20 mg, 4x Daily AC & HS  DULoxetine, 60 mg, QAM  hydroxychloroquine, 200 mg, BID  latanoprost, 1 drop, Nightly  metoprolol tartrate, 25 mg, BID  oxybutynin, 10 mg, BID  pregabalin, 100 mg, BID  verapamil, 240 mg, QAM  enoxaparin, 40 mg, Daily  insulin lispro, 0-6 Units, TID WC  insulin lispro, 0-3 Units, Nightly  Arformoterol Tartrate, 15 mcg, BID  budesonide, 250 mcg, BID  ipratropium-albuterol, 1 ampule, Q4H WA  sodium chloride flush, 10 mL, 2 times per day  sennosides-docusate sodium, 1 tablet, BID        PRN Meds  acetaminophen, 650 mg, Q6H PRN    Or  acetaminophen, 650 mg, Q6H PRN  magnesium hydroxide, 30 mL, Daily PRN  glucose, 15 g, PRN  dextrose, 12.5 g, PRN  glucagon (rDNA), 1 mg, PRN  dextrose, 100 mL/hr, PRN  trimethobenzamide, 200 mg, Q6H PRN  sodium chloride flush, 10 mL, PRN  bisacodyl, 5 mg, Daily PRN        Social History:  Social History     Socioeconomic History    Marital status: Single     Spouse name: Not on file    Number of children: Not on file    Years of education: Not on file    Highest education level: Not on file   Occupational History    Not on file   Social Needs    Financial resource strain: Not on file    Food insecurity     Worry: Not on file     Inability: Not on file    Transportation needs     Medical: Not on file     Non-medical: Not on file   Tobacco Use    Smoking status: Never Smoker    Smokeless tobacco: Never Used   Substance and Sexual Activity    Alcohol use: No     Alcohol/week: 0.0 standard drinks    Drug use: No    Sexual activity: Never   Lifestyle    Physical activity     Days per week: Not on file     Minutes per session: Not on file    Stress: Not on file   Relationships    Social connections     Talks on phone: Not on file     Gets together: Not on file     Attends [employer]ism service: Not on file     Active member of club or organization: Not on file     Attends meetings of clubs or organizations: Not on file     Relationship status: Not on file    Intimate partner violence     Fear of current or ex partner: Not on file     Emotionally abused: Not on file     Physically abused: Not on file     Forced sexual activity: Not on file   Other Topics Concern    Not on file   Social History Narrative    Not on file       Social supports: Patient reports she lives with a friend, but was unable to give additional details. Home situation: Unable to obtain from patient, due to aphasia      Functional History:  Premorbid ADL's: unable to obtain  Premorbid Mobility: Patient reports she ambulated with a rollator   Present: significant decrease in mobility and function and decreased cognition. Physical Therapy:  Bed mobility Dependent x2  Transfers: NT  Ambulation: NT    Occupational Therapy:  Feeding: Min A  Grooming: Dependent  UB dressing: Dependent  LB dressing: Dependent    Limited activity tolerance noted     Review Of Systems:   Limited ROS due to aphasia. Patient denied pain, denied CP, endorsed SOB. Physical Examination:  Vitals:   Vitals:    10/28/20 0800 10/28/20 0900 10/28/20 1000 10/28/20 1100   BP: (!) 159/64 (!) 168/84 (!) 168/61 (!) 168/64   Pulse: 76 73 73 70   Resp: 23 10 12 24   Temp: 98.5 °F (36.9 °C)      TempSrc: Axillary      SpO2: 94%  93% 96%   Weight:       Height:           GEN: NAD, resting in bed  HEENT: NC/AT, PERRL, EOMI  RESP: CTAB,  CV: +S1 S2, RRR  ABD: soft, NT, ND, normal BS   EXT: No erythema/clubbing/cyanosis. R ankle splinted. Skin: intact in visible areas  Psych: appropriate mood and affect     Neuro Exam:  -Alert. Oriented to self, to [facility]\", to [facility]". -No dysarthria  -There is moderate expressive and receptive aphasia  -Motor apraxia noted  -Follows 1 step commands about 70% of the time, sometimes requires demonstration      Cranial Nerves:  I: olfactory not tested  II visual fields grossly intact  III, IV, VI: extra occular muscles intact  Pupils (II, III): ERRL  V: Facial Sensation/Jaw clench: intact  VII: Facial Motor: intact  VIII.  Hearing: intact  XI/X: Palate: intact  XI: Shoulder shrug/SCM:intact  XII: Tongue: intact      Coordination  F - N: patient unable to follow commands for task    Sensory:    LT: diminished in LUE and LLE per patient report during sensory testing       Motor: Tone was normal    Strength was 5/5 throughout the RUE, 4/5 in the LUE. Strength 2/5 at bilateral hip flexors, 2/5 at distal LLE. Distal RLE motor testing not completed due to fracture and recent surgery    DTRs:  1+ at bilateral biceps, BR, Triceps, patellar. NT at ankles        Laboratory:    Lab Results   Component Value Date    WBC 8.8 10/28/2020    HGB 11.1 (L) 10/28/2020    HCT 35.4 10/28/2020    MCV 89.6 10/28/2020     10/28/2020     Lab Results   Component Value Date     10/28/2020    K 4.0 10/28/2020     10/28/2020    CO2 22 10/28/2020    BUN 27 10/28/2020    CREATININE 1.4 10/28/2020    GLUCOSE 119 10/28/2020    GLUCOSE 82 12/09/2011    CALCIUM 8.4 10/28/2020      Lab Results   Component Value Date    ALT 24 10/28/2020    AST 26 10/28/2020    ALKPHOS 120 (H) 10/28/2020    BILITOT 0.7 10/28/2020       Lab Results   Component Value Date    COLORU Yellow 10/26/2020    NITRU Negative 10/26/2020    GLUCOSEU Negative 10/26/2020    KETUA Negative 10/26/2020    UROBILINOGEN 1.0 10/26/2020    BILIRUBINUR Negative 10/26/2020     Lab Results   Component Value Date    LABA1C 5.9 (H) 10/25/2020           IMPRESSION:  Mary Joy is a 67 y.o. female with impairments and acitivities limitations in ADLs, mobility, functional communication, and cognition secondary to probable acute CVA    Demonstrating impaired cognition, aphasia, impaired strength, impaired ROM, impaired balance, impaired safety awareness, decreased endurance, impaired functional mobility and impaired self care. Recommendations:   Patient is at Dependent level for functional mobility at bed level and was unable to attempt out of bed activity today during therapy evaluation. Her activity tolerance is poor.  Patient does not meet criteria for Acute inpatient

## 2020-10-28 NOTE — PROGRESS NOTES
Acute Rehab Pre-Admission Screen      Referral date: 10/28/20  Onset/Hospital Admit Date: 10/24/2020 12:34 AM    Current Location: [address] [place] [address]    Name: Frank Agee  YOB: 1945  Age: 67 y.o. Admitting Diagnosis: fibula fx, CVA? Address: [facility]. [facility] [address]  Home Phone: [phone] (home)  Social Security #:     Sex: female  Race:   Marital Status: single   Ethnic/Cultural/Uatsdin Considerations: Christian    Advanced Directives: [x] Full Code  [] [facility] [] Medications only       [] Living Will  [] DPOA      []Organ donor      [] No mechanical breathing or ventilation     [] no tube feeding, nutrition or hydration      [x] Patient does not have advanced directives or living will         COVERAGE INFORMATION   Primary Insurer: medicare    Secondary Insurer: medical [place]  Medicare #: [id number]  Medicaid #:   Verified coverage: [] Patient  [] Family/caregiver    [x] financial department [] insurance carrier    COVID SCREEN DATE:  Result: (negative, positive)      MEDICAL UPDATE:  History of present admission: 67year old female fell and broke right ankle on 10/24 admitted to [facility]. Was alert and answering questions appropriately, mild aphasia noted from previous CVA. To OR on 10/26 where afterwards, alert to self only, receptive and expressive aphasia.        PHYSICIAN / REFERRAL INFORMATION  Referring Physician:   Attending Physician: Claria Scheuermann, MD  Primary Care Physician: Claria Scheuermann, MD  Consultants/Opinions (see full consult notes on chart):     SOCIAL INFORMATION  Primary  Contact: Radha CEE  Relationship: other  Primary Phone: [phone]  Secondary  Contact: Sonny Alonso CEE  Relationship: other  Primary Phone: [phone]    Previous Community Services:   Caregiver available: [] Yes [] No Hours per day available: tbd  Patient previously employed:  [] Yes [] Part Time [] Full Time [] No [] Retired  Occupation/Profession: retired  Prior living (chronic kidney disease) stage 4, GFR 15-29 ml/min ([facility] 2017    Degenerative disc disease     Degenerative joint disease     Diabetes mellitus [facility] 2008    Diabetic peripheral neuropathy [facility] 1998    Diffuse cerebral atrophy (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\"       Current Co-morbidities:  [] Alzheimer's   [x] Dysphasia     [] Parkinsonism  [] Amputation   [] GERD  [] Peripheral artery disease   [] Anemia      [] Encephalopathy  [] Peripheral vascular disease  [] Anxiety   [] Gangrene   [] Pneumonia  [x] Aphasia   [] Gout    [] Polyneuropathy  [] Asthma   [] Heart Failure (diastolic) [] Post-polio syndrome  [] Atrial fibrillation  [] Heart Failure (left-sided) [] Pseudomonas enteritis   [] Blind    [] Heart Failure (right-sided) [] Pulmonary embolism  [] Cellulitis     [] Heart Failure (systolic) [] Renal dialysis  [] Clostridium difficile  [x] Hemiparesis   [] Renal failure  [] Congestive heart failure [] Hypertension   [] Rheumatoid arthritis  [] COPD   [] Hypotension   [] Seizure disorder   [] Coronary Artery Disease [] Hypothyroidism  [] Septicemia   [] Deaf    [] Hyperlipidemia   [] Sleep apnea  [] Depression   [x] Morbid obesity  [] Spinal cord injury  [] Diabetes   [] MRSA   [x] Stroke  [] Diabetic nephropathy  [] Myocardial infarction  [] Tracheostomy  [] Diabetic neuropathy  [] Osteoarthritis  [] Traumatic brain injury   [] Diabetic retinopathy  [x] Osteoporosis   [] Urinary tract infection  [] DVT    [] Pancytopenia  [] Vocal cord paralysis  []  Spinal stenosis   []  kidney disease [] VRE  [] Post op    []    []        Medical/Functional Conditions requiring inpatient rehabilitation: Patient has  functional deficits in ADLS, mobility, speech, swallow and cognition, impaired balance, and needs ongoing medical management     Risk for Medical/Clinical Complications: Falls, injury, pain, skin breakdown, abnormal vitals, abnormal labs, DVT, PE, pneumonia, decreased mobility, neuro changes     CLINICAL DATA:     Height : 5'6     Weight:  291 lbs   BMI: 47.11       Date: 10/28/20 Date:  Date:    temperature 98.5     pulse 76     respirations 23     Blood pressure 159/64     Pulse oximeter 94% room air        ALLERGIES: Iodides; Iodine;  Other; and Pcn [penicillins]    DIET : Dietary Nutrition Supplements: Standard High Calorie Oral Supplement  DIET CARB CONTROL; Carb Control: 4 carbs/meal (approximate 1800 kcals/day)    Current Lab and Diagnostic Tests:   Recent Results (from the past 24 hour(s))   POCT Glucose    Collection Time: 10/27/20 12:39 PM   Result Value Ref Range    Meter Glucose 90 74 - 99 mg/dL   POCT Glucose    Collection Time: 10/27/20  5:53 PM   Result Value Ref Range    Meter Glucose 107 (H) 74 - 99 mg/dL   POCT Glucose    Collection Time: 10/27/20  8:47 PM   Result Value Ref Range    Meter Glucose 105 (H) 74 - 99 mg/dL   POCT Glucose    Collection Time: 10/28/20 12:03 AM   Result Value Ref Range    Meter Glucose 99 74 - 99 mg/dL   POCT Glucose    Collection Time: 10/28/20  6:14 AM   Result Value Ref Range    Meter Glucose 110 (H) 74 - 99 mg/dL   CBC Auto Differential    Collection Time: 10/28/20  6:15 AM   Result Value Ref Range    WBC 8.8 4.5 - 11.5 E9/L    RBC 3.95 3.50 - 5.50 E12/L    Hemoglobin 11.1 (L) 11.5 - 15.5 g/dL    Hematocrit 35.4 34.0 - 48.0 %    MCV 89.6 80.0 - 99.9 fL    MCH 28.1 26.0 - 35.0 pg    MCHC 31.4 (L) 32.0 - 34.5 %    RDW 14.2 11.5 - 15.0 fL    Platelets 044 640 - 574 E9/L    MPV 9.8 7.0 - 12.0 fL    Neutrophils % 77.8 43.0 - 80.0 %    Immature Surgical Procedures  Result Date: 10/24/2020    Intraprocedural fluoroscopic spot images as above. See separate procedure report for more information.        Additional labs or diagnostic studies needed before admission to rehabilitation unit:      Medications:   aspirin  81 mg Oral Daily    clopidogrel  75 mg Oral Daily    atorvastatin  40 mg Oral Nightly    cefTRIAXone (ROCEPHIN) IV  1 g Intravenous Q24H    pantoprazole  40 mg Intravenous Daily    baclofen  20 mg Oral TID    dicyclomine  20 mg Oral 4x Daily AC & HS    DULoxetine  60 mg Oral QAM    hydroxychloroquine  200 mg Oral BID    latanoprost  1 drop Both Eyes Nightly    metoprolol tartrate  25 mg Oral BID    oxybutynin  10 mg Oral BID    pregabalin  100 mg Oral BID    verapamil  240 mg Oral QAM    enoxaparin  40 mg Subcutaneous Daily    insulin lispro  0-6 Units Subcutaneous TID WC    insulin lispro  0-3 Units Subcutaneous Nightly    Arformoterol Tartrate  15 mcg Nebulization BID    budesonide  250 mcg Nebulization BID    ipratropium-albuterol  1 ampule Inhalation Q4H WA    sodium chloride flush  10 mL Intravenous 2 times per day    sennosides-docusate sodium  1 tablet Oral BID      lactated ringers 110 mL/hr (10/27/20 3493)    dextrose       potassium chloride **OR** potassium alternative oral replacement **OR** potassium chloride, acetaminophen **OR** acetaminophen, magnesium hydroxide, glucose, dextrose, glucagon (rDNA), dextrose, trimethobenzamide, sodium chloride flush, bisacodyl    SPECIAL PRECAUTIONS: [] No current precautions  [] Cardiac  [] Renal [] Sternal [] Respiratory      [] Neurological           [] Hip  [] Spinal [] Seizure  [] Aspiration  [] Isolation precautions:    [] Contact   [] Respiratory   [] Protective     [] Droplet    [x] Weight Bearing precautions:         [x] Non Weight Bearing : RLE        [] Toe Touch Weight Bearing :        [] Partial Weight Bearing :         [] Weight Bearing as Tolerated :         [x] Fall Risk:   [x] Recent history of falls [x] Falls risk level (Swan Scale): high      [x] Bed Alarm    [] Do not leave alone in the bathroom    [x] Chair Alarm    [x] Cognitive impairment      [] One to One supervision  [] Sitter / Tele sitter   [] Safety enclosure bed  [x] Decreased balance     SPECIAL REHABILITATION NEEDS:   [x] IV Therapy: [x] PRN Adapter  [] Midline  [] PICC      [] Central Line    [] TPN       [] Oxygen: [] Trach [] Bi-PAP [] CPAP  [] Nasal cannula  [] Liters:      [x] Wound Care:   [] Pressure ulcers(stage and location) -    [] Wound vac   [x] Wound or incision care    [x] Pain Management (level of pain, meds):      [] Incontinence Bladder [] Anaya  Insertion date:    []Hemodialysis and  Frequency:   [] Incontinence Bowel    [] Last bowel movement :     Substance use history: [] Yes  [x] No   [] Tobacco  [] Alcohol  [] Other     [] Ethnic  [] Cultural  [] Spiritual  [] Language [] Needs  [] Other than English  [] Hearing Impaired  [] Visually Impaired  [] Speaking Impaired  [] Blind  [] Special equipment:  [] Devices/Splints  [] Type   [] Brace   [] Type  [] Bariatric bed  [] Extra wide commode  [] Extra wide wheelchair [] Extra wide walker  [] Angelo walker  [] Angelo wheelchair  [] Transfer lift    [] Other equipment     FUNCTIONAL STATUS PT / [place] / Iwona Gallegos:  FIM / EVAL Discipline Initial:  Follow Up:  Current:    Eating OT      Grooming OT      Bathing OT      Dressing Upper Extremity OT      Dressing Lower Extremity OT      Toileting OT      Toilet Transfers OT      Tub/Shower Transfers OT      Homemaking OT      Bed Mobility PT      Bed/Wheelchair Transfers PT      Locomotion Walk / Wheelchair  Device:  Distance: PT      Endurance PT      Expression SP      Social Interaction SP      Problem Solving SP      Memory SP      Comprehension SP      Swallowing SP      Bowel Management NSG      Bladder Management NSG        Comments on Functional Status:     [x] Able to participate a minimum of 3 hours per day of therapy intervention    Required treatments/services: [x] Rehabilitation nursing [] Dietitian / nurtition                 [x] Case management  [] Respiratory Therapy      [x] Social work   [] Other     Required Therapy:  Therapy Hours per Day Days per Week Therapeutic Interventions Required   [x] Physical Therapy 1 5-7 Gait, transfers, Safety, strength, education, endurance   [x] Occupational Therapy 1 5-7 ADLs, IADLs, Safety, strength, education, endurance   [] Speech Pathology 1 5-7 Speech, cognition, swallowing, safety, education    [] Prosthetics / Orthotics       []         Anticipated Discharge Plan:   Anticipated DME Needs:  [] Home     [] Commode   [] Alone    [] Wheelchair   [] Supervised    [] Arvil McDonough   [] Assist    [] Oxygen        [] Hospital Bed  [] Assisted Living    [] Ramp        [x] To Be Determined    Anticipated Home Health Services:  Anticipated Outpatient Services:  [] PT       [] PT  [] OT      [] OT  [] Speech     [] Speech  [] Nursing     [] Dialysis  [] Aide      [x] To Be Determined  [x] To Be Determined    Anticipated support group:  [] Amputation  [] Multiple Sclerosis  [x] Stroke  [] Brain Injury  [] Spinal cord injury  [] Other     Barriers to discharge:     Discharge Support: [] Patient lives alone and does not have a caregiver available     [] Patient has a caregiver available     [] Discharge plan has been verified with patient's caregiver      [] Caregiver is in agreement with the discharge plan     Expected functional status for safe discharge:     Patient/support person goals:     Expected length of stay:     Discussed expected length of stay and agreeable to IRF plan: [] Yes   [] No    Impairment Group Category:     Etiological Diagnosis:     Primary Rehabilitation Diagnosis:     Electronically signed by Sean Mendez RN on 10/28/2020 at 9:53 AM    Prescreen completed __________________________________ (signature of prescreener)    Date:    Time: JUSTIFICATION FOR ADMISSION TO ACUTE REHABILITATION:  Patient has suffered decline in functional abilities for gait, transfers, speech, swallowing, cognition,  ADL's and IADL's as well as endurance. Patient has functional deficits requiring intensive therapy across multiple disciplines in order to return home safely. Patient will need physician oversight for respiratory issues, abnormal vital signs, nutritional and hydration status, safety issues, medications and therapy modalities. PT, OT and speech will work on deficits as noted in evaluations. Case management and social work will provide services for DME and management of a safe discharge home.         RECOMMEND LEVEL OF CARE  Recommend inpatient rehabilitation: [] Yes   [] No  If no indicate reason:  [] Functional level too high  [] Unmotivated  [] No insurance carrier approval [] Unlikely to return to community  [] No medical necessity  [] Patient or family chose other facility  [] Too medically complex  [] Inadequate discharge plan  [x] Rehabilitation bed unavailable [] Functional level too low  [] patient or family refused ARU    If patient not accepted for IRF admission, recommended level of care:  [] [facility]  [] [facility]  [] [place]   [] Home Care  [] Other      [] LTAC       Physician Assigned:  [x] Dr. Ramakrishna Lezama         [] Dr. Lilliana Gonzalez              [] Dr. Marcelo Vaughn [] Dr. Maria Elena Lange  [] Dr. Eden Mccrary:    ____________________________________________________________________  ____________________________________________________________________  ____________________________________________________________________  ____________________________________________________________________  ____________________________________________________________________      Physician Signature:_____________________________________    Print

## 2020-10-28 NOTE — PROGRESS NOTES
Pulmonary Progress Note    Admit Date: 10/24/2020  Hospital day                               PCP: Jeannette Coreas MD    Chief Complaint (s):  Patient Active Problem List   Diagnosis    Essential hypertension    PAULINA (obstructive sleep apnea)    Midline low back pain with right-sided sciatica    Morbid obesity with BMI of 40.0-44.9, adult ([facility]    CVA (cerebral vascular accident) ([facility]    Urinary incontinence    Altered mental status    Closed fracture fibula, head, right, initial encounter    Diabetes mellitus ([facility]    Neurogenic bladder    Sarcoidosis    Sleep apnea    Diffuse cerebral atrophy ([facility]    Asthma    Diabetic peripheral neuropathy (HCC)    Fibromyalgia    CKD (chronic kidney disease) stage 4, GFR 15-29 ml/min (HCC)    Iron deficiency anemia    Encephalopathy    Sarcoid       Subjective:  · Far more awake and alert and actually jovial this a.m. Blood pressure has increased and she is for transfer to a stepdown area.       Vitals:  VITALS:  BP (!) 168/64   Pulse 70   Temp 98.5 °F (36.9 °C) (Axillary)   Resp 24   Ht 5' 6\" (1.676 m)   Wt 291 lb 14.2 oz (132.4 kg)   SpO2 96%   BMI 47.11 kg/m²     24HR INTAKE/OUTPUT:      Intake/Output Summary (Last 24 hours) at 10/28/2020 1208  Last data filed at 10/28/2020 0500  Gross per 24 hour   Intake 2231 ml   Output 1325 ml   Net 906 ml       24HR PULSE OXIMETRY RANGE:    SpO2  Av.6 %  Min: 92 %  Max: 96 %    Medications:  IV:   lactated ringers 110 mL/hr (10/27/20 0256)    dextrose         Scheduled Meds:   aspirin  81 mg Oral Daily    clopidogrel  75 mg Oral Daily    atorvastatin  40 mg Oral Nightly    cefTRIAXone (ROCEPHIN) IV  1 g Intravenous Q24H    pantoprazole  40 mg Intravenous Daily    baclofen  20 mg Oral TID    dicyclomine  20 mg Oral 4x Daily AC & HS    DULoxetine  60 mg Oral QAM    hydroxychloroquine  200 mg Oral BID    latanoprost  1 drop Both Eyes Nightly    metoprolol tartrate  25 mg Oral BID results for input(s): PROTIME, INR in the last 72 hours. APTT:   Recent Labs     10/26/20  0657   APTT 32.0         Pathology:  1. N/A      Microbiology:  1. None    Recent ABG:   Recent Labs     10/27/20  0617   PH 7.402   PO2 89.6   PCO2 35.2   HCO3 21.4*   BE -2.8   O2SAT 97.1   METHB 0.4   O2HB 95.9   COHB 0.8   O2CON 16.9   HHB 2.9   THB 12.5     FiO2 : 35 %       Recent Films:  XR CHEST PORTABLE   Final Result   Pulmonary edema, increased since the comparison study. US CAROTID ARTERY BILATERAL   Final Result   The right internal carotid artery demonstrates 0-50% stenosis . The left internal carotid artery demonstrates 0-50% stenosis . Neither vertebral artery could be clearly visualized. CT HEAD WO CONTRAST   Final Result   No acute intracranial abnormality. Moderate generalized atrophy and chronic   ischemic/degenerative changes in the white matter         XR CHEST PORTABLE   Final Result   Borderline CHF, unchanged         XR CHEST PORTABLE   Final Result   Cardiomegaly with findings of mild CHF. CT HEAD WO CONTRAST   Final Result   No acute intracranial abnormality. Chronic involutional changes and chronic   microvascular ischemic changes are noted. XR FOOT LEFT (MIN 3 VIEWS)   Final Result   1. No acute fracture. 2.  Chronic fracture involving 5th metatarsal bone. 3.  Stable, satisfactory alignment status post internal fixation at level of   distal calcaneus and tarsal navicular. FLUORO FOR SURGICAL PROCEDURES   Final Result   Intraprocedural fluoroscopic spot images as above. See separate procedure   report for more information. XR ANKLE RIGHT (MIN 3 VIEWS)   Final Result   Mildly displaced distal fibular fracture involving the distal diaphysis   extending into the metaphysis. No other acute fracture is seen. Assessment:  1. 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 2. Day 2 ORIF    Plan:  1.  Agree with transfer and rehab. Time at the bedside, reviewing labs and radiographs, reviewing updated notes and consultations, discussing with staff and family was more than 35 minutes. Please note that voice recognition technology was used in the preparation of this note and make therefore it may contain inadvertent transcription errors. If the patient is a COVID 19 isolation patient, the above physical exam reflects that of the examining physician for the day. Dunia Bray M.D., [email or web address].     [facility], [address] [place] [address], [address] [place] [address], [facility]

## 2020-10-28 NOTE — PROGRESS NOTES
Family Medicine    Subjective: The patient is awake and much more alert this AM. Is talkative but not answering questions appropriately. Objective:  BP (!) 134/49   Pulse 81   Temp 98.8 °F (37.1 °C) (Axillary)   Resp 22   Ht 5' 6\" (1.676 m)   Wt 291 lb 14.2 oz (132.4 kg)   SpO2 96%   BMI 47.11 kg/m²     HEENT: MMM. Heart: RRR,. Lungs: CTA bilaterally. Abd: bowel sounds present, nontender, nondistended, no masses. Extrem:  No clubbing, cyanosis, or edema. Neuro: More alert. No focal deficits. CBC with Differential:    Lab Results   Component Value Date    WBC 8.0 10/27/2020    RBC 4.14 10/27/2020    HGB 11.8 10/27/2020    HCT 37.5 10/27/2020     10/27/2020    MCV 90.6 10/27/2020    MCH 28.5 10/27/2020    MCHC 31.5 10/27/2020    RDW 14.3 10/27/2020    LYMPHOPCT 10.2 10/27/2020    MONOPCT 13.1 10/27/2020    BASOPCT 0.6 10/27/2020    MONOSABS 1.04 10/27/2020    LYMPHSABS 0.81 10/27/2020    EOSABS 0.26 10/27/2020    BASOSABS 0.05 10/27/2020     CMP:    Lab Results   Component Value Date     10/28/2020    K 4.0 10/28/2020     10/28/2020    CO2 22 10/28/2020    BUN 27 10/28/2020    CREATININE 1.4 10/28/2020    GFRAA 44 10/28/2020    LABGLOM 37 10/28/2020    GLUCOSE 119 10/28/2020    GLUCOSE 82 12/09/2011    PROT 6.1 10/28/2020    LABALBU 2.8 10/28/2020    CALCIUM 8.4 10/28/2020    BILITOT 0.7 10/28/2020    ALKPHOS 120 10/28/2020    AST 26 10/28/2020    ALT 24 10/28/2020     Assessment/Plan:  1. Altered mental status - etiology unclear. ? secondary to CNS depression from narcotic medication - however minimal improvement from Narcan. ? UTI. ? CVA. CT head WO contrast without acute changes. Neurology on case. EEG today. MRI brain ordered. 2. Acute hypoxic respiratory failure - Stable. 3. Right fibula fracture status post ORIF - Orthopedics on this. Stable. 4. Hypertension - Stable. 5. Morbid obesity - Stable. 6. Diabetes mellitus type 2 - Stable.   7. Chronic kidney disease - Nephrology and case. Stable. 8. Obstructive sleep apnea - stable. 9. History of CVA - stable. 10. Sarcoidosis - stable. 11. Fibromyalgia - stable. 12. Asthma - stable. 13. Hyperlipidemia - statin. 14. UTI - on Rocephin. 15. Disposition - as above. Continue same.     Sabine Alberta  7:19 AM  10/28/2020

## 2020-10-28 NOTE — PROGRESS NOTES
SPEECH/LANGUAGE PATHOLOGY  SPEECH/LANGUAGE/COGNITIVE EVALUATION      PATIENT NAME:  Junior Simmons      :  1945          TODAY'S DATE:  10/28/2020 ROOM:  9479/2275-U     ADMITTING DIAGNOSIS: Closed fracture fibula, head, right, initial encounter [P43.053V]     Informal repeat speech/lang/cognitive evaluation completed due to significant improvement per staff. SPEECH PATHOLOGY DIAGNOSIS:    Moderate receptive/expresive aphasia; difficult to adequately assessed cognition due to aphasia, however cognitive linguistic deficit suspected    THERAPY RECOMMENDATIONS:   Speech Pathology intervention is recommended 3-6 times per week for LOS or when goals are met with emphasis on the following:     Attention to increase safety awareness during transfers/ ambulation and completion of ADL/iADL tasks with moderate. Orientation to spatial and temporal surroundings with use of external memory aides. Receptive and expressive language skills with adequate thought content, organization, and processing time to facilitate improved communication with moderate. Expressive language skills to improve accuracy of completion of automatic speech tasks, object/picture naming, phrase completion, and phrasal expression of basic wants and needs with 60% accuracy  Expressive Language- word finding and verbal fluency with phrase/sentence level, wh-questions and open ended conversation through incorporation of preparatory and circumlocution strategies (goal met when >90% accuracy obtained). Receptive language skills for response to [facility]- questions and follow through of simple to multi-step directions with 60% accuracy. Pt will demonstrate appropriate social language skills with mod cues for increased  social re-integration.                 MOTOR SPEECH       Oral Peripheral Examination   Adequate lingual/labial strength     Parameters of Speech Production  Respiration:  Adequate for speech production  Articulation:  Within functional limits  Resonance:  Within functional limits  Quality:   Within functional limits  Pitch: Within functional limits  Intensity: Within functional limits  Fluency:  Intact  Prosody Intact    RECEPTIVE LANGUAGE    Comprehension of Yes/No Questions:   Latent and Inconsistent    Process  Simple Verbal Commands:   Inconsistent  Process Intermediate Verbal Commands:   Inconsistent  Process Complex Verbal Commands:     Unable    Comprehension of Conversation:      Latent, Inconsistent and Cueing      EXPRESSIVE LANGUAGE     Serials: Functional, latent    Imitation:  Words   Functional   Sentences To be assessed    Naming:  (Modality used:  Verbal)  Confrontation Naming  inconsistent  Functional Description  Functional  Response Naming: To be assessed    Conversation:      Anomia was present and Semantic paraphasic errors were noted    COGNITION     Attention/Orientation  Attention: Sustained attention   Orientation:  Oriented to Person only        CLINICAL OBSERVATIONS NOTED DURING THE EVALUATION  Latent responses, Anomic errors, Paraphasic errors and Cueing was required    Lability observed                  The Speech Language Pathologist (SLP) completed education with the patient regarding results of evaluation. Explained that Speech Pathology intervention is warranted  at this time   Prognosis for improvements is fair +     This plan will be re-evaluated and revised in 1 week  if warranted. Patient stated goals: Agreed with above,   Treatment goals discussed with Patient/POA   The POA understand(s) the diagnosis, prognosis and plan of care             CPT code:    Speech/Language therapy      The admitting diagnosis and active problem list, as listed below have been reviewed prior to initiation of this evaluation.         ACTIVE PROBLEM LIST:   Patient Active Problem List   Diagnosis    Essential hypertension    PAULINA (obstructive sleep apnea)    Midline low back pain with right-sided sciatica    Morbid obesity with BMI of 40.0-44.9, adult ([facility]    CVA (cerebral vascular accident) ([facility]    Urinary incontinence    Altered mental status    Closed fracture fibula, head, right, initial encounter    Diabetes mellitus ([address]    Neurogenic bladder    Sarcoidosis    Sleep apnea    Diffuse cerebral atrophy (HCC)    Asthma    Diabetic peripheral neuropathy (HCC)    Fibromyalgia    CKD (chronic kidney disease) stage 4, GFR 15-29 ml/min (HCC)    Iron deficiency anemia    Encephalopathy    Sarcoid

## 2020-10-29 ENCOUNTER — APPOINTMENT (OUTPATIENT)
Dept: MRI IMAGING | Age: 75
DRG: 492 | End: 2020-10-29
Attending: INTERNAL MEDICINE
Payer: MEDICARE

## 2020-10-29 LAB
ALBUMIN SERPL-MCNC: 2.8 G/DL (ref 3.5–5.2)
ALP BLD-CCNC: 126 U/L (ref 35–104)
ALT SERPL-CCNC: 27 U/L (ref 0–32)
ANION GAP SERPL CALCULATED.3IONS-SCNC: 10 MMOL/L (ref 7–16)
AST SERPL-CCNC: 34 U/L (ref 0–31)
B.E.: -3.1 MMOL/L (ref -3–3)
BASOPHILS ABSOLUTE: 0.06 E9/L (ref 0–0.2)
BASOPHILS RELATIVE PERCENT: 0.6 % (ref 0–2)
BILIRUB SERPL-MCNC: 0.7 MG/DL (ref 0–1.2)
BUN BLDV-MCNC: 29 MG/DL (ref 8–23)
CALCIUM SERPL-MCNC: 8.5 MG/DL (ref 8.6–10.2)
CHLORIDE BLD-SCNC: 110 MMOL/L (ref 98–107)
CO2: 21 MMOL/L (ref 22–29)
COHB: 1.7 % (ref 0–1.5)
CREAT SERPL-MCNC: 1.4 MG/DL (ref 0.5–1)
CRITICAL: ABNORMAL
DATE ANALYZED: ABNORMAL
DATE OF COLLECTION: ABNORMAL
EOSINOPHILS ABSOLUTE: 0.28 E9/L (ref 0.05–0.5)
EOSINOPHILS RELATIVE PERCENT: 2.9 % (ref 0–6)
GFR AFRICAN AMERICAN: 44
GFR NON-AFRICAN AMERICAN: 37 ML/MIN/1.73
GLUCOSE BLD-MCNC: 134 MG/DL (ref 74–99)
HCO3: 21.2 MMOL/L (ref 22–26)
HCT VFR BLD CALC: 35.6 % (ref 34–48)
HEMOGLOBIN: 11.4 G/DL (ref 11.5–15.5)
HHB: 9.7 % (ref 0–5)
IMMATURE GRANULOCYTES #: 0.17 E9/L
IMMATURE GRANULOCYTES %: 1.8 % (ref 0–5)
LAB: ABNORMAL
LYMPHOCYTES ABSOLUTE: 0.92 E9/L (ref 1.5–4)
LYMPHOCYTES RELATIVE PERCENT: 9.7 % (ref 20–42)
Lab: ABNORMAL
MAGNESIUM: 2.2 MG/DL (ref 1.6–2.6)
MCH RBC QN AUTO: 28.4 PG (ref 26–35)
MCHC RBC AUTO-ENTMCNC: 32 % (ref 32–34.5)
MCV RBC AUTO: 88.6 FL (ref 80–99.9)
METER GLUCOSE: 104 MG/DL (ref 74–99)
METER GLUCOSE: 112 MG/DL (ref 74–99)
METER GLUCOSE: 115 MG/DL (ref 74–99)
METER GLUCOSE: 81 MG/DL (ref 74–99)
METER GLUCOSE: 98 MG/DL (ref 74–99)
METHB: 0.4 % (ref 0–1.5)
MODE: ABNORMAL
MONOCYTES ABSOLUTE: 1.32 E9/L (ref 0.1–0.95)
MONOCYTES RELATIVE PERCENT: 13.9 % (ref 2–12)
NEUTROPHILS ABSOLUTE: 6.75 E9/L (ref 1.8–7.3)
NEUTROPHILS RELATIVE PERCENT: 71.1 % (ref 43–80)
O2 CONTENT: 15.7 ML/DL
O2 SATURATION: 90.1 % (ref 92–98.5)
O2HB: 88.2 % (ref 94–97)
OPERATOR ID: 319
PATIENT TEMP: 37 C
PCO2: 35.9 MMHG (ref 35–45)
PDW BLD-RTO: 14.5 FL (ref 11.5–15)
PH BLOOD GAS: 7.39 (ref 7.35–7.45)
PHOSPHORUS: 3.8 MG/DL (ref 2.5–4.5)
PLATELET # BLD: 206 E9/L (ref 130–450)
PMV BLD AUTO: 9.4 FL (ref 7–12)
PO2: 57.3 MMHG (ref 75–100)
POTASSIUM SERPL-SCNC: 3.6 MMOL/L (ref 3.5–5)
RBC # BLD: 4.02 E12/L (ref 3.5–5.5)
SODIUM BLD-SCNC: 141 MMOL/L (ref 132–146)
SOURCE, BLOOD GAS: ABNORMAL
THB: 12.7 G/DL (ref 11.5–16.5)
TIME ANALYZED: 1009
TOTAL PROTEIN: 6.2 G/DL (ref 6.4–8.3)
WBC # BLD: 9.5 E9/L (ref 4.5–11.5)

## 2020-10-29 PROCEDURE — 92526 ORAL FUNCTION THERAPY: CPT | Performed by: SPEECH-LANGUAGE PATHOLOGIST

## 2020-10-29 PROCEDURE — C9113 INJ PANTOPRAZOLE SODIUM, VIA: HCPCS | Performed by: INTERNAL MEDICINE

## 2020-10-29 PROCEDURE — 82805 BLOOD GASES W/O2 SATURATION: CPT

## 2020-10-29 PROCEDURE — 84100 ASSAY OF PHOSPHORUS: CPT

## 2020-10-29 PROCEDURE — 97530 THERAPEUTIC ACTIVITIES: CPT

## 2020-10-29 PROCEDURE — 85025 COMPLETE CBC W/AUTO DIFF WBC: CPT

## 2020-10-29 PROCEDURE — 6360000002 HC RX W HCPCS: Performed by: INTERNAL MEDICINE

## 2020-10-29 PROCEDURE — 36415 COLL VENOUS BLD VENIPUNCTURE: CPT

## 2020-10-29 PROCEDURE — 82962 GLUCOSE BLOOD TEST: CPT

## 2020-10-29 PROCEDURE — 2580000003 HC RX 258: Performed by: INTERNAL MEDICINE

## 2020-10-29 PROCEDURE — 6370000000 HC RX 637 (ALT 250 FOR IP): Performed by: INTERNAL MEDICINE

## 2020-10-29 PROCEDURE — 83735 ASSAY OF MAGNESIUM: CPT

## 2020-10-29 PROCEDURE — 80053 COMPREHEN METABOLIC PANEL: CPT

## 2020-10-29 PROCEDURE — 94640 AIRWAY INHALATION TREATMENT: CPT

## 2020-10-29 PROCEDURE — 94660 CPAP INITIATION&MGMT: CPT

## 2020-10-29 PROCEDURE — 1200000000 HC SEMI PRIVATE

## 2020-10-29 PROCEDURE — 92507 TX SP LANG VOICE COMM INDIV: CPT | Performed by: SPEECH-LANGUAGE PATHOLOGIST

## 2020-10-29 RX ADMIN — DOCUSATE SODIUM AND SENNOSIDES 1 TABLET: 8.6; 5 TABLET, FILM COATED ORAL at 22:20

## 2020-10-29 RX ADMIN — ARFORMOTEROL TARTRATE 15 MCG: 15 SOLUTION RESPIRATORY (INHALATION) at 20:58

## 2020-10-29 RX ADMIN — BUDESONIDE 250 MCG: 0.25 SUSPENSION RESPIRATORY (INHALATION) at 10:00

## 2020-10-29 RX ADMIN — IPRATROPIUM BROMIDE AND ALBUTEROL SULFATE 1 AMPULE: 2.5; .5 SOLUTION RESPIRATORY (INHALATION) at 20:58

## 2020-10-29 RX ADMIN — BUDESONIDE 250 MCG: 0.25 SUSPENSION RESPIRATORY (INHALATION) at 20:58

## 2020-10-29 RX ADMIN — ARFORMOTEROL TARTRATE 15 MCG: 15 SOLUTION RESPIRATORY (INHALATION) at 10:00

## 2020-10-29 RX ADMIN — HYDROXYCHLOROQUINE SULFATE 200 MG: 200 TABLET ORAL at 22:19

## 2020-10-29 RX ADMIN — IPRATROPIUM BROMIDE AND ALBUTEROL SULFATE 1 AMPULE: 2.5; .5 SOLUTION RESPIRATORY (INHALATION) at 10:00

## 2020-10-29 RX ADMIN — PREGABALIN 100 MG: 50 CAPSULE ORAL at 22:19

## 2020-10-29 RX ADMIN — Medication 10 ML: at 10:34

## 2020-10-29 RX ADMIN — METOPROLOL TARTRATE 25 MG: 25 TABLET, FILM COATED ORAL at 22:19

## 2020-10-29 RX ADMIN — ENOXAPARIN SODIUM 40 MG: 40 INJECTION SUBCUTANEOUS at 10:32

## 2020-10-29 RX ADMIN — IPRATROPIUM BROMIDE AND ALBUTEROL SULFATE 1 AMPULE: 2.5; .5 SOLUTION RESPIRATORY (INHALATION) at 15:49

## 2020-10-29 RX ADMIN — DICYCLOMINE HYDROCHLORIDE 20 MG: 10 CAPSULE ORAL at 22:20

## 2020-10-29 RX ADMIN — Medication 10 ML: at 22:26

## 2020-10-29 RX ADMIN — Medication 10 ML: at 11:36

## 2020-10-29 RX ADMIN — ATORVASTATIN CALCIUM 40 MG: 40 TABLET, FILM COATED ORAL at 22:20

## 2020-10-29 RX ADMIN — Medication 10 ML: at 09:46

## 2020-10-29 RX ADMIN — WATER 1 G: 1 INJECTION INTRAMUSCULAR; INTRAVENOUS; SUBCUTANEOUS at 11:35

## 2020-10-29 RX ADMIN — LATANOPROST 1 DROP: 50 SOLUTION OPHTHALMIC at 22:20

## 2020-10-29 RX ADMIN — BACLOFEN 20 MG: 10 TABLET ORAL at 22:25

## 2020-10-29 RX ADMIN — PANTOPRAZOLE SODIUM 40 MG: 40 INJECTION, POWDER, FOR SOLUTION INTRAVENOUS at 10:31

## 2020-10-29 RX ADMIN — IPRATROPIUM BROMIDE AND ALBUTEROL SULFATE 1 AMPULE: 2.5; .5 SOLUTION RESPIRATORY (INHALATION) at 12:20

## 2020-10-29 ASSESSMENT — PAIN SCALES - GENERAL
PAINLEVEL_OUTOF10: 0

## 2020-10-29 ASSESSMENT — PAIN SCALES - WONG BAKER: WONGBAKER_NUMERICALRESPONSE: 0

## 2020-10-29 NOTE — PROGRESS NOTES
SPEECH LANGUAGE PATHOLOGY  DAILY PROGRESS NOTE        PATIENT NAME:  Jess Ervin      :  1945          TODAY'S DATE:  10/29/2020 ROOM:  [address] [place] [address]-A        SWALLOWING    Pt alert, POA present. Per RN pt was not alert enough most of the day for oral intake. SLP attempted puree, thin liquid trial. Pt presents as she did 10/27/20 when she would turn head to side in refusal however still open mouth at times. Pt did accept one 1/2 tsp puree, however held in oral cavity for >3 mins with max verbal, visual and tactile cues needed to initiate swallow. No overt s/s of aspiration observed, however silent aspiration can not be r/o bedside. Pt did not accept liquids for assessment. May want to hold PO until more responsive to directives. If do decide to feed and overt s/s of aspiration observed, recommend  hold diet until reassessment. Education- Pt/POA educated on results and POC. Pt trained on compensatory strategies for safe swallow with fair outcome. POA questions answered. SPEECH/COGNITION    Increased confusion this date. Pt did not reply to orientation questions. Was able to provide name and name of POA yesterday. Pt did not follow simple commands (yesterday was able to follow 1 and 2 step commands inconsistently) , no attempt at automatic tasks (was able yesterday). Pt did repeat \"Hi\" and continues to laugh throughout session. Attempted to initiate communication board with poor outcome. POA educated on board use and encouraged to attempt as able. Will continue SP intervention as per previously established POC. Lowell WAGGONER CCC/SLP [id number]  Speech Pathologist              CPT code(s) 79341  speech/language tx  [id number]  dysphagia tx  Total minutes :  30 minutes

## 2020-10-29 NOTE — PROGRESS NOTES
Physical Therapy  Facility/Department: [address] ORTHO SURGERY  Daily Treatment Note  NAME: Abdifatah Martin  : 1945  MRN: [id number]    Date of Service: 10/29/2020    Patient Diagnosis(es): The encounter diagnosis was Closed right ankle fracture, initial encounter. has a past medical history of Asthma, Cerebral artery occlusion with cerebral infarction ([address] CKD (chronic kidney disease) stage 4, GFR 15-29 ml/min (HCC), Degenerative disc disease, Degenerative joint disease, Diabetes mellitus ([address] Diabetic peripheral neuropathy ([address] Diffuse cerebral atrophy ([address] Fibromyalgia, Glaucoma, HX OTHER MEDICAL, Hypertension, Iron deficiency anemia, Neurogenic bladder, Neuropathy, PONV (postoperative nausea and vomiting), Sarcoidosis, Sleep apnea, and Spinal cord and nerve root disorder. has a past surgical history that includes Foot surgery (Bilateral); Hysterectomy; back surgery; Total knee arthroplasty (Bilateral); Cholecystectomy; Colonoscopy (2019); other surgical history (Right, 2017); Breast surgery; joint replacement (2016); Cardiac catheterization (); and Ankle fracture surgery (Right, 10/24/2020).     Evaluating PT:  Lalo Thomson PT      Room #:  0720/0720-A  Diagnosis:  RT lateral ankle Fx, closed fx RT fibular head  PMHx/PSHx:  DM, HTN, Fibro, B/L TKA, RT ASHTYN, Asthma, peripheral neuropathy, DJD, DDD, Neurogenic bladder, sarcoidosis, cerebral artery occlusion with infarction.   Procedure/Surgery:  RT ankle ORIF  Precautions:  NWB RTLE, Can WT bear LTLE in CAM Boot, General, Falls,   Equipment Needs:  WW, WC     SUBJECTIVE:     Pt lives with ? in a ? story home with ? stairs to enter and ? rail.  Bed is on ? floor and bath is on ? floor.  Pt ambulated with ?Brad Guest is alert to herself only.  Uncertain to date, place, home setting, steps to from home, what AD if any she uses.      OBJECTIVE:    Initial Evaluation  Date: 10/25/20 Treatment  10/29 Short Term/ Long Term   Goals AM-PAC 6 Clicks 0/87 2/24      Was pt agreeable to Eval/treatment? Yes       Does pt have pain? Yes RT leg/ankle R LE      Bed Mobility  Rolling: Mod/Max  Supine to sit: Max  Sit to supine: Max/dep  Scooting: Max/dep Rolling dependent  Scooting dependent of 2  Supine <> sit dependent of 2   Improved all levels to Min-Mod x 2-1   Transfers Sit to stand: Max/dep assist attempted; unsuccessful-unacceptabrisk for injury      NT   Mod x 2 transfers   Ambulation   SHAHRZAD    NT  WW with Min/Mod x 2 assist 5'    Stair negotiation: ascended and descended SHAHRZAD   NT      ROM  WFL x RT ankle NA       Strength  BLE 2-3-/5 hip, knee 3/5    Imp MMT 1 grade globally BLE   Balance Sitting EOB:  CGA  Dynamic Standing:  Zero   Sitting EOB:  Indep/S  Dynamic Standing:  Mod x 2-1 [employer]        Patient education  Pt was educated on bed mobility    Patient response to education:   Pt verbalized understanding Pt demonstrated skill Pt requires further education in this area   no         Additional Comments: Pt lethargic. Eyes closed for most of session. Not responding to name or following commands. Dependent to sit edge of bed    Time in: 0900  Time out: 0912    Total treatment time 12 minutes  Pt is making poot progress toward established Physical Therapy goals. Continue with physical therapy current plan of care.     Dionna PT [id number]      CPT codes:  [] Low Complexity PT evaluation 86179  [] Moderate Complexity PT evaluation 39535  [] High Complexity PT evaluation 02106  [] PT Re-evaluation 51297  [] Gait training 08742  minutes  [] Manual therapy 19052    minutes  [x] Therapeutic activities 83168  12  minutes  [] Therapeutic exercises 17340     minutes  [] Neuromuscular reeducation 53238     minutes

## 2020-10-29 NOTE — PLAN OF CARE
Problem: Falls - Risk of:  Goal: Will remain free from falls  Description: Will remain free from falls  10/28/2020 2155 by Azael Kaufman RN  Outcome: Met This Shift     Problem: Pain:  Goal: Pain level will decrease  Description: Pain level will decrease  10/28/2020 2155 by Azael Kaufman RN  Outcome: Met This Shift     Problem: Pain:  Goal: Control of acute pain  Description: Control of acute pain  10/28/2020 2155 by Azael Kaufman RN  Outcome: Met This Shift     Problem: Skin Integrity:  Goal: Will show no infection signs and symptoms  Description: Will show no infection signs and symptoms  Outcome: Met This Shift

## 2020-10-29 NOTE — PROGRESS NOTES
Patiently was alert momentarily. Patient was still disorientated to place, time and situation. She is alert to self and able to say \"she did not want to talk about it. \"   This is an improved skill in verbalization for her compared to her this morning.

## 2020-10-29 NOTE — HOME CARE
[facility] referral received, awaiting final orders. Spoke with patient's Agusto Leal and verified demographics. Will follow. Kayleigh Laboy, ROSALIA [facility].

## 2020-10-29 NOTE — PROGRESS NOTES
Critical Care to sign off at this time. Please let us know if our services are needed any further.     Astrid Morales  10/29/2020  3:18 PM

## 2020-10-29 NOTE — PROGRESS NOTES
Family Medicine    Subjective: The patient is lethargic again this AM. Not speaking or following direction. Objective:  BP (!) 161/68   Pulse 61   Temp 98.4 °F (36.9 °C) (Oral)   Resp 22   Ht 5' 6\" (1.676 m)   Wt 291 lb 14.2 oz (132.4 kg)   SpO2 93%   BMI 47.11 kg/m²     HEENT: MMM. Heart: RRR. Lungs: CTA bilaterally. Abd: bowel sounds present, nontender, nondistended, no masses. Extrem:  No clubbing, cyanosis, or edema. Neuro: More alert. No focal deficits. CBC with Differential:    Lab Results   Component Value Date    WBC 9.5 10/29/2020    RBC 4.02 10/29/2020    HGB 11.4 10/29/2020    HCT 35.6 10/29/2020     10/29/2020    MCV 88.6 10/29/2020    MCH 28.4 10/29/2020    MCHC 32.0 10/29/2020    RDW 14.5 10/29/2020    LYMPHOPCT 9.7 10/29/2020    MONOPCT 13.9 10/29/2020    BASOPCT 0.6 10/29/2020    MONOSABS 1.32 10/29/2020    LYMPHSABS 0.92 10/29/2020    EOSABS 0.28 10/29/2020    BASOSABS 0.06 10/29/2020     CMP:    Lab Results   Component Value Date     10/29/2020    K 3.6 10/29/2020     10/29/2020    CO2 21 10/29/2020    BUN 29 10/29/2020    CREATININE 1.4 10/29/2020    GFRAA 44 10/29/2020    LABGLOM 37 10/29/2020    GLUCOSE 134 10/29/2020    GLUCOSE 82 12/09/2011    PROT 6.2 10/29/2020    LABALBU 2.8 10/29/2020    CALCIUM 8.5 10/29/2020    BILITOT 0.7 10/29/2020    ALKPHOS 126 10/29/2020    AST 34 10/29/2020    ALT 27 10/29/2020     Assessment/Plan:  1. Altered mental status - etiology unclear. Most likely CVA given timeframe and persistent symptoms. CT head WO contrast without acute changes. Neurology on case. EEG abnormal. MRI brain ordered. Patient too large for MRI machine. Exploring other MRI facilities. Also ? contribution form CNS depression from narcotic medication - however minimal improvement from Narcan and ? UTI. 2. Acute hypoxic respiratory failure - Stable. 3. Right fibula fracture status post ORIF - Orthopedics on this. Stable. 4. Hypertension - Stable.   5. Morbid obesity - Stable. 6. Diabetes mellitus type 2 - Stable. 7. Chronic kidney disease - Nephrology and case. Stable. 8. Obstructive sleep apnea - stable. 9. History of CVA - stable. 10. Sarcoidosis - stable. 11. Fibromyalgia - stable. 12. Asthma - stable. 13. Hyperlipidemia - statin. 14. UTI - on Rocephin. 15. Disposition - as above. Continue same.     Lowell Elias  7:32 AM  10/29/2020

## 2020-10-29 NOTE — CARE COORDINATION
Social Work:    Social service attempted to meet with Bogdan this a.m. and afternoon. She was asleep this a.m. and out for testing this afternoon. RN advises that Bogdan is still slightly confused. Social work spoke with both [address] [place] [address] Rd twice today regarding rehab choice, as well as advised them about acute rehab denial.  Sharona Pedersenreg advised that she was on her way to hospital and we met this afternoon. Sharona Gallo expressed concern about skilled rehab & Covid and disappointment in [facility] snf not accepting patient's as that is the only one they would consider at this time. Social work discussed options with Sharona Gallo for [place] [address], [address], private duty, etc.  Sharona Cleo advised that she prefers to return Bogdan home via ambulance transfer with [facility] after choices were provided. Sharona Gallo also requested a hospital bed & wheelchair from [facility] after choices were provided.  Social work called a referral to [employer] at [address] at [facility] and prompted for orders.  (ambulance form in chart)    Electronically signed by Gian GaribayPhoebe Worth Medical Center on 10/29/2020 at 4:22 PM

## 2020-10-29 NOTE — PROGRESS NOTES
Called Dr. Natali Villagomez per VAISHALI [facility].  Reason ABG results.   [facility]ter  10/29/2020  10:23 AM

## 2020-10-29 NOTE — CARE COORDINATION
Social Work:    Transfer from ICU. Social service attempted to meet with Ian Trevino to obtain snf choices but she was sleeping soundly. A voice message with left with both Hodan Shaher, her friend & POA's. Referral to PM & R noted.     Electronically signed by ANTHONY Jacobson on 10/29/2020 at 10:05 AM

## 2020-10-29 NOTE — PROGRESS NOTES
Patient still very lethargic. Patient did not react to ABG's being done at bedside. Patient Sp02 was 89% on room air  Upon putting patient back on BIPAP machine setting of 12/6, 35% O2 patient's SP02 increased to 98%.   Will continue to monitor and reassess

## 2020-10-29 NOTE — PROGRESS NOTES
Per Dr. Ramakrishna Lezama, patient not appropriate at this time for ARU. If functional status improves, please re-consult for reconsideration. Recommending DONOVAN level of care at this time.

## 2020-10-29 NOTE — PROGRESS NOTES
Called Dr. Brittnee Fam per RN Claudius Klinefelter  Reason MRI at [facility].   [facility]  10/29/2020  5:16 PM

## 2020-10-29 NOTE — PLAN OF CARE
Problem: Falls - Risk of:  Goal: Will remain free from falls  Description: Will remain free from falls  10/29/2020 0956 by Demond Fitzgerald RN  Outcome: Met This Shift     Problem: Pain:  Goal: Pain level will decrease  Description: Pain level will decrease  10/29/2020 0956 by Demond Fitzgerald RN  Outcome: Met This Shift     Problem: Skin Integrity:  Goal: Absence of new skin breakdown  Description: Absence of new skin breakdown  Outcome: Met This Shift

## 2020-10-29 NOTE — PROGRESS NOTES
Occupational Therapy  OT BEDSIDE TREATMENT NOTE      Date:10/29/2020  Patient Name: Melo Ramírez  MRN: [id number]  : 1945  Room: [address] [place] [address]A     Referring Maria Del Carmen Rodriguez MD     Evaluating OT: Caren Ronnie OTR/L [id number]     AM-PAC Daily Activity Raw Score:      Recommended Adaptive Equipment: TBD     Diagnosis: R fibula fracture.  Pt presents to this hospital post fall. Surgery: 10/25 Right lateral malleolus open reduction and  internal fixation   Pertinent Medical History: HTN, diabetic peripheral neuropathy, DM, DJD, asthma, neurogenic bladder, fibromyalgia, glaucoma, CKD   Precautions:  Falls, NWB R LE, WBAT L LE with CAM boot, Bipap     Home Living: Pt is a poor historian unable to provide PLOF.      Pain Level: No c/o pain     Cognition: Alert to person. Pt lethargic today and difficult to arouse                 Functional Assessment:    Initial Eval Status  Date: 10/26/20 Treatment session: 10/29/20 Short Term Goals      Feeding Dependent  Mod A   Grooming Dependent Dep A  Pt unable to maintain grasp of wash cloth Mod A   UB Dressing Dependent  Mod A   LB Dressing Dependent  Mod A    Bathing Dependent  Mod A   Toileting Dependent   Mod A   Bed Mobility  Supine <> sit: Dependent x2  Rolling: Dependent Dependent x2 supine <> sit       Functional Transfers STS: unable to complete d/t poor trunk control and lethargy  NT due to poor sitting balance. Max A   Functional Mobility NT   Max A during ADLs   Balance Sitting: poor, total assist to maintain sitting balance. Max attempts to engage pt in task  Sitting EOB: x6 min     Standing: unable to attempt Sitting: Max-Dep A  With strong posterior lean demoed     Activity Tolerance Poor  bipap on throughout session Limited with minimal activity sitting chavez x10-15 min with fair/fair minus balance during self care tasks     Standing chavez x1-3 min with poor plus balance           Comments: Upon arrival pt was supine in bed.  At end of session pt was transferred back to bed with alarm on, all lines and tubes intact and call light within reach. Treatment: Pt very difficulty to arouse and appeared lethargic during tx session. Strong posterior lean was ehhibited at EOB and pt was unable to self correct. Pt did not keep eyes open throughout session despite cues. Education: Daily oritentation and benefits of EOB activity    · Pt has made limited progress towards set goals. Plan of Care: 2-5 days/week for 1-2 weeks PRN   [x]? ?? ADL retraining/adaptive techniques and AE recommendations to increase functional independence within precautions                    [x]? ? ? Energy conservation techniques to improve tolerance for ADL/IADLs  [x]? ? ? Functional transfer/mobility training/DME recommendations for increased independence, safety and fall prevention         [x]? ? ? Patient/family education to increase safety and functional independence during daily routine          [x]? ? ? Environmental modifications for safe mobility and completion of ADLs                             []? ?? Cognitive retraining to improve problem solving skills & safe participation in ADLs/IADLs     []? ? ?Sensory re-education techniques to improve extremity awareness, maintain skin integrity and improve hand function                             []? ? ? Visual/Perceptual retraining to improve body awareness and safety during transfers and ADLs  []? ?? Splinting/positioning needs to maintain joint/skin integrity and contracture prevention  [x]? ? ? Therapeutic activity to improve functional performance during ADLs                                        [x]? ? ? Therapeutic exercise to improve tolerance and functional strength for ADLs   [x]? ? ? Balance retraining/tolerance tasks for facilitation of postural control with dynamic challenges during ADLs  []? ? ?Neuromuscular re-education to facilitate righting/equilibrium reactions, midline orientation, scapular stability/mobility, normalize muscle tone and facilitate active functional movement                        []? ? ? Delirium prevention/treatment    [x]? ? Positioning to improve functional independence and decrease risk of skin breakdown  []? ??Other:        Treatment Charges: Mins Units   Ther Ex  [id number]     Manual Therapy Kris Mendoza [id number]   ADL/Home Mgt [id number]     Neuro Re-ed [id number]     Group Therapy      Orthotic manage/training  64555     Non-Billable Time       Time In: 8:00  Time Out: 8:10  Total Time: [address] [place] MARTIN/L [id number]

## 2020-10-29 NOTE — PROGRESS NOTES
[facility] Quality Flow/Interdisciplinary Rounds Progress Note        Quality Flow Rounds held on October 29, 2020    Disciplines Attending:  Bedside Nurse, ,  and Nursing Unit Leadership    Ken Zapien was admitted on 10/24/2020 12:34 AM    Anticipated Discharge Date:  Expected Discharge Date: 11/06/20    Disposition:    Jeremy Score:  Jeremy Scale Score: 16    Readmission Risk              Risk of Unplanned Readmission:        21           Discussed patient goal for the day, patient clinical progression, and barriers to discharge.   The following Goal(s) of the Day/Commitment(s) have been identified:  Discharge [address] [place]  October 29, 2020

## 2020-10-29 NOTE — PROGRESS NOTES
Called Dr. Marissa Camp per RN [facility].  Reason  Patient did not get MRI  Luella Paget  10/29/2020  3:15 PM

## 2020-10-29 NOTE — PROGRESS NOTES
Pulmonary Progress Note    Admit Date: 10/24/2020  Hospital day                               PCP: Maria Eugenia Henry MD    Chief Complaint (s):  Patient Active Problem List   Diagnosis    Essential hypertension    PAULINA (obstructive sleep apnea)    Midline low back pain with right-sided sciatica    Morbid obesity with BMI of 40.0-44.9, adult ([facility]    CVA (cerebral vascular accident) ([facility]    Urinary incontinence    Altered mental status    Closed fracture fibula, head, right, initial encounter    Diabetes mellitus ([facility]    Neurogenic bladder    Sarcoidosis    Sleep apnea    Diffuse cerebral atrophy (HCC)    Asthma    Diabetic peripheral neuropathy (HCC)    Fibromyalgia    CKD (chronic kidney disease) stage 4, GFR 15-29 ml/min (HCC)    Iron deficiency anemia    Encephalopathy    Sarcoid       Subjective:  · On BiPAP this a.m. Somewhat hypoxic on room air. .      Vitals:  VITALS:  /72 Comment: manual x 2  Pulse 61   Temp 98.3 °F (36.8 °C) (Axillary)   Resp 19   Ht 5' 6\" (1.676 m)   Wt 291 lb 14.2 oz (132.4 kg)   SpO2 96%   BMI 47.11 kg/m²     24HR INTAKE/OUTPUT:      Intake/Output Summary (Last 24 hours) at 10/29/2020 1051  Last data filed at 10/29/2020 0704  Gross per 24 hour   Intake --   Output 1250 ml   Net -1250 ml       24HR PULSE OXIMETRY RANGE:    SpO2  Av.8 %  Min: 92 %  Max: 96 %    Medications:  IV:   dextrose         Scheduled Meds:   aspirin  81 mg Oral Daily    clopidogrel  75 mg Oral Daily    atorvastatin  40 mg Oral Nightly    cefTRIAXone (ROCEPHIN) IV  1 g Intravenous Q24H    pantoprazole  40 mg Intravenous Daily    baclofen  20 mg Oral TID    dicyclomine  20 mg Oral 4x Daily AC & HS    DULoxetine  60 mg Oral QAM    hydroxychloroquine  200 mg Oral BID    latanoprost  1 drop Both Eyes Nightly    metoprolol tartrate  25 mg Oral BID    oxybutynin  10 mg Oral BID    pregabalin  100 mg Oral BID    verapamil  240 mg Oral QAM    enoxaparin  40 mg Subcutaneous Daily    insulin lispro  0-6 Units Subcutaneous TID     insulin lispro  0-3 Units Subcutaneous Nightly    Arformoterol Tartrate  15 mcg Nebulization BID    budesonide  250 mcg Nebulization BID    ipratropium-albuterol  1 ampule Inhalation Q4H WA    sodium chloride flush  10 mL Intravenous 2 times per day    sennosides-docusate sodium  1 tablet Oral BID       Diet:   DIET CARB CONTROL; Carb Control: 4 carbs/meal (approximate 1800 kcals/day)  Dietary Nutrition Supplements: Diabetic Oral Supplement     EXAM:  General: No distress. Alert. Sleepy. Eyes: PERRL. No sclera icterus. No conjunctival injection. ENT: No discharge. Pharynx clear. Neck: Trachea midline. Normal thyroid. Resp: No accessory muscle use. No rales. No wheezing. No rhonchi. CV: Regular rate. Regular rhythm. No murmur or rub. Abd: Non-tender. Non-distended. No masses. No organomegaly. Normal bowel sounds. Skin: Warm and dry. No nodule on exposed extremities. No rash on exposed extremities. Ext: No cyanosis, clubbing, edema  Lymph: No cervical LAD. No supraclavicular LAD. M/S: No cyanosis. No joint deformity. No clubbing. Neuro: Awake. Follows commands. Positive pupils/gag/corneals. Normal pain response. Results:  CBC:   Recent Labs     10/27/20  0500 10/28/20  0615 10/29/20  0500   WBC 8.0 8.8 9.5   HGB 11.8 11.1* 11.4*   HCT 37.5 35.4 35.6   MCV 90.6 89.6 88.6    192 206     BMP:   Recent Labs     10/27/20  0500 10/28/20  0615 10/29/20  0500    141 141   K 4.4 4.0 3.6   * 109* 110*   CO2 22 22 21*   PHOS 3.6 3.9 3.8   BUN 25* 27* 29*   CREATININE 1.6* 1.4* 1.4*     LIVER PROFILE:   Recent Labs     10/27/20  0500 10/28/20  0615 10/29/20  0500   AST 8 26 34*   ALT 26 24 27   BILITOT 0.5 0.7 0.7   ALKPHOS 123* 120* 126*     PT/INR:   No results for input(s): PROTIME, INR in the last 72 hours. APTT:   No results for input(s):  APTT in the last 72 hours. Pathology:  1. N/A      Microbiology:  1. None    Recent ABG:   Recent Labs     10/29/20  1009   PH 7.390   PO2 57.3*   PCO2 35.9   HCO3 21.2*   BE -3.1*   O2SAT 90.1*   METHB 0.4   O2HB 88.2*   COHB 1.7*   O2CON 15.7   HHB 9.7*   THB 12.7     FiO2 : 35 %       Recent Films:  XR CHEST PORTABLE   Final Result   Pulmonary edema, increased since the comparison study. US CAROTID ARTERY BILATERAL   Final Result   The right internal carotid artery demonstrates 0-50% stenosis . The left internal carotid artery demonstrates 0-50% stenosis . Neither vertebral artery could be clearly visualized. CT HEAD WO CONTRAST   Final Result   No acute intracranial abnormality. Moderate generalized atrophy and chronic   ischemic/degenerative changes in the white matter         XR CHEST PORTABLE   Final Result   Borderline CHF, unchanged         XR CHEST PORTABLE   Final Result   Cardiomegaly with findings of mild CHF. CT HEAD WO CONTRAST   Final Result   No acute intracranial abnormality. Chronic involutional changes and chronic   microvascular ischemic changes are noted. XR FOOT LEFT (MIN 3 VIEWS)   Final Result   1. No acute fracture. 2.  Chronic fracture involving 5th metatarsal bone. 3.  Stable, satisfactory alignment status post internal fixation at level of   distal calcaneus and tarsal navicular. FLUORO FOR SURGICAL PROCEDURES   Final Result   Intraprocedural fluoroscopic spot images as above. See separate procedure   report for more information. XR ANKLE RIGHT (MIN 3 VIEWS)   Final Result   Mildly displaced distal fibular fracture involving the distal diaphysis   extending into the metaphysis. No other acute fracture is seen. MRI BRAIN WO CONTRAST    (Results Pending)       Assessment:  1. 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 This is resolved. A.m. blood gases on room air show normal PCO2  2.  Day 2 ORIF    Plan:  1. Supplemental oxygen, BiPAP at night. Time at the bedside, reviewing labs and radiographs, reviewing updated notes and consultations, discussing with staff and family was more than 35 minutes. Please note that voice recognition technology was used in the preparation of this note and make therefore it may contain inadvertent transcription errors. If the patient is a COVID 19 isolation patient, the above physical exam reflects that of the examining physician for the day. Sascha Conner M.D., F.C.C.P.     [facility], [address] [place] [address], [address] [place] [address]

## 2020-10-30 ENCOUNTER — APPOINTMENT (OUTPATIENT)
Dept: CT IMAGING | Age: 75
DRG: 492 | End: 2020-10-30
Attending: INTERNAL MEDICINE
Payer: MEDICARE

## 2020-10-30 LAB
ALBUMIN SERPL-MCNC: 2.7 G/DL (ref 3.5–5.2)
ALP BLD-CCNC: 147 U/L (ref 35–104)
ALT SERPL-CCNC: 28 U/L (ref 0–32)
ANION GAP SERPL CALCULATED.3IONS-SCNC: 9 MMOL/L (ref 7–16)
AST SERPL-CCNC: 36 U/L (ref 0–31)
BASOPHILS ABSOLUTE: 0.06 E9/L (ref 0–0.2)
BASOPHILS RELATIVE PERCENT: 0.8 % (ref 0–2)
BILIRUB SERPL-MCNC: 0.5 MG/DL (ref 0–1.2)
BUN BLDV-MCNC: 28 MG/DL (ref 8–23)
CALCIUM SERPL-MCNC: 8.4 MG/DL (ref 8.6–10.2)
CHLORIDE BLD-SCNC: 111 MMOL/L (ref 98–107)
CO2: 25 MMOL/L (ref 22–29)
CREAT SERPL-MCNC: 1.4 MG/DL (ref 0.5–1)
EOSINOPHILS ABSOLUTE: 0.38 E9/L (ref 0.05–0.5)
EOSINOPHILS RELATIVE PERCENT: 5 % (ref 0–6)
GFR AFRICAN AMERICAN: 44
GFR NON-AFRICAN AMERICAN: 37 ML/MIN/1.73
GLUCOSE BLD-MCNC: 93 MG/DL (ref 74–99)
HCT VFR BLD CALC: 34.8 % (ref 34–48)
HEMOGLOBIN: 11 G/DL (ref 11.5–15.5)
IMMATURE GRANULOCYTES #: 0.09 E9/L
IMMATURE GRANULOCYTES %: 1.2 % (ref 0–5)
LYMPHOCYTES ABSOLUTE: 0.92 E9/L (ref 1.5–4)
LYMPHOCYTES RELATIVE PERCENT: 12.2 % (ref 20–42)
MAGNESIUM: 2.2 MG/DL (ref 1.6–2.6)
MCH RBC QN AUTO: 28.5 PG (ref 26–35)
MCHC RBC AUTO-ENTMCNC: 31.6 % (ref 32–34.5)
MCV RBC AUTO: 90.2 FL (ref 80–99.9)
METER GLUCOSE: 100 MG/DL (ref 74–99)
METER GLUCOSE: 119 MG/DL (ref 74–99)
METER GLUCOSE: 77 MG/DL (ref 74–99)
METER GLUCOSE: 97 MG/DL (ref 74–99)
MONOCYTES ABSOLUTE: 0.98 E9/L (ref 0.1–0.95)
MONOCYTES RELATIVE PERCENT: 13 % (ref 2–12)
NEUTROPHILS ABSOLUTE: 5.1 E9/L (ref 1.8–7.3)
NEUTROPHILS RELATIVE PERCENT: 67.8 % (ref 43–80)
PDW BLD-RTO: 14.3 FL (ref 11.5–15)
PHOSPHORUS: 4.1 MG/DL (ref 2.5–4.5)
PLATELET # BLD: 220 E9/L (ref 130–450)
PMV BLD AUTO: 9.6 FL (ref 7–12)
POTASSIUM SERPL-SCNC: 3.9 MMOL/L (ref 3.5–5)
RBC # BLD: 3.86 E12/L (ref 3.5–5.5)
SODIUM BLD-SCNC: 145 MMOL/L (ref 132–146)
TOTAL PROTEIN: 6 G/DL (ref 6.4–8.3)
WBC # BLD: 7.5 E9/L (ref 4.5–11.5)

## 2020-10-30 PROCEDURE — 6360000002 HC RX W HCPCS: Performed by: INTERNAL MEDICINE

## 2020-10-30 PROCEDURE — 83735 ASSAY OF MAGNESIUM: CPT

## 2020-10-30 PROCEDURE — 6370000000 HC RX 637 (ALT 250 FOR IP): Performed by: INTERNAL MEDICINE

## 2020-10-30 PROCEDURE — 2700000000 HC OXYGEN THERAPY PER DAY

## 2020-10-30 PROCEDURE — 80053 COMPREHEN METABOLIC PANEL: CPT

## 2020-10-30 PROCEDURE — 36415 COLL VENOUS BLD VENIPUNCTURE: CPT

## 2020-10-30 PROCEDURE — 70450 CT HEAD/BRAIN W/O DYE: CPT

## 2020-10-30 PROCEDURE — 2580000003 HC RX 258: Performed by: INTERNAL MEDICINE

## 2020-10-30 PROCEDURE — 84100 ASSAY OF PHOSPHORUS: CPT

## 2020-10-30 PROCEDURE — 82962 GLUCOSE BLOOD TEST: CPT

## 2020-10-30 PROCEDURE — 85025 COMPLETE CBC W/AUTO DIFF WBC: CPT

## 2020-10-30 PROCEDURE — 92526 ORAL FUNCTION THERAPY: CPT | Performed by: SPEECH-LANGUAGE PATHOLOGIST

## 2020-10-30 PROCEDURE — 92507 TX SP LANG VOICE COMM INDIV: CPT | Performed by: SPEECH-LANGUAGE PATHOLOGIST

## 2020-10-30 PROCEDURE — 94660 CPAP INITIATION&MGMT: CPT

## 2020-10-30 PROCEDURE — C9113 INJ PANTOPRAZOLE SODIUM, VIA: HCPCS | Performed by: INTERNAL MEDICINE

## 2020-10-30 PROCEDURE — 94640 AIRWAY INHALATION TREATMENT: CPT

## 2020-10-30 PROCEDURE — 97535 SELF CARE MNGMENT TRAINING: CPT

## 2020-10-30 PROCEDURE — 1200000000 HC SEMI PRIVATE

## 2020-10-30 PROCEDURE — 99232 SBSQ HOSP IP/OBS MODERATE 35: CPT | Performed by: PSYCHIATRY & NEUROLOGY

## 2020-10-30 PROCEDURE — 97530 THERAPEUTIC ACTIVITIES: CPT

## 2020-10-30 RX ADMIN — HYDROXYCHLOROQUINE SULFATE 200 MG: 200 TABLET ORAL at 10:52

## 2020-10-30 RX ADMIN — DOCUSATE SODIUM AND SENNOSIDES 1 TABLET: 8.6; 5 TABLET, FILM COATED ORAL at 10:48

## 2020-10-30 RX ADMIN — WATER 1 G: 1 INJECTION INTRAMUSCULAR; INTRAVENOUS; SUBCUTANEOUS at 12:00

## 2020-10-30 RX ADMIN — CLOPIDOGREL BISULFATE 75 MG: 75 TABLET ORAL at 10:49

## 2020-10-30 RX ADMIN — BACLOFEN 20 MG: 10 TABLET ORAL at 10:49

## 2020-10-30 RX ADMIN — BUDESONIDE 250 MCG: 0.25 SUSPENSION RESPIRATORY (INHALATION) at 09:51

## 2020-10-30 RX ADMIN — IPRATROPIUM BROMIDE AND ALBUTEROL SULFATE 1 AMPULE: 2.5; .5 SOLUTION RESPIRATORY (INHALATION) at 09:51

## 2020-10-30 RX ADMIN — ARFORMOTEROL TARTRATE 15 MCG: 15 SOLUTION RESPIRATORY (INHALATION) at 21:47

## 2020-10-30 RX ADMIN — VERAPAMIL HYDROCHLORIDE 240 MG: 240 TABLET, FILM COATED, EXTENDED RELEASE ORAL at 10:52

## 2020-10-30 RX ADMIN — ATORVASTATIN CALCIUM 40 MG: 40 TABLET, FILM COATED ORAL at 23:58

## 2020-10-30 RX ADMIN — ENOXAPARIN SODIUM 40 MG: 40 INJECTION SUBCUTANEOUS at 10:47

## 2020-10-30 RX ADMIN — IPRATROPIUM BROMIDE AND ALBUTEROL SULFATE 1 AMPULE: 2.5; .5 SOLUTION RESPIRATORY (INHALATION) at 16:38

## 2020-10-30 RX ADMIN — BUDESONIDE 250 MCG: 0.25 SUSPENSION RESPIRATORY (INHALATION) at 21:47

## 2020-10-30 RX ADMIN — PREGABALIN 100 MG: 50 CAPSULE ORAL at 23:59

## 2020-10-30 RX ADMIN — ASPIRIN 81 MG: 81 TABLET, CHEWABLE ORAL at 10:49

## 2020-10-30 RX ADMIN — DICYCLOMINE HYDROCHLORIDE 20 MG: 10 CAPSULE ORAL at 10:52

## 2020-10-30 RX ADMIN — Medication 10 ML: at 10:49

## 2020-10-30 RX ADMIN — OXYBUTYNIN CHLORIDE 10 MG: 10 TABLET, EXTENDED RELEASE ORAL at 16:32

## 2020-10-30 RX ADMIN — ARFORMOTEROL TARTRATE 15 MCG: 15 SOLUTION RESPIRATORY (INHALATION) at 09:51

## 2020-10-30 RX ADMIN — METOPROLOL TARTRATE 25 MG: 25 TABLET, FILM COATED ORAL at 10:48

## 2020-10-30 RX ADMIN — IPRATROPIUM BROMIDE AND ALBUTEROL SULFATE 1 AMPULE: 2.5; .5 SOLUTION RESPIRATORY (INHALATION) at 21:47

## 2020-10-30 RX ADMIN — DULOXETINE HYDROCHLORIDE 60 MG: 60 CAPSULE, DELAYED RELEASE ORAL at 10:49

## 2020-10-30 RX ADMIN — OXYBUTYNIN CHLORIDE 10 MG: 10 TABLET, EXTENDED RELEASE ORAL at 10:52

## 2020-10-30 RX ADMIN — BACLOFEN 20 MG: 10 TABLET ORAL at 14:56

## 2020-10-30 RX ADMIN — IPRATROPIUM BROMIDE AND ALBUTEROL SULFATE 1 AMPULE: 2.5; .5 SOLUTION RESPIRATORY (INHALATION) at 14:14

## 2020-10-30 RX ADMIN — DOCUSATE SODIUM AND SENNOSIDES 1 TABLET: 8.6; 5 TABLET, FILM COATED ORAL at 23:58

## 2020-10-30 RX ADMIN — PANTOPRAZOLE SODIUM 40 MG: 40 INJECTION, POWDER, FOR SOLUTION INTRAVENOUS at 10:49

## 2020-10-30 RX ADMIN — PREGABALIN 100 MG: 50 CAPSULE ORAL at 10:48

## 2020-10-30 ASSESSMENT — PAIN SCALES - WONG BAKER: WONGBAKER_NUMERICALRESPONSE: 0

## 2020-10-30 ASSESSMENT — PAIN SCALES - GENERAL: PAINLEVEL_OUTOF10: 0

## 2020-10-30 NOTE — PROGRESS NOTES
Physical Therapy  Facility/Department: [address] ORTHO SURGERY  Daily Treatment Note  NAME: Mia Yadav  : 1945  MRN: [id number]    Date of Service: 10/30/2020    Patient Diagnosis(es): The encounter diagnosis was Closed right ankle fracture, initial encounter. has a past medical history of Asthma, Cerebral artery occlusion with cerebral infarction ([address] CKD (chronic kidney disease) stage 4, GFR 15-29 ml/min (HCC), Degenerative disc disease, Degenerative joint disease, Diabetes mellitus ([address] Diabetic peripheral neuropathy ([address] Diffuse cerebral atrophy ([address] Fibromyalgia, Glaucoma, HX OTHER MEDICAL, Hypertension, Iron deficiency anemia, Neurogenic bladder, Neuropathy, PONV (postoperative nausea and vomiting), Sarcoidosis, Sleep apnea, and Spinal cord and nerve root disorder. has a past surgical history that includes Foot surgery (Bilateral); Hysterectomy; back surgery; Total knee arthroplasty (Bilateral); Cholecystectomy; Colonoscopy (2019); other surgical history (Right, 2017); Breast surgery; joint replacement (2016); Cardiac catheterization (); and Ankle fracture surgery (Right, 10/24/2020).        Evaluating PT:  Lalo Thomson PT      Room #:  0720/0720-A  Diagnosis:  RT lateral ankle Fx, closed fx RT fibular head  PMHx/PSHx:  DM, HTN, Fibro, B/L TKA, RT ASHTYN, Asthma, peripheral neuropathy, DJD, DDD, Neurogenic bladder, sarcoidosis, cerebral artery occlusion with infarction.   Procedure/Surgery:  RT ankle ORIF  Precautions:  NWB RTLE, Can WT bear LTLE in CAM Boot, General, Falls,   Equipment Needs:  WW, WC     SUBJECTIVE:     Pt lives with ? in a ? story home with ? stairs to enter and ? rail.  Bed is on ? floor and bath is on ? floor.  Pt ambulated with ?Kareen Bueno is alert to herself only.  Uncertain to date, place, home setting, steps to from home, what AD if any she uses.      OBJECTIVE:    Initial Evaluation  Date: 10/25/20 Treatment  10/30 Short Term/ Long Term   Goals AM-PAC 6 Clicks 0/87 9/60      Was pt agreeable to Eval/treatment? Yes       Does pt have pain? Yes RT leg/ankle R LE      Bed Mobility  Rolling: Mod/Max  Supine to sit: Max  Sit to supine: Max/dep  Scooting: Max/dep Rolling dependent  Scooting dependent of 2  Supine <> sit dependent of 2   Improved all levels to Min-Mod x 2-1   Transfers Sit to stand: Max/dep assist attempted; unsuccessful-unacceptabrisk for injury      NT   Mod x 2 transfers   Ambulation   SHAHRZAD    NT  WW with Min/Mod x 2 assist 5'    Stair negotiation: ascended and descended SHAHRZAD   NT      ROM  WFL x RT ankle NA       Strength  BLE 2-3-/5 hip, knee 3/5    Imp MMT 1 grade globally BLE   Balance Sitting EOB:  CGA  Dynamic Standing:  Zero   Sitting EOB:  Indep/S  Dynamic Standing:  Mod x 2-1 [employer]         Patient education  Pt was educated on importance of mobilityno    Patient response to education:   Pt verbalized understanding Pt demonstrated skill Pt requires further education in this area        yes     Additional Comments/treatment: Functional mobility as above. Pt more alert but continues to bed confused. Pt mod to dependent to sit edge of bed. Pushes posterior. Pt was left in bed with call light left by patient. Time in: 0805  Time out: 0820    Total treatment time 15 minutes  Pt is making poor progress toward established Physical Therapy goals. Continue with physical therapy current plan of care.     Dionna PT [id number]      CPT codes:  [] Low Complexity PT evaluation 19397  [] Moderate Complexity PT evaluation 41166  [] High Complexity PT evaluation 65268  [] PT Re-evaluation 73402  [] Gait training 97037  minutes  [] Manual therapy 19354    minutes  [x] Therapeutic activities 07805 15   minutes  [] Therapeutic exercises 26954     minutes  [] Neuromuscular reeducation 47567     minutes

## 2020-10-30 NOTE — DISCHARGE INSTR - COC
Continuity of Care Form    Patient Name: Marge Nguyen   :  1945  MRN:  [id number]    Admit date:  10/24/2020  Discharge date:  ***    Code Status Order: Full Code   Advance Directives:   Advance Care Flowsheet Documentation     Date/Time Healthcare Directive Type of Healthcare Directive Copy in [address] Agent's Name Healthcare Agent's Phone Number    10/24/20 1312  Yes, patient has an advance directive for healthcare treatment  Durable power of  for health care  Other (Comment) family to bring in   Healthcare power of   Brett Collins --    10/24/20 0057  No, patient does not have an advance directive for healthcare treatment  --  --  --  -- --          Admitting Physician:  Noah Duran MD  PCP: Noah Duran MD    Discharging Nurse: [facility] Unit/Room#: 1576/7400-O  Discharging Unit Phone Number: [phone]    Emergency Contact:   Extended Emergency Contact Information  Primary Emergency Contact: Marylou Campos [facility])  Address: [address] [place] [address] Phone: [phone]  Mobile Phone: [phone]  Relation: Other  Secondary Emergency Contact: Sharda [facility])  Home Phone: [phone]  Relation: Other    Past Surgical History:  Past Surgical History:   Procedure Laterality Date    ANKLE FRACTURE SURGERY Right 10/24/2020    RIGHT ANKLE OPEN REDUCTION INTERNAL FIXATION performed by Elizabet Song MD at [address] [place] [address]      laminectomy l3-4    BREAST SURGERY      biopsy    CARDIAC CATHETERIZATION      MINIMAL CAD    CHOLECYSTECTOMY      COLONOSCOPY  2019    TUBULAR ADENOMA x 2    FOOT SURGERY Bilateral     right foot fracture; left foot charcot    HYSTERECTOMY      JOINT REPLACEMENT  2016    right hip arthroplasty    OTHER SURGICAL HISTORY Right 2017    I & D right hip wound vaccum placement    TOTAL KNEE ARTHROPLASTY Bilateral Immunization History:   Immunization History   Administered Date(s) Administered    Influenza Vaccine, unspecified formulation 09/01/2016    Pneumococcal Conjugate Vaccine 09/01/2016       Active Problems:  Patient Active Problem List   Diagnosis Code    Essential hypertension I10    PAULINA (obstructive sleep apnea) G47.33    Midline low back pain with right-sided sciatica M54.41    Morbid obesity with BMI of 40.0-44.9, adult ([facility]) E66.01, Z68.41    CVA (cerebral vascular accident) ([facility] I63.9    Urinary incontinence R32    Altered mental status R41.82    Closed fracture fibula, head, right, initial encounter S82.831A    Diabetes mellitus ([facility] E11.9    Neurogenic bladder N31.9    Sarcoidosis D86.9    Sleep apnea G47.30    Diffuse cerebral atrophy ([facility]) G31.9    Asthma J45.909    Diabetic peripheral neuropathy ([facility]) E11.42    Fibromyalgia M79.7    CKD (chronic kidney disease) stage 4, GFR 15-29 ml/min ([facility]) N18.4    Iron deficiency anemia D50.9    Encephalopathy G93.40    Sarcoid D86.9       Isolation/Infection:   Isolation          No Isolation        Patient Infection Status     Infection Onset Added Last Indicated Last Indicated By Review Planned Expiration Resolved Resolved By    None active    Resolved    COVID-19 Rule Out 10/26/20 10/26/20 10/26/20 COVID-19 (Ordered)   10/26/20 Rule-Out Test Resulted          Nurse Assessment:  Last Vital Signs: BP (!) 150/74   Pulse 66   Temp 98.1 °F (36.7 °C) (Oral)   Resp 17   Ht 5' 6\" (1.676 m)   Wt 291 lb 14.2 oz (132.4 kg)   SpO2 94%   BMI 47.11 kg/m²     Last documented pain score (0-10 scale): Pain Level: 0  Last Weight:   Wt Readings from Last 1 Encounters:   10/28/20 291 lb 14.2 oz (132.4 kg)     Mental Status:  {IP PT MENTAL STATUS:20030}    IV Access:  { TO IV ACCESS:129526276}    Nursing Mobility/ADLs:  Walking   {CHP DME XDXX:202984416}  Transfer  {CHP DME SBUZ:942909124}  Bathing  {CHP DME XSIC:391817162}  Dressing  {CHP DME WJLS:030162527}  Toileting  {CHP DME TMLP:564894370}  Feeding  {P DME ZQKD:386446557}  Med Admin  {P DME BXKS:497571677}  Med Delivery   { TO MED Delivery:631158962}    Wound Care Documentation and Therapy:  Wound 10/21/17 Other (Comment) Groin Left pinhole (Active)   Number of days: 1105        Elimination:  Continence:   · Bowel: {YES / CH:39730}  · Bladder: {YES / DA:87029}  Urinary Catheter: {Urinary Catheter:159414188}   Colostomy/Ileostomy/Ileal Conduit: {YES / MN:93435}       Date of Last BM: ***    Intake/Output Summary (Last 24 hours) at 10/30/2020 1414  Last data filed at 10/30/2020 1322  Gross per 24 hour   Intake --   Output 1300 ml   Net -1300 ml     I/O last 3 completed shifts:  In: -   Out: 450 [Urine:450]    Safety Concerns:     [address] Safety Concerns:089986782}    Impairments/Disabilities:      [address] Impairments/Disabilities:080322046}    Nutrition Therapy:  Current Nutrition Therapy:   [address] [employer] Diet List:337181829}    Routes of Feeding: {[facility] DME Other Feedings:930467819}  Liquids: {Slp liquid thickness:69380}  Daily Fluid Restriction: {P DME Yes amt example:499274670}  Last Modified Barium Swallow with Video (Video Swallowing Test): {Done Not Done ENME:965994776}    Treatments at the Time of Hospital Discharge:   Respiratory Treatments: ***  Oxygen Therapy:  {Therapy; copd oxygen:49838}  Ventilator:    { CC Vent XVUE:697592676}    Rehab Therapies: Physical Therapy and Occupational Therapy  Weight Bearing Status/Restrictions: [employer] Weight Bearin}  Other Medical Equipment (for information only, NOT a DME order):  {EQUIPMENT:516477502}  Other Treatments: ***    Patient's personal belongings (please select all that are sent with patient):  {[facility] DME Belongings:059355356}    RN SIGNATURE:  {Esignature:474619610}    CASE MANAGEMENT/SOCIAL WORK SECTION    Inpatient Status Date: ***    Readmission Risk Assessment Score:  Readmission Risk              Risk of Unplanned Readmission:

## 2020-10-30 NOTE — PROGRESS NOTES
Family Medicine    Subjective: The patient is much more alert this AM. Is much more conversive. Recognizes me. A & O x 3. Able to answer questions. Still some confusion. Objective:  BP (!) 140/76   Pulse 57   Temp 97.9 °F (36.6 °C) (Axillary)   Resp 17   Ht 5' 6\" (1.676 m)   Wt 291 lb 14.2 oz (132.4 kg)   SpO2 100%   BMI 47.11 kg/m²     HEENT: MMM. Heart: RRR. Lungs: CTA bilaterally. Abd: bowel sounds present, nontender, nondistended, no masses. Extrem:  No clubbing, cyanosis, or edema. Neuro: More alert. No focal deficits. CBC with Differential:    Lab Results   Component Value Date    WBC 7.5 10/30/2020    RBC 3.86 10/30/2020    HGB 11.0 10/30/2020    HCT 34.8 10/30/2020     10/30/2020    MCV 90.2 10/30/2020    MCH 28.5 10/30/2020    MCHC 31.6 10/30/2020    RDW 14.3 10/30/2020    LYMPHOPCT 12.2 10/30/2020    MONOPCT 13.0 10/30/2020    BASOPCT 0.8 10/30/2020    MONOSABS 0.98 10/30/2020    LYMPHSABS 0.92 10/30/2020    EOSABS 0.38 10/30/2020    BASOSABS 0.06 10/30/2020     CMP:    Lab Results   Component Value Date     10/30/2020    K 3.9 10/30/2020     10/30/2020    CO2 25 10/30/2020    BUN 28 10/30/2020    CREATININE 1.4 10/30/2020    GFRAA 44 10/30/2020    LABGLOM 37 10/30/2020    GLUCOSE 93 10/30/2020    GLUCOSE 82 12/09/2011    PROT 6.0 10/30/2020    LABALBU 2.7 10/30/2020    CALCIUM 8.4 10/30/2020    BILITOT 0.5 10/30/2020    ALKPHOS 147 10/30/2020    AST 36 10/30/2020    ALT 28 10/30/2020     Assessment/Plan:  1. Altered mental status - etiology unclear. Most likely CVA given timeframe and persistent symptoms. CT head WO contrast without acute changes on 10/27. Repeat today also without acute changes. Neurology on case. EEG abnormal. MRI brain ordered. Patient too large for MRI machine. Exploring other MRI facilities. Also ? contribution form CNS depression from narcotic medication - however minimal improvement from Narcan.  Patient also on Bentyl and Oxybutinin which may contribute due to anticholinergic effects - will D/C these. Also ? Contribution from UTI. 2. Acute hypoxic respiratory failure - Stable. 3. Right fibula fracture status post ORIF - Orthopedics on case. Stable. Non weight bearing. PT/OT. DONOVAN at D/C.   4. Hypertension - Stable. 5. Morbid obesity - Stable. 6. Diabetes mellitus type 2 - Stable. 7. Chronic kidney disease - Nephrology on case. Stable. 8. Obstructive sleep apnea - stable. 9. History of CVA - stable. 10. Sarcoidosis - stable. 11. Fibromyalgia - stable. 12. Asthma - stable. 13. Hyperlipidemia - statin. 14. UTI - on Rocephin. 15. Disposition - as above. Continue same.     Germain Rangel  7:38 AM  10/30/2020

## 2020-10-30 NOTE — PROGRESS NOTES
Nutrition Assessment     Type and Reason for Visit: Initial, RD Nutrition Re-Screen/LOS(RD Re-Screen Negative)    Nutrition Assessment:  Pt assessed per LOS protocol. Chart reviewed. Pt currently w/ no noted poor jim/intake (per notes and per RN) and w/ no other significant nutritional issues noted at this time. Will Continue Glucerna Diabetic ONS to aid in PO intake/healing and will follow-up per policy. Please consult if RD needed.     Electronically signed by Pat Palacios RD, LD on 10/30/20 at 2:39 PM EDT    Contact: ext [phone]

## 2020-10-30 NOTE — PROGRESS NOTES
NEUROLOGY    CT brain images reviewed - there is evidence of severe small vessel disease, but I can't see anything clearly new comparing to previous CTs. See progress note from earlier today. Continue Tx as if this were a new stroke. An MRI would still be interesting. Consider reducing Ditropan and Bentyl. Consider an out-patient Neurology follow up, but unfortunately I, myself, do not have an office.

## 2020-10-30 NOTE — CARE COORDINATION
Social Work discharge 1 [facility] discussed discharge planning with CM. Plan is now [facility]. [facility] is contracted with [facility] phone [phone]. HENS done and copy placed in pt's folder with ambulance forms.   Electronically signed by Jose Interiano on 10/30/2020 at 2:26 PM

## 2020-10-30 NOTE — CARE COORDINATION
After lengthy conversion with pt and friend, Niall Aragon, pt has agreed to DONOVAN. [facility] has accepted the patient and Codie Rojo has completed all forms. Pt for ct of head today. Will need to plan for discharge tomorrow via [facility]. Facility aware.  Pt had COVID negative on 10/27-o

## 2020-10-30 NOTE — PROGRESS NOTES
Occupational Therapy  OT BEDSIDE TREATMENT NOTE      Date:10/30/2020  Patient Name: Ines Zavala  MRN: [id number]  : 1945  Room: [address] [place] [address]     Referring Geoff Byrd MD     Evaluating OT: Theda Cranker OTR/L [id number]     AM-PAC Daily Activity Raw Score:      Recommended Adaptive Equipment: TBD     Diagnosis: R fibula fracture.  Pt presents to this hospital post fall. Surgery: 10/25 Right lateral malleolus open reduction and  internal fixation   Pertinent Medical History: HTN, diabetic peripheral neuropathy, DM, DJD, asthma, neurogenic bladder, fibromyalgia, glaucoma, CKD   Precautions:  Falls, NWB R LE, WBAT L LE with CAM boot, Bipap     Home Living: Pt is a poor historian unable to provide PLOF.      Pain Level: No c/o pain     Cognition: Alert and conversing with poor safety awareness                 Functional Assessment:    Initial Eval Status  Date: 10/26/20 Treatment session: 10/30/20 Short Term Goals      Feeding Dependent Max A  Seated at EOB  Mod A   Grooming Dependent  Mod A   UB Dressing Dependent   Mod A   LB Dressing Dependent   Mod A    Bathing Dependent   Mod A   Toileting Dependent   Mod A   Bed Mobility  Supine <> sit: Dependent x2  Rolling: Dependent Dependent x2 supine <> sit       Functional Transfers STS: unable to complete d/t poor trunk control and lethargy  NT due to poor balance Max A   Functional Mobility NT   Max A during ADLs   Balance Sitting: poor, total assist to maintain sitting balance. Max attempts to engage pt in task  Sitting EOB: x6 min     Standing: unable to attempt Sitting: Max-Dep A  With strong posterior lean demoed     Activity Tolerance Poor  bipap on throughout session Limited sitting tolerance. sitting chavez x10-15 min with fair/fair minus balance during self care tasks     Standing chavez x1-3 min with poor plus balance         Comments: Upon arrival pt was supine in bed.  At end of session pt was transferred back to bed with alarm on, all lines and tubes intact and call light within reach. Treatment: Pt appeared more alert today than previous session; however, pt unable to follow commands to correct seated posture at EOB. Strong posterior lean present. ADL performed seated at EOB to improve balance and activity tolerance. Education: Safety training to maximize independence with ADLs. · Pt has made limited progress towards set goals. Plan of Care: 2-5 days/week for 1-2 weeks PRN   [x]????ADL retraining/adaptive techniques and AE recommendations to increase functional independence within precautions                    [x]? ? ??Energy conservation techniques to improve tolerance for ADL/IADLs  [x]???? Functional transfer/mobility training/DME recommendations for increased independence, safety and fall prevention         [x]????Patient/family education to increase safety and functional independence during daily routine          [x]? ? ??Environmental modifications for safe mobility and completion of ADLs                             []????Cognitive retraining to improve problem solving skills & safe participation in ADLs/IADLs     []?? ??Sensory re-education techniques to improve extremity awareness, maintain skin integrity and improve hand function                             []?? ??Visual/Perceptual retraining to improve body awareness and safety during transfers and ADLs  []????Splinting/positioning needs to maintain joint/skin integrity and contracture prevention  [x]?? ?? Therapeutic activity to improve functional performance during ADLs                                        [x]? ? ?? Therapeutic exercise to improve tolerance and functional strength for ADLs   [x]????Balance retraining/tolerance tasks for facilitation of postural control with dynamic challenges during ADLs  []????Neuromuscular re-education to facilitate righting/equilibrium reactions, midline orientation, scapular stability/mobility, normalize muscle tone and facilitate active functional movement                        []????Delirium prevention/treatment    [x]? ? ? Positioning to improve functional independence and decrease risk of skin breakdown  []????Other:        Treatment Charges: Mins Units   Ther Ex  [id number]     Manual Therapy [id number]     Thera Activities 99378 5 0   ADL/Home Mgt 44354 10 1   Neuro Re-ed 66662     Group Therapy      Orthotic manage/training  [id number]     Non-Billable Time       Time In: 8:00  Time Out: 8:15  Total Time: [address] MARTIN/L [id number]

## 2020-10-30 NOTE — PROGRESS NOTES
[facility] Quality Flow/Interdisciplinary Rounds Progress Note        Quality Flow Rounds held on October 30, 2020    Disciplines Attending:  Bedside Nurse, ,  and Nursing Unit Leadership    Mia Yadav was admitted on 10/24/2020 12:34 AM    Anticipated Discharge Date:  Expected Discharge Date: 11/06/20    Disposition:    Jeremy Score:  Jeremy Scale Score: 11    Readmission Risk              Risk of Unplanned Readmission:        21           Discussed patient goal for the day, patient clinical progression, and barriers to discharge.   The following Goal(s) of the Day/Commitment(s) have been identified: orientation baseline return/safety       [facility]  October 30, 2020

## 2020-10-30 NOTE — PROGRESS NOTES
Date: 10/29/2020    Time: 10:50 PM    Patient Placed On BIPAP/CPAP/ Non-Invasive Ventilation? Yes    If no must comment. Facial area red/color change? No           If YES are Blister/Lesion present? No   If yes must notify nursing staff  BIPAP/CPAP skin barrier?   Yes    Skin barrier type:mepilexlite       Comments:        Migel Guerrier

## 2020-10-30 NOTE — PROGRESS NOTES
[id number]  Speech Pathologist              CPT code(s) 64971  speech/language tx  30011  dysphagia tx  Total minutes :  30 minutes

## 2020-10-30 NOTE — PROGRESS NOTES
[facility] MD Demetria Bray, MD Everardo Pina, MD Valeria Silveira, CNP   Sofi Mckeon, TANJA  Progress Note    10/30/2020    SUBJECTIVE:   10/26:   MS declined yesterday evening -- unresponsive, RRT called, transferred to MICU. On cpap. Sleeping, appears comfortable. Difficult to arouse. Hypotensive in the early am -- improved, now BP stable x > 6 hrs. 10/27: Awake, minimally alert, eyes open, smiling, though does not answer questions nor follow commands. Sister at bedside notes this is been the case since she arrived this morning. Hemodynamically stable. Not eating or drinking. 10/28: More awake and alert, cognitive function improved markedly though still having some difficulty with word finding. Hemodynamically stable. Mild peripheral edema. Denies dyspnea. No chest pain. No pain. Good appetite and intake. 10/30: Continued expressive and receptive aphasia. Otherwise denies complaints. Denies dyspnea at rest on room air. Good appetite. No apparent peripheral swelling, though she tells me she feels swollen in her hands and arms. PROBLEM LIST:    Principal Problem:    Closed fracture fibula, head, right, initial encounter  Active Problems:    Essential hypertension    Morbid obesity with BMI of 40.0-44.9, adult (HCC)    Diabetes mellitus ([facility] [address]    Neurogenic bladder    Sarcoidosis    Sleep apnea    Diffuse cerebral atrophy (HCC)    Asthma    Diabetic peripheral neuropathy (HCC)    Fibromyalgia    CKD (chronic kidney disease) stage 4, GFR 15-29 ml/min (HCC)    Iron deficiency anemia    Encephalopathy    Sarcoid  Resolved Problems:    * No resolved hospital problems.  *         DIET:    DIET CARB CONTROL; Carb Control: 4 carbs/meal (approximate 1800 kcals/day)  Dietary Nutrition Supplements: Diabetic Oral Supplement     MEDS (scheduled):    aspirin  81 mg Oral Daily    clopidogrel  75 mg Oral Daily    atorvastatin  40 mg Oral Nightly    cefTRIAXone (ROCEPHIN) IV  1 g Intravenous Q24H    pantoprazole  40 mg Intravenous Daily    baclofen  20 mg Oral TID    dicyclomine  20 mg Oral 4x Daily AC & HS    DULoxetine  60 mg Oral QAM    hydroxychloroquine  200 mg Oral BID    latanoprost  1 drop Both Eyes Nightly    metoprolol tartrate  25 mg Oral BID    oxybutynin  10 mg Oral BID    pregabalin  100 mg Oral BID    verapamil  240 mg Oral QAM    enoxaparin  40 mg Subcutaneous Daily    insulin lispro  0-6 Units Subcutaneous TID WC    insulin lispro  0-3 Units Subcutaneous Nightly    Arformoterol Tartrate  15 mcg Nebulization BID    budesonide  250 mcg Nebulization BID    ipratropium-albuterol  1 ampule Inhalation Q4H WA    sodium chloride flush  10 mL Intravenous 2 times per day    sennosides-docusate sodium  1 tablet Oral BID       MEDS (infusions):   dextrose         MEDS (prn):  acetaminophen **OR** acetaminophen, magnesium hydroxide, glucose, dextrose, glucagon (rDNA), dextrose, trimethobenzamide, sodium chloride flush, bisacodyl    PHYSICAL EXAM:     Patient Vitals for the past 24 hrs:   BP Temp Temp src Pulse Resp SpO2   10/30/20 1215 -- 98.1 °F (36.7 °C) Oral 66 -- 94 %   10/30/20 0956 -- -- -- -- -- 96 %   10/30/20 0955 -- -- -- -- -- 96 %   10/30/20 0954 -- -- -- -- -- 96 %   10/30/20 0700 (!) 140/76 97.9 °F (36.6 °C) Axillary 57 17 100 %   10/30/20 0408 -- -- -- -- 18 --   10/30/20 0105 -- -- -- -- 16 --   10/30/20 0032 135/62 97.7 °F (36.5 °C) Axillary 64 20 --   10/29/20 2251 -- -- -- -- 19 --   10/29/20 2122 (!) 150/70 98.4 °F (36.9 °C) Axillary 74 18 97 %   10/29/20 1230 136/72 99.9 °F (37.7 °C) Axillary 63 19 99 %   @      Intake/Output Summary (Last 24 hours) at 10/30/2020 1228  Last data filed at 10/29/2020 1537  Gross per 24 hour   Intake --   Output 200 ml   Net -200 ml         Wt Readings from Last 3 Encounters:   10/28/20 291 lb 14.2 oz (132.4 kg)   01/28/20 250 lb (113.4 kg)   10/24/17 265 lb (120.2 kg) Constitutional:  in no acute distress, on cpap  HEENT: NC/AT, mucus membranes dry on cpap  Neck: Trachea midline, no JVD  Cardiovascular: S1, S2 regular rhythm, no murmur,or rub  Respiratory:  No crackles, no wheeze  Gastrointestinal:  Soft, nontender, nondistended, NABS  Ext: Very scant dependent chronic-type edema, no change, feet warm  Skin: dry, no rash  Neuro: awake, alert, interactive      DATA:    Recent Labs     10/28/20  0615 10/29/20  0500 10/30/20  0515   WBC 8.8 9.5 7.5   HGB 11.1* 11.4* 11.0*   HCT 35.4 35.6 34.8   MCV 89.6 88.6 90.2    206 220     Recent Labs     10/28/20  0615 10/29/20  0500 10/30/20  0515    141 145   K 4.0 3.6 3.9   * 110* 111*   CO2 22 21* 25   MG 2.1 2.2 2.2   PHOS 3.9 3.8 4.1   BUN 27* 29* 28*   CREATININE 1.4* 1.4* 1.4*   ALT 24 27 28   AST 26 34* 36*   BILITOT 0.7 0.7 0.5   ALKPHOS 120* 126* 147*       Lab Results   Component Value Date    LABPROT 0.1 01/13/2018    LABPROT 0.1 01/13/2018          Assessment  1. Chronic kidney disease stage IV, baseline creatinine 1.7 - 1.9 mg/dL, current estimated GFR 26 mL/min secondary to diabetic nephropathy likely exacerbated by renal microvascular atherosclerotic disease in setting of longstanding hypertension, as well. Current creatinine is at her baseline, which has been stable now for a number of years. 2. Hypertension well-controlled on current medication regimen. ACE/ARB none. Her medication list.  I will check my records at the office to see why not. I would not start 1 at this time  3. Osteoarthritis, multifactorial  4. Morbid obesity, BMI 44  5. PAULINA, on cpap while asleep  6. Diabetic retinopathy, diabetic neuropathy  7. Hyperphosphatemia, mild, secondary to CKD. Would not start phosphate binder at level      Azotemia improved, stable creatinine actually somewhat below her usual baseline.   Ate all of her breakfast and 75% lunch  Cognitive function improv improved, though seems to have plateaued over the last 2 days      Recommendations  1. Stop Celebrex (done) and leave off  2. Given [Na+] at upper limit of normal, and the mild hyperchloremia, would not resume diuretic therapy yet, but rather encourage oral fluid intake  3. Otherwise continue current aspects of renal management  4. Follow labs, UO  5.  Avoid nephrotoxins including contrast except as absolutely necessary      Electronically signed by Tadeo Harrison MD on 10/30/2020

## 2020-10-30 NOTE — PROGRESS NOTES
Pulmonary Progress Note    Admit Date: 10/24/2020  Hospital day                               PCP: Doe Elizabeth MD    Chief Complaint (s):  Patient Active Problem List   Diagnosis    Essential hypertension    PAULINA (obstructive sleep apnea)    Midline low back pain with right-sided sciatica    Morbid obesity with BMI of 40.0-44.9, adult ([facility] [address]    CVA (cerebral vascular accident) ([facility] [address]    Urinary incontinence    Altered mental status    Closed fracture fibula, head, right, initial encounter    Diabetes mellitus ([facility] [address]    Neurogenic bladder    Sarcoidosis    Sleep apnea    Diffuse cerebral atrophy ([facility] [address]    Asthma    Diabetic peripheral neuropathy (HCC)    Fibromyalgia    CKD (chronic kidney disease) stage 4, GFR 15-29 ml/min (HCC)    Iron deficiency anemia    Encephalopathy    Sarcoid       Subjective:  · Awake, alert and conversant this a.m., relatively dramatic change from yesterday.       Vitals:  VITALS:  BP (!) 150/74   Pulse 66   Temp 98.1 °F (36.7 °C) (Oral)   Resp 17   Ht 5' 6\" (1.676 m)   Wt 291 lb 14.2 oz (132.4 kg)   SpO2 94%   BMI 47.11 kg/m²     24HR INTAKE/OUTPUT:      Intake/Output Summary (Last 24 hours) at 10/30/2020 1324  Last data filed at 10/30/2020 1322  Gross per 24 hour   Intake --   Output 1300 ml   Net -1300 ml       24HR PULSE OXIMETRY RANGE:    SpO2  Av.5 %  Min: 94 %  Max: 100 %    Medications:  IV:   dextrose         Scheduled Meds:   aspirin  81 mg Oral Daily    clopidogrel  75 mg Oral Daily    atorvastatin  40 mg Oral Nightly    cefTRIAXone (ROCEPHIN) IV  1 g Intravenous Q24H    pantoprazole  40 mg Intravenous Daily    baclofen  20 mg Oral TID    dicyclomine  20 mg Oral 4x Daily AC & HS    DULoxetine  60 mg Oral QAM    hydroxychloroquine  200 mg Oral BID    latanoprost  1 drop Both Eyes Nightly    metoprolol tartrate  25 mg Oral BID    oxybutynin  10 mg Oral BID    pregabalin  100 mg Oral BID    verapamil  240 mg Oral QAM    hours. Pathology:  1. N/A      Microbiology:  1. None    Recent ABG:   Recent Labs     10/29/20  1009   PH 7.390   PO2 57.3*   PCO2 35.9   HCO3 21.2*   BE -3.1*   O2SAT 90.1*   METHB 0.4   O2HB 88.2*   COHB 1.7*   O2CON 15.7   HHB 9.7*   THB 12.7     FiO2 : 45 %       Recent Films:  XR CHEST PORTABLE   Final Result   Pulmonary edema, increased since the comparison study. US CAROTID ARTERY BILATERAL   Final Result   The right internal carotid artery demonstrates 0-50% stenosis . The left internal carotid artery demonstrates 0-50% stenosis . Neither vertebral artery could be clearly visualized. CT HEAD WO CONTRAST   Final Result   No acute intracranial abnormality. Moderate generalized atrophy and chronic   ischemic/degenerative changes in the white matter         XR CHEST PORTABLE   Final Result   Borderline CHF, unchanged         XR CHEST PORTABLE   Final Result   Cardiomegaly with findings of mild CHF. CT HEAD WO CONTRAST   Final Result   No acute intracranial abnormality. Chronic involutional changes and chronic   microvascular ischemic changes are noted. XR FOOT LEFT (MIN 3 VIEWS)   Final Result   1. No acute fracture. 2.  Chronic fracture involving 5th metatarsal bone. 3.  Stable, satisfactory alignment status post internal fixation at level of   distal calcaneus and tarsal navicular. FLUORO FOR SURGICAL PROCEDURES   Final Result   Intraprocedural fluoroscopic spot images as above. See separate procedure   report for more information. XR ANKLE RIGHT (MIN 3 VIEWS)   Final Result   Mildly displaced distal fibular fracture involving the distal diaphysis   extending into the metaphysis. No other acute fracture is seen. MRI BRAIN WO CONTRAST    (Results Pending)   CT HEAD WO CONTRAST    (Results Pending)       Assessment:  1. 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 This is resolved. Plan:  1.  Supplemental oxygen,

## 2020-10-30 NOTE — PROGRESS NOTES
Neurology Progress Note    Patient:  Malorie Gamboa      Unit/Bed:0714/0714-A    YOB: 1945    MRN: [id number]     Acct: [de-identified]     Admit date: 10/24/2020    Neurology follow-up regarding confusion with apparent aphasia    Patient Seen, Chart, Physician notes, Labs, Radiology studies reviewed. Subjective:   Patient thinks too much television watching here is causing her to go back to how she was a couple of days ago, she reported. Speech therapy has seen marked fluctuations in her performance day to day. Patient is having minimal pain in her right ankle. She is right-handed. She noted bruising on her hands and pointed out to me but did not think this was bruising. I asked about sensory loss and she does mention that she feels her hands are swollen. Past, Family, Social History unchanged from admission.     Diet:  DIET CARB CONTROL; Carb Control: 4 carbs/meal (approximate 1800 kcals/day)  Dietary Nutrition Supplements: Diabetic Oral Supplement    Medications:  Scheduled Meds:   aspirin  81 mg Oral Daily    clopidogrel  75 mg Oral Daily    atorvastatin  40 mg Oral Nightly    cefTRIAXone (ROCEPHIN) IV  1 g Intravenous Q24H    pantoprazole  40 mg Intravenous Daily    baclofen  20 mg Oral TID    dicyclomine  20 mg Oral 4x Daily AC & HS    DULoxetine  60 mg Oral QAM    hydroxychloroquine  200 mg Oral BID    latanoprost  1 drop Both Eyes Nightly    metoprolol tartrate  25 mg Oral BID    oxybutynin  10 mg Oral BID    pregabalin  100 mg Oral BID    verapamil  240 mg Oral QAM    enoxaparin  40 mg Subcutaneous Daily    insulin lispro  0-6 Units Subcutaneous TID WC    insulin lispro  0-3 Units Subcutaneous Nightly    Arformoterol Tartrate  15 mcg Nebulization BID    budesonide  250 mcg Nebulization BID    ipratropium-albuterol  1 ampule Inhalation Q4H WA    sodium chloride flush  10 mL Intravenous 2 times per day    sennosides-docusate sodium  1 tablet Oral BID     Continuous Infusions:   dextrose       PRN Meds:acetaminophen **OR** acetaminophen, magnesium hydroxide, glucose, dextrose, glucagon (rDNA), dextrose, trimethobenzamide, sodium chloride flush, bisacodyl     Reviewed patient's home meds with her but she is unsure what she takes. Objective:    Vitals: BP (!) 140/76   Pulse 57   Temp 97.9 °F (36.6 °C) (Axillary)   Resp 17   Ht 5' 6\" (1.676 m)   Wt 291 lb 14.2 oz (132.4 kg)   SpO2 96%   BMI 47.11 kg/m²   Physical Exam:  The patient was alert. She seemed fairly attentive. Language function was impaired. Spontaneous speech was not too bad but she had a lot of word finding difficulty otherwise. Some paraphasic errors. Some hesitancy with naming but she was able to name objects. Able to repeat a sentence. Extraocular muscles intact. No obvious facial asymmetry. Tone symmetric. Strength examined in the upper extremities only due to the recent right leg surgery. She still seems to be mildly weak in the left upper extremity compared to the right at grade 4. Unable to fully lift up her limbs at the shoulders but no clear pronator drift. She had trouble following commands to test sensation. No obvious sensory loss. 24 hour intake/output:    Intake/Output Summary (Last 24 hours) at 10/30/2020 1127  Last data filed at 10/29/2020 1537  Gross per 24 hour   Intake --   Output 200 ml   Net -200 ml     Last 3 weights:   Wt Readings from Last 3 Encounters:   10/28/20 291 lb 14.2 oz (132.4 kg)   01/28/20 250 lb (113.4 kg)   10/24/17 265 lb (120.2 kg)         CBC:   Recent Labs     10/28/20  0615 10/29/20  0500 10/30/20  0515   WBC 8.8 9.5 7.5   HGB 11.1* 11.4* 11.0*    206 220     BMP:    Recent Labs     10/28/20  0615 10/29/20  0500 10/30/20  0515    141 145   K 4.0 3.6 3.9   * 110* 111*   CO2 22 21* 25   BUN 27* 29* 28*   CREATININE 1.4* 1.4* 1.4*   GLUCOSE 119* 134* 93     Calcium:  Recent Labs     10/30/20  0515   CALCIUM 8.4*     Magnesium:  Recent Labs     10/30/20  0515   MG 2.2     LDL cholesterol 71      Radiology reports as per the Radiologist    Ct Head Wo Contrast    Result Date: 10/25/2020  EXAMINATION: CT OF THE HEAD WITHOUT CONTRAST  10/25/2020 1:34 pm TECHNIQUE: CT of the head was performed without the administration of intravenous contrast. Dose modulation, iterative reconstruction, and/or weight based adjustment of the mA/kV was utilized to reduce the radiation dose to as low as reasonably achievable. COMPARISON: 10/21/2017 HISTORY: ORDERING SYSTEM PROVIDED HISTORY: Rule out new CVA TECHNOLOGIST PROVIDED HISTORY: Reason for exam:->Rule out new CVA Has a \"code stroke\" or \"stroke alert\" been called? ->No FINDINGS: BRAIN/VENTRICLES: There are diffuse involutional changes, with prominence of the ventricles and sulci. There is diffuse stable white matter low attenuation, compatible with chronic microvascular ischemic changes. There is no CT evidence of intracranial mass, hemorrhage, acute territorial infarction, or hydrocephalus. Intracranial arteries are symmetric in density. ORBITS: The visualized portion of the orbits demonstrate no acute abnormality. SINUSES: The visualized paranasal sinuses and mastoid air cells demonstrate no acute abnormality. SOFT TISSUES/SKULL:  No acute abnormality of the visualized skull or soft tissues. No acute intracranial abnormality. Chronic involutional changes and chronic microvascular ischemic changes are noted.      Carotid ultrasound showed no significant stenosis  Echocardiogram was technically difficult but no clear embolic source  EKG-sinus rhythm                Assessment:    Principal Problem:    Closed fracture fibula, head, right, initial encounter  Active Problems:    Essential hypertension    Morbid obesity with BMI of 40.0-44.9, adult (HCC)    Diabetes mellitus ([facility]    Neurogenic bladder    Sarcoidosis    Sleep apnea    Diffuse cerebral atrophy (HCC)    Asthma    Diabetic peripheral neuropathy ([facility] [address]    Fibromyalgia    CKD (chronic kidney disease) stage 4, GFR 15-29 ml/min ([facility])    Iron deficiency anemia    Encephalopathy    Sarcoid  Resolved Problems:    * No resolved hospital problems. *    Confusion with apparent aphasia. Patient could not fit into the MRI scanner. I will repeat a CT today to see if we can see a stroke, although this can be difficult at times if the stroke is in the sylvian fissure region. She does seem to be weak on the left side, as well, and I could not get any history from her that this is pre-existing. If the CT is unremarkable for any new stroke I would still continue Plavix plus aspirin for a few weeks, and then thereafter just one antiplatelet agent. Continue Lipitor. Note that she is on a lot of anticholinergic medication. I do not know if she took this much Ditropan and Bentyl at home, but consideration could be made for decreasing these agents, which could certainly contribute to confusion.       Electronically signed by Burt Amador DO on 10/30/2020 at 11:27 AM    Neurology

## 2020-10-31 LAB
ALBUMIN SERPL-MCNC: 2.4 G/DL (ref 3.5–5.2)
ALP BLD-CCNC: 135 U/L (ref 35–104)
ALT SERPL-CCNC: 23 U/L (ref 0–32)
ANION GAP SERPL CALCULATED.3IONS-SCNC: 7 MMOL/L (ref 7–16)
AST SERPL-CCNC: 26 U/L (ref 0–31)
B.E.: -2 MMOL/L (ref -3–3)
BASOPHILS ABSOLUTE: 0.06 E9/L (ref 0–0.2)
BASOPHILS RELATIVE PERCENT: 0.9 % (ref 0–2)
BILIRUB SERPL-MCNC: 0.3 MG/DL (ref 0–1.2)
BUN BLDV-MCNC: 31 MG/DL (ref 8–23)
CALCIUM SERPL-MCNC: 8.2 MG/DL (ref 8.6–10.2)
CHLORIDE BLD-SCNC: 111 MMOL/L (ref 98–107)
CO2: 25 MMOL/L (ref 22–29)
COHB: 1.1 % (ref 0–1.5)
CREAT SERPL-MCNC: 1.6 MG/DL (ref 0.5–1)
CRITICAL: ABNORMAL
DATE ANALYZED: ABNORMAL
DATE OF COLLECTION: ABNORMAL
EOSINOPHILS ABSOLUTE: 0.32 E9/L (ref 0.05–0.5)
EOSINOPHILS RELATIVE PERCENT: 4.6 % (ref 0–6)
GFR AFRICAN AMERICAN: 38
GFR NON-AFRICAN AMERICAN: 31 ML/MIN/1.73
GLUCOSE BLD-MCNC: 111 MG/DL (ref 74–99)
HCO3: 22.9 MMOL/L (ref 22–26)
HCT VFR BLD CALC: 34.4 % (ref 34–48)
HEMOGLOBIN: 10.8 G/DL (ref 11.5–15.5)
HHB: 7.8 % (ref 0–5)
IMMATURE GRANULOCYTES #: 0.07 E9/L
IMMATURE GRANULOCYTES %: 1 % (ref 0–5)
LAB: ABNORMAL
LACTIC ACID, SEPSIS: 1.2 MMOL/L (ref 0.5–1.9)
LYMPHOCYTES ABSOLUTE: 1.11 E9/L (ref 1.5–4)
LYMPHOCYTES RELATIVE PERCENT: 16.1 % (ref 20–42)
Lab: ABNORMAL
MAGNESIUM: 2.3 MG/DL (ref 1.6–2.6)
MCH RBC QN AUTO: 28.2 PG (ref 26–35)
MCHC RBC AUTO-ENTMCNC: 31.4 % (ref 32–34.5)
MCV RBC AUTO: 89.8 FL (ref 80–99.9)
METER GLUCOSE: 110 MG/DL (ref 74–99)
METER GLUCOSE: 119 MG/DL (ref 74–99)
METER GLUCOSE: 91 MG/DL (ref 74–99)
METER GLUCOSE: 99 MG/DL (ref 74–99)
METHB: 0.2 % (ref 0–1.5)
MODE: ABNORMAL
MONOCYTES ABSOLUTE: 1.02 E9/L (ref 0.1–0.95)
MONOCYTES RELATIVE PERCENT: 14.8 % (ref 2–12)
NEUTROPHILS ABSOLUTE: 4.31 E9/L (ref 1.8–7.3)
NEUTROPHILS RELATIVE PERCENT: 62.6 % (ref 43–80)
O2 CONTENT: 15.6 ML/DL
O2 SATURATION: 92.1 % (ref 92–98.5)
O2HB: 90.9 % (ref 94–97)
OPERATOR ID: 3114
PATIENT TEMP: 37 C
PCO2: 39.4 MMHG (ref 35–45)
PDW BLD-RTO: 14.1 FL (ref 11.5–15)
PH BLOOD GAS: 7.38 (ref 7.35–7.45)
PHOSPHORUS: 4.1 MG/DL (ref 2.5–4.5)
PLATELET # BLD: 213 E9/L (ref 130–450)
PMV BLD AUTO: 9.4 FL (ref 7–12)
PO2: 64.2 MMHG (ref 75–100)
POTASSIUM SERPL-SCNC: 3.6 MMOL/L (ref 3.5–5)
RBC # BLD: 3.83 E12/L (ref 3.5–5.5)
SODIUM BLD-SCNC: 143 MMOL/L (ref 132–146)
SOURCE, BLOOD GAS: ABNORMAL
THB: 12.2 G/DL (ref 11.5–16.5)
TIME ANALYZED: 816
TOTAL PROTEIN: 5.8 G/DL (ref 6.4–8.3)
WBC # BLD: 6.9 E9/L (ref 4.5–11.5)

## 2020-10-31 PROCEDURE — 83605 ASSAY OF LACTIC ACID: CPT

## 2020-10-31 PROCEDURE — 36600 WITHDRAWAL OF ARTERIAL BLOOD: CPT

## 2020-10-31 PROCEDURE — 94660 CPAP INITIATION&MGMT: CPT

## 2020-10-31 PROCEDURE — 87040 BLOOD CULTURE FOR BACTERIA: CPT

## 2020-10-31 PROCEDURE — 36415 COLL VENOUS BLD VENIPUNCTURE: CPT

## 2020-10-31 PROCEDURE — 2580000003 HC RX 258: Performed by: INTERNAL MEDICINE

## 2020-10-31 PROCEDURE — 83735 ASSAY OF MAGNESIUM: CPT

## 2020-10-31 PROCEDURE — 94640 AIRWAY INHALATION TREATMENT: CPT

## 2020-10-31 PROCEDURE — 6370000000 HC RX 637 (ALT 250 FOR IP): Performed by: INTERNAL MEDICINE

## 2020-10-31 PROCEDURE — 6360000002 HC RX W HCPCS: Performed by: INTERNAL MEDICINE

## 2020-10-31 PROCEDURE — C9113 INJ PANTOPRAZOLE SODIUM, VIA: HCPCS | Performed by: INTERNAL MEDICINE

## 2020-10-31 PROCEDURE — 6370000000 HC RX 637 (ALT 250 FOR IP)

## 2020-10-31 PROCEDURE — 82962 GLUCOSE BLOOD TEST: CPT

## 2020-10-31 PROCEDURE — 84100 ASSAY OF PHOSPHORUS: CPT

## 2020-10-31 PROCEDURE — 80053 COMPREHEN METABOLIC PANEL: CPT

## 2020-10-31 PROCEDURE — 85025 COMPLETE CBC W/AUTO DIFF WBC: CPT

## 2020-10-31 PROCEDURE — 1200000000 HC SEMI PRIVATE

## 2020-10-31 PROCEDURE — 82805 BLOOD GASES W/O2 SATURATION: CPT

## 2020-10-31 RX ORDER — POTASSIUM CHLORIDE 20 MEQ/1
40 TABLET, EXTENDED RELEASE ORAL ONCE
Status: COMPLETED | OUTPATIENT
Start: 2020-10-31 | End: 2020-10-31

## 2020-10-31 RX ORDER — POTASSIUM CHLORIDE 20 MEQ/1
TABLET, EXTENDED RELEASE ORAL
Status: COMPLETED
Start: 2020-10-31 | End: 2020-10-31

## 2020-10-31 RX ORDER — SODIUM CHLORIDE 0.9 % (FLUSH) 0.9 %
10 SYRINGE (ML) INJECTION PRN
Status: DISCONTINUED | OUTPATIENT
Start: 2020-10-31 | End: 2020-11-01 | Stop reason: HOSPADM

## 2020-10-31 RX ORDER — SODIUM CHLORIDE 0.9 % (FLUSH) 0.9 %
10 SYRINGE (ML) INJECTION EVERY 12 HOURS SCHEDULED
Status: DISCONTINUED | OUTPATIENT
Start: 2020-10-31 | End: 2020-11-01 | Stop reason: HOSPADM

## 2020-10-31 RX ADMIN — BACLOFEN 20 MG: 10 TABLET ORAL at 00:00

## 2020-10-31 RX ADMIN — Medication 10 ML: at 09:46

## 2020-10-31 RX ADMIN — WATER 1 G: 1 INJECTION INTRAMUSCULAR; INTRAVENOUS; SUBCUTANEOUS at 12:00

## 2020-10-31 RX ADMIN — HYDROXYCHLOROQUINE SULFATE 200 MG: 200 TABLET ORAL at 00:02

## 2020-10-31 RX ADMIN — BUDESONIDE 250 MCG: 0.25 SUSPENSION RESPIRATORY (INHALATION) at 19:39

## 2020-10-31 RX ADMIN — ARFORMOTEROL TARTRATE 15 MCG: 15 SOLUTION RESPIRATORY (INHALATION) at 08:16

## 2020-10-31 RX ADMIN — IPRATROPIUM BROMIDE AND ALBUTEROL SULFATE 1 AMPULE: 2.5; .5 SOLUTION RESPIRATORY (INHALATION) at 17:13

## 2020-10-31 RX ADMIN — Medication 10 ML: at 00:05

## 2020-10-31 RX ADMIN — DOCUSATE SODIUM AND SENNOSIDES 1 TABLET: 8.6; 5 TABLET, FILM COATED ORAL at 21:18

## 2020-10-31 RX ADMIN — METOPROLOL TARTRATE 25 MG: 25 TABLET, FILM COATED ORAL at 21:18

## 2020-10-31 RX ADMIN — SODIUM CHLORIDE, PRESERVATIVE FREE 10 ML: 5 INJECTION INTRAVENOUS at 21:20

## 2020-10-31 RX ADMIN — IPRATROPIUM BROMIDE AND ALBUTEROL SULFATE 1 AMPULE: 2.5; .5 SOLUTION RESPIRATORY (INHALATION) at 08:16

## 2020-10-31 RX ADMIN — IPRATROPIUM BROMIDE AND ALBUTEROL SULFATE 1 AMPULE: 2.5; .5 SOLUTION RESPIRATORY (INHALATION) at 12:42

## 2020-10-31 RX ADMIN — ARFORMOTEROL TARTRATE 15 MCG: 15 SOLUTION RESPIRATORY (INHALATION) at 19:38

## 2020-10-31 RX ADMIN — PANTOPRAZOLE SODIUM 40 MG: 40 INJECTION, POWDER, FOR SOLUTION INTRAVENOUS at 09:53

## 2020-10-31 RX ADMIN — POTASSIUM CHLORIDE 40 MEQ: 20 TABLET, EXTENDED RELEASE ORAL at 17:10

## 2020-10-31 RX ADMIN — SODIUM CHLORIDE, PRESERVATIVE FREE 10 ML: 5 INJECTION INTRAVENOUS at 09:53

## 2020-10-31 RX ADMIN — POTASSIUM CHLORIDE 40 MEQ: 20 TABLET, EXTENDED RELEASE ORAL at 17:01

## 2020-10-31 RX ADMIN — IPRATROPIUM BROMIDE AND ALBUTEROL SULFATE 1 AMPULE: 2.5; .5 SOLUTION RESPIRATORY (INHALATION) at 19:38

## 2020-10-31 RX ADMIN — LATANOPROST 1 DROP: 50 SOLUTION OPHTHALMIC at 21:19

## 2020-10-31 RX ADMIN — LATANOPROST 1 DROP: 50 SOLUTION OPHTHALMIC at 00:01

## 2020-10-31 RX ADMIN — ATORVASTATIN CALCIUM 40 MG: 40 TABLET, FILM COATED ORAL at 21:18

## 2020-10-31 RX ADMIN — ENOXAPARIN SODIUM 40 MG: 40 INJECTION SUBCUTANEOUS at 09:52

## 2020-10-31 RX ADMIN — HYDROXYCHLOROQUINE SULFATE 200 MG: 200 TABLET ORAL at 21:18

## 2020-10-31 RX ADMIN — BUDESONIDE 250 MCG: 0.25 SUSPENSION RESPIRATORY (INHALATION) at 08:16

## 2020-10-31 ASSESSMENT — PAIN SCALES - WONG BAKER
WONGBAKER_NUMERICALRESPONSE: 0
WONGBAKER_NUMERICALRESPONSE: 0

## 2020-10-31 ASSESSMENT — PAIN SCALES - GENERAL
PAINLEVEL_OUTOF10: 0

## 2020-10-31 NOTE — PLAN OF CARE
Problem: Falls - Risk of:  Goal: Will remain free from falls  Description: Will remain free from falls  Outcome: Met This Shift  Goal: Absence of physical injury  Description: Absence of physical injury  Outcome: Met This Shift     Problem: Pain:  Goal: Pain level will decrease  Description: Pain level will decrease  Outcome: Met This Shift  Goal: Control of acute pain  Description: Control of acute pain  Outcome: Met This Shift     Problem: Skin Integrity:  Goal: Will show no infection signs and symptoms  Description: Will show no infection signs and symptoms  Outcome: Met This Shift  Goal: Absence of new skin breakdown  Description: Absence of new skin breakdown  Outcome: Met This Shift

## 2020-10-31 NOTE — PROGRESS NOTES
Patient is still very difficult to arouse post RRT. Only order from RRT was for ABG's and they were completed, and no new orders were placed. This has been her baseline for the entire morning. Patient is disorientated x 4. All oral medications were placed on hold because patient is unable to remain alert enough to take medications.

## 2020-10-31 NOTE — PROGRESS NOTES
Subjective:    Patient seen and examined at bedside, requesting discharge to skilled nursing facility. Had episode of unresponsiveness altered mental status this morning. RRT was called. Patient went down for CT head, read pending. Objective:    /60   Pulse 66   Temp 98.3 °F (36.8 °C) (Oral)   Resp 16   Ht 5' 6\" (1.676 m)   Wt 291 lb 14.2 oz (132.4 kg)   SpO2 92%   BMI 47.11 kg/m²     Current medications that patient is taking have been reviewed. Heart:  RRR, no murmurs, gallops, or rubs.   Lungs:  CTA bilaterally, no wheeze, rales or rhonchi  Abd: bowel sounds present, soft, nontender, nondistended, no masses  Extrem:  No cyanosis or edema    CBC:   Lab Results   Component Value Date    WBC 6.9 10/31/2020    RBC 3.83 10/31/2020    HGB 10.8 10/31/2020    HCT 34.4 10/31/2020    MCV 89.8 10/31/2020    MCH 28.2 10/31/2020    MCHC 31.4 10/31/2020    RDW 14.1 10/31/2020     10/31/2020    MPV 9.4 10/31/2020     BMP:    Lab Results   Component Value Date     10/31/2020    K 3.6 10/31/2020     10/31/2020    CO2 25 10/31/2020    BUN 31 10/31/2020    LABALBU 2.4 10/31/2020    CREATININE 1.6 10/31/2020    CALCIUM 8.2 10/31/2020    GFRAA 38 10/31/2020    LABGLOM 31 10/31/2020    GLUCOSE 111 10/31/2020    GLUCOSE 82 12/09/2011        Assessment:    Patient Active Problem List   Diagnosis    Essential hypertension    PAULINA (obstructive sleep apnea)    Midline low back pain with right-sided sciatica    Morbid obesity with BMI of 40.0-44.9, adult ([address]    CVA (cerebral vascular accident) ([address]    Urinary incontinence    Altered mental status    Closed fracture fibula, head, right, initial encounter    Diabetes mellitus ([address]    Neurogenic bladder    Sarcoidosis    Sleep apnea    Diffuse cerebral atrophy (HCC)    Asthma    Diabetic peripheral neuropathy (HCC)    Fibromyalgia    CKD (chronic kidney disease) stage 4, GFR 15-29 ml/min ([facility])    Iron deficiency anemia    Encephalopathy    Sarcoid       Plan:    1. Altered mental status - etiology unclear. Initial two CT head unremarkable. Repeat today pending   Previous Bentyl and oxybutynin held. Will hold gabapentin and baclofen today. Continue to monitor off centrally acting medications. Urine culture from 10/26 negative  2. Acute hypoxic respiratory failure - Stable. 3. Right fibula fracture status post ORIF - Orthopedics on case. Stable. Non weight bearing. PT/OT. DONOVAN at D/C.          Keyla Lange    1:28 PM  10/31/2020

## 2020-10-31 NOTE — PROGRESS NOTES
Patient found at shift change not arousable. Patient not responding to voice. Patient slightly responds to pain from sternal rub. Due to change in alertness RRT called.

## 2020-10-31 NOTE — PROGRESS NOTES
Patient is now awake and alert to person, place, time and circumstance. Patient is off BPAP and feeding herself lunch with no complaints of pain or concerns  Change in cognition from earlier today.

## 2020-10-31 NOTE — PROGRESS NOTES
[facility]  [address], [address]  [facility], [facility]      Pulmonary Progress Note      SUBJECTIVE:  Claims doing ok, on RA, used NIPPV last night. Eating lunch, feels well.     OBJECTIVE    Medications    Continuous Infusions:   dextrose         Scheduled Meds:   aspirin  81 mg Oral Daily    clopidogrel  75 mg Oral Daily    atorvastatin  40 mg Oral Nightly    cefTRIAXone (ROCEPHIN) IV  1 g Intravenous Q24H    pantoprazole  40 mg Intravenous Daily    DULoxetine  60 mg Oral QAM    hydroxychloroquine  200 mg Oral BID    latanoprost  1 drop Both Eyes Nightly    metoprolol tartrate  25 mg Oral BID    verapamil  240 mg Oral QAM    enoxaparin  40 mg Subcutaneous Daily    insulin lispro  0-6 Units Subcutaneous TID WC    insulin lispro  0-3 Units Subcutaneous Nightly    Arformoterol Tartrate  15 mcg Nebulization BID    budesonide  250 mcg Nebulization BID    ipratropium-albuterol  1 ampule Inhalation Q4H WA    sodium chloride flush  10 mL Intravenous 2 times per day    sennosides-docusate sodium  1 tablet Oral BID       PRN Meds:acetaminophen **OR** acetaminophen, magnesium hydroxide, glucose, dextrose, glucagon (rDNA), dextrose, trimethobenzamide, sodium chloride flush, bisacodyl    Physical    VITALS:  BP (!) 158/67   Pulse 50   Temp 97.3 °F (36.3 °C) (Axillary)   Resp 17   Ht 5' 6\" (1.676 m)   Wt 291 lb 14.2 oz (132.4 kg)   SpO2 92%   BMI 47.11 kg/m²     24HR INTAKE/OUTPUT:      Intake/Output Summary (Last 24 hours) at 10/31/2020 1211  Last data filed at 10/31/2020 0741  Gross per 24 hour   Intake --   Output 2700 ml   Net -2700 ml       24HR PULSE OXIMETRY RANGE:    SpO2  Av.8 %  Min: 92 %  Max: 96 %    General appearance: alert, appears stated age and cooperative  Lungs: clear to auscultation bilaterally, obese  Heart: regular rate and rhythm, S1, S2 normal, no murmur, click, rub or gallop  Abdomen: soft, non-tender; bowel sounds normal; no masses,  no organomegaly  Extremities: extremities normal, atraumatic, no cyanosis or edema  Neurologic: Mental status: Alert, oriented, thought content appropriate    Data    CBC:   Recent Labs     10/29/20  0500 10/30/20  0515 10/31/20  0430   WBC 9.5 7.5 6.9   HGB 11.4* 11.0* 10.8*   HCT 35.6 34.8 34.4   MCV 88.6 90.2 89.8    220 213       BMP:  Recent Labs     10/29/20  0500 10/30/20  0515 10/31/20  0430    145 143   K 3.6 3.9 3.6   * 111* 111*   CO2 21* 25 25   PHOS 3.8 4.1 4.1   BUN 29* 28* 31*   CREATININE 1.4* 1.4* 1.6*    ALB:3,BILIDIR:3,BILITOT:3,ALKPHOS:3)@    PT/INR: No results for input(s): PROTIME, INR in the last 72 hours. ABG:   Recent Labs     10/31/20  0816   PH 7.382   PO2 64.2*   PCO2 39.4   HCO3 22.9   BE -2.0   O2SAT 92.1   METHB 0.2   O2HB 90.9*   COHB 1.1   O2CON 15.6   HHB 7.8*   THB 12.2     FiO2 : 45 %       Radiology/Other tests reviewed: none    Assessment:     Principal Problem:    Closed fracture fibula, head, right, initial encounter  Active Problems:    Essential hypertension    Morbid obesity with BMI of 40.0-44.9, adult (HCC)    Diabetes mellitus (HCC)    Neurogenic bladder    Sarcoidosis    Sleep apnea    Diffuse cerebral atrophy (HCC)    Asthma    Diabetic peripheral neuropathy (HCC)    Fibromyalgia    CKD (chronic kidney disease) stage 4, GFR 15-29 ml/min (HCC)    Iron deficiency anemia    Encephalopathy    Sarcoid  Resolved Problems:    * No resolved hospital problems. *      Plan:       1. On RA, observe oxygenation  2. Cont with NIPPV use at night  3. Cont with anticoagulation  4. Cont with neb treatments, observe respiratory function  5. OOB to chair as tolerated      Time at the bedside, reviewing labs and radiographs, reviewing notes and consultations, discussing with staff and family was more than 35 minutes. Thanks for letting us see this patient in consultation. Please contact us with any questions.  Office () [phone] or after hours through [employer] customer insight, x [phone].

## 2020-11-01 VITALS
SYSTOLIC BLOOD PRESSURE: 159 MMHG | BODY MASS INDEX: 46.91 KG/M2 | HEIGHT: 66 IN | RESPIRATION RATE: 18 BRPM | DIASTOLIC BLOOD PRESSURE: 60 MMHG | HEART RATE: 59 BPM | TEMPERATURE: 97.8 F | OXYGEN SATURATION: 95 % | WEIGHT: 291.89 LBS

## 2020-11-01 LAB
ALBUMIN SERPL-MCNC: 2.9 G/DL (ref 3.5–5.2)
ALP BLD-CCNC: 146 U/L (ref 35–104)
ALT SERPL-CCNC: 24 U/L (ref 0–32)
ANION GAP SERPL CALCULATED.3IONS-SCNC: 7 MMOL/L (ref 7–16)
AST SERPL-CCNC: 27 U/L (ref 0–31)
BASOPHILS ABSOLUTE: 0.06 E9/L (ref 0–0.2)
BASOPHILS RELATIVE PERCENT: 0.9 % (ref 0–2)
BILIRUB SERPL-MCNC: 0.4 MG/DL (ref 0–1.2)
BUN BLDV-MCNC: 29 MG/DL (ref 8–23)
CALCIUM SERPL-MCNC: 8.4 MG/DL (ref 8.6–10.2)
CHLORIDE BLD-SCNC: 107 MMOL/L (ref 98–107)
CO2: 27 MMOL/L (ref 22–29)
CREAT SERPL-MCNC: 1.6 MG/DL (ref 0.5–1)
EOSINOPHILS ABSOLUTE: 0.36 E9/L (ref 0.05–0.5)
EOSINOPHILS RELATIVE PERCENT: 5.7 % (ref 0–6)
GFR AFRICAN AMERICAN: 38
GFR NON-AFRICAN AMERICAN: 31 ML/MIN/1.73
GLUCOSE BLD-MCNC: 111 MG/DL (ref 74–99)
HCT VFR BLD CALC: 38.7 % (ref 34–48)
HEMOGLOBIN: 12 G/DL (ref 11.5–15.5)
IMMATURE GRANULOCYTES #: 0.07 E9/L
IMMATURE GRANULOCYTES %: 1.1 % (ref 0–5)
LYMPHOCYTES ABSOLUTE: 0.85 E9/L (ref 1.5–4)
LYMPHOCYTES RELATIVE PERCENT: 13.4 % (ref 20–42)
MAGNESIUM: 2.3 MG/DL (ref 1.6–2.6)
MCH RBC QN AUTO: 28 PG (ref 26–35)
MCHC RBC AUTO-ENTMCNC: 31 % (ref 32–34.5)
MCV RBC AUTO: 90.4 FL (ref 80–99.9)
METER GLUCOSE: 105 MG/DL (ref 74–99)
METER GLUCOSE: 141 MG/DL (ref 74–99)
MONOCYTES ABSOLUTE: 0.85 E9/L (ref 0.1–0.95)
MONOCYTES RELATIVE PERCENT: 13.4 % (ref 2–12)
NEUTROPHILS ABSOLUTE: 4.14 E9/L (ref 1.8–7.3)
NEUTROPHILS RELATIVE PERCENT: 65.5 % (ref 43–80)
PDW BLD-RTO: 14.3 FL (ref 11.5–15)
PHOSPHORUS: 3.9 MG/DL (ref 2.5–4.5)
PLATELET # BLD: 256 E9/L (ref 130–450)
PMV BLD AUTO: 9.6 FL (ref 7–12)
POTASSIUM SERPL-SCNC: 4.4 MMOL/L (ref 3.5–5)
RBC # BLD: 4.28 E12/L (ref 3.5–5.5)
SODIUM BLD-SCNC: 141 MMOL/L (ref 132–146)
TOTAL PROTEIN: 6.3 G/DL (ref 6.4–8.3)
WBC # BLD: 6.3 E9/L (ref 4.5–11.5)

## 2020-11-01 PROCEDURE — 94640 AIRWAY INHALATION TREATMENT: CPT

## 2020-11-01 PROCEDURE — 6370000000 HC RX 637 (ALT 250 FOR IP): Performed by: INTERNAL MEDICINE

## 2020-11-01 PROCEDURE — 83735 ASSAY OF MAGNESIUM: CPT

## 2020-11-01 PROCEDURE — 2700000000 HC OXYGEN THERAPY PER DAY

## 2020-11-01 PROCEDURE — 82962 GLUCOSE BLOOD TEST: CPT

## 2020-11-01 PROCEDURE — 6360000002 HC RX W HCPCS: Performed by: INTERNAL MEDICINE

## 2020-11-01 PROCEDURE — 2580000003 HC RX 258: Performed by: INTERNAL MEDICINE

## 2020-11-01 PROCEDURE — 84100 ASSAY OF PHOSPHORUS: CPT

## 2020-11-01 PROCEDURE — 36415 COLL VENOUS BLD VENIPUNCTURE: CPT

## 2020-11-01 PROCEDURE — C9113 INJ PANTOPRAZOLE SODIUM, VIA: HCPCS | Performed by: INTERNAL MEDICINE

## 2020-11-01 PROCEDURE — 94660 CPAP INITIATION&MGMT: CPT

## 2020-11-01 PROCEDURE — 80053 COMPREHEN METABOLIC PANEL: CPT

## 2020-11-01 PROCEDURE — 85025 COMPLETE CBC W/AUTO DIFF WBC: CPT

## 2020-11-01 RX ORDER — PANTOPRAZOLE SODIUM 40 MG/1
40 TABLET, DELAYED RELEASE ORAL
Qty: 30 TABLET | Refills: 0 | Status: SHIPPED | OUTPATIENT
Start: 2020-11-01 | End: 2021-02-02

## 2020-11-01 RX ORDER — CLOPIDOGREL BISULFATE 75 MG/1
75 TABLET ORAL DAILY
Qty: 30 TABLET | Refills: 3 | Status: ON HOLD | OUTPATIENT
Start: 2020-11-02 | End: 2022-09-01

## 2020-11-01 RX ADMIN — ENOXAPARIN SODIUM 40 MG: 40 INJECTION SUBCUTANEOUS at 08:15

## 2020-11-01 RX ADMIN — DOCUSATE SODIUM AND SENNOSIDES 1 TABLET: 8.6; 5 TABLET, FILM COATED ORAL at 08:19

## 2020-11-01 RX ADMIN — HYDROXYCHLOROQUINE SULFATE 200 MG: 200 TABLET ORAL at 08:19

## 2020-11-01 RX ADMIN — VERAPAMIL HYDROCHLORIDE 240 MG: 240 TABLET, FILM COATED, EXTENDED RELEASE ORAL at 08:19

## 2020-11-01 RX ADMIN — PANTOPRAZOLE SODIUM 40 MG: 40 INJECTION, POWDER, FOR SOLUTION INTRAVENOUS at 08:16

## 2020-11-01 RX ADMIN — SODIUM CHLORIDE, PRESERVATIVE FREE 10 ML: 5 INJECTION INTRAVENOUS at 13:21

## 2020-11-01 RX ADMIN — CLOPIDOGREL BISULFATE 75 MG: 75 TABLET ORAL at 08:18

## 2020-11-01 RX ADMIN — DULOXETINE HYDROCHLORIDE 60 MG: 60 CAPSULE, DELAYED RELEASE ORAL at 08:18

## 2020-11-01 RX ADMIN — WATER 1 G: 1 INJECTION INTRAMUSCULAR; INTRAVENOUS; SUBCUTANEOUS at 13:20

## 2020-11-01 RX ADMIN — INSULIN LISPRO 1 UNITS: 100 INJECTION, SOLUTION INTRAVENOUS; SUBCUTANEOUS at 08:18

## 2020-11-01 RX ADMIN — IPRATROPIUM BROMIDE AND ALBUTEROL SULFATE 1 AMPULE: 2.5; .5 SOLUTION RESPIRATORY (INHALATION) at 13:41

## 2020-11-01 RX ADMIN — METOPROLOL TARTRATE 25 MG: 25 TABLET, FILM COATED ORAL at 08:19

## 2020-11-01 RX ADMIN — SODIUM CHLORIDE, PRESERVATIVE FREE 10 ML: 5 INJECTION INTRAVENOUS at 08:16

## 2020-11-01 RX ADMIN — Medication 10 ML: at 08:20

## 2020-11-01 ASSESSMENT — PAIN SCALES - GENERAL: PAINLEVEL_OUTOF10: 0

## 2020-11-01 ASSESSMENT — PAIN SCALES - WONG BAKER: WONGBAKER_NUMERICALRESPONSE: 0

## 2020-11-01 NOTE — PROGRESS NOTES
Date: 11/1/2020    Time: 12:51 AM    Patient Placed On BIPAP/CPAP/ Non-Invasive Ventilation? Yes    Facial area red/color change? No         BIPAP/CPAP skin barrier?   Yes    Skin barrier type:mepilexlite        11/01/20 0047   NIV Type   NIV Started/Stopped On   Equipment Type V60   Mode Bilevel   Mask Type Full face mask   Mask Size Small   Settings/Measurements   IPAP 12 cmH20   CPAP/EPAP 6 cmH2O   Rate Ordered 16   Resp 21   Insp Rise Time (%) 2 %   FiO2  45 %   I Time/ I Time % 1 s   Vt Exhaled 609 mL   Minute Volume 9.6 Liters   Mask Leak (lpm) 63 lpm   Comfort Level Good   Using Accessory Muscles No   SpO2 98         Lauren Pettit

## 2020-11-01 NOTE — PLAN OF CARE
Problem: Falls - Risk of:  Goal: Will remain free from falls  Description: Will remain free from falls  Outcome: Met This Shift  Goal: Absence of physical injury  Description: Absence of physical injury  Outcome: Met This Shift     Problem: Skin Integrity:  Goal: Will show no infection signs and symptoms  Description: Will show no infection signs and symptoms  Outcome: Met This Shift  Goal: Absence of new skin breakdown  Description: Absence of new skin breakdown  Outcome: Met This Shift     Problem: Confusion - Acute:  Goal: Absence of continued neurological deterioration signs and symptoms  Description: Absence of continued neurological deterioration signs and symptoms  Outcome: Met This Shift     Problem: Injury - Risk of, Physical Injury:  Goal: Will remain free from falls  Description: Will remain free from falls  Outcome: Met This Shift  Goal: Absence of physical injury  Description: Absence of physical injury  Outcome: Met This Shift

## 2020-11-01 NOTE — PROGRESS NOTES
Date: 10/31/2020    Time: 10:22 PM    Patient Placed On BIPAP/CPAP/ Non-Invasive Ventilation? No     Comments:  Pt. refusing the Bipap tonight. Machine remains in the room. Pt. informed to call if desire to use it.      Fan Coronel

## 2020-11-01 NOTE — PROGRESS NOTES
Attempted to give nurse to nurse report to Patricio Tavares in [place]. Nurse stated she would call back to nurses station. I did mention that the  time was set for 1530. Awaiting callback.

## 2020-11-01 NOTE — DISCHARGE SUMMARY
Physician Discharge Summary     Patient ID:  Elida Tabor  [id number]  1 y.o.  1945    Admit date: 10/24/2020    Discharge date and time:  11/1 3pm    Admission Diagnoses:   Patient Active Problem List   Diagnosis    Essential hypertension    PAULINA (obstructive sleep apnea)    Midline low back pain with right-sided sciatica    Morbid obesity with BMI of 40.0-44.9, adult ([facility] [address]    CVA (cerebral vascular accident) ([facility] [address]    Urinary incontinence    Altered mental status    Closed fracture fibula, head, right, initial encounter    Diabetes mellitus ([facility] [address]    Neurogenic bladder    Sarcoidosis    Sleep apnea    Diffuse cerebral atrophy (HCC)    Asthma    Diabetic peripheral neuropathy (HCC)    Fibromyalgia    CKD (chronic kidney disease) stage 4, GFR 15-29 ml/min (HCC)    Iron deficiency anemia    Encephalopathy    Sarcoid       Discharge Diagnoses: Right fibula fracture, AMS    Consults: pulmonary/intensive care, nephrology, neurology and orthopedic surgery    Procedures: ORIF right  fibula    Hospital Course:   1. Altered mental status - etiology unclear. Patient had multiple episodes of altered mental status with unremarkable CT head. Patient was too large to fit in MRI. Seen by neurology, empirically treated for CVA. With antiplatelet therapy. Of note centrally acting medications and anticholinergics including Bentyl oxybutynin gabapentin and baclofen discontinued. Patient's mental status at baseline at time of discharge. Discharged to skilled nursing facility. 2. Right fibula fracture status post ORIF - Orthopedics on case. Stable. Non weight bearing. PT/OT. DONOVAN at D/C.            Discharge Exam:  Gen - NAD AAOx3  CV - RRR s1/s2 no M/R/G  Pulm - Fair AE b/l no wheeze  Abd - soft NT ND  Ext - no LE edema      Condition:  Stable    Disposition: SNF    Patient Instructions:   Current Discharge Medication List      START taking these medications    Details   enoxaparin (LOVENOX) 40 MG/0.4ML injection Inject 0.4 mLs into the skin daily for 7 days  Qty: 2.8 mL, Refills: 0      clopidogrel (PLAVIX) 75 MG tablet Take 1 tablet by mouth daily  Qty: 30 tablet, Refills: 3      pantoprazole (PROTONIX) 40 MG tablet Take 1 tablet by mouth every morning (before breakfast)  Qty: 30 tablet, Refills: 0         CONTINUE these medications which have NOT CHANGED    Details   hydroxychloroquine (PLAQUENIL) 200 MG tablet Take 200 mg by mouth 2 times daily       verapamil (CALAN SR) 240 MG extended release tablet Take 240 mg by mouth every morning 240 mg in the Am and 120 mg Nightly      DULoxetine (CYMBALTA) 60 MG capsule Take 60 mg by mouth every morning       !! albuterol sulfate HFA (PROAIR HFA) 108 (90 Base) MCG/ACT inhaler Inhale 1 puff into the lungs every 4 hours as needed for Wheezing or Shortness of Breath  Qty: 1 Inhaler, Refills: 3      docusate sodium (COLACE) 100 MG capsule Take 100 mg by mouth daily      aspirin 81 MG EC tablet Take 1 tablet by mouth 2 times daily  Qty: 60 tablet, Refills: 0      atorvastatin (LIPITOR) 20 MG tablet Take 1 tablet by mouth nightly  Qty: 30 tablet, Refills: 0      metoprolol tartrate (LOPRESSOR) 25 MG tablet Take 1 tablet by mouth 2 times daily  Qty: 60 tablet, Refills: 0      latanoprost (XALATAN) 0.005 % ophthalmic solution Place 1 drop into both eyes nightly  Qty: 1 Bottle, Refills: 3      metFORMIN (GLUCOPHAGE) 1000 MG tablet Take 500 mg by mouth 2 times daily (with meals)       theophylline CR, 12 hour, (THEOCHRON) 300 MG CR tablet Take 300 mg by mouth every morning       !! Albuterol Sulfate (PROAIR HFA IN) Inhale 1 puff into the lungs as needed Use am dos, if needed and brin      multivitamin (THERAGRAN) per tablet Take 1 tablet by mouth every morning        !! - Potential duplicate medications found. Please discuss with provider.       STOP taking these medications       HYDROcodone-acetaminophen (NORCO) 5-325 MG per tablet Comments:   Reason for Stopping: dicyclomine (BENTYL) 10 MG capsule Comments:   Reason for Stopping:         acetaminophen (TYLENOL) 500 MG tablet Comments:   Reason for Stopping:         celecoxib (CELEBREX) 200 MG capsule Comments:   Reason for Stopping:         baclofen (LIORESAL) 20 MG tablet Comments:   Reason for Stopping:         oxyCODONE-acetaminophen (PERCOCET)  MG per tablet Comments:   Reason for Stopping:         pregabalin (LYRICA) 100 MG capsule Comments:   Reason for Stopping:         oxybutynin (DITROPAN-XL) 10 MG extended release tablet Comments:   Reason for Stopping:             Activity: with PT  Diet: diabetic diet    Follow-up with PCP 2 week    Note that over 30 minutes was spent in preparing discharge papers, discussing discharge with patient, medication review, etc.    Signed:  Jane Mercado    11/1/2020  3:26 PM

## 2020-11-01 NOTE — PATIENT CARE CONFERENCE
[facility] Quality Flow/Interdisciplinary Rounds Progress Note        Quality Flow Rounds held on November 1, 2020    Disciplines Attending:  Bedside Nurse, ,  and Nursing Unit Leadership    Marge Nguyen was admitted on 10/24/2020 12:34 AM    Anticipated Discharge Date:  Expected Discharge Date: 11/06/20    Disposition:    Jeremy Score:  Jeremy Scale Score: 11    Readmission Risk              Risk of Unplanned Readmission:        19           Discussed patient goal for the day, patient clinical progression, and barriers to discharge. The following Goal(s) of the Day/Commitment(s) have been identified:  Safety. Preservation of skin integrity.       Dorian Cavazos  November 1, 2020

## 2020-11-01 NOTE — PLAN OF CARE
Problem: Falls - Risk of:  Goal: Will remain free from falls  Description: Will remain free from falls  11/1/2020 1053 by Marlon Segal RN  Outcome: Met This Shift     Problem: Pain:  Goal: Pain level will decrease  Description: Pain level will decrease  Outcome: Met This Shift     Problem: Skin Integrity:  Goal: Will show no infection signs and symptoms  Description: Will show no infection signs and symptoms  11/1/2020 1053 by Marlon Segal RN  Outcome: Met This Shift

## 2020-11-01 NOTE — PROGRESS NOTES
Date: 11/1/2020    Time: 5:20 AM    Patient Placed On BIPAP/CPAP/ Non-Invasive Ventilation? Patient already on    If no must comment. Facial area red/color change? No           If YES are Blister/Lesion present? No   If yes must notify nursing staff  BIPAP/CPAP skin barrier?   Yes    Skin barrier type:mepilexlite       Comments:        Andreina Stewart

## 2020-11-04 LAB
SARS-COV-2: NOT DETECTED
SARS-COV-2: NOT DETECTED
SOURCE: NORMAL
SOURCE: NORMAL

## 2020-11-05 LAB
BLOOD CULTURE, ROUTINE: NORMAL
CULTURE, BLOOD 2: NORMAL

## 2020-11-10 LAB
SARS-COV-2: NOT DETECTED
SOURCE: NORMAL

## 2020-11-17 LAB — SARS-COV-2, PCR: NOT DETECTED

## 2020-11-18 LAB
SARS-COV-2: NOT DETECTED
SOURCE: NORMAL

## 2020-11-24 LAB
SARS-COV-2: NOT DETECTED
SOURCE: NORMAL

## 2020-12-01 LAB
SARS-COV-2: NOT DETECTED
SOURCE: NORMAL

## 2020-12-02 LAB
ALBUMIN SERPL-MCNC: 3.1 G/DL (ref 3.5–5.2)
ALP BLD-CCNC: 140 U/L (ref 35–104)
ALT SERPL-CCNC: 47 U/L (ref 0–32)
ANION GAP SERPL CALCULATED.3IONS-SCNC: 11 MMOL/L (ref 7–16)
AST SERPL-CCNC: 39 U/L (ref 0–31)
BASOPHILS ABSOLUTE: 0.07 E9/L (ref 0–0.2)
BASOPHILS RELATIVE PERCENT: 0.8 % (ref 0–2)
BILIRUB SERPL-MCNC: 0.3 MG/DL (ref 0–1.2)
BUN BLDV-MCNC: 26 MG/DL (ref 8–23)
CALCIUM SERPL-MCNC: 8.7 MG/DL (ref 8.6–10.2)
CHLORIDE BLD-SCNC: 101 MMOL/L (ref 98–107)
CO2: 27 MMOL/L (ref 22–29)
CREAT SERPL-MCNC: 1.3 MG/DL (ref 0.5–1)
EOSINOPHILS ABSOLUTE: 0.37 E9/L (ref 0.05–0.5)
EOSINOPHILS RELATIVE PERCENT: 4.4 % (ref 0–6)
FERRITIN: 308 NG/ML
GFR AFRICAN AMERICAN: 48
GFR NON-AFRICAN AMERICAN: 40 ML/MIN/1.73
GLUCOSE BLD-MCNC: 133 MG/DL (ref 74–99)
HCT VFR BLD CALC: 41 % (ref 34–48)
HEMOGLOBIN: 13.6 G/DL (ref 11.5–15.5)
IMMATURE GRANULOCYTES #: 0.05 E9/L
IMMATURE GRANULOCYTES %: 0.6 % (ref 0–5)
IRON SATURATION: 28 % (ref 15–50)
IRON: 53 MCG/DL (ref 37–145)
LYMPHOCYTES ABSOLUTE: 2.02 E9/L (ref 1.5–4)
LYMPHOCYTES RELATIVE PERCENT: 24 % (ref 20–42)
MAGNESIUM: 1.9 MG/DL (ref 1.6–2.6)
MCH RBC QN AUTO: 28.9 PG (ref 26–35)
MCHC RBC AUTO-ENTMCNC: 33.2 % (ref 32–34.5)
MCV RBC AUTO: 87.2 FL (ref 80–99.9)
MONOCYTES ABSOLUTE: 0.96 E9/L (ref 0.1–0.95)
MONOCYTES RELATIVE PERCENT: 11.4 % (ref 2–12)
NEUTROPHILS ABSOLUTE: 4.95 E9/L (ref 1.8–7.3)
NEUTROPHILS RELATIVE PERCENT: 58.8 % (ref 43–80)
PARATHYROID HORMONE INTACT: 43 PG/ML (ref 15–65)
PDW BLD-RTO: 13.9 FL (ref 11.5–15)
PHOSPHORUS: 3.4 MG/DL (ref 2.5–4.5)
PLATELET # BLD: 201 E9/L (ref 130–450)
PMV BLD AUTO: 10.2 FL (ref 7–12)
POTASSIUM SERPL-SCNC: 3.5 MMOL/L (ref 3.5–5)
RBC # BLD: 4.7 E12/L (ref 3.5–5.5)
SODIUM BLD-SCNC: 139 MMOL/L (ref 132–146)
TOTAL IRON BINDING CAPACITY: 190 MCG/DL (ref 250–450)
TOTAL PROTEIN: 6 G/DL (ref 6.4–8.3)
VITAMIN D 25-HYDROXY: 36 NG/ML (ref 30–100)
WBC # BLD: 8.4 E9/L (ref 4.5–11.5)

## 2020-12-08 LAB
SARS-COV-2: NOT DETECTED
SOURCE: NORMAL

## 2020-12-12 LAB
SARS-COV-2: NOT DETECTED
SOURCE: NORMAL

## 2020-12-16 LAB
SARS-COV-2: NOT DETECTED
SOURCE: 2

## 2021-01-03 ENCOUNTER — HOSPITAL ENCOUNTER (EMERGENCY)
Age: 76
Discharge: HOME | End: 2021-01-04
Attending: EMERGENCY MEDICINE
Payer: MEDICARE

## 2021-01-03 ENCOUNTER — APPOINTMENT (OUTPATIENT)
Dept: GENERAL RADIOLOGY | Age: 76
End: 2021-01-03
Payer: MEDICARE

## 2021-01-03 DIAGNOSIS — N30.00 ACUTE CYSTITIS WITHOUT HEMATURIA: Primary | ICD-10-CM

## 2021-01-03 DIAGNOSIS — R53.83 OTHER FATIGUE: ICD-10-CM

## 2021-01-03 LAB
ALBUMIN SERPL-MCNC: 3 G/DL (ref 3.5–5.2)
ALP BLD-CCNC: 134 U/L (ref 35–104)
ALT SERPL-CCNC: 15 U/L (ref 0–32)
ANION GAP SERPL CALCULATED.3IONS-SCNC: 12 MMOL/L (ref 7–16)
AST SERPL-CCNC: 24 U/L (ref 0–31)
BASOPHILS ABSOLUTE: 0.03 E9/L (ref 0–0.2)
BASOPHILS RELATIVE PERCENT: 0.5 % (ref 0–2)
BILIRUB SERPL-MCNC: 0.5 MG/DL (ref 0–1.2)
BUN BLDV-MCNC: 22 MG/DL (ref 8–23)
CALCIUM SERPL-MCNC: 8.7 MG/DL (ref 8.6–10.2)
CHLORIDE BLD-SCNC: 101 MMOL/L (ref 98–107)
CO2: 25 MMOL/L (ref 22–29)
CREAT SERPL-MCNC: 1.5 MG/DL (ref 0.5–1)
EOSINOPHILS ABSOLUTE: 0.11 E9/L (ref 0.05–0.5)
EOSINOPHILS RELATIVE PERCENT: 1.7 % (ref 0–6)
GFR AFRICAN AMERICAN: 41
GFR NON-AFRICAN AMERICAN: 34 ML/MIN/1.73
GLUCOSE BLD-MCNC: 122 MG/DL (ref 74–99)
HCT VFR BLD CALC: 42.3 % (ref 34–48)
HEMOGLOBIN: 14.4 G/DL (ref 11.5–15.5)
IMMATURE GRANULOCYTES #: 0.07 E9/L
IMMATURE GRANULOCYTES %: 1.1 % (ref 0–5)
LYMPHOCYTES ABSOLUTE: 1.27 E9/L (ref 1.5–4)
LYMPHOCYTES RELATIVE PERCENT: 19.2 % (ref 20–42)
MAGNESIUM: 2.1 MG/DL (ref 1.6–2.6)
MCH RBC QN AUTO: 28.3 PG (ref 26–35)
MCHC RBC AUTO-ENTMCNC: 34 % (ref 32–34.5)
MCV RBC AUTO: 83.3 FL (ref 80–99.9)
MONOCYTES ABSOLUTE: 1.29 E9/L (ref 0.1–0.95)
MONOCYTES RELATIVE PERCENT: 19.5 % (ref 2–12)
NEUTROPHILS ABSOLUTE: 3.83 E9/L (ref 1.8–7.3)
NEUTROPHILS RELATIVE PERCENT: 58 % (ref 43–80)
PDW BLD-RTO: 14.1 FL (ref 11.5–15)
PLATELET # BLD: 367 E9/L (ref 130–450)
PMV BLD AUTO: 8.7 FL (ref 7–12)
POTASSIUM REFLEX MAGNESIUM: 3 MMOL/L (ref 3.5–5)
RBC # BLD: 5.08 E12/L (ref 3.5–5.5)
SODIUM BLD-SCNC: 138 MMOL/L (ref 132–146)
TOTAL PROTEIN: 6.7 G/DL (ref 6.4–8.3)
WBC # BLD: 6.6 E9/L (ref 4.5–11.5)

## 2021-01-03 PROCEDURE — 83735 ASSAY OF MAGNESIUM: CPT

## 2021-01-03 PROCEDURE — 93005 ELECTROCARDIOGRAM TRACING: CPT | Performed by: EMERGENCY MEDICINE

## 2021-01-03 PROCEDURE — 71045 X-RAY EXAM CHEST 1 VIEW: CPT

## 2021-01-03 PROCEDURE — 85025 COMPLETE CBC W/AUTO DIFF WBC: CPT

## 2021-01-03 PROCEDURE — 2580000003 HC RX 258: Performed by: EMERGENCY MEDICINE

## 2021-01-03 PROCEDURE — 81001 URINALYSIS AUTO W/SCOPE: CPT

## 2021-01-03 PROCEDURE — 96374 THER/PROPH/DIAG INJ IV PUSH: CPT

## 2021-01-03 PROCEDURE — 99285 EMERGENCY DEPT VISIT HI MDM: CPT

## 2021-01-03 PROCEDURE — 80053 COMPREHEN METABOLIC PANEL: CPT

## 2021-01-03 PROCEDURE — 36415 COLL VENOUS BLD VENIPUNCTURE: CPT

## 2021-01-03 RX ORDER — 0.9 % SODIUM CHLORIDE 0.9 %
1000 INTRAVENOUS SOLUTION INTRAVENOUS ONCE
Status: COMPLETED | OUTPATIENT
Start: 2021-01-03 | End: 2021-01-04

## 2021-01-03 RX ADMIN — SODIUM CHLORIDE 1000 ML: 9 INJECTION, SOLUTION INTRAVENOUS at 23:22

## 2021-01-03 ASSESSMENT — PAIN DESCRIPTION - PAIN TYPE: TYPE: ACUTE PAIN

## 2021-01-03 ASSESSMENT — PAIN SCALES - GENERAL: PAINLEVEL_OUTOF10: 4

## 2021-01-04 VITALS
HEIGHT: 66 IN | SYSTOLIC BLOOD PRESSURE: 142 MMHG | HEART RATE: 69 BPM | TEMPERATURE: 98 F | BODY MASS INDEX: 46.77 KG/M2 | OXYGEN SATURATION: 96 % | RESPIRATION RATE: 20 BRPM | WEIGHT: 291 LBS | DIASTOLIC BLOOD PRESSURE: 92 MMHG

## 2021-01-04 LAB
BACTERIA: ABNORMAL /HPF
BILIRUBIN URINE: ABNORMAL
BLOOD, URINE: NEGATIVE
CLARITY: CLEAR
COLOR: YELLOW
EKG ATRIAL RATE: 82 BPM
EKG P AXIS: 53 DEGREES
EKG P-R INTERVAL: 142 MS
EKG Q-T INTERVAL: 454 MS
EKG QRS DURATION: 86 MS
EKG QTC CALCULATION (BAZETT): 530 MS
EKG R AXIS: 19 DEGREES
EKG T AXIS: 116 DEGREES
EKG VENTRICULAR RATE: 82 BPM
EPITHELIAL CELLS, UA: ABNORMAL /HPF
GLUCOSE URINE: NEGATIVE MG/DL
KETONES, URINE: 15 MG/DL
LEUKOCYTE ESTERASE, URINE: ABNORMAL
NITRITE, URINE: POSITIVE
PH UA: 5.5 (ref 5–9)
PROTEIN UA: 30 MG/DL
RBC UA: ABNORMAL /HPF (ref 0–2)
SPECIFIC GRAVITY UA: >=1.03 (ref 1–1.03)
UROBILINOGEN, URINE: 1 E.U./DL
WBC UA: ABNORMAL /HPF (ref 0–5)

## 2021-01-04 PROCEDURE — 6360000002 HC RX W HCPCS: Performed by: EMERGENCY MEDICINE

## 2021-01-04 PROCEDURE — 2580000003 HC RX 258: Performed by: EMERGENCY MEDICINE

## 2021-01-04 PROCEDURE — 93010 ELECTROCARDIOGRAM REPORT: CPT | Performed by: INTERNAL MEDICINE

## 2021-01-04 PROCEDURE — 6370000000 HC RX 637 (ALT 250 FOR IP): Performed by: EMERGENCY MEDICINE

## 2021-01-04 RX ORDER — 0.9 % SODIUM CHLORIDE 0.9 %
1000 INTRAVENOUS SOLUTION INTRAVENOUS ONCE
Status: COMPLETED | OUTPATIENT
Start: 2021-01-04 | End: 2021-01-04

## 2021-01-04 RX ORDER — CEPHALEXIN 500 MG/1
500 CAPSULE ORAL 3 TIMES DAILY
Qty: 15 CAPSULE | Refills: 0 | Status: SHIPPED | OUTPATIENT
Start: 2021-01-04 | End: 2021-01-09

## 2021-01-04 RX ORDER — BENZONATATE 200 MG/1
200 CAPSULE ORAL EVERY 8 HOURS PRN
Qty: 20 CAPSULE | Refills: 0 | Status: SHIPPED | OUTPATIENT
Start: 2021-01-04 | End: 2021-07-16 | Stop reason: ALTCHOICE

## 2021-01-04 RX ORDER — GUAIFENESIN 100 MG/5ML
200 SOLUTION ORAL ONCE
Status: COMPLETED | OUTPATIENT
Start: 2021-01-04 | End: 2021-01-04

## 2021-01-04 RX ADMIN — SODIUM CHLORIDE 1000 ML: 9 INJECTION, SOLUTION INTRAVENOUS at 02:24

## 2021-01-04 RX ADMIN — CEFTRIAXONE SODIUM 2 G: 2 INJECTION, POWDER, FOR SOLUTION INTRAMUSCULAR; INTRAVENOUS at 02:25

## 2021-01-04 RX ADMIN — GUAIFENESIN 200 MG: 200 SOLUTION ORAL at 03:37

## 2021-01-04 NOTE — ED PROVIDER NOTES
HPI:  1/4/21,   Time: 3:31 AM TRACY Jasso is a 67 y.o. female presenting to the ED for generalized fatigue and weakness. Patient states she began having a dry cough approximately 1 week ago on Tuesday, that has since improved, states cough is very mild almost resolved at this time. She denies any fevers or chills, no chest pain or shortness of breath, no abdominal pain nausea or vomiting. Patient states she has just felt weak and tired over the past week with decreased appetite. No other complaints. ROS:   GEN: no fevers, no chills, + fatique  HENT: No trauma, no sore throat, no swelling throat or oral mucosa  EYES: No changes in vision, no pain  CARDIO: No chest pain, no palpitations  PULM: No trouble breathing, no wheezing  ABD: No pain, no nausea, no vomiting  MSK: No pain, no trauma, no deformities  SKIN: No rashes, no abrasions, no lacerations  NEURO: No changes in mentation, no headache, no weakness     --------------------------------------------- PAST HISTORY ---------------------------------------------  Past Medical History:  has a past medical history of Asthma, Cerebral artery occlusion with cerebral infarction ([facility] [address] CKD (chronic kidney disease) stage 4, GFR 15-29 ml/min ([facility]), Degenerative disc disease, Degenerative joint disease, Diabetes mellitus ([facility] [address] Diabetic peripheral neuropathy ([facility] [address] Diffuse cerebral atrophy ([facility]), Fibromyalgia, Glaucoma, HX OTHER MEDICAL, Hypertension, Iron deficiency anemia, Neurogenic bladder, Neuropathy, PONV (postoperative nausea and vomiting), Sarcoidosis, Sleep apnea, and Spinal cord and nerve root disorder. Past Surgical History:  has a past surgical history that includes Foot surgery (Bilateral); Hysterectomy; back surgery; Total knee arthroplasty (Bilateral); Cholecystectomy; Colonoscopy (08/2019); other surgical history (Right, 02/06/2017); Breast surgery; joint replacement (12/16/2016);  Cardiac catheterization (2012); and Ankle fracture  41     Calcium 8.7 8.6 - 10.2 mg/dL    Total Protein 6.7 6.4 - 8.3 g/dL    Alb 3.0 (L) 3.5 - 5.2 g/dL    Total Bilirubin 0.5 0.0 - 1.2 mg/dL    Alkaline Phosphatase 134 (H) 35 - 104 U/L    ALT 15 0 - 32 U/L    AST 24 0 - 31 U/L   Urinalysis, reflex to microscopic   Result Value Ref Range    Color, UA Yellow Straw/Yellow    Clarity, UA Clear Clear    Glucose, Ur Negative Negative mg/dL    Bilirubin Urine SMALL (A) Negative    Ketones, Urine 15 (A) Negative mg/dL    Specific Gravity, UA >=1.030 1.005 - 1.030    Blood, Urine Negative Negative    pH, UA 5.5 5.0 - 9.0    Protein, UA 30 (A) Negative mg/dL    Urobilinogen, Urine 1.0 <2.0 E.U./dL    Nitrite, Urine POSITIVE (A) Negative    Leukocyte Esterase, Urine TRACE (A) Negative   Magnesium   Result Value Ref Range    Magnesium 2.1 1.6 - 2.6 mg/dL   Microscopic Urinalysis   Result Value Ref Range    WBC, UA 5-10 (A) 0 - 5 /HPF    RBC, UA NONE 0 - 2 /HPF    Epithelial Cells, UA FEW /HPF    Bacteria, UA MANY (A) None Seen /HPF   EKG 12 Lead   Result Value Ref Range    Ventricular Rate 82 BPM    Atrial Rate 82 BPM    P-R Interval 142 ms    QRS Duration 86 ms    Q-T Interval 454 ms    QTc Calculation (Bazett) 530 ms    P Axis 53 degrees    R Axis 19 degrees    T Axis 116 degrees       RADIOLOGY:  Interpreted by Radiologist.  XR CHEST PORTABLE   Final Result   Pulmonary vascular congestive changes. No evidence for pneumonia.          ------------------------- NURSING NOTES AND VITALS REVIEWED ---------------------------   The nursing notes within the ED encounter and vital signs as below have been reviewed.    BP (!) 165/70   Pulse 76   Temp 97.9 °F (36.6 °C) (Oral)   Resp 17   Ht 5' 6\" (1.676 m)   Wt 291 lb (132 kg)   SpO2 96%   BMI 46.97 kg/m²   Oxygen Saturation Interpretation: Normal    ---------------------------------------------------PHYSICAL EXAM--------------------------------------    GEN: No acute distress, well-appearing, well nourished HENT: Normocephalic, atraumatic, oral mucosa moist  EYES: No scleral injection, no scleral icterus, PERRL   PULM: Lungs clear to ascultation bilaterally, no wheezes, no crackles  CARDIO: Regular rate, regular rhythm, normal S1/S2  ABD: Soft, non-tender, no rigidity, no guarding, no distention  MSK: No deformities, no edema, palpable pulses all extremities   SKIN: No rashes, no lacerations, no abrasions   NEURO: Awake, alert, appropriate         ------------------------------ ED COURSE/MEDICAL DECISION MAKING----------------------  Medications   guaiFENesin (ROBITUSSIN) 100 MG/5ML oral solution 200 mg (has no administration in time range)   0.9 % sodium chloride bolus (0 mLs Intravenous Stopped 1/4/21 0028)   cefTRIAXone (ROCEPHIN) 2 g in sterile water 20 mL IV syringe (2 g Intravenous Given 1/4/21 0225)   0.9 % sodium chloride bolus (0 mLs Intravenous Stopped 1/4/21 0325)         ED Course and Medical Decision Making:   Patient presents with main complaint of generalized weakness and fatigue. Patient does admit to mild cough but that has been improving since onset. No chest pain or shortness of breath. Patient hemodynamically stable. Lung sounds clear with no respiratory distress. Chest x-ray unchanged from previous. Work-up reviewed, urinalysis consistent with UTI. Patient was rehydrated with IV fluids and first dose of antibiotics were given in emergency department, Rocephin IV. Patient was discharged home with prescription for antibiotics for UTI. Afebrile, no leukocytosis. Recommended follow-up with primary care physician. Appropriate recommendations provided and return precautions discussed.   Patient states understanding and agrees to plan.       --------------------------------- ADDITIONAL PROVIDER NOTES ---------------------------------        EKG Interpretation  Interpreted by emergency department physician    Rhythm: normal sinus   Rate: normal  Axis: normal  Conduction: normal  ST Segments: normal  T Waves: no acute change    Clinical Impression: no acute changes        This patient's ED course included: re-evaluation prior to disposition and a personal history and physicial eaxmination    This patient has remained hemodynamically stable during their ED course. Counseling: The emergency provider has spoken with the patient and discussed todays results, in addition to providing specific details for the plan of care and counseling regarding the diagnosis and prognosis. Questions are answered at this time and they are agreeable with the plan.      --------------------------------- IMPRESSION AND DISPOSITION ---------------------------------    IMPRESSION  1. Acute cystitis without hematuria    2.  Other fatigue        DISPOSITION  Disposition: Discharge to home  Patient condition is good        Demetrio Shin DO  01/04/21 0335

## 2021-02-01 ENCOUNTER — HOSPITAL ENCOUNTER (INPATIENT)
Age: 76
LOS: 1 days | Discharge: HOME HEALTH | DRG: 641 | End: 2021-02-04
Attending: EMERGENCY MEDICINE | Admitting: INTERNAL MEDICINE
Payer: MEDICARE

## 2021-02-01 ENCOUNTER — APPOINTMENT (OUTPATIENT)
Dept: CT IMAGING | Age: 76
DRG: 641 | End: 2021-02-01
Payer: MEDICARE

## 2021-02-01 ENCOUNTER — APPOINTMENT (OUTPATIENT)
Dept: GENERAL RADIOLOGY | Age: 76
DRG: 641 | End: 2021-02-01
Payer: MEDICARE

## 2021-02-01 DIAGNOSIS — I95.1 ORTHOSTATIC HYPOTENSION: Primary | ICD-10-CM

## 2021-02-01 LAB
ANION GAP SERPL CALCULATED.3IONS-SCNC: 13 MMOL/L (ref 7–16)
BASOPHILS ABSOLUTE: 0.07 E9/L (ref 0–0.2)
BASOPHILS RELATIVE PERCENT: 0.8 % (ref 0–2)
BUN BLDV-MCNC: 30 MG/DL (ref 8–23)
CALCIUM SERPL-MCNC: 8.8 MG/DL (ref 8.6–10.2)
CHLORIDE BLD-SCNC: 102 MMOL/L (ref 98–107)
CO2: 23 MMOL/L (ref 22–29)
CREAT SERPL-MCNC: 1.5 MG/DL (ref 0.5–1)
EOSINOPHILS ABSOLUTE: 0.35 E9/L (ref 0.05–0.5)
EOSINOPHILS RELATIVE PERCENT: 3.9 % (ref 0–6)
GFR AFRICAN AMERICAN: 41
GFR NON-AFRICAN AMERICAN: 34 ML/MIN/1.73
GLUCOSE BLD-MCNC: 191 MG/DL (ref 74–99)
HCT VFR BLD CALC: 42.5 % (ref 34–48)
HEMOGLOBIN: 13.8 G/DL (ref 11.5–15.5)
IMMATURE GRANULOCYTES #: 0.06 E9/L
IMMATURE GRANULOCYTES %: 0.7 % (ref 0–5)
LYMPHOCYTES ABSOLUTE: 1.58 E9/L (ref 1.5–4)
LYMPHOCYTES RELATIVE PERCENT: 17.8 % (ref 20–42)
MCH RBC QN AUTO: 29.5 PG (ref 26–35)
MCHC RBC AUTO-ENTMCNC: 32.5 % (ref 32–34.5)
MCV RBC AUTO: 90.8 FL (ref 80–99.9)
MONOCYTES ABSOLUTE: 0.91 E9/L (ref 0.1–0.95)
MONOCYTES RELATIVE PERCENT: 10.2 % (ref 2–12)
NEUTROPHILS ABSOLUTE: 5.93 E9/L (ref 1.8–7.3)
NEUTROPHILS RELATIVE PERCENT: 66.6 % (ref 43–80)
PDW BLD-RTO: 16.7 FL (ref 11.5–15)
PLATELET # BLD: 325 E9/L (ref 130–450)
PMV BLD AUTO: 9.2 FL (ref 7–12)
POTASSIUM REFLEX MAGNESIUM: 4.1 MMOL/L (ref 3.5–5)
RBC # BLD: 4.68 E12/L (ref 3.5–5.5)
REASON FOR REJECTION: NORMAL
REASON FOR REJECTION: NORMAL
REJECTED TEST: NORMAL
REJECTED TEST: NORMAL
SODIUM BLD-SCNC: 138 MMOL/L (ref 132–146)
TROPONIN: <0.01 NG/ML (ref 0–0.03)
WBC # BLD: 8.9 E9/L (ref 4.5–11.5)

## 2021-02-01 PROCEDURE — 73030 X-RAY EXAM OF SHOULDER: CPT

## 2021-02-01 PROCEDURE — 2500000003 HC RX 250 WO HCPCS

## 2021-02-01 PROCEDURE — 93005 ELECTROCARDIOGRAM TRACING: CPT | Performed by: STUDENT IN AN ORGANIZED HEALTH CARE EDUCATION/TRAINING PROGRAM

## 2021-02-01 PROCEDURE — 36415 COLL VENOUS BLD VENIPUNCTURE: CPT

## 2021-02-01 PROCEDURE — 90715 TDAP VACCINE 7 YRS/> IM: CPT | Performed by: STUDENT IN AN ORGANIZED HEALTH CARE EDUCATION/TRAINING PROGRAM

## 2021-02-01 PROCEDURE — 2580000003 HC RX 258: Performed by: STUDENT IN AN ORGANIZED HEALTH CARE EDUCATION/TRAINING PROGRAM

## 2021-02-01 PROCEDURE — 71045 X-RAY EXAM CHEST 1 VIEW: CPT

## 2021-02-01 PROCEDURE — 6360000002 HC RX W HCPCS: Performed by: STUDENT IN AN ORGANIZED HEALTH CARE EDUCATION/TRAINING PROGRAM

## 2021-02-01 PROCEDURE — 99285 EMERGENCY DEPT VISIT HI MDM: CPT

## 2021-02-01 PROCEDURE — 90471 IMMUNIZATION ADMIN: CPT | Performed by: STUDENT IN AN ORGANIZED HEALTH CARE EDUCATION/TRAINING PROGRAM

## 2021-02-01 PROCEDURE — 72125 CT NECK SPINE W/O DYE: CPT

## 2021-02-01 PROCEDURE — 12011 RPR F/E/E/N/L/M 2.5 CM/<: CPT

## 2021-02-01 PROCEDURE — 85025 COMPLETE CBC W/AUTO DIFF WBC: CPT

## 2021-02-01 PROCEDURE — 84484 ASSAY OF TROPONIN QUANT: CPT

## 2021-02-01 PROCEDURE — 70450 CT HEAD/BRAIN W/O DYE: CPT

## 2021-02-01 PROCEDURE — 80048 BASIC METABOLIC PNL TOTAL CA: CPT

## 2021-02-01 PROCEDURE — 12001 RPR S/N/AX/GEN/TRNK 2.5CM/<: CPT

## 2021-02-01 PROCEDURE — 0HQ0XZZ REPAIR SCALP SKIN, EXTERNAL APPROACH: ICD-10-PCS | Performed by: INTERNAL MEDICINE

## 2021-02-01 RX ORDER — LIDOCAINE HYDROCHLORIDE 10 MG/ML
20 INJECTION, SOLUTION EPIDURAL; INFILTRATION; INTRACAUDAL; PERINEURAL ONCE
Status: DISCONTINUED | OUTPATIENT
Start: 2021-02-01 | End: 2021-02-01

## 2021-02-01 RX ORDER — LIDOCAINE HYDROCHLORIDE 10 MG/ML
INJECTION, SOLUTION INFILTRATION; PERINEURAL
Status: COMPLETED
Start: 2021-02-01 | End: 2021-02-01

## 2021-02-01 RX ORDER — ACETAMINOPHEN 500 MG
500 TABLET ORAL EVERY 6 HOURS PRN
COMMUNITY
End: 2022-02-23

## 2021-02-01 RX ORDER — LIDOCAINE HYDROCHLORIDE 10 MG/ML
20 INJECTION, SOLUTION INFILTRATION; PERINEURAL ONCE
Status: COMPLETED | OUTPATIENT
Start: 2021-02-01 | End: 2021-02-01

## 2021-02-01 RX ORDER — 0.9 % SODIUM CHLORIDE 0.9 %
1000 INTRAVENOUS SOLUTION INTRAVENOUS ONCE
Status: COMPLETED | OUTPATIENT
Start: 2021-02-02 | End: 2021-02-02

## 2021-02-01 RX ADMIN — LIDOCAINE HYDROCHLORIDE: 10 INJECTION, SOLUTION INFILTRATION; PERINEURAL at 23:59

## 2021-02-01 RX ADMIN — TETANUS TOXOID, REDUCED DIPHTHERIA TOXOID AND ACELLULAR PERTUSSIS VACCINE, ADSORBED 0.5 ML: 5; 2.5; 8; 8; 2.5 SUSPENSION INTRAMUSCULAR at 22:42

## 2021-02-01 RX ADMIN — SODIUM CHLORIDE 1000 ML: 9 INJECTION, SOLUTION INTRAVENOUS at 23:58

## 2021-02-01 ASSESSMENT — ENCOUNTER SYMPTOMS
COUGH: 0
BACK PAIN: 0
RHINORRHEA: 0
NAUSEA: 0
VOMITING: 0
SORE THROAT: 0
ABDOMINAL PAIN: 0
DIARRHEA: 0
SHORTNESS OF BREATH: 0

## 2021-02-01 ASSESSMENT — PAIN DESCRIPTION - PROGRESSION: CLINICAL_PROGRESSION_2: GRADUALLY WORSENING

## 2021-02-01 ASSESSMENT — PAIN DESCRIPTION - INTENSITY
RATING_2: 7
RATING_3: 4

## 2021-02-01 ASSESSMENT — PAIN DESCRIPTION - ONSET
ONSET_2: GRADUAL
ONSET: GRADUAL

## 2021-02-01 ASSESSMENT — PAIN - FUNCTIONAL ASSESSMENT: PAIN_FUNCTIONAL_ASSESSMENT: ACTIVITIES ARE NOT PREVENTED

## 2021-02-01 ASSESSMENT — PAIN DESCRIPTION - DURATION: DURATION_2: CONTINUOUS

## 2021-02-01 ASSESSMENT — PAIN DESCRIPTION - PAIN TYPE: TYPE_2: ACUTE PAIN

## 2021-02-01 ASSESSMENT — PAIN DESCRIPTION - DESCRIPTORS
DESCRIPTORS: ACHING
DESCRIPTORS_3: ACHING;SORE

## 2021-02-01 ASSESSMENT — PAIN DESCRIPTION - LOCATION: LOCATION_2: NECK

## 2021-02-01 ASSESSMENT — PAIN SCALES - GENERAL: PAINLEVEL_OUTOF10: 7

## 2021-02-01 NOTE — ED NOTES
Bed:  [place] [address]  Expected date:   Expected time:   Means of arrival:   Comments:  BRODERICK Aguirre RN  02/01/21 1966

## 2021-02-01 NOTE — ED PROVIDER NOTES
Arya Fernandez is a 67 y.o. female with a PMHx significant for obesity, HTN, CVA, DM, neuropathy, CKD who presents for evaluation of fall, beginning prior to arrival.  The complaint has been persistent, moderate in severity, and worsened by nothing. The patient states that she was transferring from her walker to her wheelchair when she all of a sudden fell backwards. States she did not trip on anything, it was not mechanical fall. Denies any dizziness, lightheadedness, chest pain, shortness of breath, nausea, vomiting. She has blood to the back of her head, last tetanus was greater than five years ago. The history is provided by the patient and medical records. Review of Systems   Constitutional: Negative for chills, diaphoresis and fever. HENT: Negative for congestion, rhinorrhea and sore throat. Eyes: Negative for visual disturbance. Respiratory: Negative for cough and shortness of breath. Cardiovascular: Negative for chest pain. Gastrointestinal: Negative for abdominal pain, diarrhea, nausea and vomiting. Genitourinary: Negative for dysuria and urgency. Musculoskeletal: Positive for neck pain. Negative for back pain. Left shoulder pain     Skin: Positive for wound. Negative for rash. Neurological: Positive for headaches. Negative for dizziness, light-headedness and numbness. Physical Exam  Vitals signs and nursing note reviewed. Constitutional:       General: She is not in acute distress. Appearance: Normal appearance. She is well-developed. She is not ill-appearing. HENT:      Head: Normocephalic. Comments: Pressure dressing; Wound to the posterior scalp       Right Ear: External ear normal.      Left Ear: External ear normal.   Eyes:      Extraocular Movements: Extraocular movements intact. Conjunctiva/sclera: Conjunctivae normal.   Neck:      Musculoskeletal: Normal range of motion and neck supple.    Cardiovascular:      Rate and Rhythm: Normal rate and regular rhythm. Heart sounds: Normal heart sounds. No murmur. Pulmonary:      Effort: Pulmonary effort is normal. No respiratory distress. Breath sounds: Normal breath sounds. No stridor. No wheezing or rhonchi. Abdominal:      General: There is no distension. Palpations: Abdomen is soft. There is no mass. Tenderness: There is no abdominal tenderness. Hernia: No hernia is present. Skin:     General: Skin is warm and dry. Coloration: Skin is not jaundiced or pale. Findings: Lesion present. Neurological:      General: No focal deficit present. Mental Status: She is alert and oriented to person, place, and time. Procedures     Laceration Repair Procedure Note  Indication: Laceration  Procedure: The patient was placed in the appropriate position and anesthesia around the laceration was obtained by infiltration using 1% Lidocaine without epinephrine. The area was then cleansed with Shur-Clens and draped in a sterile fashion. The laceration was closed with staples. There were no additional lacerations requiring repair. The wound area was then dressed with bacitracin. Minimal debridement was preformed, flaps were aligned. No foreign body was identified. Total repaired wound length: 2 cm. Other Items: Staple count: 3  The patient tolerated the procedure well. Complications: None        MDM     ED Course as of Feb 02 2018   Mon Feb 01, 2021   3130 EKG: This EKG is signed and interpreted by me. Rate: 80  Rhythm: Sinus  Interpretation: non-specific EKG, ME of 168, QRS of 94, QTc of 486, no ST-Elevations or Depressions, T-wave inversions in I and aVL  Comparison: No significant changes compared to prior      [BB]   Tue Feb 02, 2021   2381 Spoke with April for Dr. Yamilet Welsh, discussed the patient. She will admit the patient. [BB]      ED Course User Index  [BB] DO Radha Merlos presents to the ED for evaluation of fall.  Patient notes she hit her head and had dizziness. Workup in the ED revealed negative imaging, labs that were hemolyzed multiple times. Patient was orthostatic positive, given a liter of fluids and was orthostatic positive still. Decision was made to then admit her to the hospital for observation. Patient requires continued workup and management of their symptoms and will be admitted to the hospital for further evaluation and treatment.      --------------------------------------------- PAST HISTORY ---------------------------------------------  Past Medical History:  has a past medical history of Asthma, Cerebral artery occlusion with cerebral infarction ([address] CKD (chronic kidney disease) stage 4, GFR 15-29 ml/min ([address] Degenerative disc disease, Degenerative joint disease, Diabetes mellitus ([address] Diabetic peripheral neuropathy ([address] Diffuse cerebral atrophy ([address] Fibromyalgia, Glaucoma, HX OTHER MEDICAL, Hypertension, Iron deficiency anemia, Neurogenic bladder, Neuropathy, PONV (postoperative nausea and vomiting), Sarcoidosis, Sleep apnea, and Spinal cord and nerve root disorder. Past Surgical History:  has a past surgical history that includes Foot surgery (Bilateral); Hysterectomy; back surgery; Total knee arthroplasty (Bilateral); Cholecystectomy; Colonoscopy (08/2019); other surgical history (Right, 02/06/2017); Breast surgery; joint replacement (12/16/2016); Cardiac catheterization (2012); and Ankle fracture surgery (Right, 10/24/2020). Social History:  reports that she has never smoked. She has never used smokeless tobacco. She reports that she does not drink alcohol or use drugs. Family History: family history includes Cancer in her brother and mother; Diabetes in her maternal grandfather; Parkinsonism in her father. The patients home medications have been reviewed. Allergies:  Iodides, Iodine, Other, and Pcn [penicillins]    -------------------------------------------------- RESULTS -------------------------------------------------    LABS:  Results for orders placed or performed during the hospital encounter of 02/01/21   CBC Auto Differential   Result Value Ref Range    WBC 8.9 4.5 - 11.5 E9/L    RBC 4.68 3.50 - 5.50 E12/L    Hemoglobin 13.8 11.5 - 15.5 g/dL    Hematocrit 42.5 34.0 - 48.0 %    MCV 90.8 80.0 - 99.9 fL    MCH 29.5 26.0 - 35.0 pg    MCHC 32.5 32.0 - 34.5 %    RDW 16.7 (H) 11.5 - 15.0 fL    Platelets 832 852 - 924 E9/L    MPV 9.2 7.0 - 12.0 fL    Neutrophils % 66.6 43.0 - 80.0 %    Immature Granulocytes % 0.7 0.0 - 5.0 %    Lymphocytes % 17.8 (L) 20.0 - 42.0 %    Monocytes % 10.2 2.0 - 12.0 %    Eosinophils % 3.9 0.0 - 6.0 %    Basophils % 0.8 0.0 - 2.0 %    Neutrophils Absolute 5.93 1.80 - 7.30 E9/L    Immature Granulocytes # 0.06 E9/L    Lymphocytes Absolute 1.58 1.50 - 4.00 E9/L    Monocytes Absolute 0.91 0.10 - 0.95 E9/L    Eosinophils Absolute 0.35 0.05 - 0.50 E9/L    Basophils Absolute 0.07 0.00 - 0.20 E9/L   Urinalysis, reflex to microscopic   Result Value Ref Range    Color, UA Yellow Straw/Yellow    Clarity, UA Clear Clear    Glucose, Ur Negative Negative mg/dL    Bilirubin Urine Negative Negative    Ketones, Urine Negative Negative mg/dL    Specific Gravity, UA >=1.030 1.005 - 1.030    Blood, Urine Negative Negative    pH, UA 5.0 5.0 - 9.0    Protein, UA Negative Negative mg/dL    Urobilinogen, Urine 0.2 <2.0 E.U./dL    Nitrite, Urine POSITIVE (A) Negative    Leukocyte Esterase, Urine TRACE (A) Negative   SPECIMEN REJECTION   Result Value Ref Range    Rejected Test bmpx,trop     Reason for Rejection see below    Basic Metabolic Panel w/ Reflex to MG   Result Value Ref Range    Sodium 138 132 - 146 mmol/L    Potassium reflex Magnesium 4.1 3.5 - 5.0 mmol/L    Chloride 102 98 - 107 mmol/L    CO2 23 22 - 29 mmol/L    Anion Gap 13 7 - 16 mmol/L    Glucose 191 (H) 74 - 99 mg/dL    BUN 30 (H) 8 - 23 mg/dL    CREATININE 1.5 (H) 0.5 - 1.0 mg/dL    GFR Non-African American 34 >=60 mL/min/1.73    GFR African American 41     Calcium 8.8 8.6 - 10.2 mg/dL   Troponin   Result Value Ref Range    Troponin <0.01 0.00 - 0.03 ng/mL   SPECIMEN REJECTION   Result Value Ref Range    Rejected Test trop     Reason for Rejection see below    Microscopic Urinalysis   Result Value Ref Range    WBC, UA 5-10 (A) 0 - 5 /HPF    RBC, UA 1-3 0 - 2 /HPF    Epithelial Cells, UA FEW /HPF    Bacteria, UA MANY (A) None Seen /HPF   Basic Metabolic Panel w/ Reflex to MG   Result Value Ref Range    Sodium 140 132 - 146 mmol/L    Potassium reflex Magnesium 3.8 3.5 - 5.0 mmol/L    Chloride 107 98 - 107 mmol/L    CO2 22 22 - 29 mmol/L    Anion Gap 11 7 - 16 mmol/L    Glucose 136 (H) 74 - 99 mg/dL    BUN 24 (H) 8 - 23 mg/dL    CREATININE 1.3 (H) 0.5 - 1.0 mg/dL    GFR Non-African American 40 >=60 mL/min/1.73    GFR African American 48     Calcium 8.2 (L) 8.6 - 10.2 mg/dL   CBC   Result Value Ref Range    WBC 8.2 4.5 - 11.5 E9/L    RBC 4.14 3.50 - 5.50 E12/L    Hemoglobin 12.1 11.5 - 15.5 g/dL    Hematocrit 38.0 34.0 - 48.0 %    MCV 91.8 80.0 - 99.9 fL    MCH 29.2 26.0 - 35.0 pg    MCHC 31.8 (L) 32.0 - 34.5 %    RDW 16.1 (H) 11.5 - 15.0 fL    Platelets 628 227 - 254 E9/L    MPV 9.2 7.0 - 12.0 fL   Hemoglobin A1C   Result Value Ref Range    Hemoglobin A1C 6.9 (H) 4.0 - 5.6 %   POCT Glucose   Result Value Ref Range    Meter Glucose 208 (H) 74 - 99 mg/dL   POCT Glucose   Result Value Ref Range    Meter Glucose 258 (H) 74 - 99 mg/dL   POCT Glucose   Result Value Ref Range    Meter Glucose 106 (H) 74 - 99 mg/dL   EKG 12 Lead   Result Value Ref Range    Ventricular Rate 80 BPM    Atrial Rate 80 BPM    P-R Interval 168 ms    QRS Duration 94 ms    Q-T Interval 422 ms    QTc Calculation (Bazett) 486 ms    P Axis 51 degrees    R Axis 9 degrees    T Axis 93 degrees       RADIOLOGY:  CT Head WO Contrast   Final Result   No acute displaced fracture. Diffuse degenerative changes from C3-T1 with multilevel disc bulges.       CT Cervical Spine WO Contrast   Final Result   No acute displaced fracture. Diffuse degenerative changes from C3-T1 with multilevel disc bulges. XR SHOULDER LEFT (MIN 2 VIEWS)   Final Result   No acute fracture or dislocation of the left shoulder. Chronic and degenerative changes as described. XR CHEST PORTABLE   Final Result   No acute process. Sign report        ------------------------- NURSING NOTES AND VITALS REVIEWED ---------------------------  Date / Time Roomed:  2/1/2021  6:19 PM  ED Bed Assignment:  [id number]    The nursing notes within the ED encounter and vital signs as below have been reviewed. Patient Vitals for the past 24 hrs:   BP Temp Temp src Pulse Resp SpO2   02/02/21 1450 (!) 185/71 97.9 °F (36.6 °C) Oral 79 18 96 %   02/02/21 1030     18 97 %   02/02/21 1006 (!) 169/79   75     02/02/21 0627 (!) 155/70   73 16 100 %   02/02/21 0416 (!) 131/49   74 12    02/02/21 0254     18 96 %   02/02/21 0144 (!) 111/56   85         Oxygen Saturation Interpretation: Normal    ------------------------------------------ PROGRESS NOTES ------------------------------------------  Counseling:  I have spoken with the patient and discussed todays results, in addition to providing specific details for the plan of care and counseling regarding the diagnosis and prognosis. Their questions are answered at this time and they are agreeable with the plan of admission.    --------------------------------- ADDITIONAL PROVIDER NOTES ---------------------------------  Consultations:  Spoke with April for Dr. Remigio Bolton. Discussed case. They will admit the patient.   This patient's ED course included: a personal history and physicial examination, re-evaluation prior to disposition, multiple bedside re-evaluations, IV medications, cardiac monitoring, continuous pulse oximetry and complex medical decision making and emergency management    This patient has remained hemodynamically stable during their ED course. Diagnosis:  1. Orthostatic hypotension        Disposition:  Patient's disposition: Admit to telemetry  Patient's condition is stable.              Denver Eis, DO  Resident  02/02/21 2020

## 2021-02-02 PROBLEM — Z86.73 HISTORY OF CVA (CEREBROVASCULAR ACCIDENT): Status: ACTIVE | Noted: 2021-02-02

## 2021-02-02 PROBLEM — I95.1 ORTHOSTATIC HYPOTENSION: Status: ACTIVE | Noted: 2021-02-02

## 2021-02-02 LAB
ANION GAP SERPL CALCULATED.3IONS-SCNC: 11 MMOL/L (ref 7–16)
BACTERIA: ABNORMAL /HPF
BILIRUBIN URINE: NEGATIVE
BLOOD, URINE: NEGATIVE
BUN BLDV-MCNC: 24 MG/DL (ref 8–23)
CALCIUM SERPL-MCNC: 8.2 MG/DL (ref 8.6–10.2)
CHLORIDE BLD-SCNC: 107 MMOL/L (ref 98–107)
CLARITY: CLEAR
CO2: 22 MMOL/L (ref 22–29)
COLOR: YELLOW
CREAT SERPL-MCNC: 1.3 MG/DL (ref 0.5–1)
EKG ATRIAL RATE: 80 BPM
EKG P AXIS: 51 DEGREES
EKG P-R INTERVAL: 168 MS
EKG Q-T INTERVAL: 422 MS
EKG QRS DURATION: 94 MS
EKG QTC CALCULATION (BAZETT): 486 MS
EKG R AXIS: 9 DEGREES
EKG T AXIS: 93 DEGREES
EKG VENTRICULAR RATE: 80 BPM
EPITHELIAL CELLS, UA: ABNORMAL /HPF
GFR AFRICAN AMERICAN: 48
GFR NON-AFRICAN AMERICAN: 40 ML/MIN/1.73
GLUCOSE BLD-MCNC: 136 MG/DL (ref 74–99)
GLUCOSE URINE: NEGATIVE MG/DL
HBA1C MFR BLD: 6.9 % (ref 4–5.6)
HCT VFR BLD CALC: 38 % (ref 34–48)
HEMOGLOBIN: 12.1 G/DL (ref 11.5–15.5)
KETONES, URINE: NEGATIVE MG/DL
LEUKOCYTE ESTERASE, URINE: ABNORMAL
MCH RBC QN AUTO: 29.2 PG (ref 26–35)
MCHC RBC AUTO-ENTMCNC: 31.8 % (ref 32–34.5)
MCV RBC AUTO: 91.8 FL (ref 80–99.9)
METER GLUCOSE: 106 MG/DL (ref 74–99)
METER GLUCOSE: 139 MG/DL (ref 74–99)
METER GLUCOSE: 208 MG/DL (ref 74–99)
METER GLUCOSE: 258 MG/DL (ref 74–99)
NITRITE, URINE: POSITIVE
PDW BLD-RTO: 16.1 FL (ref 11.5–15)
PH UA: 5 (ref 5–9)
PLATELET # BLD: 271 E9/L (ref 130–450)
PMV BLD AUTO: 9.2 FL (ref 7–12)
POTASSIUM REFLEX MAGNESIUM: 3.8 MMOL/L (ref 3.5–5)
PROTEIN UA: NEGATIVE MG/DL
RBC # BLD: 4.14 E12/L (ref 3.5–5.5)
RBC UA: ABNORMAL /HPF (ref 0–2)
SODIUM BLD-SCNC: 140 MMOL/L (ref 132–146)
SPECIFIC GRAVITY UA: >=1.03 (ref 1–1.03)
UROBILINOGEN, URINE: 0.2 E.U./DL
WBC # BLD: 8.2 E9/L (ref 4.5–11.5)
WBC UA: ABNORMAL /HPF (ref 0–5)

## 2021-02-02 PROCEDURE — 2580000003 HC RX 258: Performed by: NURSE PRACTITIONER

## 2021-02-02 PROCEDURE — 6360000002 HC RX W HCPCS: Performed by: INTERNAL MEDICINE

## 2021-02-02 PROCEDURE — 96372 THER/PROPH/DIAG INJ SC/IM: CPT

## 2021-02-02 PROCEDURE — 2580000003 HC RX 258: Performed by: INTERNAL MEDICINE

## 2021-02-02 PROCEDURE — 85027 COMPLETE CBC AUTOMATED: CPT

## 2021-02-02 PROCEDURE — 2500000003 HC RX 250 WO HCPCS: Performed by: INTERNAL MEDICINE

## 2021-02-02 PROCEDURE — G0378 HOSPITAL OBSERVATION PER HR: HCPCS

## 2021-02-02 PROCEDURE — 82962 GLUCOSE BLOOD TEST: CPT

## 2021-02-02 PROCEDURE — 6370000000 HC RX 637 (ALT 250 FOR IP): Performed by: NURSE PRACTITIONER

## 2021-02-02 PROCEDURE — 36415 COLL VENOUS BLD VENIPUNCTURE: CPT

## 2021-02-02 PROCEDURE — 80048 BASIC METABOLIC PNL TOTAL CA: CPT

## 2021-02-02 PROCEDURE — 81001 URINALYSIS AUTO W/SCOPE: CPT

## 2021-02-02 PROCEDURE — 96374 THER/PROPH/DIAG INJ IV PUSH: CPT

## 2021-02-02 PROCEDURE — 87186 SC STD MICRODIL/AGAR DIL: CPT

## 2021-02-02 PROCEDURE — 2580000003 HC RX 258: Performed by: STUDENT IN AN ORGANIZED HEALTH CARE EDUCATION/TRAINING PROGRAM

## 2021-02-02 PROCEDURE — 87088 URINE BACTERIA CULTURE: CPT

## 2021-02-02 PROCEDURE — 83036 HEMOGLOBIN GLYCOSYLATED A1C: CPT

## 2021-02-02 RX ORDER — VERAPAMIL HYDROCHLORIDE 120 MG/1
120 CAPSULE, EXTENDED RELEASE ORAL NIGHTLY
Status: ON HOLD | COMMUNITY
End: 2021-02-04 | Stop reason: HOSPADM

## 2021-02-02 RX ORDER — BENZONATATE 100 MG/1
200 CAPSULE ORAL EVERY 8 HOURS PRN
Status: DISCONTINUED | OUTPATIENT
Start: 2021-02-02 | End: 2021-02-04 | Stop reason: HOSPADM

## 2021-02-02 RX ORDER — POLYETHYLENE GLYCOL 3350 17 G/17G
17 POWDER, FOR SOLUTION ORAL DAILY PRN
Status: DISCONTINUED | OUTPATIENT
Start: 2021-02-02 | End: 2021-02-04 | Stop reason: HOSPADM

## 2021-02-02 RX ORDER — CLOPIDOGREL BISULFATE 75 MG/1
75 TABLET ORAL DAILY
Status: DISCONTINUED | OUTPATIENT
Start: 2021-02-02 | End: 2021-02-04 | Stop reason: HOSPADM

## 2021-02-02 RX ORDER — 0.9 % SODIUM CHLORIDE 0.9 %
1000 INTRAVENOUS SOLUTION INTRAVENOUS ONCE
Status: COMPLETED | OUTPATIENT
Start: 2021-02-02 | End: 2021-02-02

## 2021-02-02 RX ORDER — ALBUTEROL SULFATE 90 UG/1
1 AEROSOL, METERED RESPIRATORY (INHALATION) EVERY 4 HOURS PRN
Status: DISCONTINUED | OUTPATIENT
Start: 2021-02-02 | End: 2021-02-04 | Stop reason: HOSPADM

## 2021-02-02 RX ORDER — NICOTINE POLACRILEX 4 MG
15 LOZENGE BUCCAL PRN
Status: DISCONTINUED | OUTPATIENT
Start: 2021-02-02 | End: 2021-02-04 | Stop reason: HOSPADM

## 2021-02-02 RX ORDER — DULOXETIN HYDROCHLORIDE 60 MG/1
60 CAPSULE, DELAYED RELEASE ORAL EVERY MORNING
Status: DISCONTINUED | OUTPATIENT
Start: 2021-02-02 | End: 2021-02-04 | Stop reason: HOSPADM

## 2021-02-02 RX ORDER — DEXTROSE MONOHYDRATE 25 G/50ML
12.5 INJECTION, SOLUTION INTRAVENOUS PRN
Status: DISCONTINUED | OUTPATIENT
Start: 2021-02-02 | End: 2021-02-04 | Stop reason: HOSPADM

## 2021-02-02 RX ORDER — THEOPHYLLINE 300 MG/1
300 TABLET, EXTENDED RELEASE ORAL EVERY MORNING
Status: DISCONTINUED | OUTPATIENT
Start: 2021-02-02 | End: 2021-02-04 | Stop reason: HOSPADM

## 2021-02-02 RX ORDER — SODIUM CHLORIDE 0.9 % (FLUSH) 0.9 %
10 SYRINGE (ML) INJECTION EVERY 12 HOURS SCHEDULED
Status: DISCONTINUED | OUTPATIENT
Start: 2021-02-02 | End: 2021-02-04 | Stop reason: HOSPADM

## 2021-02-02 RX ORDER — HYDROXYCHLOROQUINE SULFATE 200 MG/1
200 TABLET, FILM COATED ORAL 2 TIMES DAILY
Status: DISCONTINUED | OUTPATIENT
Start: 2021-02-02 | End: 2021-02-04 | Stop reason: HOSPADM

## 2021-02-02 RX ORDER — SODIUM CHLORIDE 9 MG/ML
INJECTION, SOLUTION INTRAVENOUS CONTINUOUS
Status: DISCONTINUED | OUTPATIENT
Start: 2021-02-02 | End: 2021-02-04 | Stop reason: HOSPADM

## 2021-02-02 RX ORDER — MULTIVITAMIN WITH IRON
1 TABLET ORAL EVERY MORNING
Status: DISCONTINUED | OUTPATIENT
Start: 2021-02-02 | End: 2021-02-04 | Stop reason: HOSPADM

## 2021-02-02 RX ORDER — LATANOPROST 50 UG/ML
1 SOLUTION/ DROPS OPHTHALMIC NIGHTLY
Status: DISCONTINUED | OUTPATIENT
Start: 2021-02-02 | End: 2021-02-04 | Stop reason: HOSPADM

## 2021-02-02 RX ORDER — DOCUSATE SODIUM 100 MG/1
100 CAPSULE, LIQUID FILLED ORAL DAILY
Status: DISCONTINUED | OUTPATIENT
Start: 2021-02-02 | End: 2021-02-04 | Stop reason: HOSPADM

## 2021-02-02 RX ORDER — PANTOPRAZOLE SODIUM 40 MG/1
40 TABLET, DELAYED RELEASE ORAL
Status: DISCONTINUED | OUTPATIENT
Start: 2021-02-02 | End: 2021-02-04 | Stop reason: HOSPADM

## 2021-02-02 RX ORDER — ACETAMINOPHEN 325 MG/1
650 TABLET ORAL EVERY 6 HOURS PRN
Status: DISCONTINUED | OUTPATIENT
Start: 2021-02-02 | End: 2021-02-04 | Stop reason: HOSPADM

## 2021-02-02 RX ORDER — VERAPAMIL HYDROCHLORIDE 240 MG/1
240 TABLET, FILM COATED, EXTENDED RELEASE ORAL EVERY MORNING
Status: DISCONTINUED | OUTPATIENT
Start: 2021-02-02 | End: 2021-02-04 | Stop reason: HOSPADM

## 2021-02-02 RX ORDER — DEXTROSE MONOHYDRATE 50 MG/ML
100 INJECTION, SOLUTION INTRAVENOUS PRN
Status: DISCONTINUED | OUTPATIENT
Start: 2021-02-02 | End: 2021-02-04 | Stop reason: HOSPADM

## 2021-02-02 RX ORDER — ATORVASTATIN CALCIUM 20 MG/1
20 TABLET, FILM COATED ORAL NIGHTLY
Status: DISCONTINUED | OUTPATIENT
Start: 2021-02-02 | End: 2021-02-04 | Stop reason: HOSPADM

## 2021-02-02 RX ORDER — ASPIRIN 81 MG/1
81 TABLET ORAL 2 TIMES DAILY
Status: DISCONTINUED | OUTPATIENT
Start: 2021-02-02 | End: 2021-02-04 | Stop reason: HOSPADM

## 2021-02-02 RX ORDER — ACETAMINOPHEN 650 MG/1
650 SUPPOSITORY RECTAL EVERY 6 HOURS PRN
Status: DISCONTINUED | OUTPATIENT
Start: 2021-02-02 | End: 2021-02-04 | Stop reason: HOSPADM

## 2021-02-02 RX ORDER — SODIUM CHLORIDE 0.9 % (FLUSH) 0.9 %
10 SYRINGE (ML) INJECTION PRN
Status: DISCONTINUED | OUTPATIENT
Start: 2021-02-02 | End: 2021-02-04 | Stop reason: HOSPADM

## 2021-02-02 RX ADMIN — METOPROLOL TARTRATE 25 MG: 25 TABLET, FILM COATED ORAL at 10:06

## 2021-02-02 RX ADMIN — ASPIRIN 81 MG: 81 TABLET, COATED ORAL at 21:33

## 2021-02-02 RX ADMIN — INSULIN LISPRO 3 UNITS: 100 INJECTION, SOLUTION INTRAVENOUS; SUBCUTANEOUS at 11:40

## 2021-02-02 RX ADMIN — SODIUM CHLORIDE: 9 INJECTION, SOLUTION INTRAVENOUS at 06:27

## 2021-02-02 RX ADMIN — DOCUSATE SODIUM 100 MG: 100 CAPSULE, LIQUID FILLED ORAL at 10:06

## 2021-02-02 RX ADMIN — LATANOPROST 1 DROP: 50 SOLUTION OPHTHALMIC at 21:33

## 2021-02-02 RX ADMIN — BENZONATATE 200 MG: 100 CAPSULE ORAL at 10:07

## 2021-02-02 RX ADMIN — ATORVASTATIN CALCIUM 20 MG: 20 TABLET, FILM COATED ORAL at 21:33

## 2021-02-02 RX ADMIN — HYDROXYCHLOROQUINE SULFATE 200 MG: 200 TABLET, FILM COATED ORAL at 21:33

## 2021-02-02 RX ADMIN — SODIUM CHLORIDE: 9 INJECTION, SOLUTION INTRAVENOUS at 15:09

## 2021-02-02 RX ADMIN — METOPROLOL TARTRATE 25 MG: 25 TABLET, FILM COATED ORAL at 21:33

## 2021-02-02 RX ADMIN — SODIUM CHLORIDE 1000 ML: 9 INJECTION, SOLUTION INTRAVENOUS at 04:08

## 2021-02-02 RX ADMIN — CLOPIDOGREL BISULFATE 75 MG: 75 TABLET ORAL at 10:07

## 2021-02-02 RX ADMIN — DULOXETINE HYDROCHLORIDE 60 MG: 60 CAPSULE, DELAYED RELEASE ORAL at 11:18

## 2021-02-02 RX ADMIN — MULTIVITAMIN TABLET 1 TABLET: TABLET at 11:18

## 2021-02-02 RX ADMIN — ASPIRIN 81 MG: 81 TABLET, COATED ORAL at 10:06

## 2021-02-02 RX ADMIN — CEFTRIAXONE 1000 MG: 1 INJECTION, POWDER, FOR SOLUTION INTRAMUSCULAR; INTRAVENOUS at 11:40

## 2021-02-02 RX ADMIN — VERAPAMIL HYDROCHLORIDE 240 MG: 240 TABLET, FILM COATED, EXTENDED RELEASE ORAL at 11:18

## 2021-02-02 RX ADMIN — MICONAZOLE NITRATE: 20 POWDER TOPICAL at 21:35

## 2021-02-02 RX ADMIN — ENOXAPARIN SODIUM 30 MG: 30 INJECTION SUBCUTANEOUS at 15:08

## 2021-02-02 RX ADMIN — PANTOPRAZOLE SODIUM 40 MG: 40 TABLET, DELAYED RELEASE ORAL at 06:27

## 2021-02-02 RX ADMIN — HYDROXYCHLOROQUINE SULFATE 200 MG: 200 TABLET, FILM COATED ORAL at 11:18

## 2021-02-02 ASSESSMENT — PAIN DESCRIPTION - ONSET
ONSET: ON-GOING

## 2021-02-02 ASSESSMENT — PAIN DESCRIPTION - FREQUENCY
FREQUENCY: CONTINUOUS
FREQUENCY: CONTINUOUS

## 2021-02-02 ASSESSMENT — PAIN SCALES - GENERAL
PAINLEVEL_OUTOF10: 3
PAINLEVEL_OUTOF10: 4

## 2021-02-02 ASSESSMENT — PAIN DESCRIPTION - LOCATION
LOCATION: HEAD
LOCATION: HEAD

## 2021-02-02 ASSESSMENT — PAIN DESCRIPTION - DESCRIPTORS
DESCRIPTORS: SHARP;DISCOMFORT
DESCRIPTORS: DISCOMFORT

## 2021-02-02 ASSESSMENT — PAIN DESCRIPTION - ORIENTATION: ORIENTATION: POSTERIOR

## 2021-02-02 NOTE — PROGRESS NOTES
Wilburt Hazard was ordered theophylline [place]) which is a nonformulary medication. The patient has indicated that the home supply of this medication will be brought in to the hospital for inpatient use. If the medication has not been administered by 1400 on the following day from the time the order was placed, a pharmacist will follow-up with the nurse of the patient to assess the capability of the patient to bring in the medication. If it is determined that the patient cannot supply the medication and it is not available to be dispensed from the pharmacy, a call will be placed to the ordering provider to discuss alternative options.     [facility]  2/2/2021  6:41 AM

## 2021-02-02 NOTE — H&P
[address] [facility]  Attending History and Physical      CHIEF COMPLAINT:  Fall at home      HISTORY OF PRESENT ILLNESS:      The patient is a 67 y.o. female patient of Heather Lyons MD  who presents with complaints of fall while transferring from walker to wheelchair. Patient normally gets around with a wheelchair, sometimes use a walker/Rollator. Hit the back of her head, 4 staples placed in ED. Admits to slight lightheadedness prior to episode - presyncope. Found to have UTI in ED, complains of frequency and dark urine. Denies CP and SOB. Did not trip on anything. Ct head, cervical spine, XR chest and L shoulder unremarkable.         Past Medical History:    Past Medical History:   Diagnosis Date    Asthma 1993    Cerebral artery occlusion with cerebral infarction ([address] 07/2017    CKD (chronic kidney disease) stage 4, GFR 15-29 ml/min ([address] 2017    Degenerative disc disease     Degenerative joint disease     Diabetes mellitus ([address] 2008    Diabetic peripheral neuropathy ([address] 1998    Diffuse cerebral atrophy ([address] 2012    Fibromyalgia 01/2012    [facility]    Glaucoma     HX OTHER MEDICAL 02/2017    right hip wound dehiscence; using rollator    Hypertension     Iron deficiency anemia 10/25/2020    Neurogenic bladder     Neuropathy     PONV (postoperative nausea and vomiting)     had trouble  waking up with past foot surgery    Sarcoidosis 1993    Sleep apnea 2009    cpap; SEVERE    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\"       Past Surgical History:    Past Surgical History:   Procedure Laterality Date    ANKLE FRACTURE SURGERY Right 10/24/2020    RIGHT ANKLE OPEN REDUCTION INTERNAL FIXATION performed by Missael Russ MD at [facility]      laminectomy l3-4    BREAST SURGERY      biopsy    CARDIAC CATHETERIZATION  2012    MINIMAL CAD    CHOLECYSTECTOMY      COLONOSCOPY  08/2019    TUBULAR ADENOMA x 2  FOOT SURGERY Bilateral     right foot fracture; left foot charcot    HYSTERECTOMY      JOINT REPLACEMENT  12/16/2016    right hip arthroplasty    OTHER SURGICAL HISTORY Right 02/06/2017    I & D right hip wound vaccum placement    TOTAL KNEE ARTHROPLASTY Bilateral        Medications Prior to Admission:    Not in a hospital admission. Allergies: Iodides, Iodine, Other, and Pcn [penicillins]    Social History:    reports that she has never smoked. She has never used smokeless tobacco. She reports that she does not drink alcohol or use drugs. Family History:   family history includes Cancer in her brother and mother; Diabetes in her maternal grandfather; Parkinsonism in her father. REVIEW OF SYSTEMS:  As above in the HPI, otherwise negative    PHYSICAL EXAM:    Vitals:  BP (!) 169/79   Pulse 75   Temp 97.7 °F (36.5 °C) (Oral)   Resp 18   Ht 5' 6\" (1.676 m)   Wt 250 lb (113.4 kg)   SpO2 97%   BMI 40.35 kg/m²     General:  Awake, alert, oriented X 3. Well developed, well nourished, well groomed. No apparent distress. HEENT:  Normocephalic, atraumatic. Pupils equal, round, reactive to light. No scleral icterus. No conjunctival injection. Normal lips, teeth, and gums. No nasal discharge. Neck:  Supple  Heart:  RRR, no murmurs, gallops, rubs  Lungs:  CTA bilaterally, bilat symmetrical expansion, no wheeze, rales, or rhonchi  Abdomen:   Bowel sounds present, soft, nontender, no masses, no organomegaly, no peritoneal signs  Extremities:  No clubbing, cyanosis, or edema  Skin:  Warm and dry, no open lesions or rash  Neuro:  Cranial nerves 2-12 intact, no focal deficits  Breast: deferred  Rectal: deferred  Genitalia:  deferred    LABS:    CBC:   Lab Results   Component Value Date    WBC 8.2 02/02/2021    RBC 4.14 02/02/2021    HGB 12.1 02/02/2021    HCT 38.0 02/02/2021    MCV 91.8 02/02/2021    MCH 29.2 02/02/2021    MCHC 31.8 02/02/2021    RDW 16.1 02/02/2021     02/02/2021 MPV 9.2 02/02/2021     BMP:    Lab Results   Component Value Date     02/02/2021    K 3.8 02/02/2021     02/02/2021    CO2 22 02/02/2021    BUN 24 02/02/2021    LABALBU 3.0 01/03/2021    CREATININE 1.3 02/02/2021    CALCIUM 8.2 02/02/2021    GFRAA 48 02/02/2021    LABGLOM 40 02/02/2021    GLUCOSE 136 02/02/2021    GLUCOSE 82 12/09/2011       ASSESSMENT:      Patient Active Problem List   Diagnosis    Essential hypertension    PAULINA (obstructive sleep apnea)    Midline low back pain with right-sided sciatica    Morbid obesity with BMI of 40.0-44.9, adult ([facility]    CVA (cerebral vascular accident) ([facility]    Urinary incontinence    Altered mental status    Closed fracture fibula, head, right, initial encounter    Diabetes mellitus ([facility]    Neurogenic bladder    Sarcoidosis    Sleep apnea    Diffuse cerebral atrophy (HCC)    Asthma    Diabetic peripheral neuropathy (HCC)    Fibromyalgia    CKD (chronic kidney disease) stage 4, GFR 15-29 ml/min (HCC)    Iron deficiency anemia    Encephalopathy    Sarcoid    Orthostatic hypotension    History of CVA (cerebrovascular accident)         PLAN:    1. Presyncope   Likely secondary to dehydration and mild UTI   Continue with IVF, monitor orthostatics   PT/OT evaluation  2. UTI   UA shows +nitrite, trace LE   Urine culture sent from ED   Continue rocephin   3. CKD 3 - at baseline  4. DM2 - check A1c   Continue sliding scale  5. GERD - PPI  6. DVT px  - lovenox 30mg  7.  HLD - statin      Please call my cell phone between 8pm-6am Marilee Soler MD  1:22 PM  2/2/2021

## 2021-02-02 NOTE — PLAN OF CARE
Problem: Falls - Risk of:  Goal: Will remain free from falls  Description: Will remain free from falls  2/2/2021 1713 by Allie Josue RN  Outcome: Met This Shift  2/2/2021 1146 by Na Cho RN  Outcome: Met This Shift  Goal: Absence of physical injury  Description: Absence of physical injury  2/2/2021 1713 by Allie Josue RN  Outcome: Met This Shift  2/2/2021 1146 by Na Cho RN  Outcome: Met This Shift     Problem: Pain:  Goal: Pain level will decrease  Description: Pain level will decrease  2/2/2021 1713 by Allie Josue RN  Outcome: Met This Shift  2/2/2021 1146 by Na Cho RN  Outcome: Met This Shift  Goal: Control of acute pain  Description: Control of acute pain  2/2/2021 1713 by Allie Josue RN  Outcome: Met This Shift  2/2/2021 1146 by Na Cho RN  Outcome: Met This Shift  Goal: Control of chronic pain  Description: Control of chronic pain  2/2/2021 1713 by Allie Josue RN  Outcome: Met This Shift  2/2/2021 1146 by Na Cho RN  Outcome: Met This Shift     Problem: FLUID AND ELECTROLYTE IMBALANCE  Goal: Fluid and electrolyte balance are achieved/maintained  2/2/2021 1713 by Allie Josue RN  Outcome: Met This Shift  2/2/2021 1146 by Na Cho RN  Outcome: Ongoing     Problem: INFECTION  Goal: Absence of urinary tract infection signs and symptoms  Description: Absence of urinary tract infection signs and symptoms  2/2/2021 1713 by Allie Josue RN  Outcome: Met This Shift  2/2/2021 1146 by Na Cho RN  Outcome: Ongoing

## 2021-02-02 NOTE — PROGRESS NOTES
Database complete. Medications reconciled. Care plans and education initiated. uses walker/rollator. Has CPAP at home, but pt states it is not working correctly. 2 falls in  past 3 weeks. Nephrologist is Dr. Naresh Mercado. Zeeshan Godwin states she has a wound to right outer foot.

## 2021-02-03 LAB
ANION GAP SERPL CALCULATED.3IONS-SCNC: 8 MMOL/L (ref 7–16)
BASOPHILS ABSOLUTE: 0.04 E9/L (ref 0–0.2)
BASOPHILS RELATIVE PERCENT: 0.5 % (ref 0–2)
BUN BLDV-MCNC: 20 MG/DL (ref 8–23)
CALCIUM SERPL-MCNC: 8.1 MG/DL (ref 8.6–10.2)
CHLORIDE BLD-SCNC: 105 MMOL/L (ref 98–107)
CO2: 25 MMOL/L (ref 22–29)
CREAT SERPL-MCNC: 1.3 MG/DL (ref 0.5–1)
EOSINOPHILS ABSOLUTE: 0.59 E9/L (ref 0.05–0.5)
EOSINOPHILS RELATIVE PERCENT: 7.9 % (ref 0–6)
GFR AFRICAN AMERICAN: 48
GFR NON-AFRICAN AMERICAN: 40 ML/MIN/1.73
GLUCOSE BLD-MCNC: 134 MG/DL (ref 74–99)
HCT VFR BLD CALC: 36.9 % (ref 34–48)
HEMOGLOBIN: 11.8 G/DL (ref 11.5–15.5)
IMMATURE GRANULOCYTES #: 0.05 E9/L
IMMATURE GRANULOCYTES %: 0.7 % (ref 0–5)
LYMPHOCYTES ABSOLUTE: 0.98 E9/L (ref 1.5–4)
LYMPHOCYTES RELATIVE PERCENT: 13.1 % (ref 20–42)
MCH RBC QN AUTO: 28.9 PG (ref 26–35)
MCHC RBC AUTO-ENTMCNC: 32 % (ref 32–34.5)
MCV RBC AUTO: 90.4 FL (ref 80–99.9)
METER GLUCOSE: 126 MG/DL (ref 74–99)
METER GLUCOSE: 142 MG/DL (ref 74–99)
METER GLUCOSE: 154 MG/DL (ref 74–99)
METER GLUCOSE: 173 MG/DL (ref 74–99)
MONOCYTES ABSOLUTE: 0.84 E9/L (ref 0.1–0.95)
MONOCYTES RELATIVE PERCENT: 11.2 % (ref 2–12)
NEUTROPHILS ABSOLUTE: 4.98 E9/L (ref 1.8–7.3)
NEUTROPHILS RELATIVE PERCENT: 66.6 % (ref 43–80)
PDW BLD-RTO: 15.9 FL (ref 11.5–15)
PLATELET # BLD: 282 E9/L (ref 130–450)
PMV BLD AUTO: 9.2 FL (ref 7–12)
POTASSIUM SERPL-SCNC: 4 MMOL/L (ref 3.5–5)
RBC # BLD: 4.08 E12/L (ref 3.5–5.5)
SODIUM BLD-SCNC: 138 MMOL/L (ref 132–146)
WBC # BLD: 7.5 E9/L (ref 4.5–11.5)

## 2021-02-03 PROCEDURE — G0378 HOSPITAL OBSERVATION PER HR: HCPCS

## 2021-02-03 PROCEDURE — 96372 THER/PROPH/DIAG INJ SC/IM: CPT

## 2021-02-03 PROCEDURE — 85025 COMPLETE CBC W/AUTO DIFF WBC: CPT

## 2021-02-03 PROCEDURE — 97161 PT EVAL LOW COMPLEX 20 MIN: CPT

## 2021-02-03 PROCEDURE — 80048 BASIC METABOLIC PNL TOTAL CA: CPT

## 2021-02-03 PROCEDURE — 97165 OT EVAL LOW COMPLEX 30 MIN: CPT

## 2021-02-03 PROCEDURE — 96376 TX/PRO/DX INJ SAME DRUG ADON: CPT

## 2021-02-03 PROCEDURE — 82962 GLUCOSE BLOOD TEST: CPT

## 2021-02-03 PROCEDURE — 6370000000 HC RX 637 (ALT 250 FOR IP): Performed by: NURSE PRACTITIONER

## 2021-02-03 PROCEDURE — 2580000003 HC RX 258: Performed by: NURSE PRACTITIONER

## 2021-02-03 PROCEDURE — 2580000003 HC RX 258: Performed by: INTERNAL MEDICINE

## 2021-02-03 PROCEDURE — 36415 COLL VENOUS BLD VENIPUNCTURE: CPT

## 2021-02-03 PROCEDURE — 6360000002 HC RX W HCPCS: Performed by: INTERNAL MEDICINE

## 2021-02-03 RX ADMIN — HYDROXYCHLOROQUINE SULFATE 200 MG: 200 TABLET, FILM COATED ORAL at 08:51

## 2021-02-03 RX ADMIN — ASPIRIN 81 MG: 81 TABLET, COATED ORAL at 08:50

## 2021-02-03 RX ADMIN — METOPROLOL TARTRATE 25 MG: 25 TABLET, FILM COATED ORAL at 21:35

## 2021-02-03 RX ADMIN — INSULIN LISPRO 1 UNITS: 100 INJECTION, SOLUTION INTRAVENOUS; SUBCUTANEOUS at 11:06

## 2021-02-03 RX ADMIN — CEFTRIAXONE 1000 MG: 1 INJECTION, POWDER, FOR SOLUTION INTRAMUSCULAR; INTRAVENOUS at 11:08

## 2021-02-03 RX ADMIN — LATANOPROST 1 DROP: 50 SOLUTION OPHTHALMIC at 21:35

## 2021-02-03 RX ADMIN — DULOXETINE HYDROCHLORIDE 60 MG: 60 CAPSULE, DELAYED RELEASE ORAL at 08:51

## 2021-02-03 RX ADMIN — MICONAZOLE NITRATE: 20 POWDER TOPICAL at 21:35

## 2021-02-03 RX ADMIN — ATORVASTATIN CALCIUM 20 MG: 20 TABLET, FILM COATED ORAL at 21:35

## 2021-02-03 RX ADMIN — MICONAZOLE NITRATE: 20 POWDER TOPICAL at 08:51

## 2021-02-03 RX ADMIN — SODIUM CHLORIDE, PRESERVATIVE FREE 10 ML: 5 INJECTION INTRAVENOUS at 21:35

## 2021-02-03 RX ADMIN — PANTOPRAZOLE SODIUM 40 MG: 40 TABLET, DELAYED RELEASE ORAL at 05:42

## 2021-02-03 RX ADMIN — SODIUM CHLORIDE, PRESERVATIVE FREE 10 ML: 5 INJECTION INTRAVENOUS at 11:08

## 2021-02-03 RX ADMIN — INSULIN LISPRO 1 UNITS: 100 INJECTION, SOLUTION INTRAVENOUS; SUBCUTANEOUS at 21:37

## 2021-02-03 RX ADMIN — DOCUSATE SODIUM 100 MG: 100 CAPSULE, LIQUID FILLED ORAL at 08:51

## 2021-02-03 RX ADMIN — MULTIVITAMIN TABLET 1 TABLET: TABLET at 08:50

## 2021-02-03 RX ADMIN — BENZONATATE 200 MG: 100 CAPSULE ORAL at 21:34

## 2021-02-03 RX ADMIN — HYDROXYCHLOROQUINE SULFATE 200 MG: 200 TABLET, FILM COATED ORAL at 21:34

## 2021-02-03 RX ADMIN — ENOXAPARIN SODIUM 30 MG: 30 INJECTION SUBCUTANEOUS at 14:36

## 2021-02-03 RX ADMIN — CLOPIDOGREL BISULFATE 75 MG: 75 TABLET ORAL at 08:50

## 2021-02-03 RX ADMIN — SODIUM CHLORIDE: 9 INJECTION, SOLUTION INTRAVENOUS at 05:42

## 2021-02-03 RX ADMIN — ASPIRIN 81 MG: 81 TABLET, COATED ORAL at 21:35

## 2021-02-03 RX ADMIN — SODIUM CHLORIDE: 9 INJECTION, SOLUTION INTRAVENOUS at 19:16

## 2021-02-03 RX ADMIN — VERAPAMIL HYDROCHLORIDE 240 MG: 240 TABLET, FILM COATED, EXTENDED RELEASE ORAL at 08:50

## 2021-02-03 RX ADMIN — METOPROLOL TARTRATE 25 MG: 25 TABLET, FILM COATED ORAL at 08:50

## 2021-02-03 ASSESSMENT — PAIN DESCRIPTION - DESCRIPTORS: DESCRIPTORS: DISCOMFORT

## 2021-02-03 ASSESSMENT — PAIN DESCRIPTION - ONSET: ONSET: ON-GOING

## 2021-02-03 ASSESSMENT — PAIN DESCRIPTION - FREQUENCY: FREQUENCY: CONTINUOUS

## 2021-02-03 NOTE — PROGRESS NOTES
Physical Therapy    Facility/Department: [address] INTERMEDIATE 1  Initial Assessment    NAME: Michelle Mccrary  : 1945  MRN: [id number]    Date of Service: 2/3/2021       REQUIRES PT FOLLOW UP: Yes       Patient Diagnosis(es): The encounter diagnosis was Orthostatic hypotension. has a past medical history of Asthma, Cerebral artery occlusion with cerebral infarction ([address] CKD (chronic kidney disease) stage 4, GFR 15-29 ml/min (HCC), Degenerative disc disease, Degenerative joint disease, Diabetes mellitus ([address] Diabetic peripheral neuropathy ([address] Diffuse cerebral atrophy ([address] Fibromyalgia, Glaucoma, HX OTHER MEDICAL, Hypertension, Iron deficiency anemia, Neurogenic bladder, Neuropathy, PONV (postoperative nausea and vomiting), Sarcoidosis, Sleep apnea, and Spinal cord and nerve root disorder. has a past surgical history that includes Foot surgery (Bilateral); Hysterectomy; back surgery; Total knee arthroplasty (Bilateral); Cholecystectomy; Colonoscopy (2019); other surgical history (Right, 2017); Breast surgery; joint replacement (2016); Cardiac catheterization (); and Ankle fracture surgery (Right, 10/24/2020). Evaluating Therapist: Galvin Romberg, PT     Referring Provider:  Dr. Aguirre Ser #: [id number]   DIAGNOSIS:  Orthostatic htn     PRECAUTIONS: falls     Social:  Pt lives with a friend  in a 1  floor plan  1  Step  to enter. Prior to admission pt walked with  rollator or ww for short distances, w/c for longer     Initial Evaluation  Date: 2/3/2021  Treatment      Short Term/ Long Term   Goals   Was pt agreeable to Eval/treatment?  Yes      Does pt have pain?  chronic L shoulder pain      Bed Mobility  Rolling: NT   Supine to sit:  SBA   Sit to supine:  SBA   Scooting:  SBA    S/I    Transfers Sit to stand:  SBA   Stand to sit:  SBA   Stand pivot:  CGA    S/I    Ambulation    40  feet with  ww  with  CGA    50  feet with  ww  with S/I Stair negotiation: ascended and descended NT    2  steps with  B  rail with  CGA    LE ROM  WFL except decreased R ankle df      LE strength  4-/ 5 except R ankle df 3-/ 5      AM- PAC RAW score   17/ 24            Pt is alert and Oriented x  3      Balance:  CGA, high fall risk due to h/o frequent falls and decreased R foot clearance  Endurance: decreased   Bed/Chair alarm: yes      ASSESSMENT  Pt displays functional ability as noted in the objective portion of this evaluation. Treatment/Education:     Mobility as above. Cues for increased safety with mobility due to h/o falls. Pt reports she usually falls to the L     Pt educated on fall risk,  Hand placement with tranfers, ww safety        Patient response to education:   Pt verbalized understanding Pt demonstrated skill Pt requires further education in this area   x With cues  x       Comments:  Pt left  In bed per pt request after session, with call light in reach. Rehab potential is Good for reaching above PT goals. Pts/ family goals   1. Home     Patient and or family understand(s) diagnosis, prognosis, and plan of care. -  Yes     PLAN  PT care will be provided in accordance with the objectives noted above. Whenever appropriate, clear delegation orders will be provided for nursing staff. Exercises and functional mobility practice will be used as well as appropriate assistive devices or modalities to obtain goals. Patient and family education will also be administered as needed. PLAN OF CARE:    Current Treatment Recommendations     [x] Strengthening     [] ROM   [x] Balance Training   [x] Endurance Training   [x] Transfer Training   [x] Gait Training   [x] Stair Training   [] Positioning   [x] Safety and Education Training   [x] Patient/Caregiver Education   [] HEP  [] Other       Frequency of treatments will be 2-5x/week x  7 days.     Time in: 1402   Time out:  1415 Evaluation Time includes thorough review of current medical information, gathering information on past medical history/social history and prior level of function, completion of standardized testing/informal observation of tasks, assessment of data and education on plan of care and goals.     CPT codes:  [x] Low Complexity PT evaluation 20267  [] Moderate Complexity PT evaluation 01438  [] High Complexity PT evaluation 10640  [] PT Re-evaluation 31515  [] Gait training 05241  minutes  [] Therapeutic activities 02283  minutes  [] Therapeutic exercises 05569  minutes  [] Neuromuscular reeducation 26319  minutes       Ariel 18 number:  PT [id number]

## 2021-02-03 NOTE — CARE COORDINATION
Social Work/Discharge Planning:  Social Work consult noted. Met with patient and completed initial assessment. Explained Social Work role and discussed transition of care/discharge planning. Patient lives with her friend in a one story house with two steps enter. PTA uses a rollator walker. Patient has a walker, wheelchair, bedside commode, shower chair and an emergency response button at home. She has a snf history at [facility] [place]. She recently had [facility]. Patient PCP is Dr. Sherine Huber and pharmacy is [facility] in [place]. She states plan is home at discharge and she is not interested in home health care. Will continue to follow and assist with discharge planning.   Electronically signed by ANTHONY Padilla on 2/3/2021 at 2:02 PM

## 2021-02-03 NOTE — PROGRESS NOTES
Occupational Therapy  OCCUPATIONAL THERAPY INITIAL EVALUATION      Date:2/3/2021  Patient Name: Mac Steel  MRN: [id number]  : 1945  Room: [address] [place] [address]    Referring Provider: RADHA Griffiths CNP    Evaluating OT: Helga Samson OTR/L [id number]    AM-PAC Daily Activity Raw Score: 16/24    Recommended Adaptive Equipment: TBD    Diagnosis: orthostatic hypotension. Pt presents to ED from home post fall, pt reports multiple falls in home environment. Pertinent Medical History: asthma, CVA, DM, peripheral neuropathy, CKD, fibromyalgia, CKD   Precautions:  Falls, R foot drop     Home Living: Pt lives with a friend in a single story home. Bathroom setup: walk in shower with seat, standard commode     Prior Level of Function: Mod I with ADLs, use of sock aid and reacher at home, friend assists PRN with IADLs; completed functional mobility with rollator, wc for longer distances. Driving: No. Friend provides transportation    Pain Level: chronic L shoulder pain    Cognition: A&O: 4/4. Pt is alert and oriented but easily distracted.     Problem solving:  fair   Judgement/safety:  fair     Functional Assessment:   Initial Eval Status  Date: 2/3/21 Treatment session:  Short Term Goals     Feeding Independent     Grooming Set up  Independent   UB Dressing Min A  Management of hospital gown  Independent   LB Dressing Mod A  Management of B socks and donning brief  Pt reports use of sock aid and reacher in home environment  Mod I    Bathing Mod A  Mod I   Toileting Mod A  Incontinent of urine upon standing at EOB with assist in elissa care and brief management up over hips  Mod I   Bed Mobility  Supine to sit: SBA     Functional Transfers STS: SBA  Mod I   Functional Mobility CGA with [employer]  Household distance  Limited further d/t weakness/fatigue  Mod I during ADLs   Balance Sitting: fair plus    Standing: fair/fair minus Activity Tolerance Poor plus  standing chavez x6-7 min with fair plus balance during self care tasks             Treatment: Patient educated on techniques for completion of ADL, safe functional transfers and functional mobility. Patient required cues for follow through with proper hand/foot placement, pacing, safety, compensatory strategies, breathing and technique in bed mobility, functional transfers, functional mobility, post toileting care and LB dressing in preparation for maximum independence in all self care tasks. Hand Dominance: Right []  Left []   Strength ROM Additional Info:    RUE  Proximally: 3-/5  Distally: 3+/5 WFL elbow to digits. R shoulder flexion approx 90 degrees fair  and FMC/dexterity noted during ADL tasks     LUE Proximally: 3-/5  Distally: 3+/5 WFL elbow to digits.  L shoulder flexion approx 90 degrees fair  and FMC/dexterity noted during ADL tasks         Hearing: WFL  Vision: WFL   Sensation: decreased sensation B LEs   Tone: WFL   Edema: none                            Long Term Goal (1-3 wks): Pt will maximize functional performance in all self care tasks/functional transfers with good follow through of all trained techniques for safe transition to next level of care    Assessment of current deficits   Functional mobility [x]  ADLs [x] Strength [x]  Cognition []  Functional transfers  [x] IADLs [x] Safety Awareness [x]  Endurance [x]  Fine Motor Coordination [] Balance [x] Vision/perception [] Sensation []   Gross Motor Coordination [] ROM [] Delirium []                  Motor Control []    Plan of Care: 2-5 days/week for 1-2 weeks PRN   [x]ADL retraining/adaptive techniques and AE recommendations to increase functional independence within precautions                    [x]Energy conservation techniques to improve tolerance for ADL/IADLs  [x]Functional transfer/mobility training/DME recommendations for increased independence, safety and fall prevention [x]Patient/family education to increase safety and functional independence during daily routine          [x]Environmental modifications for safe mobility and completion of ADLs                             []Cognitive retraining to improve problem solving skills & safe participation in ADLs/IADLs     []Sensory re-education techniques to improve extremity awareness, maintain skin integrity and improve hand function                             []Visual/Perceptual retraining to improve body awareness and safety during transfers and ADLs  []Splinting/positioning needs to maintain joint/skin integrity and contracture prevention  [x]Therapeutic activity to improve functional performance during ADLs                                        [x]Therapeutic exercise to improve tolerance and functional strength for ADLs   [x]Balance retraining/tolerance tasks for facilitation of postural control with dynamic challenges during ADLs  []Neuromuscular re-education to facilitate righting/equilibrium reactions, midline orientation, scapular stability/mobility, normalize muscle tone and facilitate active functional movement                        []Delirium prevention/treatment    [x]Positioning to improve functional independence and decrease risk of skin breakdown  []Other:     Rehab Potential: Good for established goals     Patient/Family Goal: To get home. Patient and/or family were instructed on functional diagnosis, prognosis/goals and OT plan of care. Pt verbalized understanding. Upon arrival, patient supine in bed. At end of session, patient supine in bed per pt request with call light and phone within reach, all lines and tubes intact. Pt would benefit from continued skilled OT to increase safety and independence with completion of ADL/IADL tasks for functional independence and quality of life.  Bed/chair alarm: ON    Low Evaluation 69197  Time In: 1359   Time Out: 1417

## 2021-02-03 NOTE — PLAN OF CARE
Problem: Falls - Risk of:  Goal: Will remain free from falls  Description: Will remain free from falls  2/3/2021 0017 by Kierra Mora RN  Outcome: Met This Shift     Problem: Falls - Risk of:  Goal: Absence of physical injury  Description: Absence of physical injury  2/3/2021 0017 by Kierra Mora RN  Outcome: Met This Shift

## 2021-02-03 NOTE — PROGRESS NOTES
Subjective:    Patient seen and examined at bedside, no further lightheadedness or dizziness. Denies dysuria    Objective:    BP (!) 167/69   Pulse 65   Temp 98.8 °F (37.1 °C) (Oral)   Resp 18   Ht 5' 6\" (1.676 m)   Wt 253 lb 8 oz (115 kg)   SpO2 96%   BMI 40.92 kg/m²     Current medications that patient is taking have been reviewed. GEN: NAD AAOx3  Heart:  RRR, no murmurs, gallops, or rubs.   Lungs:  CTA bilaterally, no wheeze, rales or rhonchi  Abd: bowel sounds present, soft, nontender, nondistended, no masses  Extrem:  No cyanosis or edema    CBC:   Lab Results   Component Value Date    WBC 7.5 02/03/2021    RBC 4.08 02/03/2021    HGB 11.8 02/03/2021    HCT 36.9 02/03/2021    MCV 90.4 02/03/2021    MCH 28.9 02/03/2021    MCHC 32.0 02/03/2021    RDW 15.9 02/03/2021     02/03/2021    MPV 9.2 02/03/2021     BMP:    Lab Results   Component Value Date     02/03/2021    K 4.0 02/03/2021    K 3.8 02/02/2021     02/03/2021    CO2 25 02/03/2021    BUN 20 02/03/2021    LABALBU 3.0 01/03/2021    CREATININE 1.3 02/03/2021    CALCIUM 8.1 02/03/2021    GFRAA 48 02/03/2021    LABGLOM 40 02/03/2021    GLUCOSE 134 02/03/2021    GLUCOSE 82 12/09/2011        Assessment:    Patient Active Problem List   Diagnosis    Essential hypertension    PAULINA (obstructive sleep apnea)    Midline low back pain with right-sided sciatica    Morbid obesity with BMI of 40.0-44.9, adult ([address]    CVA (cerebral vascular accident) ([address]    Urinary incontinence    Altered mental status    Closed fracture fibula, head, right, initial encounter    Diabetes mellitus ([address]    Neurogenic bladder    Sarcoidosis    Sleep apnea    Diffuse cerebral atrophy (HCC)    Asthma    Diabetic peripheral neuropathy (HCC)    Fibromyalgia    CKD (chronic kidney disease) stage 4, GFR 15-29 ml/min (HCC)    Iron deficiency anemia    Encephalopathy    Sarcoid    Orthostatic hypotension  History of CVA (cerebrovascular accident)       Plan:  1. Presyncope              Likely secondary to dehydration and mild UTI              Continue with IVF, monitor orthostatics              PT/OT evaluation  2. UTI              UA shows +nitrite, trace LE              Urine culture 100k thus far              Continue rocephin   3. CKD 3 - at baseline  4. DM2 - A1c 6.9              Continue sliding scale  5. GERD - PPI  6. DVT px  - lovenox 30mg  7.  HLD - statin      Please call my cell phone between 8pm-6am 21 [phone]    Faxon Neal    6:33 PM  2/3/2021

## 2021-02-04 VITALS
HEART RATE: 66 BPM | DIASTOLIC BLOOD PRESSURE: 76 MMHG | BODY MASS INDEX: 41.12 KG/M2 | SYSTOLIC BLOOD PRESSURE: 165 MMHG | RESPIRATION RATE: 16 BRPM | HEIGHT: 66 IN | OXYGEN SATURATION: 97 % | TEMPERATURE: 98 F | WEIGHT: 255.9 LBS

## 2021-02-04 LAB
ANION GAP SERPL CALCULATED.3IONS-SCNC: 9 MMOL/L (ref 7–16)
BASOPHILS ABSOLUTE: 0.05 E9/L (ref 0–0.2)
BASOPHILS RELATIVE PERCENT: 0.8 % (ref 0–2)
BUN BLDV-MCNC: 21 MG/DL (ref 8–23)
CALCIUM SERPL-MCNC: 8.1 MG/DL (ref 8.6–10.2)
CHLORIDE BLD-SCNC: 105 MMOL/L (ref 98–107)
CO2: 23 MMOL/L (ref 22–29)
CREAT SERPL-MCNC: 1.2 MG/DL (ref 0.5–1)
EOSINOPHILS ABSOLUTE: 0.58 E9/L (ref 0.05–0.5)
EOSINOPHILS RELATIVE PERCENT: 8.9 % (ref 0–6)
GFR AFRICAN AMERICAN: 53
GFR NON-AFRICAN AMERICAN: 44 ML/MIN/1.73
GLUCOSE BLD-MCNC: 129 MG/DL (ref 74–99)
HCT VFR BLD CALC: 36 % (ref 34–48)
HEMOGLOBIN: 11.4 G/DL (ref 11.5–15.5)
IMMATURE GRANULOCYTES #: 0.04 E9/L
IMMATURE GRANULOCYTES %: 0.6 % (ref 0–5)
LYMPHOCYTES ABSOLUTE: 1.46 E9/L (ref 1.5–4)
LYMPHOCYTES RELATIVE PERCENT: 22.5 % (ref 20–42)
MCH RBC QN AUTO: 28.7 PG (ref 26–35)
MCHC RBC AUTO-ENTMCNC: 31.7 % (ref 32–34.5)
MCV RBC AUTO: 90.7 FL (ref 80–99.9)
METER GLUCOSE: 111 MG/DL (ref 74–99)
METER GLUCOSE: 181 MG/DL (ref 74–99)
MONOCYTES ABSOLUTE: 0.84 E9/L (ref 0.1–0.95)
MONOCYTES RELATIVE PERCENT: 12.9 % (ref 2–12)
NEUTROPHILS ABSOLUTE: 3.52 E9/L (ref 1.8–7.3)
NEUTROPHILS RELATIVE PERCENT: 54.3 % (ref 43–80)
ORGANISM: ABNORMAL
PDW BLD-RTO: 15.9 FL (ref 11.5–15)
PLATELET # BLD: 261 E9/L (ref 130–450)
PMV BLD AUTO: 9.3 FL (ref 7–12)
POTASSIUM SERPL-SCNC: 4.3 MMOL/L (ref 3.5–5)
RBC # BLD: 3.97 E12/L (ref 3.5–5.5)
SODIUM BLD-SCNC: 137 MMOL/L (ref 132–146)
URINE CULTURE, ROUTINE: ABNORMAL
WBC # BLD: 6.5 E9/L (ref 4.5–11.5)

## 2021-02-04 PROCEDURE — 2580000003 HC RX 258: Performed by: INTERNAL MEDICINE

## 2021-02-04 PROCEDURE — 2580000003 HC RX 258: Performed by: NURSE PRACTITIONER

## 2021-02-04 PROCEDURE — 80048 BASIC METABOLIC PNL TOTAL CA: CPT

## 2021-02-04 PROCEDURE — 97110 THERAPEUTIC EXERCISES: CPT

## 2021-02-04 PROCEDURE — 97530 THERAPEUTIC ACTIVITIES: CPT

## 2021-02-04 PROCEDURE — 96376 TX/PRO/DX INJ SAME DRUG ADON: CPT

## 2021-02-04 PROCEDURE — 85025 COMPLETE CBC W/AUTO DIFF WBC: CPT

## 2021-02-04 PROCEDURE — 2060000000 HC ICU INTERMEDIATE R&B

## 2021-02-04 PROCEDURE — 6360000002 HC RX W HCPCS: Performed by: INTERNAL MEDICINE

## 2021-02-04 PROCEDURE — 82962 GLUCOSE BLOOD TEST: CPT

## 2021-02-04 PROCEDURE — 36415 COLL VENOUS BLD VENIPUNCTURE: CPT

## 2021-02-04 PROCEDURE — 6370000000 HC RX 637 (ALT 250 FOR IP): Performed by: NURSE PRACTITIONER

## 2021-02-04 RX ORDER — CEPHALEXIN 500 MG/1
500 CAPSULE ORAL 2 TIMES DAILY
Qty: 20 CAPSULE | Refills: 0 | Status: SHIPPED | OUTPATIENT
Start: 2021-02-04 | End: 2021-02-14

## 2021-02-04 RX ADMIN — ASPIRIN 81 MG: 81 TABLET, COATED ORAL at 08:59

## 2021-02-04 RX ADMIN — CEFTRIAXONE 1000 MG: 1 INJECTION, POWDER, FOR SOLUTION INTRAMUSCULAR; INTRAVENOUS at 11:11

## 2021-02-04 RX ADMIN — MICONAZOLE NITRATE: 20 POWDER TOPICAL at 08:59

## 2021-02-04 RX ADMIN — MULTIVITAMIN TABLET 1 TABLET: TABLET at 08:59

## 2021-02-04 RX ADMIN — CLOPIDOGREL BISULFATE 75 MG: 75 TABLET ORAL at 08:59

## 2021-02-04 RX ADMIN — METOPROLOL TARTRATE 25 MG: 25 TABLET, FILM COATED ORAL at 08:59

## 2021-02-04 RX ADMIN — VERAPAMIL HYDROCHLORIDE 240 MG: 240 TABLET, FILM COATED, EXTENDED RELEASE ORAL at 08:59

## 2021-02-04 RX ADMIN — DOCUSATE SODIUM 100 MG: 100 CAPSULE, LIQUID FILLED ORAL at 08:59

## 2021-02-04 RX ADMIN — INSULIN LISPRO 1 UNITS: 100 INJECTION, SOLUTION INTRAVENOUS; SUBCUTANEOUS at 11:15

## 2021-02-04 RX ADMIN — HYDROXYCHLOROQUINE SULFATE 200 MG: 200 TABLET, FILM COATED ORAL at 08:59

## 2021-02-04 RX ADMIN — PANTOPRAZOLE SODIUM 40 MG: 40 TABLET, DELAYED RELEASE ORAL at 07:05

## 2021-02-04 RX ADMIN — DULOXETINE HYDROCHLORIDE 60 MG: 60 CAPSULE, DELAYED RELEASE ORAL at 08:59

## 2021-02-04 RX ADMIN — SODIUM CHLORIDE: 9 INJECTION, SOLUTION INTRAVENOUS at 09:00

## 2021-02-04 ASSESSMENT — PAIN SCALES - GENERAL
PAINLEVEL_OUTOF10: 0
PAINLEVEL_OUTOF10: 0

## 2021-02-04 NOTE — CARE COORDINATION
Social Work/Discharge Planning:  Discharge order noted. Met with patient and confirmed she wants home health care at discharge through [facility]. She states she will need an ambulette transport via [employer]. Informed patient transport may require payment upfront and she is agreeable. [facility] order noted. Called liaison Bill with [facility] and confirmed patient is active. Informed Bill of discharge for today. Notified patient. Notified RN that patient will require ambulette transport home. Will continue to follow.   Electronically signed by ANTHONY Dang on 2/4/2021 at 12:30 PM

## 2021-02-04 NOTE — PROGRESS NOTES
Physical Therapy  Facility/Department: [address] INTERMEDIATE 1  Daily Treatment Note  NAME: Stacey Silva  : 1945  MRN: [id number]    Date of Service: 2021    Patient Diagnosis(es): The encounter diagnosis was Orthostatic hypotension. has a past medical history of Asthma, Cerebral artery occlusion with cerebral infarction ([facility] [address] CKD (chronic kidney disease) stage 4, GFR 15-29 ml/min (HCC), Degenerative disc disease, Degenerative joint disease, Diabetes mellitus ([address] Diabetic peripheral neuropathy ([address] Diffuse cerebral atrophy ([facility] [address] Fibromyalgia, Glaucoma, HX OTHER MEDICAL, Hypertension, Iron deficiency anemia, Neurogenic bladder, Neuropathy, PONV (postoperative nausea and vomiting), Sarcoidosis, Sleep apnea, and Spinal cord and nerve root disorder. has a past surgical history that includes Foot surgery (Bilateral); Hysterectomy; back surgery; Total knee arthroplasty (Bilateral); Cholecystectomy; Colonoscopy (2019); other surgical history (Right, 2017); Breast surgery; joint replacement (2016); Cardiac catheterization (); and Ankle fracture surgery (Right, 10/24/2020). Evaluating Therapist: Yanick Frederick, PT      Referring Provider:  Dr. Starla Pleitez #: [id number]   DIAGNOSIS:  Orthostatic htn      PRECAUTIONS: falls      Social:  Pt lives with a friend  in a 1  floor plan  1  Step  to enter. Prior to admission pt walked with  rollator or ww for short distances, w/c for longer       Initial Evaluation  Date: 2/3/2021  Treatment  21    Short Term/ Long Term   Goals   Was pt agreeable to Eval/treatment?  Yes  yes      Does pt have pain?  chronic L shoulder pain  No c/o pain      Bed Mobility  Rolling: NT   Supine to sit:  SBA   Sit to supine:  SBA   Scooting:  SBA   supine to sit sBA  Scooting to edge of bed SBA  S/I    Transfers Sit to stand:  SBA   Stand to sit:  SBA   Stand pivot:  CGA   sit to stand CGA  Stand to sit CGA  S/I Ambulation    40  feet with  ww  with  CGA   20 feet and 40 feet with ww CGA  50  feet with  ww  with S/I          Stair negotiation: ascended and descended NT     2  steps with  B  rail with  CGA    LE ROM  WFL except decreased R ankle df        LE strength  4-/ 5 except R ankle df 3-/ 5        AM- PAC RAW score   17/ 24 17/24        Patient education  Pt was educated on transfer technique    Patient response to education:   Pt verbalized understanding Pt demonstrated skill Pt requires further education in this area   yes With cues yes     Additional Comments/treatment: Pt in bed and agreeable to PT session. Functional mobility as above. Standing balance at sink SBA. During ambulation pt c/o one episode of dizziness. Pt instructed in exercise in chair LAQ, marching and chair push ups    Pt was left in chiar with call light left by patient. Time in: 0815  Time out: 0845    Total treatment time 30 minutes    Pt is making good progress toward established Physical Therapy goals. Continue with physical therapy current plan of care.     Dionna PT [id number]      CPT codes:  [] Low Complexity PT evaluation 78197  [] Moderate Complexity PT evaluation 09361  [] High Complexity PT evaluation 21230  [] PT Re-evaluation 12409  [] Gait training 86012  minutes  [] Manual therapy 20391    minutes  [x] Therapeutic activities 55092  20  minutes  [x] Therapeutic exercises 74856    10 minutes  [] Neuromuscular reeducation 55420     minutes

## 2021-02-04 NOTE — PROGRESS NOTES
CLINICAL PHARMACY NOTE: MEDS TO [address] [place] [address] Drive Select Patient?: No  Total # of Prescriptions Filled: 1   The following medications were delivered to the patient:  · Cephalexin 500 mg  Total # of Interventions Completed: 7  Time Spent (min): 45    Additional Documentation:

## 2021-02-04 NOTE — PLAN OF CARE
Problem: Falls - Risk of:  Goal: Will remain free from falls  Description: Will remain free from falls  Outcome: Met This Shift  Goal: Absence of physical injury  Description: Absence of physical injury  Outcome: Met This Shift     Problem: Pain:  Goal: Pain level will decrease  Description: Pain level will decrease  Outcome: Met This Shift  Goal: Control of acute pain  Description: Control of acute pain  Outcome: Met This Shift  Goal: Control of chronic pain  Description: Control of chronic pain  Outcome: Met This Shift     Problem: FLUID AND ELECTROLYTE IMBALANCE  Goal: Fluid and electrolyte balance are achieved/maintained  Outcome: Met This Shift     Problem: INFECTION  Goal: Absence of urinary tract infection signs and symptoms  Description: Absence of urinary tract infection signs and symptoms  Outcome: Met This Shift     Problem: Skin Integrity:  Goal: Will show no infection signs and symptoms  Description: Will show no infection signs and symptoms  Outcome: Met This Shift  Goal: Absence of new skin breakdown  Description: Absence of new skin breakdown  Outcome: Met This Shift

## 2021-02-04 NOTE — CARE COORDINATION
Social Work/Discharge Planning:  [employer] and they do not provide ambulette transport to private residence. Called [facility] and ambulette transport is $110. Called [facility] and private pay ambulette is $75.  Notified patient and she is agreeable to using [employer] and arranged ambulette for 4:00pm.  Notified patient and provided her with number to call [facility] for payment. Notified RN. Will continue to follow.   Electronically signed by ANTHONY Barth on 2/4/2021 at 1:58 PM

## 2021-02-15 NOTE — DISCHARGE SUMMARY
Physician Discharge Summary     Patient ID:  Jordan Patel  [id number]  61 y.o.  1945    Admit date: 2/1/2021    Discharge date and time:  2/5 5pm    Admission Diagnoses:   Patient Active Problem List   Diagnosis    Essential hypertension    PAULINA (obstructive sleep apnea)    Midline low back pain with right-sided sciatica    Morbid obesity with BMI of 40.0-44.9, adult ([facility] [address]    CVA (cerebral vascular accident) ([facility] [address]    Urinary incontinence    Altered mental status    Closed fracture fibula, head, right, initial encounter    Diabetes mellitus ([facility] [address]    Neurogenic bladder    Sarcoidosis    Sleep apnea    Diffuse cerebral atrophy (HCC)    Asthma    Diabetic peripheral neuropathy (HCC)    Fibromyalgia    CKD (chronic kidney disease) stage 4, GFR 15-29 ml/min ([facility])    Iron deficiency anemia    Encephalopathy    Sarcoid    Orthostatic hypotension    History of CVA (cerebrovascular accident)       Discharge Diagnoses: Presyncope    Consults: none    Procedures: None    Hospital Course:   1. Presyncope              Likely secondary to dehydration and mild UTI. Patient treated with IV fluids, orthostatics negative. Seen by physical and occupational therapies. Discharged home in stable condition  2. UTI              UA shows +nitrite, trace LE. Patient treated with Rocephin, discharged home on oral antibiotics  3. CKD 3 - at baseline  4. DM2 - A1c 6.9              Continue sliding scale  5. GERD - PPI  6. DVT px  - lovenox 30mg  7.  HLD - statin       Discharge Exam:  Gen - NAD AAOx3  CV - RRR s1/s2, no M/R/G  Pulm - Fair air entry bilaterally, no wheeze  Abd - Soft NT ND  Ext - No LE edema    Condition:  Stable    Disposition: home    Patient Instructions:   Discharge Medication List as of 2/4/2021  3:23 PM      START taking these medications    Details   cephALEXin (KEFLEX) 500 MG capsule Take 1 capsule by mouth 2 times daily for 10 days, Disp-20 capsule, R-0Normal CONTINUE these medications which have NOT CHANGED    Details   acetaminophen (TYLENOL) 500 MG tablet Take 500 mg by mouth every 6 hours as needed for PainHistorical Med      benzonatate (TESSALON) 200 MG capsule Take 1 capsule by mouth every 8 hours as needed for Cough, Disp-20 capsule, R-0Normal      clopidogrel (PLAVIX) 75 MG tablet Take 1 tablet by mouth daily, Disp-30 tablet,R-3Normal      !! albuterol sulfate HFA (PROAIR HFA) 108 (90 Base) MCG/ACT inhaler Inhale 1 puff into the lungs every 4 hours as needed for Wheezing or Shortness of Breath, Disp-1 Inhaler, R-3Normal      docusate sodium (COLACE) 100 MG capsule Take 100 mg by mouth dailyHistorical Med      atorvastatin (LIPITOR) 20 MG tablet Take 1 tablet by mouth nightly, Disp-30 tablet, R-0Normal      metoprolol tartrate (LOPRESSOR) 25 MG tablet Take 1 tablet by mouth 2 times daily, Disp-60 tablet, R-0Normal      hydroxychloroquine (PLAQUENIL) 200 MG tablet Take 200 mg by mouth 2 times daily Historical Med      latanoprost (XALATAN) 0.005 % ophthalmic solution Place 1 drop into both eyes nightly, Disp-1 Bottle, R-3Normal      verapamil (CALAN SR) 240 MG extended release tablet Take 240 mg by mouth every morning Historical Med      DULoxetine (CYMBALTA) 60 MG capsule Take 60 mg by mouth every morning Historical Med      theophylline CR, 12 hour, (THEOCHRON) 300 MG CR tablet Take 300 mg by mouth every morning Historical Med      !! Albuterol Sulfate (PROAIR HFA IN) Inhale 1 puff into the lungs as needed Use am dos, if needed and brinHistorical Med      multivitamin (THERAGRAN) per tablet Take 1 tablet by mouth every morning Historical Med       !! - Potential duplicate medications found. Please discuss with provider.       STOP taking these medications       verapamil (VERELAN) 120 MG extended release capsule Comments:   Reason for Stopping:         pantoprazole (PROTONIX) 40 MG tablet Comments:   Reason for Stopping: aspirin 81 MG EC tablet Comments:   Reason for Stopping:         metFORMIN (GLUCOPHAGE) 1000 MG tablet Comments:   Reason for Stopping:             Activity: activity as tolerated and no driving for today  Diet: diabetic diet    Follow-up with PCP in 1 week    Note that over 30 minutes was spent in preparing discharge papers, discussing discharge with patient, medication review, etc.    Signed:  Cat Kilgore    2/14/2021  11:04 PM

## 2021-04-28 ENCOUNTER — IMMUNIZATION (OUTPATIENT)
Dept: PRIMARY CARE CLINIC | Age: 76
End: 2021-04-28
Payer: MEDICARE

## 2021-04-28 PROCEDURE — 91300 COVID-19, PFIZER VACCINE 30MCG/0.3ML DOSE: CPT | Performed by: NURSE PRACTITIONER

## 2021-04-28 PROCEDURE — 0001A COVID-19, PFIZER VACCINE 30MCG/0.3ML DOSE: CPT | Performed by: NURSE PRACTITIONER

## 2021-05-19 ENCOUNTER — IMMUNIZATION (OUTPATIENT)
Dept: PRIMARY CARE CLINIC | Age: 76
End: 2021-05-19
Payer: MEDICARE

## 2021-05-19 PROCEDURE — 0002A COVID-19, PFIZER VACCINE 30MCG/0.3ML DOSE: CPT

## 2021-05-19 PROCEDURE — 91300 COVID-19, PFIZER VACCINE 30MCG/0.3ML DOSE: CPT

## 2021-07-16 NOTE — PROGRESS NOTES
Have you been tested for COVID  yes         Have you been told you were positive for COVID no  Have you had any known exposure to someone that is positive for COVID No  Do you have a cough                   No              Do you have shortness of breath No                 Do you have a sore throat            No                Are you having chills                    No                Are you having muscle aches. No                    Please come to the hospital wearing a mask and have your significant other wear a mask as well. Both of you should check your temperature before leaving to come here,  if it is 100 or higher please call [phone] for instruction.

## 2021-07-16 NOTE — PROGRESS NOTES
[facility] PRE-ADMISSION TESTING INSTRUCTIONS    The Preadmission Testing patient is instructed accordingly using the following criteria (check applicable):    ARRIVAL INSTRUCTIONS:  [x] Parking the day of Surgery is located in the Main Entrance lot. Upon entering the door, make an immediate right to the surgery reception desk    [x] Bring photo ID and insurance card    [] Bring in a copy of Living will or Durable Power of  papers. [x] Please be sure to arrange for responsible adult to provide transportation to and from the hospital    [x] Please arrange for responsible adult to be with you for the 24 hour period post procedure due to having anesthesia      GENERAL INSTRUCTIONS:    [x] Nothing by mouth after midnight, including gum, candy, mints or water    [x] You may brush your teeth, but do not swallow any water    [x] Take medications as instructed with 1-2 oz of water    [x] Stop herbal supplements and vitamins 5 days prior to procedure    [x] Follow preop dosing of blood thinners per physician instructions    [] Take 1/2 dose of evening insulin, but no insulin after midnight    [] No oral diabetic medications after midnight    [] If diabetic and have low blood sugar or feel symptomatic, take 1-2oz apple juice only    [] Bring inhalers day of surgery    [] Bring C-PAP/ Bi-Pap day of surgery    [] Bring urine specimen day of surgery    [x] Shower or bath with soap, lather and rinse well, AM of Surgery, no lotion, powders or creams to surgical site    [] Follow bowel prep as instructed per surgeon    [x] No tobacco products within 24 hours of surgery     [x] No alcohol or illegal drug use within 24 hours of surgery.     [x] Jewelry, body piercing's, eyeglasses, contact lenses and dentures are not permitted into surgery (bring cases)      [x] Please do not wear any nail polish, make up or hair products on the day of surgery    [x] You can expect a call the business day prior to procedure to notify you if your arrival time changes    [x] If you receive a survey after surgery we would greatly appreciate your comments    [] Parent/guardian of a minor must accompany their child and remain on the premises  the entire time they are under our care     [] Pediatric patients may bring favorite toy, blanket or comfort item with them    [] A caregiver or family member must remain with the patient during their stay if they are mentally handicapped, have dementia, disoriented or unable to use a call light or would be a safety concern if left unattended    [x] Please notify surgeon if you develop any illness between now and time of surgery (cold, cough, sore throat, fever, nausea, vomiting) or any signs of infections  including skin, wounds, and dental.    [x]  The Outpatient Pharmacy is available to fill your prescription here on your day of surgery, ask your preop nurse for details    [] Other instructions    EDUCATIONAL MATERIALS PROVIDED:    [] PAT Preoperative Education Packet/Booklet     [] Medication List    [] Transfusion bracelet applied with instructions    [] Shower with soap, lather and rinse well, and use CHG wipes provided the evening before surgery as instructed    [] Incentive spirometer with instructions

## 2021-07-22 ENCOUNTER — ANESTHESIA EVENT (OUTPATIENT)
Dept: OPERATING ROOM | Age: 76
End: 2021-07-22
Payer: MEDICARE

## 2021-07-22 ENCOUNTER — HOSPITAL ENCOUNTER (OUTPATIENT)
Age: 76
Setting detail: OUTPATIENT SURGERY
Discharge: HOME | End: 2021-07-22
Attending: OPHTHALMOLOGY | Admitting: OPHTHALMOLOGY
Payer: MEDICARE

## 2021-07-22 ENCOUNTER — ANESTHESIA (OUTPATIENT)
Dept: OPERATING ROOM | Age: 76
End: 2021-07-22
Payer: MEDICARE

## 2021-07-22 VITALS
DIASTOLIC BLOOD PRESSURE: 64 MMHG | SYSTOLIC BLOOD PRESSURE: 142 MMHG | RESPIRATION RATE: 18 BRPM | OXYGEN SATURATION: 100 %

## 2021-07-22 VITALS
RESPIRATION RATE: 16 BRPM | SYSTOLIC BLOOD PRESSURE: 161 MMHG | HEIGHT: 66 IN | HEART RATE: 60 BPM | TEMPERATURE: 96.7 F | DIASTOLIC BLOOD PRESSURE: 72 MMHG | BODY MASS INDEX: 39.37 KG/M2 | WEIGHT: 245 LBS | OXYGEN SATURATION: 96 %

## 2021-07-22 PROBLEM — H25.813 COMBINED FORMS OF AGE-RELATED CATARACT OF BOTH EYES: Status: ACTIVE | Noted: 2021-07-22

## 2021-07-22 PROCEDURE — 2580000003 HC RX 258: Performed by: OPHTHALMOLOGY

## 2021-07-22 PROCEDURE — 6370000000 HC RX 637 (ALT 250 FOR IP): Performed by: OPHTHALMOLOGY

## 2021-07-22 PROCEDURE — 3600000012 HC SURGERY LEVEL 2 ADDTL 15MIN: Performed by: OPHTHALMOLOGY

## 2021-07-22 PROCEDURE — 3600000002 HC SURGERY LEVEL 2 BASE: Performed by: OPHTHALMOLOGY

## 2021-07-22 PROCEDURE — 6360000002 HC RX W HCPCS: Performed by: OPHTHALMOLOGY

## 2021-07-22 PROCEDURE — 2500000003 HC RX 250 WO HCPCS: Performed by: OPHTHALMOLOGY

## 2021-07-22 PROCEDURE — 7100000010 HC PHASE II RECOVERY - FIRST 15 MIN: Performed by: OPHTHALMOLOGY

## 2021-07-22 PROCEDURE — 3700000001 HC ADD 15 MINUTES (ANESTHESIA): Performed by: OPHTHALMOLOGY

## 2021-07-22 PROCEDURE — 2709999900 HC NON-CHARGEABLE SUPPLY: Performed by: OPHTHALMOLOGY

## 2021-07-22 PROCEDURE — 2500000003 HC RX 250 WO HCPCS: Performed by: NURSE ANESTHETIST, CERTIFIED REGISTERED

## 2021-07-22 PROCEDURE — 2580000003 HC RX 258: Performed by: NURSE ANESTHETIST, CERTIFIED REGISTERED

## 2021-07-22 PROCEDURE — 7100000011 HC PHASE II RECOVERY - ADDTL 15 MIN: Performed by: OPHTHALMOLOGY

## 2021-07-22 PROCEDURE — 3700000000 HC ANESTHESIA ATTENDED CARE: Performed by: OPHTHALMOLOGY

## 2021-07-22 PROCEDURE — 6370000000 HC RX 637 (ALT 250 FOR IP)

## 2021-07-22 PROCEDURE — 6360000002 HC RX W HCPCS: Performed by: NURSE ANESTHETIST, CERTIFIED REGISTERED

## 2021-07-22 PROCEDURE — V2632 POST CHMBR INTRAOCULAR LENS: HCPCS | Performed by: OPHTHALMOLOGY

## 2021-07-22 DEVICE — ACRYSOF(R) IQ ASPHERIC NATURAL IOL, SINGLE-PIECE ACRYLIC FOLDABLE PCL, UV WITH BLUE LIGHTFILTER, 13.0MM LENGTH, 6.0MM ANTERIORASYMMETRIC BICONVEX OPTIC, PLANAR HAPTICS.
Type: IMPLANTABLE DEVICE | Status: FUNCTIONAL
Brand: ACRYSOF®

## 2021-07-22 RX ORDER — PROPOFOL 10 MG/ML
INJECTION, EMULSION INTRAVENOUS PRN
Status: DISCONTINUED | OUTPATIENT
Start: 2021-07-22 | End: 2021-07-22 | Stop reason: SDUPTHER

## 2021-07-22 RX ORDER — TETRACAINE HYDROCHLORIDE 5 MG/ML
SOLUTION OPHTHALMIC PRN
Status: DISCONTINUED | OUTPATIENT
Start: 2021-07-22 | End: 2021-07-22 | Stop reason: ALTCHOICE

## 2021-07-22 RX ORDER — KETOROLAC TROMETHAMINE 5 MG/ML
1 SOLUTION OPHTHALMIC
Status: COMPLETED | OUTPATIENT
Start: 2021-07-22 | End: 2021-07-22

## 2021-07-22 RX ORDER — PHENYLEPHRINE HCL 2.5 %
1 DROPS OPHTHALMIC (EYE)
Status: COMPLETED | OUTPATIENT
Start: 2021-07-22 | End: 2021-07-22

## 2021-07-22 RX ORDER — CIPROFLOXACIN HYDROCHLORIDE 3.5 MG/ML
1 SOLUTION/ DROPS TOPICAL SEE ADMIN INSTRUCTIONS
Status: DISCONTINUED | OUTPATIENT
Start: 2021-07-22 | End: 2021-07-22 | Stop reason: HOSPADM

## 2021-07-22 RX ORDER — CYCLOPENTOLATE HYDROCHLORIDE 10 MG/ML
1 SOLUTION/ DROPS OPHTHALMIC
Status: COMPLETED | OUTPATIENT
Start: 2021-07-22 | End: 2021-07-22

## 2021-07-22 RX ORDER — PREDNISOLONE ACETATE 10 MG/ML
SUSPENSION/ DROPS OPHTHALMIC PRN
Status: DISCONTINUED | OUTPATIENT
Start: 2021-07-22 | End: 2021-07-22 | Stop reason: ALTCHOICE

## 2021-07-22 RX ORDER — SODIUM CHLORIDE 9 MG/ML
INJECTION, SOLUTION INTRAVENOUS CONTINUOUS PRN
Status: DISCONTINUED | OUTPATIENT
Start: 2021-07-22 | End: 2021-07-22 | Stop reason: SDUPTHER

## 2021-07-22 RX ORDER — TROPICAMIDE 10 MG/ML
1 SOLUTION/ DROPS OPHTHALMIC
Status: COMPLETED | OUTPATIENT
Start: 2021-07-22 | End: 2021-07-22

## 2021-07-22 RX ORDER — ONDANSETRON 4 MG/1
4 TABLET, ORALLY DISINTEGRATING ORAL EVERY 8 HOURS PRN
Status: DISCONTINUED | OUTPATIENT
Start: 2021-07-22 | End: 2021-07-22 | Stop reason: HOSPADM

## 2021-07-22 RX ORDER — CIPROFLOXACIN HYDROCHLORIDE 3.5 MG/ML
SOLUTION/ DROPS TOPICAL PRN
Status: DISCONTINUED | OUTPATIENT
Start: 2021-07-22 | End: 2021-07-22 | Stop reason: ALTCHOICE

## 2021-07-22 RX ORDER — LIDOCAINE HYDROCHLORIDE 10 MG/ML
INJECTION, SOLUTION EPIDURAL; INFILTRATION; INTRACAUDAL; PERINEURAL PRN
Status: DISCONTINUED | OUTPATIENT
Start: 2021-07-22 | End: 2021-07-22 | Stop reason: SDUPTHER

## 2021-07-22 RX ORDER — SODIUM CHLORIDE 9 MG/ML
INJECTION, SOLUTION INTRAVENOUS CONTINUOUS
Status: DISCONTINUED | OUTPATIENT
Start: 2021-07-22 | End: 2021-07-22 | Stop reason: HOSPADM

## 2021-07-22 RX ORDER — ONDANSETRON 2 MG/ML
4 INJECTION INTRAMUSCULAR; INTRAVENOUS EVERY 6 HOURS PRN
Status: DISCONTINUED | OUTPATIENT
Start: 2021-07-22 | End: 2021-07-22 | Stop reason: HOSPADM

## 2021-07-22 RX ADMIN — SODIUM CHLORIDE: 9 INJECTION, SOLUTION INTRAVENOUS at 08:56

## 2021-07-22 RX ADMIN — CIPROFLOXACIN 1 DROP: 3 SOLUTION OPHTHALMIC at 07:55

## 2021-07-22 RX ADMIN — PHENYLEPHRINE HYDROCHLORIDE 1 DROP: 25 SOLUTION/ DROPS OPHTHALMIC at 07:20

## 2021-07-22 RX ADMIN — LIDOCAINE HYDROCHLORIDE 20 MG: 10 INJECTION, SOLUTION EPIDURAL; INFILTRATION; INTRACAUDAL; PERINEURAL at 08:59

## 2021-07-22 RX ADMIN — SODIUM CHLORIDE: 9 INJECTION, SOLUTION INTRAVENOUS at 07:43

## 2021-07-22 RX ADMIN — KETOROLAC TROMETHAMINE 1 DROP: 5 SOLUTION OPHTHALMIC at 07:20

## 2021-07-22 RX ADMIN — Medication 1 DROP: at 07:55

## 2021-07-22 RX ADMIN — KETOROLAC TROMETHAMINE 1 DROP: 5 SOLUTION OPHTHALMIC at 07:45

## 2021-07-22 RX ADMIN — PROPOFOL 100 MG: 10 INJECTION, EMULSION INTRAVENOUS at 08:59

## 2021-07-22 RX ADMIN — PHENYLEPHRINE HYDROCHLORIDE 1 DROP: 25 SOLUTION/ DROPS OPHTHALMIC at 07:45

## 2021-07-22 RX ADMIN — CIPROFLOXACIN 1 DROP: 3 SOLUTION OPHTHALMIC at 07:20

## 2021-07-22 RX ADMIN — PHENYLEPHRINE HYDROCHLORIDE 1 DROP: 25 SOLUTION/ DROPS OPHTHALMIC at 07:55

## 2021-07-22 RX ADMIN — PHENYLEPHRINE HYDROCHLORIDE 1 DROP: 25 SOLUTION/ DROPS OPHTHALMIC at 07:35

## 2021-07-22 RX ADMIN — Medication 1 DROP: at 07:20

## 2021-07-22 RX ADMIN — KETOROLAC TROMETHAMINE 1 DROP: 5 SOLUTION OPHTHALMIC at 07:35

## 2021-07-22 RX ADMIN — CIPROFLOXACIN 1 DROP: 3 SOLUTION OPHTHALMIC at 07:45

## 2021-07-22 RX ADMIN — Medication 1 DROP: at 07:35

## 2021-07-22 RX ADMIN — Medication 1 DROP: at 07:45

## 2021-07-22 RX ADMIN — CIPROFLOXACIN 1 DROP: 3 SOLUTION OPHTHALMIC at 07:35

## 2021-07-22 RX ADMIN — KETOROLAC TROMETHAMINE 1 DROP: 5 SOLUTION OPHTHALMIC at 07:55

## 2021-07-22 ASSESSMENT — PAIN SCALES - GENERAL
PAINLEVEL_OUTOF10: 0

## 2021-07-22 ASSESSMENT — LIFESTYLE VARIABLES: SMOKING_STATUS: 0

## 2021-07-22 ASSESSMENT — PAIN - FUNCTIONAL ASSESSMENT: PAIN_FUNCTIONAL_ASSESSMENT: 0-10

## 2021-07-22 NOTE — ANESTHESIA PRE PROCEDURE
Department of Anesthesiology  Preprocedure Note       Name:  Gabby Dears   Age:  67 y.o.  :  1945                                          MRN:  [id number]         Date:  2021      Surgeon: Trang Corrales):  Brennan Ingram MD    Procedure: Procedure(s):  RIGHT EYE CATARACT EXTRACTION IOL IMPLANT   ++IODINE ALLERGY++    Medications prior to admission:   Prior to Admission medications    Medication Sig Start Date End Date Taking? Authorizing Provider   acetaminophen (TYLENOL) 500 MG tablet Take 500 mg by mouth every 6 hours as needed for Pain    Historical Provider, MD   clopidogrel (PLAVIX) 75 MG tablet Take 1 tablet by mouth daily 20   Ford Hernandez MD   albuterol sulfate HFA (PROAIR HFA) 108 (90 Base) MCG/ACT inhaler Inhale 1 puff into the lungs every 4 hours as needed for Wheezing or Shortness of Breath 10/24/17   Claria Scheuermann, MD   atorvastatin (LIPITOR) 20 MG tablet Take 1 tablet by mouth nightly 17   Oc Otto MD   hydroxychloroquine (PLAQUENIL) 200 MG tablet Take 200 mg by mouth 2 times daily     Historical Provider, MD   latanoprost (XALATAN) 0.005 % ophthalmic solution Place 1 drop into both eyes nightly 17   Shandra Lr MD   verapamil (CALAN SR) 240 MG extended release tablet Take 240 mg by mouth every morning     Historical Provider, MD   DULoxetine (CYMBALTA) 60 MG capsule Take 60 mg by mouth every morning     Historical Provider, MD   multivitamin [facility]) per tablet Take 1 tablet by mouth every morning     Historical Provider, MD       Current medications:    No current facility-administered medications for this visit. No current outpatient medications on file.      Facility-Administered Medications Ordered in Other Visits   Medication Dose Route Frequency Provider Last Rate Last Admin    tropicamide (MYDRIACYL) 1 % ophthalmic solution 1 drop  1 drop Ophthalmic Q15 Min PRN Brennan Ingram MD        phenylephrine (MYDFRIN) 2.5 % ophthalmic CKD (chronic kidney disease) stage 4, GFR 15-29 ml/min ([facility]) 2017    Degenerative disc disease     Degenerative joint disease     Diabetes mellitus ([facility] [address] 2008    diet controlled    Diabetic peripheral neuropathy ([facility] [address] 1998    Diffuse cerebral atrophy ([facility] [address] 2012    Fibromyalgia 01/2012    [facility]    Glaucoma     Hypertension     Iron deficiency anemia 10/25/2020    Neurogenic bladder     Neuropathy     Sarcoidosis 1993    Sleep apnea 2009    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\"       Past Surgical History:        Procedure Laterality Date    ANKLE FRACTURE SURGERY Right 10/24/2020    RIGHT ANKLE OPEN REDUCTION INTERNAL FIXATION performed [facility] Shaheed Navarro MD [facility]      laminectomy l3-4    BREAST SURGERY      biopsy    CARDIAC CATHETERIZATION  2012    MINIMAL CAD    CHOLECYSTECTOMY      COLONOSCOPY  08/2019    TUBULAR ADENOMA x 2    FOOT SURGERY Bilateral     right foot fracture; left foot charcot    HYSTERECTOMY      JOINT REPLACEMENT  12/16/2016    right hip arthroplasty    OTHER SURGICAL HISTORY Right 02/06/2017    I & D right hip wound vaccum placement    TOTAL KNEE ARTHROPLASTY Bilateral        Social History:    Social History     Tobacco Use    Smoking status: Never Smoker    Smokeless tobacco: Never Used   Substance Use Topics    Alcohol use: No     Alcohol/week: 0.0 standard drinks                                Counseling given: Not Answered      Vital Signs (Current): There were no vitals filed for this visit.                                            BP Readings from Last 3 Encounters:   02/04/21 (!) 165/76   01/04/21 (!) 142/92   11/01/20 (!) 159/60       NPO Status:                                                                                 BMI:   Wt Readings from Last 3 Encounters:   07/16/21 245 lb (111.1 kg)   02/04/21 255 lb 14.4 oz (116.1 kg)   01/03/21 291 lb (132 kg)     There is no height or weight on file to calculate BMI.    CBC:   Lab Results   Component Value Date    WBC 6.5 02/04/2021    RBC 3.97 02/04/2021    HGB 11.4 02/04/2021    HCT 36.0 02/04/2021    MCV 90.7 02/04/2021    RDW 15.9 02/04/2021     02/04/2021       CMP:   Lab Results   Component Value Date     02/04/2021    K 4.3 02/04/2021    K 3.8 02/02/2021     02/04/2021    CO2 23 02/04/2021    BUN 21 02/04/2021    CREATININE 1.2 02/04/2021    GFRAA 53 02/04/2021    LABGLOM 44 02/04/2021    GLUCOSE 129 02/04/2021    GLUCOSE 82 12/09/2011    PROT 6.7 01/03/2021    CALCIUM 8.1 02/04/2021    BILITOT 0.5 01/03/2021    ALKPHOS 134 01/03/2021    AST 24 01/03/2021    ALT 15 01/03/2021       POC Tests: No results for input(s): POCGLU, POCNA, POCK, POCCL, POCBUN, POCHEMO, POCHCT in the last 72 hours.     Coags:   Lab Results   Component Value Date    PROTIME 11.3 10/24/2020    PROTIME 12.0 12/09/2011    INR 1.0 10/24/2020    APTT 32.0 10/26/2020       HCG (If Applicable): No results found for: PREGTESTUR, PREGSERUM, HCG, HCGQUANT     ABGs: No results found for: PHART, PO2ART, FUX2XYM, GFH3DZA, BEART, T3BLYAGX     Type & Screen (If Applicable):  Lab Results   Component Value Date    LABABO B 12/09/2011    [facility] POS 12/09/2011       Drug/Infectious Status (If Applicable):  No results found for: HIV, HEPCAB    COVID-19 Screening (If Applicable):   Lab Results   Component Value Date    COVID19 Not Detected 12/14/2020    COVID19 Not Detected 11/16/2020         Anesthesia Evaluation  Patient summary reviewed and Nursing notes reviewed no history of anesthetic complications:   Airway: Mallampati: III  TM distance: >3 FB   Neck ROM: full  Mouth opening: > = 3 FB Dental: normal exam         Pulmonary: breath sounds clear to auscultation  (+) sleep apnea: on CPAP,  asthma:     (-) not a current smoker                           Cardiovascular:    (+) hypertension:,       ECG reviewed  Rhythm: regular  Rate: normal  Echocardiogram reviewed  Stress test reviewed                Neuro/Psych:   (+) CVA (balance problems): residual symptoms, neuromuscular disease:,              ROS comment: Diabetic peripheral neuropathy    fibromyalgia GI/Hepatic/Renal:   (+) renal disease: CRI, morbid obesity          Endo/Other:    (+) DiabetesType II DM, no interval change, , .                  ROS comment: sarcoidosis Abdominal:             Vascular: negative vascular ROS. Other Findings:             Anesthesia Plan      MAC     ASA 3     (Pt agrees to [facility] ATHENS and IV sedation.)  Induction: intravenous. Anesthetic plan and risks discussed with patient. Plan discussed with CRNA.                 Candi Parra MD   7/22/2021

## 2021-07-22 NOTE — ANESTHESIA POSTPROCEDURE EVALUATION
Department of Anesthesiology  Postprocedure Note    Patient: Dorie Cowden  MRN: [id number]  YOB: 1945  Date of evaluation: 7/22/2021  Time:  11:58 AM     Procedure Summary     Date: 07/22/21 Room / Location: [facility] OR 03 / [facility]    Anesthesia Start: 2410 Anesthesia Stop: 0959    Procedure: RIGHT EYE CATARACT EXTRACTION IOL IMPLANT   ++IODINE ALLERGY++ (Right Eye) Diagnosis: (RIGHT EYE CATARACT)    Surgeons: Martha Cedillo MD Responsible Provider: Norah Rae MD    Anesthesia Type: MAC ASA Status: 3          Anesthesia Type: MAC    Sharon Phase I:      Sharon Phase II: Sharon Score: 10    Last vitals: Reviewed and per EMR flowsheets.        Anesthesia Post Evaluation    Patient location during evaluation: PACU  Level of consciousness: awake  Airway patency: patent  Nausea & Vomiting: no nausea and no vomiting  Complications: no  Cardiovascular status: hemodynamically stable  Respiratory status: acceptable

## 2021-07-22 NOTE — OP NOTE
then used to create the capsulorhexis. Hydrodissection with hydrodelineation were the performed in the four quadrants with sterile balanced salt solution. The nucleus and epinucleus were then removed with phacoemulsification. The residual cortex was then removed using an aspiration/irrigation unit. The capsular bag was then reconstituted with Provisc. A posterior chamber intraocular lens was then injected into the capsular bag. An SN 60 WF 17.5 D lens was used. The aspiration-irrigation united was then used to remove the residual Provisc. The pupil was then brought down with Miostat. The wound was then tested and found to be watertight. One suture was then placed through the flap, then closed and tied. The conjunctiva was then carefully draped over the surgical wound and then cauterized. The lid speculum  was removed. Pred Forte 1% solution,  Vigamox drops and TobraDex ointment  were then instilled into the eye. The eye was noted to be in good condition. A patch and shield were then placed over the operated eye. The patient was transferred to the recovery room in good condition. [facility] VALERY Rivas.       Electronically signed by Aydin Hawkins MD on 7/22/2021 at 11:26 AM

## 2022-02-01 ENCOUNTER — HOSPITAL ENCOUNTER (OUTPATIENT)
Dept: ULTRASOUND IMAGING | Age: 77
Discharge: HOME | End: 2022-02-03
Payer: MEDICARE

## 2022-02-01 DIAGNOSIS — E11.59 OBESITY, DIABETES, AND HYPERTENSION SYNDROME (HCC): ICD-10-CM

## 2022-02-01 DIAGNOSIS — I73.9 CLAUDICATION (HCC): ICD-10-CM

## 2022-02-01 DIAGNOSIS — E66.9 OBESITY, DIABETES, AND HYPERTENSION SYNDROME (HCC): ICD-10-CM

## 2022-02-01 DIAGNOSIS — I15.2 OBESITY, DIABETES, AND HYPERTENSION SYNDROME (HCC): ICD-10-CM

## 2022-02-01 DIAGNOSIS — I73.9 PVD (PERIPHERAL VASCULAR DISEASE) (HCC): ICD-10-CM

## 2022-02-01 DIAGNOSIS — E11.69 OBESITY, DIABETES, AND HYPERTENSION SYNDROME (HCC): ICD-10-CM

## 2022-02-01 PROCEDURE — 93923 UPR/LXTR ART STDY 3+ LVLS: CPT

## 2022-02-21 ENCOUNTER — TELEPHONE (OUTPATIENT)
Dept: VASCULAR SURGERY | Age: 77
End: 2022-02-21

## 2022-02-23 ENCOUNTER — OFFICE VISIT (OUTPATIENT)
Dept: VASCULAR SURGERY | Age: 77
End: 2022-02-23
Payer: MEDICARE

## 2022-02-23 VITALS — DIASTOLIC BLOOD PRESSURE: 76 MMHG | SYSTOLIC BLOOD PRESSURE: 116 MMHG

## 2022-02-23 DIAGNOSIS — I73.9 PVD (PERIPHERAL VASCULAR DISEASE) (HCC): ICD-10-CM

## 2022-02-23 DIAGNOSIS — I70.209: Primary | ICD-10-CM

## 2022-02-23 PROCEDURE — 4040F PNEUMOC VAC/ADMIN/RCVD: CPT | Performed by: PHYSICIAN ASSISTANT

## 2022-02-23 PROCEDURE — G8417 CALC BMI ABV UP PARAM F/U: HCPCS | Performed by: PHYSICIAN ASSISTANT

## 2022-02-23 PROCEDURE — 99203 OFFICE O/P NEW LOW 30 MIN: CPT | Performed by: PHYSICIAN ASSISTANT

## 2022-02-23 PROCEDURE — 1123F ACP DISCUSS/DSCN MKR DOCD: CPT | Performed by: PHYSICIAN ASSISTANT

## 2022-02-23 PROCEDURE — 1036F TOBACCO NON-USER: CPT | Performed by: PHYSICIAN ASSISTANT

## 2022-02-23 PROCEDURE — G8427 DOCREV CUR MEDS BY ELIG CLIN: HCPCS | Performed by: PHYSICIAN ASSISTANT

## 2022-02-23 PROCEDURE — 1090F PRES/ABSN URINE INCON ASSESS: CPT | Performed by: PHYSICIAN ASSISTANT

## 2022-02-23 PROCEDURE — G8484 FLU IMMUNIZE NO ADMIN: HCPCS | Performed by: PHYSICIAN ASSISTANT

## 2022-02-23 PROCEDURE — G8400 PT W/DXA NO RESULTS DOC: HCPCS | Performed by: PHYSICIAN ASSISTANT

## 2022-02-23 RX ORDER — MIRABEGRON 25 MG/1
25 TABLET, FILM COATED, EXTENDED RELEASE ORAL DAILY
COMMUNITY
Start: 2022-01-24 | End: 2022-05-27

## 2022-02-23 NOTE — PROGRESS NOTES
Vascular Surgery Outpatient Consultation      Chief Complaint   Patient presents with    Surgical Consult     PVD       Reason for Consult:  Peripheral vascular disease, toe ulcer    Requesting Physician:  Dr. Gio Austin:                The patient is a 67 y.o. female who states she is here for a L second toe wound and decreased pedal pulses. Patient is a diabetic and ambulates infrequently with the use of a walker. She denies any claudication or rest pain. She has neuropathy to bilateral feet. She denies any pain. She developed an ulcer to her L 2nd toe approximately 2 months ago and is following with Dr Joe Ren for this. She had several rounds of antibiotics and debridements per her report, which improved the wound. She states it continues to improve now. She continues to follow closely with Dr Joe Ren.     Past Medical History:        Diagnosis Date    Asthma 1993    Cataract, right eye     Cerebral artery occlusion with cerebral infarction ([address] 07/2017    CKD (chronic kidney disease) stage 4, GFR 15-29 ml/min ([address] 2017    Degenerative disc disease     Degenerative joint disease     Diabetes mellitus ([address] 2008    diet controlled    Diabetic peripheral neuropathy ([address] 1998    Diffuse cerebral atrophy ([address] 2012    Fibromyalgia 01/2012    [facility]    Glaucoma     Hypertension     Iron deficiency anemia 10/25/2020    Neurogenic bladder     Neuropathy     Sarcoidosis 1993    Sleep apnea 2009    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      laminectomy l3-4    BREAST SURGERY      biopsy    CARDIAC CATHETERIZATION  2012    MINIMAL CAD    CHOLECYSTECTOMY      COLONOSCOPY  08/2019    TUBULAR ADENOMA x 2    FOOT SURGERY Bilateral     right foot fracture; left foot charcot    HYSTERECTOMY      INTRACAPSULAR CATARACT EXTRACTION Right 7/22/2021    RIGHT EYE CATARACT EXTRACTION IOL IMPLANT [facility] [id number]  12[facility]    right hip arthroplasty    OTHER SURGICAL HISTORY Right 02/06/2017    I & D right hip wound vaccum placement    TOTAL KNEE ARTHROPLASTY Bilateral      Current Medications:   Prior to Admission medications    Medication Sig Start Date End Date Taking? Authorizing Provider   MYRBETRIQ 25 MG TB24 Take 25 mg by mouth daily 1/24/22  Yes Historical Provider, MD   clopidogrel (PLAVIX) 75 MG tablet Take 1 tablet by mouth daily 11/2/20  Yes Rajan Corcoran MD   albuterol sulfate HFA (PROAIR HFA) 108 (90 Base) MCG/ACT inhaler Inhale 1 puff into the lungs every 4 hours as needed for Wheezing or Shortness of Breath 10/24/17  Yes Melo Arrieta MD   atorvastatin (LIPITOR) 20 MG tablet Take 1 tablet by mouth nightly 7/19/17  Yes Viviana Cancino MD   hydroxychloroquine (PLAQUENIL) 200 MG tablet Take 200 mg by mouth 2 times daily    Yes Historical Provider, MD   latanoprost (XALATAN) 0.005 % ophthalmic solution Place 1 drop into both eyes nightly 2/9/17  Yes Lynne Gonzalez MD   verapamil (CALAN SR) 240 MG extended release tablet Take 240 mg by mouth every morning    Yes Historical Provider, MD   DULoxetine (CYMBALTA) 60 MG capsule Take 60 mg by mouth every morning    Yes Historical Provider, MD   multivitamin (THERAGRAN) per tablet Take 1 tablet by mouth every morning    Yes Historical Provider, MD     Allergies:   Iodides, Iodine, Other, and Pcn [penicillins]    Social History     Socioeconomic History    Marital status: Single     Spouse name: Not on file    Number of children: Not on file    Years of education: Not on file    Highest education level: Not on file   Occupational History    Not on file   Tobacco Use    Smoking status: Never Smoker    Smokeless tobacco: Never Used   Vaping Use    Vaping Use: Never used   Substance and Sexual Activity    Alcohol use: No     Alcohol/week: 0.0 standard drinks    Drug use: No    Sexual activity: Never   Other Topics Concern    Not on file   Social History Narrative    Not on file     Social Determinants of Health     Financial Resource Strain:     Difficulty of Paying Living Expenses: Not on file   Food Insecurity:     Worried About Running Out of Food in the Last Year: Not on file    Hugo of Food in the Last Year: Not on file   Transportation Needs:     Lack of Transportation (Medical): Not on file    Lack of Transportation (Non-Medical):  Not on file   Physical Activity:     Days of Exercise per Week: Not on file    Minutes of Exercise per Session: Not on file   Stress:     Feeling of Stress : Not on file   Social Connections:     Frequency of Communication with Friends and Family: Not on file    Frequency of Social Gatherings with Friends and Family: Not on file    Attends Oriental orthodox Services: Not on file    Active Member of [address] [place] [address] or Organizations: Not on file    Attends Club or Organization Meetings: Not on file    Marital Status: Not on file   Intimate Partner Violence:     Fear of Current or Ex-Partner: Not on file    Emotionally Abused: Not on file    Physically Abused: Not on file    Sexually Abused: Not on file   Housing Stability:     Unable to Pay for Housing in the Last Year: Not on file    Number of [place] in the Last Year: Not on file    Unstable Housing in the Last Year: Not on file        Family History   Problem Relation Age of Onset    Cancer Mother         pancreatic    Parkinsonism Father     Diabetes Maternal Grandfather     Cancer Brother         esophageal       REVIEW OF SYSTEMS (Recent symptoms): Negative if blank [], Positive if [x]       Constitutional    [] Fevers / chills  [] General weakness   [] Poor appetite    [] Weight gain or loss  Eyes    [] Blurred vision  [] Wear glasses / contacts   [] Visual disturbances   [] Blindness  Ears, Nose, Throat    [] Hearing loss  [] Ringing in ears   [] Nose bleeding    [] Voice hoarseness  [] Difficulty swallowing      Lungs    [] Cough  [] Chest pain   [] Wheezing    [] Difficult breathing  Heart    [] Chest pain during activity  [] Dizziness   [] Irregular heart beat    [] Fainting episode    [] Leg swelling   Stomach, Intestines    [] Intestinal bleeding  [] Heart burn   [] Abdominal pain    [] Stomach ulcers   [] Change in bowel habits      Kidney, Prostate    [] Frequent need to urinate  [] Incontinence   [] Blood in urine    [] Erectile dysfunction (men)      [] All negative   Hematologic, Lymphatic,   Skin, Musculoskeletal    [] Easy bruising  [] Swollen lymph nodes   [x] Skin lesions, ulcers   [] Skin rash  [] Neck or back pain   [] Leg / foot pain     Neurologic    [] Speech problems  [] Memory loss   [] Headaches     [] Walking problems    [] Hand / arm / leg weakness   [x] Numbness in arms or legs           PHYSICAL EXAM:      CONSTITUTIONAL:  awake, alert, cooperative, no apparent distress, and appears stated age  EYES:  extra-ocular muscles intact and vision intact  ENT:  normocepalic, without obvious abnormality, atraumatic  NECK:  supple, symmetrical, trachea midline, no jugular venous distension, no carotid bruits,  and MASSES:  no masses  LUNGS:  no increased work of breathing, good air exchange and clear to auscultation  CARDIOVASCULAR:  regular rate and rhythm and no murmur noted  ABDOMEN:  soft, non-distended, non-tender, Aorta not palpated  SKIN:  no lesions  EXTREMITIES: 1+ pitting edema BLE, venous stasis skin changes present. Small callous present to dorsal L 2nd toe without surrounding erythema or signs of infection.           Right      Left   Brachial     Radial 2 2   Femoral 0-1 2   Popliteal     Dorsalis Pedis multiphasic multiphasic   Posterior Tibial multiphasic multiphasic   (3=normal, 2=diminished, 1=barely palpable, 4=widened)    IMPRESSION/RECOMMENDATIONS:      Problem List Items Addressed This Visit        Vascular Problems    Atherosclerosis of artery of extremity without gangrene ([address] - Primary    PVD (peripheral vascular disease) ([address]        I reviewed with the patient that the circulation to her feet remains adequate and that I do not feel that she requires any additional testing or intervention at this time. She is diabetic and has calcific arterial disease although arterial studies demonstrate multiphasic waveforms through the large vessels. She has small vessel disease and I explained to her that there is no intervention recommended for this. Continue local wound care per podiatry. As of now her wound is healing. We will see her back in 3 months to ensure her wound has healed but I asked her to call sooner with any issues. Pt seen and plan reviewed with Dr. Camille Dumont. Nathalia Barrera PA-C    Return in about 3 months (around 5/23/2022).

## 2022-05-26 ENCOUNTER — APPOINTMENT (OUTPATIENT)
Dept: CT IMAGING | Age: 77
End: 2022-05-26
Payer: MEDICARE

## 2022-05-26 ENCOUNTER — HOSPITAL ENCOUNTER (EMERGENCY)
Age: 77
Discharge: ANOTHER HOSPITAL | End: 2022-05-27
Attending: EMERGENCY MEDICINE
Payer: MEDICARE

## 2022-05-26 ENCOUNTER — APPOINTMENT (OUTPATIENT)
Dept: GENERAL RADIOLOGY | Age: 77
End: 2022-05-26
Payer: MEDICARE

## 2022-05-26 VITALS
DIASTOLIC BLOOD PRESSURE: 64 MMHG | HEIGHT: 66 IN | TEMPERATURE: 98 F | BODY MASS INDEX: 39.37 KG/M2 | SYSTOLIC BLOOD PRESSURE: 149 MMHG | WEIGHT: 245 LBS | RESPIRATION RATE: 18 BRPM | OXYGEN SATURATION: 99 % | HEART RATE: 78 BPM

## 2022-05-26 DIAGNOSIS — S72.111A CLOSED DISPLACED FRACTURE OF GREATER TROCHANTER OF RIGHT FEMUR, INITIAL ENCOUNTER (HCC): Primary | ICD-10-CM
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
## 2022-05-26 DIAGNOSIS — Z86.79 HISTORY OF PERIPHERAL ARTERIAL DISEASE: ICD-10-CM

## 2022-05-26 LAB
ABO/RH: NORMAL
ALBUMIN SERPL-MCNC: 4 G/DL (ref 3.5–5.2)
ALP BLD-CCNC: 162 U/L (ref 35–104)
ALT SERPL-CCNC: 25 U/L (ref 0–32)
ANION GAP SERPL CALCULATED.3IONS-SCNC: 14 MMOL/L (ref 7–16)
ANTIBODY SCREEN: NORMAL
AST SERPL-CCNC: 21 U/L (ref 0–31)
BASOPHILS ABSOLUTE: 0.07 E9/L (ref 0–0.2)
BASOPHILS RELATIVE PERCENT: 0.6 % (ref 0–2)
BILIRUB SERPL-MCNC: 0.3 MG/DL (ref 0–1.2)
BILIRUBIN DIRECT: <0.2 MG/DL (ref 0–0.3)
BILIRUBIN, INDIRECT: ABNORMAL MG/DL (ref 0–1)
BUN BLDV-MCNC: 33 MG/DL (ref 6–23)
CALCIUM SERPL-MCNC: 9.1 MG/DL (ref 8.6–10.2)
CHLORIDE BLD-SCNC: 100 MMOL/L (ref 98–107)
CO2: 22 MMOL/L (ref 22–29)
CREAT SERPL-MCNC: 1.5 MG/DL (ref 0.5–1)
EKG ATRIAL RATE: 72 BPM
EKG P AXIS: 11 DEGREES
EKG P-R INTERVAL: 174 MS
EKG Q-T INTERVAL: 442 MS
EKG QRS DURATION: 92 MS
EKG QTC CALCULATION (BAZETT): 483 MS
EKG R AXIS: 30 DEGREES
EKG T AXIS: 88 DEGREES
EKG VENTRICULAR RATE: 72 BPM
EOSINOPHILS ABSOLUTE: 0.26 E9/L (ref 0.05–0.5)
EOSINOPHILS RELATIVE PERCENT: 2.3 % (ref 0–6)
GFR AFRICAN AMERICAN: 41
GFR NON-AFRICAN AMERICAN: 34 ML/MIN/1.73
GLUCOSE BLD-MCNC: 143 MG/DL (ref 74–99)
HCT VFR BLD CALC: 43.3 % (ref 34–48)
HEMOGLOBIN: 14.2 G/DL (ref 11.5–15.5)
IMMATURE GRANULOCYTES #: 0.08 E9/L
IMMATURE GRANULOCYTES %: 0.7 % (ref 0–5)
LYMPHOCYTES ABSOLUTE: 1.54 E9/L (ref 1.5–4)
LYMPHOCYTES RELATIVE PERCENT: 13.4 % (ref 20–42)
MCH RBC QN AUTO: 29.4 PG (ref 26–35)
MCHC RBC AUTO-ENTMCNC: 32.8 % (ref 32–34.5)
MCV RBC AUTO: 89.6 FL (ref 80–99.9)
MONOCYTES ABSOLUTE: 1.2 E9/L (ref 0.1–0.95)
MONOCYTES RELATIVE PERCENT: 10.4 % (ref 2–12)
NEUTROPHILS ABSOLUTE: 8.38 E9/L (ref 1.8–7.3)
NEUTROPHILS RELATIVE PERCENT: 72.6 % (ref 43–80)
PDW BLD-RTO: 13.2 FL (ref 11.5–15)
PLATELET # BLD: 283 E9/L (ref 130–450)
PMV BLD AUTO: 9 FL (ref 7–12)
POTASSIUM REFLEX MAGNESIUM: 4.2 MMOL/L (ref 3.5–5)
RBC # BLD: 4.83 E12/L (ref 3.5–5.5)
SODIUM BLD-SCNC: 136 MMOL/L (ref 132–146)
TOTAL PROTEIN: 7.7 G/DL (ref 6.4–8.3)
WBC # BLD: 11.5 E9/L (ref 4.5–11.5)

## 2022-05-26 PROCEDURE — 80048 BASIC METABOLIC PNL TOTAL CA: CPT

## 2022-05-26 PROCEDURE — 86900 BLOOD TYPING SEROLOGIC ABO: CPT

## 2022-05-26 PROCEDURE — 80076 HEPATIC FUNCTION PANEL: CPT

## 2022-05-26 PROCEDURE — 86901 BLOOD TYPING SEROLOGIC RH(D): CPT

## 2022-05-26 PROCEDURE — 85025 COMPLETE CBC W/AUTO DIFF WBC: CPT

## 2022-05-26 PROCEDURE — 93005 ELECTROCARDIOGRAM TRACING: CPT | Performed by: STUDENT IN AN ORGANIZED HEALTH CARE EDUCATION/TRAINING PROGRAM

## 2022-05-26 PROCEDURE — 73552 X-RAY EXAM OF FEMUR 2/>: CPT

## 2022-05-26 PROCEDURE — 86850 RBC ANTIBODY SCREEN: CPT

## 2022-05-26 PROCEDURE — 99285 EMERGENCY DEPT VISIT HI MDM: CPT

## 2022-05-26 PROCEDURE — 96374 THER/PROPH/DIAG INJ IV PUSH: CPT

## 2022-05-26 PROCEDURE — 96376 TX/PRO/DX INJ SAME DRUG ADON: CPT

## 2022-05-26 PROCEDURE — 96375 TX/PRO/DX INJ NEW DRUG ADDON: CPT

## 2022-05-26 PROCEDURE — 70450 CT HEAD/BRAIN W/O DYE: CPT

## 2022-05-26 PROCEDURE — 72125 CT NECK SPINE W/O DYE: CPT

## 2022-05-26 PROCEDURE — 73502 X-RAY EXAM HIP UNI 2-3 VIEWS: CPT

## 2022-05-26 PROCEDURE — 6360000002 HC RX W HCPCS: Performed by: STUDENT IN AN ORGANIZED HEALTH CARE EDUCATION/TRAINING PROGRAM

## 2022-05-26 PROCEDURE — 71045 X-RAY EXAM CHEST 1 VIEW: CPT

## 2022-05-26 RX ORDER — MORPHINE SULFATE 4 MG/ML
4 INJECTION, SOLUTION INTRAMUSCULAR; INTRAVENOUS ONCE
Status: COMPLETED | OUTPATIENT
Start: 2022-05-26 | End: 2022-05-26

## 2022-05-26 RX ORDER — FENTANYL CITRATE 50 UG/ML
50 INJECTION, SOLUTION INTRAMUSCULAR; INTRAVENOUS ONCE
Status: COMPLETED | OUTPATIENT
Start: 2022-05-26 | End: 2022-05-26

## 2022-05-26 RX ORDER — MORPHINE SULFATE 4 MG/ML
4 INJECTION, SOLUTION INTRAMUSCULAR; INTRAVENOUS
Status: DISCONTINUED | OUTPATIENT
Start: 2022-05-26 | End: 2022-05-27 | Stop reason: HOSPADM

## 2022-05-26 RX ADMIN — MORPHINE SULFATE 4 MG: 4 INJECTION, SOLUTION INTRAMUSCULAR; INTRAVENOUS at 18:18

## 2022-05-26 RX ADMIN — FENTANYL CITRATE 50 MCG: 50 INJECTION, SOLUTION INTRAMUSCULAR; INTRAVENOUS at 17:04

## 2022-05-26 RX ADMIN — MORPHINE SULFATE 4 MG: 4 INJECTION, SOLUTION INTRAMUSCULAR; INTRAVENOUS at 19:51

## 2022-05-26 ASSESSMENT — ENCOUNTER SYMPTOMS
VOMITING: 0
NAUSEA: 0
COUGH: 0
BACK PAIN: 0
SINUS PRESSURE: 0
DIARRHEA: 0
EYE PAIN: 0
WHEEZING: 0
EYE DISCHARGE: 0
SHORTNESS OF BREATH: 0
SORE THROAT: 0
EYE REDNESS: 0
ABDOMINAL DISTENTION: 0

## 2022-05-26 ASSESSMENT — PAIN SCALES - GENERAL
PAINLEVEL_OUTOF10: 10
PAINLEVEL_OUTOF10: 7

## 2022-05-26 ASSESSMENT — PAIN DESCRIPTION - LOCATION
LOCATION: HIP
LOCATION: HIP

## 2022-05-26 ASSESSMENT — PAIN DESCRIPTION - ORIENTATION
ORIENTATION: RIGHT
ORIENTATION: RIGHT

## 2022-05-26 ASSESSMENT — PAIN - FUNCTIONAL ASSESSMENT: PAIN_FUNCTIONAL_ASSESSMENT: 0-10

## 2022-05-26 ASSESSMENT — PAIN DESCRIPTION - DESCRIPTORS: DESCRIPTORS: THROBBING

## 2022-05-26 NOTE — ED PROVIDER NOTES
65-year-old female presented with fall, hip pain, she states she was walking with her walker at home, turned quickly and fell and landed on her right hip. She has had multiple hip surgeries with Dr. Teresa Crabtree, denies consciousness, is unsure if she hit her head, is on Plavix. Pain is sudden onset, severe in severity, worse with palpation, nothing makes it better, hip pain associated inability ambulate. Review of Systems   Constitutional: Negative for chills and fever. HENT: Negative for ear pain, sinus pressure and sore throat. Eyes: Negative for pain, discharge and redness. Respiratory: Negative for cough, shortness of breath and wheezing. Cardiovascular: Negative for chest pain. Gastrointestinal: Negative for abdominal distention, diarrhea, nausea and vomiting. Genitourinary: Negative for dysuria and frequency. Musculoskeletal: Positive for arthralgias (Right hip and groin pain). Negative for back pain. Skin: Negative for rash and wound. Neurological: Negative for weakness and headaches. Hematological: Negative for adenopathy. All other systems reviewed and are negative. Physical Exam  Vitals and nursing note reviewed. Constitutional:       Appearance: Normal appearance. HENT:      Head: Normocephalic and atraumatic. Right Ear: External ear normal.      Left Ear: External ear normal.      Nose: Nose normal.      Mouth/Throat:      Mouth: Mucous membranes are moist.   Eyes:      Extraocular Movements: Extraocular movements intact. Pupils: Pupils are equal, round, and reactive to light. Cardiovascular:      Rate and Rhythm: Normal rate and regular rhythm. Heart sounds: Normal heart sounds.       Comments: Palpable posterior tibialis arterial pulses bilaterally, DP's are biphasic by Doppler, patient states she has a history of peripheral arterial disease at baseline  Pulmonary:      Effort: Pulmonary effort is normal.      Breath sounds: Normal breath sounds. Abdominal:      General: Abdomen is flat. Bowel sounds are normal.      Palpations: Abdomen is soft. Musculoskeletal:         General: Tenderness (Pain with internal and external rotation of the right leg) present. Cervical back: Normal range of motion and neck supple. Skin:     General: Skin is warm and dry. Neurological:      General: No focal deficit present. Mental Status: She is alert and oriented to person, place, and time. Cranial Nerves: No cranial nerve deficit. Sensory: No sensory deficit. Motor: Weakness (Secondary to pain) present. Procedures     MDM     ED Course as of 05/26/22 2042   Thu May 26, 2022   1641 EKG: This EKG is signed by emergency department physician. Rate: 72  Rhythm: Sinus  Interpretation: non-specific EKG  Comparison: stable as compared to patient's most recent EKG      [JG]   1743 Consulted orthopedic surgery for patient's hip fracture [JG]   1810 CT is negative, patient is stable at this time [JG]   1909 Awaiting callback from orthopedic surgery [JG]   1924 Dr. Gautam Sewell has not called back, he does not cover here as he is only at [facility], will call on-call orthopedic surgeon [JG]   7560 Dr. Gautam Sewell back, apologized as he has been in clinic today, requested the patient be sent to either Dr. Saleem Moreno or Dr. Rebeca Vargas for her periprosthetic fracture, put out consult [JG]   2027 Dr. Berenice Mckeon patient for transfer, recommends ED to ED transfer. [JG]   219 79-year-old female presenting to the emergency department or fall, right hip pain, history of hip fracture in the past with prosthesis, which was replaced again a second time due to prior infection. Spoke to her orthopedic surgeon who recommended speaking to on-call trauma orthopedic surgeon due to the periprosthetic fracture, spoke to Dr. Saleem Moreno, who accepted the patient for transfer ED to ED.  [JG]   2039 Dr. Noelle Ireland accepted patient for transfer ER to ER [JG]      ED Course User Index  [JG] Gabino Gallegos MD      68-year-old female presenting to the emergency department or fall, right hip pain, history of hip fracture in the past with prosthesis, which was replaced again a second time due to prior infection. Spoke to her orthopedic surgeon who recommended speaking to on-call trauma orthopedic surgeon due to the periprosthetic fracture, spoke to Dr. Connie Jackson, who accepted the patient for transfer ED to ED. ED Course as of 05/26/22 2042   Thu May 26, 2022   1641 EKG: This EKG is signed by emergency department physician. Rate: 72  Rhythm: Sinus  Interpretation: non-specific EKG  Comparison: stable as compared to patient's most recent EKG      [JG]   1743 Consulted orthopedic surgery for patient's hip fracture [JG]   1810 CT is negative, patient is stable at this time [JG]   1909 Awaiting callback from orthopedic surgery [JG]   1924 Dr. Dionte Davis has not called back, he does not cover here as he is only at [facility], will call on-call orthopedic surgeon [JG]   7105 Dr. Dionte Davis back, apologized as he has been in clinic today, requested the patient be sent to either Dr. Connie Jackson or Dr. Cespedes Handing for her periprosthetic fracture, put out consult [JG]   2027 Dr. Lloyd Dennis patient for transfer, recommends ED to ED transfer. [JG]   219 68-year-old female presenting to the emergency department or fall, right hip pain, history of hip fracture in the past with prosthesis, which was replaced again a second time due to prior infection. Spoke to her orthopedic surgeon who recommended speaking to on-call trauma orthopedic surgeon due to the periprosthetic fracture, spoke to Dr. Connie Jackson, who accepted the patient for transfer ED to ED.  [JG]   2039 Dr. Sean Juarez accepted patient for transfer ER to ER [JG]      ED Course User Index  [JG] Gabino Gallegos MD       --------------------------------------------- PAST HISTORY ---------------------------------------------  Past Medical History: has a past medical history of Asthma, Cataract, right eye, Cerebral artery occlusion with cerebral infarction [facility]), CKD (chronic kidney disease) stage 4, GFR 15-29 ml/min ([facility] Degenerative disc disease, Degenerative joint disease, Diabetes mellitus ([facility] Diabetic peripheral neuropathy ([facility] Diffuse cerebral atrophy (H[facility]), Fibromyalgia, Glaucoma, Hypertension, Iron deficiency anemia, Neurogenic bladder, Neuropathy, Sarcoidosis, Sleep apnea, and Spinal cord and nerve root disorder. Past Surgical History:  has a past surgical history that includes Foot surgery (Bilateral); Hysterectomy; back surgery; Total knee arthroplasty (Bilateral); Cholecystectomy; Colonoscopy (08/2019); other surgical history (Right, 02/06/2017); Breast surgery; joint replacement (12/16/2016); Cardiac catheterization (2012); Ankle fracture surgery (Right, 10/24/2020); and Intracapsular cataract extraction (Right, 7/22/2021). Social History:  reports that she has never smoked. She has never used smokeless tobacco. She reports that she does not drink alcohol and does not use drugs. Family History: family history includes Cancer in her brother and mother; Diabetes in her maternal grandfather; Parkinsonism in her father. The patients home medications have been reviewed. Allergies:  Iodides, Iodine, Other, and Pcn [penicillins]    -------------------------------------------------- RESULTS -------------------------------------------------    Lab  Results for orders placed or performed during the hospital encounter of 05/26/22   CBC with Auto Differential   Result Value Ref Range    WBC 11.5 4.5 - 11.5 E9/L    RBC 4.83 3.50 - 5.50 E12/L    Hemoglobin 14.2 11.5 - 15.5 g/dL    Hematocrit 43.3 34.0 - 48.0 %    MCV 89.6 80.0 - 99.9 fL    MCH 29.4 26.0 - 35.0 pg    MCHC 32.8 32.0 - 34.5 %    RDW 13.2 11.5 - 15.0 fL    Platelets 090 231 - 473 E9/L    MPV 9.0 7.0 - 12.0 fL    Neutrophils % 72.6 43.0 - 80.0 %    Immature Granulocytes % 0.7 0.0 - 5.0 %    Lymphocytes % 13.4 (L) 20.0 - 42.0 %    Monocytes % 10.4 2.0 - 12.0 %    Eosinophils % 2.3 0.0 - 6.0 %    Basophils % 0.6 0.0 - 2.0 %    Neutrophils Absolute 8.38 (H) 1.80 - 7.30 E9/L    Immature Granulocytes # 0.08 E9/L    Lymphocytes Absolute 1.54 1.50 - 4.00 E9/L    Monocytes Absolute 1.20 (H) 0.10 - 0.95 E9/L    Eosinophils Absolute 0.26 0.05 - 0.50 E9/L    Basophils Absolute 0.07 0.00 - 0.20 F6/J   Basic Metabolic Panel w/ Reflex to MG   Result Value Ref Range    Sodium 136 132 - 146 mmol/L    Potassium reflex Magnesium 4.2 3.5 - 5.0 mmol/L    Chloride 100 98 - 107 mmol/L    CO2 22 22 - 29 mmol/L    Anion Gap 14 7 - 16 mmol/L    Glucose 143 (H) 74 - 99 mg/dL    BUN 33 (H) 6 - 23 mg/dL    CREATININE 1.5 (H) 0.5 - 1.0 mg/dL    GFR Non-African American 34 >=60 mL/min/1.73    GFR African American 41     Calcium 9.1 8.6 - 10.2 mg/dL   Hepatic Function Panel   Result Value Ref Range    Total Protein 7.7 6.4 - 8.3 g/dL    Albumin 4.0 3.5 - 5.2 g/dL    Alkaline Phosphatase 162 (H) 35 - 104 U/L    ALT 25 0 - 32 U/L    AST 21 0 - 31 U/L    Total Bilirubin 0.3 0.0 - 1.2 mg/dL    Bilirubin, Direct <0.2 0.0 - 0.3 mg/dL    Bilirubin, Indirect see below 0.0 - 1.0 mg/dL   EKG 12 Lead   Result Value Ref Range    Ventricular Rate 72 BPM    Atrial Rate 72 BPM    P-R Interval 174 ms    QRS Duration 92 ms    Q-T Interval 442 ms    QTc Calculation (Bazett) 483 ms    P Axis 11 degrees    R Axis 30 degrees    T Axis 88 degrees   TYPE AND SCREEN   Result Value Ref Range    ABO/Rh B POS     Antibody Screen NEG        Radiology  CT Head WO Contrast   Final Result   1. No acute intracranial abnormality. 2. Chronic small vessel ischemic disease and generalized cerebral volume loss. 3. Degenerative changes in the cervical spine without acute fracture or   subluxation. CT Cervical Spine WO Contrast   Final Result   1. No acute intracranial abnormality.    2. Chronic small vessel ischemic disease and generalized cerebral volume loss. 3. Degenerative changes in the cervical spine without acute fracture or   subluxation. XR HIP 2-3 VW W PELVIS RIGHT   Final Result   Oblique acute to subacute fractures, not significantly displaced involving   the greater trochanter and proximal femoral metaphysis complicating the right   hip arthroplasty. XR FEMUR RIGHT (MIN 2 VIEWS)   Final Result   Relatively nondisplaced fractures at the base of the greater trochanter and   proximal femoral diaphysis adjacent to indwelling total hip prosthesis. XR CHEST PORTABLE   Final Result   No acute process. ------------------------- NURSING NOTES AND VITALS REVIEWED ---------------------------  Date / Time Roomed:  5/26/2022  4:08 PM  ED Bed Assignment:  16/16    The nursing notes within the ED encounter and vital signs as below have been reviewed. Patient Vitals for the past 24 hrs:   BP Temp Pulse Resp SpO2 Height Weight   05/26/22 1955 (!) 149/64 -- 78 18 99 % -- --   05/26/22 1815 (!) 163/66 -- 71 18 100 % -- --   05/26/22 1807 (!) 162/65 -- -- -- 90 % -- --   05/26/22 1755 (!) 163/65 -- -- -- 99 % -- --   05/26/22 1753 (!) 163/65 -- -- -- -- -- --   05/26/22 1619 (!) 161/73 98 °F (36.7 °C) 78 18 100 % 5' 6\" (1.676 m) 245 lb (111.1 kg)       Oxygen Saturation Interpretation: Normal      ------------------------------------------ PROGRESS NOTES ------------------------------------------      I have spoken with the patient and discussed todays results, in addition to providing specific details for the plan of care and counseling regarding the diagnosis and prognosis. Their questions are answered at this time and they are agreeable with the plan. I have discussed the risks and benefits of transfer and they wish to proceed with the transfer. --------------------------------- ADDITIONAL PROVIDER NOTES ---------------------------------  Consultations:  Spoke with Dr. Woody Alvarenga (emergency medicine). Discussed case. They will come to the ED to evaluate this patient. Spoke with Dr. Sid Carver (Orthopedics). Discussed case. They will come to the ED to evaluate this patient. Reason for transfer: Orthopedic management on periprosthetic fracture. This patient's ED course included: a personal history and physicial examination, re-evaluation prior to disposition, multiple bedside re-evaluations and IV medications    This patient has remained hemodynamically stable and been closely monitored during their ED course. Clinical Impression  1. Closed displaced fracture of greater trochanter of right femur, initial encounter ([address]    2. Fall, initial encounter    3. History of peripheral arterial disease          Disposition  Patient's disposition: Transfer to [facility].  Transferred by: BLS. Patient's condition is stable.            Jax Oglesby MD  Resident  05/26/22 2051

## 2022-05-27 ENCOUNTER — APPOINTMENT (OUTPATIENT)
Dept: CT IMAGING | Age: 77
DRG: 481 | End: 2022-05-27
Payer: MEDICARE

## 2022-05-27 ENCOUNTER — ANESTHESIA EVENT (OUTPATIENT)
Dept: OPERATING ROOM | Age: 77
DRG: 481 | End: 2022-05-27
Payer: MEDICARE

## 2022-05-27 ENCOUNTER — APPOINTMENT (OUTPATIENT)
Dept: GENERAL RADIOLOGY | Age: 77
DRG: 481 | End: 2022-05-27
Payer: MEDICARE

## 2022-05-27 ENCOUNTER — HOSPITAL ENCOUNTER (INPATIENT)
Age: 77
LOS: 4 days | Discharge: HOME HEALTH | DRG: 481 | End: 2022-05-31
Attending: EMERGENCY MEDICINE | Admitting: INTERNAL MEDICINE
Payer: MEDICARE

## 2022-05-27 DIAGNOSIS — Z98.890 STATUS POST OPEN REDUCTION AND INTERNAL FIXATION (ORIF) OF FRACTURE: ICD-10-CM

## 2022-05-27 DIAGNOSIS — M97.8XXA PERIPROSTHETIC INTERTROCHANTERIC FRACTURE OF FEMUR, INITIAL ENCOUNTER: Primary | ICD-10-CM

## 2022-05-27 DIAGNOSIS — Z87.81 STATUS POST OPEN REDUCTION AND INTERNAL FIXATION (ORIF) OF FRACTURE: ICD-10-CM

## 2022-05-27 DIAGNOSIS — Z96.649 PERIPROSTHETIC INTERTROCHANTERIC FRACTURE OF FEMUR, INITIAL ENCOUNTER: Primary | ICD-10-CM

## 2022-05-27 PROBLEM — Z86.73 HISTORY OF CVA (CEREBROVASCULAR ACCIDENT): Chronic | Status: ACTIVE | Noted: 2021-02-02

## 2022-05-27 PROBLEM — I73.9 PVD (PERIPHERAL VASCULAR DISEASE) (HCC): Chronic | Status: ACTIVE | Noted: 2022-02-23

## 2022-05-27 PROBLEM — I63.9 CVA (CEREBRAL VASCULAR ACCIDENT) (HCC): Status: RESOLVED | Noted: 2017-07-18 | Resolved: 2022-05-27

## 2022-05-27 PROBLEM — S72.8X1A OTHER FRACTURE OF RIGHT FEMUR, INITIAL ENCOUNTER FOR CLOSED FRACTURE (HCC): Status: ACTIVE | Noted: 2022-05-27

## 2022-05-27 PROBLEM — E66.9 OBESITY: Chronic | Status: ACTIVE | Noted: 2022-05-27

## 2022-05-27 PROBLEM — N18.30 CKD (CHRONIC KIDNEY DISEASE) STAGE 3, GFR 30-59 ML/MIN (HCC): Chronic | Status: ACTIVE | Noted: 2022-05-27

## 2022-05-27 PROBLEM — R94.31 PROLONGED QT INTERVAL: Status: ACTIVE | Noted: 2022-05-27

## 2022-05-27 LAB
APTT: 28.9 SEC (ref 24.5–35.1)
CHP ED QC CHECK: NORMAL
EKG ATRIAL RATE: 80 BPM
EKG P AXIS: 87 DEGREES
EKG P-R INTERVAL: 188 MS
EKG Q-T INTERVAL: 448 MS
EKG QRS DURATION: 96 MS
EKG QTC CALCULATION (BAZETT): 516 MS
EKG R AXIS: 79 DEGREES
EKG T AXIS: 86 DEGREES
EKG VENTRICULAR RATE: 80 BPM
GLUCOSE BLD-MCNC: 130 MG/DL
INR BLD: 1.1
METER GLUCOSE: 130 MG/DL (ref 74–99)
METER GLUCOSE: 185 MG/DL (ref 74–99)
PROTHROMBIN TIME: 11.9 SEC (ref 9.3–12.4)

## 2022-05-27 PROCEDURE — 6370000000 HC RX 637 (ALT 250 FOR IP): Performed by: INTERNAL MEDICINE

## 2022-05-27 PROCEDURE — 6360000002 HC RX W HCPCS: Performed by: INTERNAL MEDICINE

## 2022-05-27 PROCEDURE — 73130 X-RAY EXAM OF HAND: CPT

## 2022-05-27 PROCEDURE — 93005 ELECTROCARDIOGRAM TRACING: CPT | Performed by: STUDENT IN AN ORGANIZED HEALTH CARE EDUCATION/TRAINING PROGRAM

## 2022-05-27 PROCEDURE — 82962 GLUCOSE BLOOD TEST: CPT

## 2022-05-27 PROCEDURE — 6360000002 HC RX W HCPCS: Performed by: STUDENT IN AN ORGANIZED HEALTH CARE EDUCATION/TRAINING PROGRAM

## 2022-05-27 PROCEDURE — 2060000000 HC ICU INTERMEDIATE R&B

## 2022-05-27 PROCEDURE — 85730 THROMBOPLASTIN TIME PARTIAL: CPT

## 2022-05-27 PROCEDURE — 2580000003 HC RX 258: Performed by: INTERNAL MEDICINE

## 2022-05-27 PROCEDURE — 73700 CT LOWER EXTREMITY W/O DYE: CPT

## 2022-05-27 PROCEDURE — 99285 EMERGENCY DEPT VISIT HI MDM: CPT

## 2022-05-27 PROCEDURE — 85610 PROTHROMBIN TIME: CPT

## 2022-05-27 RX ORDER — VERAPAMIL HYDROCHLORIDE 240 MG/1
240 TABLET, FILM COATED, EXTENDED RELEASE ORAL EVERY MORNING
Status: DISCONTINUED | OUTPATIENT
Start: 2022-05-28 | End: 2022-05-31 | Stop reason: HOSPADM

## 2022-05-27 RX ORDER — GLIMEPIRIDE 1 MG/1
0.5 TABLET ORAL
Status: ON HOLD | COMMUNITY
End: 2022-09-01

## 2022-05-27 RX ORDER — LATANOPROST 50 UG/ML
1 SOLUTION/ DROPS OPHTHALMIC NIGHTLY
Status: CANCELLED | OUTPATIENT
Start: 2022-05-27

## 2022-05-27 RX ORDER — HYDROCODONE BITARTRATE AND ACETAMINOPHEN 5; 325 MG/1; MG/1
1 TABLET ORAL EVERY 6 HOURS PRN
Status: DISCONTINUED | OUTPATIENT
Start: 2022-05-27 | End: 2022-05-31 | Stop reason: HOSPADM

## 2022-05-27 RX ORDER — POLYETHYLENE GLYCOL 3350 17 G/17G
17 POWDER, FOR SOLUTION ORAL DAILY PRN
Status: DISCONTINUED | OUTPATIENT
Start: 2022-05-27 | End: 2022-05-28 | Stop reason: SDUPTHER

## 2022-05-27 RX ORDER — ACETAMINOPHEN 325 MG/1
650 TABLET ORAL EVERY 6 HOURS PRN
Status: DISCONTINUED | OUTPATIENT
Start: 2022-05-27 | End: 2022-05-31 | Stop reason: HOSPADM

## 2022-05-27 RX ORDER — ALBUTEROL SULFATE 90 UG/1
1 AEROSOL, METERED RESPIRATORY (INHALATION) EVERY 4 HOURS PRN
Status: DISCONTINUED | OUTPATIENT
Start: 2022-05-27 | End: 2022-05-27 | Stop reason: CLARIF

## 2022-05-27 RX ORDER — SODIUM CHLORIDE 9 MG/ML
INJECTION, SOLUTION INTRAVENOUS CONTINUOUS
Status: DISCONTINUED | OUTPATIENT
Start: 2022-05-27 | End: 2022-05-28 | Stop reason: SDUPTHER

## 2022-05-27 RX ORDER — OXYBUTYNIN CHLORIDE 15 MG/1
15 TABLET, EXTENDED RELEASE ORAL DAILY
COMMUNITY

## 2022-05-27 RX ORDER — SODIUM CHLORIDE 0.9 % (FLUSH) 0.9 %
5-40 SYRINGE (ML) INJECTION EVERY 12 HOURS SCHEDULED
Status: DISCONTINUED | OUTPATIENT
Start: 2022-05-27 | End: 2022-05-28 | Stop reason: SDUPTHER

## 2022-05-27 RX ORDER — VERAPAMIL HYDROCHLORIDE 240 MG/1
120 TABLET, FILM COATED, EXTENDED RELEASE ORAL NIGHTLY
Status: DISCONTINUED | OUTPATIENT
Start: 2022-05-27 | End: 2022-05-31 | Stop reason: HOSPADM

## 2022-05-27 RX ORDER — VERAPAMIL HYDROCHLORIDE 240 MG/1
120 TABLET, FILM COATED, EXTENDED RELEASE ORAL NIGHTLY
COMMUNITY
End: 2022-06-16

## 2022-05-27 RX ORDER — DULOXETIN HYDROCHLORIDE 60 MG/1
60 CAPSULE, DELAYED RELEASE ORAL EVERY MORNING
Status: DISCONTINUED | OUTPATIENT
Start: 2022-05-27 | End: 2022-05-31 | Stop reason: HOSPADM

## 2022-05-27 RX ORDER — VERAPAMIL HYDROCHLORIDE 240 MG/1
240 TABLET, FILM COATED, EXTENDED RELEASE ORAL EVERY MORNING
Status: DISCONTINUED | OUTPATIENT
Start: 2022-05-27 | End: 2022-05-27 | Stop reason: RX

## 2022-05-27 RX ORDER — INSULIN LISPRO 100 [IU]/ML
0-6 INJECTION, SOLUTION INTRAVENOUS; SUBCUTANEOUS NIGHTLY
Status: DISCONTINUED | OUTPATIENT
Start: 2022-05-27 | End: 2022-05-31 | Stop reason: HOSPADM

## 2022-05-27 RX ORDER — ALBUTEROL SULFATE 2.5 MG/3ML
2.5 SOLUTION RESPIRATORY (INHALATION) EVERY 4 HOURS PRN
Status: DISCONTINUED | OUTPATIENT
Start: 2022-05-27 | End: 2022-05-31 | Stop reason: HOSPADM

## 2022-05-27 RX ORDER — INSULIN LISPRO 100 [IU]/ML
0-12 INJECTION, SOLUTION INTRAVENOUS; SUBCUTANEOUS
Status: DISCONTINUED | OUTPATIENT
Start: 2022-05-27 | End: 2022-05-31 | Stop reason: HOSPADM

## 2022-05-27 RX ORDER — MORPHINE SULFATE 2 MG/ML
2 INJECTION, SOLUTION INTRAMUSCULAR; INTRAVENOUS EVERY 4 HOURS PRN
Status: DISCONTINUED | OUTPATIENT
Start: 2022-05-27 | End: 2022-05-31 | Stop reason: HOSPADM

## 2022-05-27 RX ORDER — HYDROXYCHLOROQUINE SULFATE 200 MG/1
200 TABLET, FILM COATED ORAL 2 TIMES DAILY
Status: DISCONTINUED | OUTPATIENT
Start: 2022-05-27 | End: 2022-05-31 | Stop reason: HOSPADM

## 2022-05-27 RX ORDER — SODIUM CHLORIDE 0.9 % (FLUSH) 0.9 %
5-40 SYRINGE (ML) INJECTION PRN
Status: DISCONTINUED | OUTPATIENT
Start: 2022-05-27 | End: 2022-05-28 | Stop reason: SDUPTHER

## 2022-05-27 RX ORDER — ATORVASTATIN CALCIUM 20 MG/1
20 TABLET, FILM COATED ORAL NIGHTLY
Status: DISCONTINUED | OUTPATIENT
Start: 2022-05-27 | End: 2022-05-31 | Stop reason: HOSPADM

## 2022-05-27 RX ORDER — ACETAMINOPHEN 650 MG/1
650 SUPPOSITORY RECTAL EVERY 6 HOURS PRN
Status: DISCONTINUED | OUTPATIENT
Start: 2022-05-27 | End: 2022-05-31 | Stop reason: HOSPADM

## 2022-05-27 RX ORDER — SODIUM CHLORIDE 9 MG/ML
INJECTION, SOLUTION INTRAVENOUS PRN
Status: DISCONTINUED | OUTPATIENT
Start: 2022-05-27 | End: 2022-05-28 | Stop reason: SDUPTHER

## 2022-05-27 RX ADMIN — MORPHINE SULFATE 4 MG: 4 INJECTION, SOLUTION INTRAMUSCULAR; INTRAVENOUS at 03:18

## 2022-05-27 RX ADMIN — ATORVASTATIN CALCIUM 20 MG: 20 TABLET, FILM COATED ORAL at 22:51

## 2022-05-27 RX ADMIN — MORPHINE SULFATE 2 MG: 2 INJECTION, SOLUTION INTRAMUSCULAR; INTRAVENOUS at 19:38

## 2022-05-27 RX ADMIN — SODIUM CHLORIDE: 9 INJECTION, SOLUTION INTRAVENOUS at 14:07

## 2022-05-27 RX ADMIN — INSULIN LISPRO 2 UNITS: 100 INJECTION, SOLUTION INTRAVENOUS; SUBCUTANEOUS at 14:00

## 2022-05-27 ASSESSMENT — PAIN DESCRIPTION - ORIENTATION
ORIENTATION: RIGHT

## 2022-05-27 ASSESSMENT — PAIN DESCRIPTION - LOCATION
LOCATION: HIP

## 2022-05-27 ASSESSMENT — PAIN - FUNCTIONAL ASSESSMENT
PAIN_FUNCTIONAL_ASSESSMENT: NONE - DENIES PAIN
PAIN_FUNCTIONAL_ASSESSMENT: ACTIVITIES ARE NOT PREVENTED
PAIN_FUNCTIONAL_ASSESSMENT: ACTIVITIES ARE NOT PREVENTED
PAIN_FUNCTIONAL_ASSESSMENT: 0-10

## 2022-05-27 ASSESSMENT — PAIN SCALES - GENERAL
PAINLEVEL_OUTOF10: 6
PAINLEVEL_OUTOF10: 0
PAINLEVEL_OUTOF10: 7

## 2022-05-27 ASSESSMENT — ENCOUNTER SYMPTOMS
COLOR CHANGE: 0
VOMITING: 0
ABDOMINAL PAIN: 0
SHORTNESS OF BREATH: 0
RHINORRHEA: 0
BACK PAIN: 0
BLOOD IN STOOL: 0
COUGH: 0
NAUSEA: 0

## 2022-05-27 ASSESSMENT — PAIN DESCRIPTION - FREQUENCY
FREQUENCY: CONTINUOUS
FREQUENCY: CONTINUOUS

## 2022-05-27 ASSESSMENT — PAIN DESCRIPTION - PAIN TYPE
TYPE: ACUTE PAIN
TYPE: ACUTE PAIN

## 2022-05-27 ASSESSMENT — PAIN DESCRIPTION - DESCRIPTORS
DESCRIPTORS: POUNDING;STABBING
DESCRIPTORS: GNAWING
DESCRIPTORS: SHARP

## 2022-05-27 ASSESSMENT — PAIN DESCRIPTION - ONSET
ONSET: SUDDEN
ONSET: SUDDEN

## 2022-05-27 ASSESSMENT — PAIN DESCRIPTION - DIRECTION: RADIATING_TOWARDS: DOWN LEG

## 2022-05-27 NOTE — ED PROVIDER NOTES
ED PROVIDER NOTE    Chief Complaint   Patient presents with    Fall     Transfer from [address], fall fx right hip. Previous hip replacement       HPI:  5/27/22,   Time: 4:42 AM EDT       Jessy Montalvo is a 67 y.o. female presenting to the ED for right hip fx. Earlier today patient was using her walker and the phone rang. When she turned toward the phone she lost her balance and fell onto her right hip. Acute onset constant throbbing pain, nonradiating, worse w/ movement. No head trauma, LOC, or anticoagulant use. Hx of multiple right hip arthroplasty by Dr. Edgardo Brush. Found at [address] [place] [address] ED to have periprosthetic right hip fx, ortho recommended transfer to [facility] for further evaluation by ortho trauma. Chart review: hx of DM, HTN, CKD, CVA, FM, neuropathy, PVD, sarcoidosis    Review of Systems:     Review of Systems   Constitutional: Negative for appetite change, chills and fever. HENT: Negative for congestion and rhinorrhea. Eyes: Negative for visual disturbance. Respiratory: Negative for cough and shortness of breath. Cardiovascular: Negative for chest pain. Gastrointestinal: Negative for abdominal pain, blood in stool, nausea and vomiting. Genitourinary: Negative for decreased urine volume and difficulty urinating. Musculoskeletal: Positive for arthralgias. Negative for back pain and neck pain. Skin: Negative for color change.    Neurological: Negative for dizziness, syncope, weakness, light-headedness, numbness and headaches.         --------------------------------------------- PAST HISTORY ---------------------------------------------  Past Medical History:   Past Medical History:   Diagnosis Date    Asthma 1993    Cataract, right eye     Cerebral artery occlusion with cerebral infarction ([facility] 07/2017    CKD (chronic kidney disease) stage 4, GFR 15-29 ml/min ([facility] 2017    Degenerative disc disease     Degenerative joint disease     Diabetes mellitus ([facility] 2008    diet controlled    Diabetic peripheral neuropathy ([facility] 1998    Diffuse cerebral atrophy ([facility] 2012    Fibromyalgia 01/2012    [facility]    Glaucoma     Hypertension     Iron deficiency anemia 10/25/2020    Neurogenic bladder     Neuropathy     Sarcoidosis 1993    Sleep apnea 2009    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\"       Past Surgical History:   Past Surgical History:   Procedure Laterality Date    ANKLE FRACTURE SURGERY Right 10/24/2020    RIGHT ANKLE OPEN REDUCTION INTERNAL FIXATION performed by Shamir Ghotra MD at [facility]      laminectomy l3-4    BREAST SURGERY      biopsy    CARDIAC CATHETERIZATION  2012    MINIMAL CAD    CHOLECYSTECTOMY      COLONOSCOPY  08/2019    TUBULAR ADENOMA x 2    FOOT SURGERY Bilateral     right foot fracture; left foot charcot    HYSTERECTOMY      INTRACAPSULAR CATARACT EXTRACTION Right 7/22/2021    RIGHT EYE CATARACT EXTRACTION IOL IMPLANT performed by Ki Brennan MD at [address] [place] [address]  12/16/2016    right hip arthroplasty    OTHER SURGICAL HISTORY Right 02/06/2017    I & D right hip wound vaccum placement    TOTAL KNEE ARTHROPLASTY Bilateral        Social History:   Social History     Socioeconomic History    Marital status: Single     Spouse name: None    Number of children: None    Years of education: None    Highest education level: None   Occupational History    None   Tobacco Use    Smoking status: Never Smoker    Smokeless tobacco: Never Used   Vaping Use    Vaping Use: Never used   Substance and Sexual Activity    Alcohol use: No     Alcohol/week: 0.0 standard drinks    Drug use: No    Sexual activity: Never   Other Topics Concern    None   Social History Narrative    None     Social Determinants of Health     Financial Resource Strain:     Difficulty of Paying Living Expenses: Not on file   Food Insecurity:     Worried About Running Out of Food in the Last Year: Not on file  Ran Out of Food in the Last Year: Not on file   Transportation Needs:     Lack of Transportation (Medical): Not on file    Lack of Transportation (Non-Medical): Not on file   Physical Activity:     Days of Exercise per Week: Not on file    Minutes of Exercise per Session: Not on file   Stress:     Feeling of Stress : Not on file   Social Connections:     Frequency of Communication with Friends and Family: Not on file    Frequency of Social Gatherings with Friends and Family: Not on file    Attends Yazidism Services: Not on file    Active Member of [address] [place] [address] [employer] or Organizations: Not on file    Attends Club or Organization Meetings: Not on file    Marital Status: Not on file   Intimate Partner Violence:     Fear of Current or Ex-Partner: Not on file    Emotionally Abused: Not on file    Physically Abused: Not on file    Sexually Abused: Not on file   Housing Stability:     Unable to Pay for Housing in the Last Year: Not on file    Number of [place] in the Last Year: Not on file    Unstable Housing in the Last Year: Not on file       Family History:   Family History   Problem Relation Age of Onset    Cancer Mother         pancreatic    Parkinsonism Father     Diabetes Maternal Grandfather     Cancer Brother         esophageal       The patients home medications have been reviewed. Allergies: Allergies   Allergen Reactions    Iodides Shortness Of Breath     CT Scan Dye (\"turned purple\")    Iodine Shortness Of Breath     Had trouble breathing and Skin color turned purple and stayed that way for  Hours.     Other Shortness Of Breath     Perfumes and smoke    Pcn [Penicillins] Itching           ---------------------------------------------------PHYSICAL EXAM--------------------------------------    BP (!) 107/52   Pulse 80   Temp 97.5 °F (36.4 °C) (Oral)   Resp 20   Ht 5' 6\" (1.676 m)   Wt 245 lb (111.1 kg)   SpO2 96%   BMI 39.54 kg/m²     Physical Exam  Constitutional: General: She is not in acute distress. Appearance: She is not toxic-appearing. HENT:      Mouth/Throat:      Mouth: Mucous membranes are moist.   Eyes:      General: No scleral icterus. Extraocular Movements: Extraocular movements intact. Pupils: Pupils are equal, round, and reactive to light. Cardiovascular:      Rate and Rhythm: Normal rate and regular rhythm. Pulses: Normal pulses. Heart sounds: Normal heart sounds. No murmur heard. Pulmonary:      Effort: Pulmonary effort is normal. No respiratory distress. Breath sounds: Normal breath sounds. No wheezing or rales. Abdominal:      General: There is no distension. Palpations: Abdomen is soft. Tenderness: There is no abdominal tenderness. Musculoskeletal:         General: No swelling or tenderness. Normal range of motion. Cervical back: Normal range of motion and neck supple. Comments: Right hip and thigh ttp. Radial, DP, and PT pulses 2+ bilaterally. Skin:     General: Skin is warm and dry. Neurological:      Mental Status: She is alert and oriented to person, place, and time. Comments: BUE/LLE 5/5 strength throughout  RLE proximal strength limited due to pain. Distal strength 5/5 DF/PF              ------------------------- NURSING NOTES AND VITALS REVIEWED ---------------------------   The nursing notes within the ED encounter and vital signs as below have been reviewed by myself. BP (!) 107/52   Pulse 80   Temp 97.5 °F (36.4 °C) (Oral)   Resp 20   Ht 5' 6\" (1.676 m)   Wt 245 lb (111.1 kg)   SpO2 96%   BMI 39.54 kg/m²   Oxygen Saturation Interpretation: Normal    The patients available past medical records and past encounters were reviewed. ------------------------------ ED COURSE/MEDICAL DECISION MAKING----------------------      Consultations:             Orthopedic surgery      Counseling:    The emergency provider has spoken with the patient and discussed todays results, in addition to providing specific details for the plan of care and counseling regarding the diagnosis and prognosis. Questions are answered at this time and they are agreeable with the plan. ED Course/Medical Decision Makin y.o. female here with periprosthetic right proximal femur fx s/p mechanical ground level fall. Non-toxic appearing, afebrile, hemodynamically stable, and in no acute distress. RLE neurovascularly intact. Orthopedic surgery consulted, recommends admission to medicine service. Admitted in stable condition for further management.       --------------------------------- IMPRESSION AND DISPOSITION ---------------------------------    IMPRESSION  1. Periprosthetic intertrochanteric fracture of femur, initial encounter        DISPOSITION  Disposition: Admit to med/surg floor  Patient condition is stable    NOTE: This report was transcribed using voice recognition software.  Every effort was made to ensure accuracy; however, inadvertent computerized transcription errors may be present    Quan Mac MD  Attending Emergency Physician         Quan Mac MD  [phone]

## 2022-05-27 NOTE — H&P
[address]                  [address] formerly [facility],  [facility]                              HISTORY AND PHYSICAL    PATIENT NAME: Sonja Bird                     :        1945  MED REC NO:   [id number]                            ROOM:       [facility]  ACCOUNT NO:   [de-identified]                           ADMIT DATE: 2022  PROVIDER:     Susan Gan DO    CHIEF COMPLAINT:  Right hip pain. HISTORY OF PRESENT ILLNESS:  The patient is a 54-year-old   female who presented to the emergency room after a fall at home in her  house. There was no loss of consciousness. Diagnostic evaluation in  emergency room revealed a fracture of the right femur. She was admitted  for further evaluation and treatment. PAST MEDICAL HISTORY:  CKD, CVA, PAD, elevated cholesterol, diabetes  mellitus type 2, sarcoidosis, neuropathy, glaucoma, asthma,  osteoarthritis. PAST SURGICAL HISTORY:  Right ankle fracture surgery, lumbar  laminectomy, breast biopsy, cardiac catheterization, cholecystectomy,  bilateral foot surgery, hysterectomy, right eye cataract surgery, right  hip replacement, bilateral total knee arthroplasty. REVIEW OF SYSTEMS:  Remarkable for above-stated chief complaint plus  allergy to PENICILLIN, SMOKE and IODINE and IODIDES. SOCIAL HISTORY:  Nonsmoker, no alcohol. MEDICATIONS PRIOR TO ADMISSION:  Plavix, ProAir which the patient uses  rarely, Lipitor, Plaquenil, Lumigan eyedrops, Calan, Cymbalta,  Theragran, glimepiride, Oxybutynin. PHYSICAL EXAMINATION:  GENERAL APPEARANCE:  Physical exam reveals a 54-year-old   female who is alert and oriented x3, cooperative and a fair historian. VITAL SIGNS:  On admission, temperature 97.5, pulse 80, respirations 20,  blood pressure 107/52. HEENT:  Head:  Normocephalic, atraumatic. Eyes:  Pupils equal and  reactive to light. Extraocular muscles intact. Fundi not well  visualized.

## 2022-05-27 NOTE — PROGRESS NOTES
@1262 access center notified of transfer to [facility] ([facility]) ED  Periprosthetic fx  Dr. Rama Mon spoke with Dr. Marcus Vo requesting transfer  @0969 Dr. Rama Mon spoke with Dr. Delonte Giron accepting ED physician  @3255 [facility] center reported JUAN NUNEZ [phone] on 5-27-22/

## 2022-05-27 NOTE — CONSULTS
Department of Orthopedic Surgery  Resident Consult Note          Reason for Consult:  R hip pain after a fall    HISTORY OF PRESENT ILLNESS:       Patient is a morbidly obese 67 y.o. female with past medical history of hypertension, hyperlipidemia, diabetes with peripheral neuropathy, CKD stage IV, sleep apnea, fibromyalgia, and history of stroke currently on Plavix who presents with the chief complaint of right hip pain after fall from standing height. Patient states that she ambulates with a Rollator and was turning around and lost her balance and fell onto her right side. Patient states she had immediate pain and inability to ambulate. Was then taken to [facility] for evaluation. Found to have a right periprosthetic femur fracture. Subsequently transferred to [facility] for definitive management. Speak with the patient in room states that she has a an extensive past medical history as well as an orthopedic history. States that she has a history of a Charcot foot on the left. States that she had her original right total hip arthroplasty done in approximately 2009. This was complicated by late presenting periprosthetic joint infection with subsequent explantation and implant of a antibiotic coated spacer. Approximately 1 year later she was taken back for revision total hip arthroplasty with a restoration modular stem. States that after that surgery she did have a foot drop and wore an AFO. States she no longer wears this due to it being cumbersome and improving with physical therapy. Denies any problems since that procedure. Patient states that she has a history of falls. Of note patient does have bilateral total knee arthroplasties as well. Patient also states that she is recently being diagnosed with peripheral vascular disease. She is also insensate to the level of the knees, due to her peripheral neuropathy.     She is also complaining of some right hand pain at this time. She states she did not really notice it until after the fall when she was brought to [facility]. States that it is sharp in nature, worse with movement better with rest.  States she is having some paresthesias into the pinky finger. Currently denying any other orthopedic complaints at this time. Past Medical History:        Diagnosis Date    Asthma 1993    Cataract, right eye     Cerebral artery occlusion with cerebral infarction ([address] 07/2017    CKD (chronic kidney disease) stage 4, GFR 15-29 ml/min ([address] 2017    Degenerative disc disease     Degenerative joint disease     Diabetes mellitus ([address] 2008    diet controlled    Diabetic peripheral neuropathy ([address] 1998    Diffuse cerebral atrophy ([address] 2012    Fibromyalgia 01/2012    [facility]    Glaucoma     Hypertension     Iron deficiency anemia 10/25/2020    Neurogenic bladder     Neuropathy     Sarcoidosis 1993    Sleep apnea 2009    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\"     Past Surgical History:        Procedure Laterality Date    ANKLE FRACTURE SURGERY Right 10/24/2020    RIGHT ANKLE OPEN REDUCTION INTERNAL FIXATION performed by Jeremy Elise MD at [address] [place] [address]      laminectomy l3-4    BREAST SURGERY      biopsy    CARDIAC CATHETERIZATION  2012    MINIMAL CAD    CHOLECYSTECTOMY      COLONOSCOPY  08/2019    TUBULAR ADENOMA x 2    FOOT SURGERY Bilateral     right foot fracture; left foot charcot    HYSTERECTOMY      INTRACAPSULAR CATARACT EXTRACTION Right 7/22/2021    RIGHT EYE CATARACT EXTRACTION IOL IMPLANT performed by Kermit Pires MD at [address] [place] [address]  12/16/2016    right hip arthroplasty    OTHER SURGICAL HISTORY Right 02/06/2017    I & D right hip wound vaccum placement    TOTAL KNEE ARTHROPLASTY Bilateral      Current Medications:   No current facility-administered medications for this encounter. Allergies:   Iodides, Iodine, Other, and Pcn [penicillins]    Social History:   TOBACCO:   reports that she has never smoked. She has never used smokeless tobacco.  ETOH:   reports no history of alcohol use. DRUGS:   reports no history of drug use. ACTIVITIES OF DAILY LIVING: Activities around the house  OCCUPATION: Retired  Family History:       Problem Relation Age of Onset    Cancer Mother         pancreatic    Parkinsonism Father     Diabetes Maternal Grandfather     Cancer Brother         esophageal       REVIEW OF SYSTEMS:  CONSTITUTIONAL:  negative for  fevers, chills  EYES:  negative for acute vision changes  HEENT:  negative for cough, hearing loss  RESPIRATORY:  negative for  dyspnea, wheezing  CARDIOVASCULAR:  negative for  chest pain  GASTROINTESTINAL:  negative for nausea, vomiting  GENITOURINARY:  negative for frequency, positive for urinary incontinence  HEMATOLOGIC/LYMPHATIC:  negative for bleeding  MUSCULOSKELETAL:  positive for right hip and right hand pain  NEUROLOGICAL:  negative for headaches, dizziness  BEHAVIOR/PSYCH:  negative for increased agitation and anxiety    PHYSICAL EXAM:    VITALS:  BP (!) 107/52   Pulse 80   Temp 97.5 °F (36.4 °C) (Oral)   Resp 20   Ht 5' 6\" (1.676 m)   Wt 245 lb (111.1 kg)   SpO2 96%   BMI 39.54 kg/m²   CONSTITUTIONAL: Obese, awake, alert, cooperative, no apparent distress, and appears stated age, Anaya catheter in place  MUSCULOSKELETAL:  Right lower Extremity:  · Skin skin examination discoloring on the anterior shin, no koki ecchymosis or erythema. Overall limb alignment appears neutral.  No significant deformity. No superficial lacerations or abrasions noted. · TTP over the right hip and proximal thigh. No tenderness palpation over the pelvis, distal thigh, knee, shin, ankle, foot, toes  · Patient is insensate to light touch below the knee.   States that she has minimal perceptive touch to palpation in sural, saphenous, tibial, deep and supers peroneal nerve distributions. · Pain with logroll  · Motor function grossly intact distally in tibial, deep and superficial peroneal nerve distributions. +3/5 dorsiflexion compared to contralateral extremity. · Compartment soft and compressible  · +1/4 PT pulse, DP not palpable, foot warm and perfused, brisk capillary refill in all toes    Right upper Extremity:  · Skin examination reveals no superficial lacerations or abrasions. No significant soft tissue edema noted to the hand. No ecchymosis or erythema noted. · TTP over the fifth MCP joint. No tenderness palpation the remainder the fingers, hand, wrist, forearm, elbow, arm, shoulder, clavicle  · Flexion cascade intact, patient able to flex and extend DIP and PIP joints of all fingers. · No malrotation noted  · Sensation tact light touch distally median, radial, ulnar nerve distributions to the hand. · Motor function gross intact distally in AIN, PIN, median, radial, ulnar nerve distributions to the hand. · Compartments soft and compressible  · +2/4 radial and ulnar pulses, hand warm well-perfused, brisk cap refill to the hand    Secondary Exam:   · leftUE: No obvious signs of trauma. -TTP to fingers, hand, wrist, forearm, elbow, humerus, shoulder or clavicle. · leftLE: No obvious signs of trauma. -TTP to foot, ankle, leg, knee, thigh, hip. · Pelvis: -TTP, -Log roll, -Heel strike on the left    DATA:    CBC:   Lab Results   Component Value Date    WBC 11.5 05/26/2022    RBC 4.83 05/26/2022    HGB 14.2 05/26/2022    HCT 43.3 05/26/2022    MCV 89.6 05/26/2022    MCH 29.4 05/26/2022    MCHC 32.8 05/26/2022    RDW 13.2 05/26/2022     05/26/2022    MPV 9.0 05/26/2022     PT/INR:    Lab Results   Component Value Date    PROTIME 11.3 10/24/2020    PROTIME 12.0 12/09/2011    INR 1.0 10/24/2020       Radiology Review:  XR AP pelvis and 2 views of the right hip reviewed demonstrating a right periprosthetic femur fracture.   Fracture line noted through the greater trochanter. Essentially nondisplaced. There is also a large diaphyseal cortical butterfly fragment adjacent to the prosthesis. No koki evidence of loosening or subsidence. 4 views right femur reviewed demonstrating fracture pattern as above. Total knee implant noted distally. No evidence of any other acute fracture dislocations about the femur. CT scan right hip ordered    Right hand x-rays ordered    IMPRESSION:  · Closed, right periprosthetic hip fracture    PLAN:  · Weightbearing right lower extremity, position of comfort  · Medical admit  · Appreciate lundberg perioperative medical optimization  · Multimodal pain control  · Antibiotics preoperatively for surgical prophylaxis  · N.p.o. at midnight  · Hold anticoags  · Plan for open reduction internal fixation versus revision total hip arthroplasty right hip  · All patient/family questions have been answered and patient is currently agreeable to plan. · Discussed with Attending      Electronically signed by Maulik Rush DO on 5/27/2022 at 5:49 AM       I have seen and evaluated the patient and agree with the above assessment on today's visit. I have performed the key components of the history and physical examination and concur completely with the findings and plans as documented. Agree with ROS, examination, FMH, PMH, PSH, SocHx, and allergies as above. Patient physically seen and examined. Status post fall with a [place] B fracture around a restoration modular stem. Her stem does not appear grossly loose. The patient has multiple medical comorbidities including stage IV chronic kidney disease, diabetes with neuropathy, and history of stroke with a foot drop on the injured side since her revision surgery apparently. Follow-up with her in detail about the fact we mostly perform open reduction internal fixation.   Explained the risk of surgery including death and anesthesia, infection, neurovascular damage, wound healing issues, loosening of the stem down the road requiring revision surgery, deep venous thrombosis, and any other unforeseen complications. She understood this and decided to go forward with operative fixation.     Past Medical History:   Diagnosis Date    Asthma 1993    Cataract, right eye     Cerebral artery occlusion with cerebral infarction ([address] 07/2017    CKD (chronic kidney disease) stage 4, GFR 15-29 ml/min ([address] 2017    Degenerative disc disease     Degenerative joint disease     Diabetes mellitus ([facility] 2008    diet controlled    Diabetic peripheral neuropathy ([facility] 1998    Diffuse cerebral atrophy ([facility] 2012    Fibromyalgia 01/2012    [facility]    Glaucoma     Hypertension     Iron deficiency anemia 10/25/2020    Neurogenic bladder     Neuropathy     Sarcoidosis 1993    Sleep apnea 2009    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\"     Past Surgical History:   Procedure Laterality Date    ANKLE FRACTURE SURGERY Right 10/24/2020    RIGHT ANKLE OPEN REDUCTION INTERNAL FIXATION performed by Dana Mittal MD at [facility]      laminectomy l3-4    BREAST SURGERY      biopsy    CARDIAC CATHETERIZATION  2012    MINIMAL CAD    CHOLECYSTECTOMY      COLONOSCOPY  08/2019    TUBULAR ADENOMA x 2    FOOT SURGERY Bilateral     right foot fracture; left foot charcot    HYSTERECTOMY      INTRACAPSULAR CATARACT EXTRACTION Right 7/22/2021    RIGHT EYE CATARACT EXTRACTION IOL IMPLANT [facility] [address]  12/16/2016    right hip arthroplasty    OTHER SURGICAL HISTORY Right 02/06/2017    I & D right hip wound vaccum placement    TOTAL KNEE ARTHROPLASTY Bilateral      Family History   Problem Relation Age of Onset    Cancer Mother         pancreatic    Parkinsonism Father     Diabetes Maternal Grandfather     Cancer Brother         esophageal     Social History     Tobacco Use    Smoking status: Never Smoker    Smokeless tobacco: Never Used   Vaping Use    Vaping Use: Never used   Substance Use Topics    Alcohol use: No     Alcohol/week: 0.0 standard drinks    Drug use: No     Allergies   Allergen Reactions    Iodides Shortness Of Breath     CT Scan Dye (\"turned purple\")    Iodine Shortness Of Breath     Had trouble breathing and Skin color turned purple and stayed that way for  Hours.  Other Shortness Of Breath     Perfumes and smoke    Pcn [Penicillins] Itching           Physical Examination:   General appearance: alert, well appearing, and in no distress, over weight. No visible signs of trauma   Mental status: alert, oriented to person, place, and time, normal mood, behavior, speech, dress, motor activity, and thought processes  Abdomen: soft, nondistended  Resp:   resp easy and unlabored, no audible wheezes note, normal symmetrical expansion of both hemithoraces  Cardiac: distal pulses palpable, skin and extremities well perfused  Neurological: alert, oriented X3, normal speech, no focal findings or movement disorder noted, motor and sensory grossly normal bilaterally, normal muscle tone, no tremors  HEENT: normochephalic atraumatic, external ears and eyes normal, sclera normal, neck supple, no nasal discharge. Extremities:   peripheral pulses normal, no edema, redness or tenderness in the calves   Skin: normal coloration, no rashes or open wounds, no suspicious skin lesions noted  Psych: Affect euthymic   Musculoskeletal:   Extremity:  Agree with above examination, skin intact, compartment soft compressible. Benefit reduction internal fixation right [place] B periprosthetic femur fracture around total hip versus revision with restoration modular total hip arthroplasty if components are grossly loose. ELECTRONICALLY signed by:    Jocelyn Bird MD  5/28/22   This is been dictated utilizing voice recognition software.   All efforts have been made to make the note accurate although inadvertent errors may be present.

## 2022-05-27 NOTE — ED NOTES
[facility]AR faxed, nurse to nurse called to floor. Patient will be placed in transport when returns from imaging.       Pinky Barraza RN  05/27/22 [id number]

## 2022-05-27 NOTE — ED NOTES
Treatment consent filled out and placed in soft chart in ED. Signature to be obtained by surgical team prior to procedure.       Paige Hoffman RN  05/27/22 [id number]

## 2022-05-27 NOTE — Clinical Note
Discharge Plan[de-identified] Other/[facility])   Telemetry/Cardiac Monitoring Required?: No

## 2022-05-27 NOTE — CARE COORDINATION
Social Work /Transition of Care:    Pt presents to the ED as a transfer from [facility] after a fall with right hip fracture. Pt is from home. SW met with pt who was lying in bed, alert and oriented x4. Pt reports she and her friend, Rhys Mayfield, live in a one floor home with a ramp at the entrance. Pt has a rollator, bsc, wheel chair, and shower chair at home. Pt has hx with [facility] and with [facility]. Pt's PCP is Dr Adeola Sanz and she uses [facility] in [place] to fill her prescriptions. Pt reports she does not want to go to a nursing facility for rehab upon discharge. Pt reports she will be returning home with [facility]. Pt reports she would like [facility] again. Initial referral made to [facility]. Plan is for pt to have surgery tomorrow morning 5/27 at 730am with Dr Xin Neff. JOCELYNN/REMI to follow.

## 2022-05-28 ENCOUNTER — ANESTHESIA (OUTPATIENT)
Dept: OPERATING ROOM | Age: 77
DRG: 481 | End: 2022-05-28
Payer: MEDICARE

## 2022-05-28 ENCOUNTER — APPOINTMENT (OUTPATIENT)
Dept: GENERAL RADIOLOGY | Age: 77
DRG: 481 | End: 2022-05-28
Payer: MEDICARE

## 2022-05-28 LAB
ANION GAP SERPL CALCULATED.3IONS-SCNC: 15 MMOL/L (ref 7–16)
BUN BLDV-MCNC: 28 MG/DL (ref 6–23)
CALCIUM SERPL-MCNC: 8.1 MG/DL (ref 8.6–10.2)
CHLORIDE BLD-SCNC: 102 MMOL/L (ref 98–107)
CO2: 21 MMOL/L (ref 22–29)
CREAT SERPL-MCNC: 1.5 MG/DL (ref 0.5–1)
GFR AFRICAN AMERICAN: 41
GFR NON-AFRICAN AMERICAN: 34 ML/MIN/1.73
GLUCOSE BLD-MCNC: 149 MG/DL (ref 74–99)
HCT VFR BLD CALC: 40.6 % (ref 34–48)
HEMOGLOBIN: 12.9 G/DL (ref 11.5–15.5)
MCH RBC QN AUTO: 28.9 PG (ref 26–35)
MCHC RBC AUTO-ENTMCNC: 31.8 % (ref 32–34.5)
MCV RBC AUTO: 90.8 FL (ref 80–99.9)
METER GLUCOSE: 131 MG/DL (ref 74–99)
METER GLUCOSE: 190 MG/DL (ref 74–99)
METER GLUCOSE: 216 MG/DL (ref 74–99)
METER GLUCOSE: 333 MG/DL (ref 74–99)
PDW BLD-RTO: 13.3 FL (ref 11.5–15)
PLATELET # BLD: 228 E9/L (ref 130–450)
PMV BLD AUTO: 9 FL (ref 7–12)
POTASSIUM SERPL-SCNC: 4.4 MMOL/L (ref 3.5–5)
RBC # BLD: 4.47 E12/L (ref 3.5–5.5)
SODIUM BLD-SCNC: 138 MMOL/L (ref 132–146)
WBC # BLD: 9.3 E9/L (ref 4.5–11.5)

## 2022-05-28 PROCEDURE — 36415 COLL VENOUS BLD VENIPUNCTURE: CPT

## 2022-05-28 PROCEDURE — 6370000000 HC RX 637 (ALT 250 FOR IP): Performed by: STUDENT IN AN ORGANIZED HEALTH CARE EDUCATION/TRAINING PROGRAM

## 2022-05-28 PROCEDURE — C1769 GUIDE WIRE: HCPCS | Performed by: ORTHOPAEDIC SURGERY

## 2022-05-28 PROCEDURE — 3600000005 HC SURGERY LEVEL 5 BASE: Performed by: ORTHOPAEDIC SURGERY

## 2022-05-28 PROCEDURE — 80048 BASIC METABOLIC PNL TOTAL CA: CPT

## 2022-05-28 PROCEDURE — 2709999900 HC NON-CHARGEABLE SUPPLY: Performed by: ORTHOPAEDIC SURGERY

## 2022-05-28 PROCEDURE — 87015 SPECIMEN INFECT AGNT CONCNTJ: CPT

## 2022-05-28 PROCEDURE — 0QS604Z REPOSITION RIGHT UPPER FEMUR WITH INTERNAL FIXATION DEVICE, OPEN APPROACH: ICD-10-PCS | Performed by: ORTHOPAEDIC SURGERY

## 2022-05-28 PROCEDURE — 3600000015 HC SURGERY LEVEL 5 ADDTL 15MIN: Performed by: ORTHOPAEDIC SURGERY

## 2022-05-28 PROCEDURE — 3700000000 HC ANESTHESIA ATTENDED CARE: Performed by: ORTHOPAEDIC SURGERY

## 2022-05-28 PROCEDURE — 2060000000 HC ICU INTERMEDIATE R&B

## 2022-05-28 PROCEDURE — 73552 X-RAY EXAM OF FEMUR 2/>: CPT

## 2022-05-28 PROCEDURE — 87205 SMEAR GRAM STAIN: CPT

## 2022-05-28 PROCEDURE — 3209999900 FLUORO FOR SURGICAL PROCEDURES

## 2022-05-28 PROCEDURE — 99223 1ST HOSP IP/OBS HIGH 75: CPT | Performed by: ORTHOPAEDIC SURGERY

## 2022-05-28 PROCEDURE — 2580000003 HC RX 258: Performed by: STUDENT IN AN ORGANIZED HEALTH CARE EDUCATION/TRAINING PROGRAM

## 2022-05-28 PROCEDURE — 87070 CULTURE OTHR SPECIMN AEROBIC: CPT

## 2022-05-28 PROCEDURE — 2500000003 HC RX 250 WO HCPCS: Performed by: STUDENT IN AN ORGANIZED HEALTH CARE EDUCATION/TRAINING PROGRAM

## 2022-05-28 PROCEDURE — 7100000000 HC PACU RECOVERY - FIRST 15 MIN: Performed by: ORTHOPAEDIC SURGERY

## 2022-05-28 PROCEDURE — 87102 FUNGUS ISOLATION CULTURE: CPT

## 2022-05-28 PROCEDURE — 87206 SMEAR FLUORESCENT/ACID STAI: CPT

## 2022-05-28 PROCEDURE — 7100000001 HC PACU RECOVERY - ADDTL 15 MIN: Performed by: ORTHOPAEDIC SURGERY

## 2022-05-28 PROCEDURE — 27244 TREAT THIGH FRACTURE: CPT | Performed by: ORTHOPAEDIC SURGERY

## 2022-05-28 PROCEDURE — 6360000002 HC RX W HCPCS: Performed by: ANESTHESIOLOGY

## 2022-05-28 PROCEDURE — 2500000003 HC RX 250 WO HCPCS: Performed by: NURSE ANESTHETIST, CERTIFIED REGISTERED

## 2022-05-28 PROCEDURE — 6360000002 HC RX W HCPCS: Performed by: NURSE ANESTHETIST, CERTIFIED REGISTERED

## 2022-05-28 PROCEDURE — 85027 COMPLETE CBC AUTOMATED: CPT

## 2022-05-28 PROCEDURE — 2720000010 HC SURG SUPPLY STERILE: Performed by: ORTHOPAEDIC SURGERY

## 2022-05-28 PROCEDURE — C1776 JOINT DEVICE (IMPLANTABLE): HCPCS | Performed by: ORTHOPAEDIC SURGERY

## 2022-05-28 PROCEDURE — 82962 GLUCOSE BLOOD TEST: CPT

## 2022-05-28 PROCEDURE — 3700000001 HC ADD 15 MINUTES (ANESTHESIA): Performed by: ORTHOPAEDIC SURGERY

## 2022-05-28 PROCEDURE — C1713 ANCHOR/SCREW BN/BN,TIS/BN: HCPCS | Performed by: ORTHOPAEDIC SURGERY

## 2022-05-28 PROCEDURE — 2580000003 HC RX 258: Performed by: NURSE ANESTHETIST, CERTIFIED REGISTERED

## 2022-05-28 PROCEDURE — 87075 CULTR BACTERIA EXCEPT BLOOD: CPT

## 2022-05-28 PROCEDURE — 87116 MYCOBACTERIA CULTURE: CPT

## 2022-05-28 DEVICE — SCREW BNE L12MM DIA5MM CNDYL S STL ST VAR ANG LOK COMPR T25: Type: IMPLANTABLE DEVICE | Site: HIP | Status: FUNCTIONAL

## 2022-05-28 DEVICE — IMPLANTABLE DEVICE: Type: IMPLANTABLE DEVICE | Site: HIP | Status: FUNCTIONAL

## 2022-05-28 DEVICE — SCREW BNE L40MM DIA4.5MM PROX CORT TIB S STL ST LOK FULL: Type: IMPLANTABLE DEVICE | Site: HIP | Status: FUNCTIONAL

## 2022-05-28 DEVICE — CABLE SURG DIA1.7MM S STL HA CERCLAGE W/ CRMP 29880101S] DEPUY SYNTHES USA]: Type: IMPLANTABLE DEVICE | Site: HIP | Status: FUNCTIONAL

## 2022-05-28 DEVICE — SCREW BNE L20MM DIA3.5MM PROX TIB S STL ST FULL THRD W/ T15: Type: IMPLANTABLE DEVICE | Site: HIP | Status: FUNCTIONAL

## 2022-05-28 DEVICE — SCREW BNE L16MM DIA3.5MM PROX TIB S STL ST FULL THRD W/ T15: Type: IMPLANTABLE DEVICE | Site: HIP | Status: FUNCTIONAL

## 2022-05-28 DEVICE — SCREW BNE L34MM DIA3.5MM PROX TIB S STL ST FULL THRD W/ T15: Type: IMPLANTABLE DEVICE | Site: HIP | Status: FUNCTIONAL

## 2022-05-28 DEVICE — SCREW BNE L30MM DIA3.5MM PROX TIB S STL ST FULL THRD T15: Type: IMPLANTABLE DEVICE | Site: HIP | Status: FUNCTIONAL

## 2022-05-28 DEVICE — SCREW BNE L38MM DIA4.5MM PROX CORT TIB S STL ST LOK FULL: Type: IMPLANTABLE DEVICE | Site: HIP | Status: FUNCTIONAL

## 2022-05-28 DEVICE — SCREW BNE L44MM DIA3.5MM PROX TIB S STL ST FULL THRD W/ T15: Type: IMPLANTABLE DEVICE | Site: HIP | Status: FUNCTIONAL

## 2022-05-28 RX ORDER — DEXTROSE MONOHYDRATE 50 MG/ML
100 INJECTION, SOLUTION INTRAVENOUS PRN
Status: DISCONTINUED | OUTPATIENT
Start: 2022-05-28 | End: 2022-05-31 | Stop reason: HOSPADM

## 2022-05-28 RX ORDER — MEPERIDINE HYDROCHLORIDE 25 MG/ML
12.5 INJECTION INTRAMUSCULAR; INTRAVENOUS; SUBCUTANEOUS EVERY 5 MIN PRN
Status: DISCONTINUED | OUTPATIENT
Start: 2022-05-28 | End: 2022-05-28 | Stop reason: HOSPADM

## 2022-05-28 RX ORDER — PROPOFOL 10 MG/ML
INJECTION, EMULSION INTRAVENOUS PRN
Status: DISCONTINUED | OUTPATIENT
Start: 2022-05-28 | End: 2022-05-28 | Stop reason: SDUPTHER

## 2022-05-28 RX ORDER — SODIUM CHLORIDE 9 MG/ML
INJECTION, SOLUTION INTRAVENOUS PRN
Status: DISCONTINUED | OUTPATIENT
Start: 2022-05-28 | End: 2022-05-28 | Stop reason: HOSPADM

## 2022-05-28 RX ORDER — SODIUM CHLORIDE 0.9 % (FLUSH) 0.9 %
5-40 SYRINGE (ML) INJECTION PRN
Status: DISCONTINUED | OUTPATIENT
Start: 2022-05-28 | End: 2022-05-28 | Stop reason: HOSPADM

## 2022-05-28 RX ORDER — ASPIRIN 81 MG/1
81 TABLET ORAL 2 TIMES DAILY
Status: DISCONTINUED | OUTPATIENT
Start: 2022-05-28 | End: 2022-05-31 | Stop reason: HOSPADM

## 2022-05-28 RX ORDER — SODIUM CHLORIDE 0.9 % (FLUSH) 0.9 %
5-40 SYRINGE (ML) INJECTION EVERY 12 HOURS SCHEDULED
Status: DISCONTINUED | OUTPATIENT
Start: 2022-05-28 | End: 2022-05-31 | Stop reason: HOSPADM

## 2022-05-28 RX ORDER — SODIUM CHLORIDE, SODIUM LACTATE, POTASSIUM CHLORIDE, CALCIUM CHLORIDE 600; 310; 30; 20 MG/100ML; MG/100ML; MG/100ML; MG/100ML
INJECTION, SOLUTION INTRAVENOUS CONTINUOUS PRN
Status: DISCONTINUED | OUTPATIENT
Start: 2022-05-28 | End: 2022-05-28 | Stop reason: SDUPTHER

## 2022-05-28 RX ORDER — ONDANSETRON 2 MG/ML
4 INJECTION INTRAMUSCULAR; INTRAVENOUS
Status: DISCONTINUED | OUTPATIENT
Start: 2022-05-28 | End: 2022-05-28 | Stop reason: HOSPADM

## 2022-05-28 RX ORDER — SODIUM CHLORIDE 0.9 % (FLUSH) 0.9 %
5-40 SYRINGE (ML) INJECTION PRN
Status: DISCONTINUED | OUTPATIENT
Start: 2022-05-28 | End: 2022-05-31 | Stop reason: HOSPADM

## 2022-05-28 RX ORDER — CLINDAMYCIN PHOSPHATE 150 MG/ML
INJECTION, SOLUTION INTRAVENOUS PRN
Status: DISCONTINUED | OUTPATIENT
Start: 2022-05-28 | End: 2022-05-28 | Stop reason: SDUPTHER

## 2022-05-28 RX ORDER — DEXAMETHASONE SODIUM PHOSPHATE 10 MG/ML
INJECTION INTRAMUSCULAR; INTRAVENOUS PRN
Status: DISCONTINUED | OUTPATIENT
Start: 2022-05-28 | End: 2022-05-28 | Stop reason: SDUPTHER

## 2022-05-28 RX ORDER — MIDAZOLAM HYDROCHLORIDE 1 MG/ML
INJECTION INTRAMUSCULAR; INTRAVENOUS PRN
Status: DISCONTINUED | OUTPATIENT
Start: 2022-05-28 | End: 2022-05-28 | Stop reason: SDUPTHER

## 2022-05-28 RX ORDER — PHENYLEPHRINE HCL IN 0.9% NACL 1 MG/10 ML
SYRINGE (ML) INTRAVENOUS PRN
Status: DISCONTINUED | OUTPATIENT
Start: 2022-05-28 | End: 2022-05-28 | Stop reason: SDUPTHER

## 2022-05-28 RX ORDER — ONDANSETRON 2 MG/ML
4 INJECTION INTRAMUSCULAR; INTRAVENOUS EVERY 6 HOURS PRN
Status: DISCONTINUED | OUTPATIENT
Start: 2022-05-28 | End: 2022-05-28

## 2022-05-28 RX ORDER — MORPHINE SULFATE 2 MG/ML
1 INJECTION, SOLUTION INTRAMUSCULAR; INTRAVENOUS EVERY 5 MIN PRN
Status: DISCONTINUED | OUTPATIENT
Start: 2022-05-28 | End: 2022-05-28 | Stop reason: HOSPADM

## 2022-05-28 RX ORDER — FENTANYL CITRATE 50 UG/ML
INJECTION, SOLUTION INTRAMUSCULAR; INTRAVENOUS PRN
Status: DISCONTINUED | OUTPATIENT
Start: 2022-05-28 | End: 2022-05-28 | Stop reason: SDUPTHER

## 2022-05-28 RX ORDER — NEOSTIGMINE METHYLSULFATE 1 MG/ML
INJECTION, SOLUTION INTRAVENOUS PRN
Status: DISCONTINUED | OUTPATIENT
Start: 2022-05-28 | End: 2022-05-28 | Stop reason: SDUPTHER

## 2022-05-28 RX ORDER — SODIUM CHLORIDE 9 MG/ML
INJECTION, SOLUTION INTRAVENOUS CONTINUOUS
Status: DISCONTINUED | OUTPATIENT
Start: 2022-05-28 | End: 2022-05-31 | Stop reason: HOSPADM

## 2022-05-28 RX ORDER — SODIUM CHLORIDE 0.9 % (FLUSH) 0.9 %
5-40 SYRINGE (ML) INJECTION EVERY 12 HOURS SCHEDULED
Status: DISCONTINUED | OUTPATIENT
Start: 2022-05-28 | End: 2022-05-28 | Stop reason: HOSPADM

## 2022-05-28 RX ORDER — POLYETHYLENE GLYCOL 3350 17 G/17G
17 POWDER, FOR SOLUTION ORAL DAILY PRN
Status: DISCONTINUED | OUTPATIENT
Start: 2022-05-28 | End: 2022-05-31 | Stop reason: HOSPADM

## 2022-05-28 RX ORDER — ONDANSETRON 2 MG/ML
INJECTION INTRAMUSCULAR; INTRAVENOUS PRN
Status: DISCONTINUED | OUTPATIENT
Start: 2022-05-28 | End: 2022-05-28 | Stop reason: SDUPTHER

## 2022-05-28 RX ORDER — ASPIRIN 81 MG/1
81 TABLET ORAL 2 TIMES DAILY
Qty: 56 TABLET | Refills: 0 | Status: SHIPPED | OUTPATIENT
Start: 2022-05-28 | End: 2022-06-16

## 2022-05-28 RX ORDER — SODIUM CHLORIDE 9 MG/ML
INJECTION, SOLUTION INTRAVENOUS PRN
Status: DISCONTINUED | OUTPATIENT
Start: 2022-05-28 | End: 2022-05-31 | Stop reason: HOSPADM

## 2022-05-28 RX ORDER — ONDANSETRON 4 MG/1
4 TABLET, ORALLY DISINTEGRATING ORAL EVERY 8 HOURS PRN
Status: DISCONTINUED | OUTPATIENT
Start: 2022-05-28 | End: 2022-05-28

## 2022-05-28 RX ORDER — GLYCOPYRROLATE 1 MG/5 ML
SYRINGE (ML) INTRAVENOUS PRN
Status: DISCONTINUED | OUTPATIENT
Start: 2022-05-28 | End: 2022-05-28 | Stop reason: SDUPTHER

## 2022-05-28 RX ORDER — LIDOCAINE HYDROCHLORIDE 20 MG/ML
INJECTION, SOLUTION INTRAVENOUS PRN
Status: DISCONTINUED | OUTPATIENT
Start: 2022-05-28 | End: 2022-05-28 | Stop reason: SDUPTHER

## 2022-05-28 RX ORDER — OXYCODONE HYDROCHLORIDE AND ACETAMINOPHEN 5; 325 MG/1; MG/1
1 TABLET ORAL EVERY 6 HOURS PRN
Qty: 28 TABLET | Refills: 0 | Status: SHIPPED | OUTPATIENT
Start: 2022-05-28 | End: 2022-06-04

## 2022-05-28 RX ORDER — CLINDAMYCIN PHOSPHATE 600 MG/50ML
600 INJECTION INTRAVENOUS EVERY 8 HOURS
Status: COMPLETED | OUTPATIENT
Start: 2022-05-28 | End: 2022-05-29

## 2022-05-28 RX ADMIN — Medication 3 MG: at 09:59

## 2022-05-28 RX ADMIN — FENTANYL CITRATE 25 MCG: 50 INJECTION, SOLUTION INTRAMUSCULAR; INTRAVENOUS at 10:01

## 2022-05-28 RX ADMIN — MIDAZOLAM 1 MG: 1 INJECTION INTRAMUSCULAR; INTRAVENOUS at 07:50

## 2022-05-28 RX ADMIN — Medication 10 ML: at 13:20

## 2022-05-28 RX ADMIN — VERAPAMIL HYDROCHLORIDE 240 MG: 240 TABLET, FILM COATED, EXTENDED RELEASE ORAL at 13:20

## 2022-05-28 RX ADMIN — CLINDAMYCIN PHOSPHATE 900 MG: 150 INJECTION, SOLUTION INTRAVENOUS at 08:07

## 2022-05-28 RX ADMIN — DULOXETINE 60 MG: 60 CAPSULE, DELAYED RELEASE ORAL at 13:20

## 2022-05-28 RX ADMIN — FENTANYL CITRATE 50 MCG: 50 INJECTION, SOLUTION INTRAMUSCULAR; INTRAVENOUS at 08:07

## 2022-05-28 RX ADMIN — Medication 0.5 MG: at 09:59

## 2022-05-28 RX ADMIN — FENTANYL CITRATE 50 MCG: 50 INJECTION, SOLUTION INTRAMUSCULAR; INTRAVENOUS at 07:55

## 2022-05-28 RX ADMIN — PROPOFOL 150 MG: 10 INJECTION, EMULSION INTRAVENOUS at 07:55

## 2022-05-28 RX ADMIN — PROPOFOL 20 MG: 10 INJECTION, EMULSION INTRAVENOUS at 09:50

## 2022-05-28 RX ADMIN — INSULIN LISPRO 4 UNITS: 100 INJECTION, SOLUTION INTRAVENOUS; SUBCUTANEOUS at 20:15

## 2022-05-28 RX ADMIN — CLINDAMYCIN IN 5 PERCENT DEXTROSE 600 MG: 12 INJECTION, SOLUTION INTRAVENOUS at 16:24

## 2022-05-28 RX ADMIN — Medication 100 MCG: at 08:49

## 2022-05-28 RX ADMIN — DEXAMETHASONE SODIUM PHOSPHATE 10 MG: 10 INJECTION INTRAMUSCULAR; INTRAVENOUS at 07:55

## 2022-05-28 RX ADMIN — HYDROCODONE BITARTRATE AND ACETAMINOPHEN 1 TABLET: 5; 325 TABLET ORAL at 13:45

## 2022-05-28 RX ADMIN — PROPOFOL 20 MG: 10 INJECTION, EMULSION INTRAVENOUS at 09:35

## 2022-05-28 RX ADMIN — LIDOCAINE HYDROCHLORIDE 60 MG: 20 INJECTION, SOLUTION INTRAVENOUS at 07:55

## 2022-05-28 RX ADMIN — HYDROMORPHONE HYDROCHLORIDE 0.5 MG: 1 INJECTION, SOLUTION INTRAMUSCULAR; INTRAVENOUS; SUBCUTANEOUS at 10:45

## 2022-05-28 RX ADMIN — VERAPAMIL HYDROCHLORIDE 120 MG: 240 TABLET, FILM COATED, EXTENDED RELEASE ORAL at 20:11

## 2022-05-28 RX ADMIN — ONDANSETRON 4 MG: 2 INJECTION INTRAMUSCULAR; INTRAVENOUS at 09:42

## 2022-05-28 RX ADMIN — HYDROCODONE BITARTRATE AND ACETAMINOPHEN 1 TABLET: 5; 325 TABLET ORAL at 22:46

## 2022-05-28 RX ADMIN — SODIUM CHLORIDE, POTASSIUM CHLORIDE, SODIUM LACTATE AND CALCIUM CHLORIDE: 600; 310; 30; 20 INJECTION, SOLUTION INTRAVENOUS at 07:32

## 2022-05-28 RX ADMIN — ASPIRIN 81 MG: 81 TABLET, COATED ORAL at 20:11

## 2022-05-28 RX ADMIN — INSULIN LISPRO 4 UNITS: 100 INJECTION, SOLUTION INTRAVENOUS; SUBCUTANEOUS at 16:20

## 2022-05-28 RX ADMIN — Medication 50 MCG: at 08:19

## 2022-05-28 RX ADMIN — FENTANYL CITRATE 50 MCG: 50 INJECTION, SOLUTION INTRAMUSCULAR; INTRAVENOUS at 08:55

## 2022-05-28 RX ADMIN — Medication 50 MCG: at 08:31

## 2022-05-28 RX ADMIN — ASPIRIN 81 MG: 81 TABLET, COATED ORAL at 13:45

## 2022-05-28 RX ADMIN — HYDROXYCHLOROQUINE SULFATE 200 MG: 200 TABLET ORAL at 20:11

## 2022-05-28 RX ADMIN — SODIUM CHLORIDE: 9 INJECTION, SOLUTION INTRAVENOUS at 13:49

## 2022-05-28 RX ADMIN — HYDROXYCHLOROQUINE SULFATE 200 MG: 200 TABLET ORAL at 13:20

## 2022-05-28 RX ADMIN — ATORVASTATIN CALCIUM 20 MG: 20 TABLET, FILM COATED ORAL at 20:11

## 2022-05-28 RX ADMIN — HYDROMORPHONE HYDROCHLORIDE 0.5 MG: 1 INJECTION, SOLUTION INTRAMUSCULAR; INTRAVENOUS; SUBCUTANEOUS at 11:05

## 2022-05-28 ASSESSMENT — PAIN DESCRIPTION - DESCRIPTORS
DESCRIPTORS: SHARP
DESCRIPTORS: NAGGING;SHARP
DESCRIPTORS: SHARP

## 2022-05-28 ASSESSMENT — PAIN DESCRIPTION - ORIENTATION
ORIENTATION: RIGHT

## 2022-05-28 ASSESSMENT — PAIN SCALES - GENERAL
PAINLEVEL_OUTOF10: 0
PAINLEVEL_OUTOF10: 5
PAINLEVEL_OUTOF10: 8
PAINLEVEL_OUTOF10: 8
PAINLEVEL_OUTOF10: 6
PAINLEVEL_OUTOF10: 0
PAINLEVEL_OUTOF10: 7

## 2022-05-28 ASSESSMENT — PAIN DESCRIPTION - LOCATION
LOCATION: HIP

## 2022-05-28 ASSESSMENT — LIFESTYLE VARIABLES: SMOKING_STATUS: 0

## 2022-05-28 NOTE — PROGRESS NOTES
Occupational Therapy  Received OT order, Reviewed Chart. Noted Pt is for Surgical Repair of R LE Fracture this date. Will hold OT assessment until pt is medically appropriate to participate in ax post-op.   Thank you for this referral. FAVIAN Nair, OTR/L  # [phone]

## 2022-05-28 NOTE — PROGRESS NOTES
[facility]    Subjective:  Pt seen postop alert conversive      Current Facility-Administered Medications:     sodium chloride flush 0.9 % injection 5-40 mL, 5-40 mL, IntraVENous, 2 times per day, Arpita Jumper, DO    sodium chloride flush 0.9 % injection 5-40 mL, 5-40 mL, IntraVENous, PRN, Mecmaira Domingoro Akosua, DO    0.9 % sodium chloride infusion, , IntraVENous, PRN, Arpita Wilson, DO    morphine (PF) injection 1 mg, 1 mg, IntraVENous, Q5 Min PRN, Mecmaira South, DO    ondansetron [facility]) injection 4 mg, 4 mg, IntraVENous, Once PRN, Arpita Wilson, DO    meperidine (DEMEROL) injection 12.5 mg, 12.5 mg, IntraVENous, Q5 Min PRN, Jayy South, DO    sodium chloride flush 0.9 % injection 5-40 mL, 5-40 mL, IntraVENous, 2 times per day, Agusto Stafford, DO    sodium chloride flush 0.9 % injection 5-40 mL, 5-40 mL, IntraVENous, PRN, Agusto Stafford, DO    0.9 % sodium chloride infusion, , IntraVENous, PRN, Agusto Stafford, DO    0.9 % sodium chloride infusion, , IntraVENous, Continuous, Agusto Stafford, DO    clindamycin (CLEOCIN) 600 mg in dextrose 5 % 50 mL IVPB, 600 mg, IntraVENous, Q8H, Agusto Stafford, DO    polyethylene glycol (GLYCOLAX) packet 17 g, 17 g, Oral, Daily PRN, Agusto Stafford DO    ondansetron (ZOFRAN-ODT) disintegrating tablet 4 mg, 4 mg, Oral, Q8H PRN **OR** ondansetron (ZOFRAN) injection 4 mg, 4 mg, IntraVENous, Q6H PRN, Agusto Stafford DO    aspirin EC tablet 81 mg, 81 mg, Oral, BID, Agusto Stafford,     glucose chewable tablet 16 g, 4 tablet, Oral, PRN, Romeo Plata,     dextrose bolus 10% 125 mL, 125 mL, IntraVENous, PRN **OR** dextrose bolus 10% 250 mL, 250 mL, IntraVENous, PRN, Romeo Plata,     glucagon (rDNA) injection 1 mg, 1 mg, IntraMUSCular, PRN, Romeo Plata DO    dextrose 5 % solution, 100 mL/hr, IntraVENous, PRN, Romeo Plata DO    atorvastatin (LIPITOR) tablet 20 mg, 20 mg, Oral, Nightly, Alexander Bremen, DO, 20 mg at 05/27/22 2251    DULoxetine (CYMBALTA) extended release capsule 60 mg, 60 mg, Oral, QAM, Alexander Bremen, DO    hydroxychloroquine (PLAQUENIL) tablet 200 mg, 200 mg, Oral, BID, Alexander Bremen, DO    acetaminophen (TYLENOL) tablet 650 mg, 650 mg, Oral, Q6H PRN **OR** acetaminophen (TYLENOL) suppository 650 mg, 650 mg, Rectal, Q6H PRN, Alexander Bremen, DO    HYDROcodone-acetaminophen (NORCO) 5-325 MG per tablet 1 tablet, 1 tablet, Oral, Q6H PRN, Alexander Bremen, DO    morphine (PF) injection 2 mg, 2 mg, IntraVENous, Q4H PRN, Alexander Bremen, DO, 2 mg at 05/27/22 1938    insulin lispro (HUMALOG) injection vial 0-12 Units, 0-12 Units, SubCUTAneous, TID WC, Alexander Bremen, DO, 2 Units at 05/27/22 1400    insulin lispro (HUMALOG) injection vial 0-6 Units, 0-6 Units, SubCUTAneous, Nightly, Alexander Bremen, DO    verapamil (CALAN SR) extended release tablet 120 mg, 120 mg, Oral, Nightly, Alexander Bremen, DO    verapamil (CALAN SR) extended release tablet 240 mg, 240 mg, Oral, QAM, Alexander Bremen, DO    albuterol (PROVENTIL) nebulizer solution 2.5 mg, 2.5 mg, Nebulization, Q4H PRN, Alexander Bremen, DO    Objective:    BP (!) 150/73   Pulse 81   Temp 97.8 °F (36.6 °C) (Temporal)   Resp 17   Ht 5' 6\" (1.676 m)   Wt 262 lb 8 oz (119.1 kg)   SpO2 98%   BMI 42.37 kg/m²     Heart:  reg  Lungs:  ctab  Abd: + bs soft nontender  Extrem:  Min edema legs    CBC with Differential:    Lab Results   Component Value Date    WBC 9.3 05/28/2022    RBC 4.47 05/28/2022    HGB 12.9 05/28/2022    HCT 40.6 05/28/2022     05/28/2022    MCV 90.8 05/28/2022    MCH 28.9 05/28/2022    MCHC 31.8 05/28/2022    RDW 13.3 05/28/2022    LYMPHOPCT 13.4 05/26/2022    MONOPCT 10.4 05/26/2022    BASOPCT 0.6 05/26/2022    MONOSABS 1.20 05/26/2022    LYMPHSABS 1.54 05/26/2022    EOSABS 0.26 05/26/2022    BASOSABS 0.07 05/26/2022     CMP:    Lab Results   Component Value Date     05/28/2022 K 4.4 05/28/2022    K 4.2 05/26/2022     05/28/2022    CO2 21 05/28/2022    BUN 28 05/28/2022    CREATININE 1.5 05/28/2022    GFRAA 41 05/28/2022    LABGLOM 34 05/28/2022    GLUCOSE 149 05/28/2022    GLUCOSE 82 12/09/2011    PROT 7.7 05/26/2022    LABALBU 4.0 05/26/2022    CALCIUM 8.1 05/28/2022    BILITOT 0.3 05/26/2022    ALKPHOS 162 05/26/2022    AST 21 05/26/2022    ALT 25 05/26/2022     Warfarin PT/INR:    Lab Results   Component Value Date    INR 1.1 05/27/2022    INR 1.0 10/24/2020    INR 2.2 04/30/2018    PROTIME 11.9 05/27/2022    PROTIME 11.3 10/24/2020    PROTIME 24.8 (H) 04/30/2018       Assessment:    Principal Problem:    Mirtha-prosthetic intertrochanteric fracture of femur  Active Problems:    Other fracture of right femur, initial encounter for closed fracture (HCC)    Obesity    CKD (chronic kidney disease) stage 3, GFR 30-59 ml/min ([facility])    Prolonged QT interval    Essential hypertension    Diabetes mellitus ([facility]    Sarcoidosis    Sleep apnea    History of CVA (cerebrovascular accident)    PVD (peripheral vascular disease) ([facility]  Resolved Problems:    * No resolved hospital problems.  *      Plan:  Cont postop care as per ortho serial lab pain control        Ashley Crowley DO  11:31 AM  5/28/2022

## 2022-05-28 NOTE — ANESTHESIA POSTPROCEDURE EVALUATION
Department of Anesthesiology  Postprocedure Note    Patient: Elly Dietz  MRN: [id number]  YOB: 1945  Date of evaluation: 5/28/2022  Time:  3:05 PM     Procedure Summary     Date: 05/28/22 Room / Location: Alyse Serna OR 09 / [facility]    Anesthesia Start: 0269 Anesthesia Stop: 7830    Procedure: OPEN REDUCTION INTERNAL FIXATION RIGHT PERIPROSTHETIC HIP FRACTURE (Right Hip) Diagnosis:       Status post open reduction and internal fixation (ORIF) of fracture      (/)    Surgeons: Rashad Rojo MD Responsible Provider: Sandra Ocasio DO    Anesthesia Type: general ASA Status: 4          Anesthesia Type: No value filed. Sharon Phase I: Sharon Score: 9    Sharon Phase II:      Last vitals: Reviewed and per EMR flowsheets.        Anesthesia Post Evaluation    Patient location during evaluation: PACU  Patient participation: complete - patient participated  Level of consciousness: awake and alert  Pain score: 1  Airway patency: patent  Nausea & Vomiting: no nausea and no vomiting  Complications: no  Cardiovascular status: hemodynamically stable  Respiratory status: acceptable  Hydration status: euvolemic

## 2022-05-28 NOTE — PLAN OF CARE
Problem: Discharge Planning  Goal: Discharge to home or other facility with appropriate resources  Outcome: Progressing  Flowsheets (Taken 5/27/2022 2239)  Discharge to home or other facility with appropriate resources: Identify barriers to discharge with patient and caregiver     Problem: Pain  Goal: Verbalizes/displays adequate comfort level or baseline comfort level  Outcome: Progressing

## 2022-05-28 NOTE — ANESTHESIA PRE PROCEDURE
Department of Anesthesiology  Preprocedure Note       Name:  Zaira Hilton   Age:  67 y.o.  :  1945                                          MRN:  [id number]         Date:  2022      Surgeon: Marlen Kimbrough):  Taina Ramírez MD    Procedure: Procedure(s):  OPEN REDUCTION INTERNAL FIXATION RIGHT HIP FRACTURE VERSUS REVISION TOTAL HIP ARTHROPLASTY    Medications prior to admission:   Prior to Admission medications    Medication Sig Start Date End Date Taking? Authorizing Provider   bimatoprost (LUMIGAN) 0.01 % SOLN ophthalmic drops Place 1 drop into both eyes nightly    Historical Provider, MD   oxybutynin (DITROPAN XL) 15 MG extended release tablet Take 15 mg by mouth daily    Historical Provider, MD   verapamil (CALAN SR) 240 MG extended release tablet Take 120 mg by mouth nightly    Historical Provider, MD   glimepiride (AMARYL) 1 MG tablet Take 0.5 mg by mouth every morning (before breakfast)    Historical Provider, MD   clopidogrel (PLAVIX) 75 MG tablet Take 1 tablet by mouth daily 20   Ellen Mojica MD   albuterol sulfate HFA (PROAIR HFA) 108 (90 Base) MCG/ACT inhaler Inhale 1 puff into the lungs every 4 hours as needed for Wheezing or Shortness of Breath 10/24/17   Lemuel Klinefelter, MD   atorvastatin (LIPITOR) 20 MG tablet Take 1 tablet by mouth nightly 17   Jamar Puentes MD   hydroxychloroquine (PLAQUENIL) 200 MG tablet Take 200 mg by mouth 2 times daily     Historical Provider, MD   verapamil (CALAN SR) 240 MG extended release tablet Take 240 mg by mouth every morning     Historical Provider, MD   DULoxetine (CYMBALTA) 60 MG capsule Take 60 mg by mouth every morning     Historical Provider, MD   multivitamin [facility]) per tablet Take 1 tablet by mouth three times a week Given Taz Kwon Provider, MD       Current medications:    No current facility-administered medications for this visit. No current outpatient medications on file. Facility-Administered Medications Ordered in Other Visits   Medication Dose Route Frequency Provider Last Rate Last Admin    ceFAZolin (ANCEF) 2,000 mg in sterile water 20 mL IV syringe  2,000 mg IntraVENous On Call to [facility],         atorvastatin (LIPITOR) tablet 20 mg  20 mg Oral Nightly Roland Plata, DO   20 mg at 05/27/22 2251    DULoxetine (CYMBALTA) extended release capsule 60 mg  60 mg Oral QAM Roland Plata DO        hydroxychloroquine (PLAQUENIL) tablet 200 mg  200 mg Oral BID Roland Plata, DO        sodium chloride flush 0.9 % injection 5-40 mL  5-40 mL IntraVENous 2 times per day Roland Plata, DO        sodium chloride flush 0.9 % injection 5-40 mL  5-40 mL IntraVENous PRN Roland Plata, DO        0.9 % sodium chloride infusion   IntraVENous PRN Roland Plata, DO        polyethylene glycol (GLYCOLAX) packet 17 g  17 g Oral Daily PRN Roland Plata DO        acetaminophen (TYLENOL) tablet 650 mg  650 mg Oral Q6H PRN Roland Plata DO        Or    acetaminophen (TYLENOL) suppository 650 mg  650 mg Rectal Q6H PRN Roland Plata, DO        0.9 % sodium chloride infusion   IntraVENous Continuous Roland Plata, DO 75 mL/hr at 05/27/22 2245 Rate Verify at 05/27/22 2245    HYDROcodone-acetaminophen (NORCO) 5-325 MG per tablet 1 tablet  1 tablet Oral Q6H PRN Roland Plata DO        morphine (PF) injection 2 mg  2 mg IntraVENous Q4H PRN Roland Plata DO   2 mg at 05/27/22 1938    insulin lispro (HUMALOG) injection vial 0-12 Units  0-12 Units SubCUTAneous TID WC Roland Plata DO   2 Units at 05/27/22 1400    insulin lispro (HUMALOG) injection vial 0-6 Units  0-6 Units SubCUTAneous Nightly Roland Plata DO        verapamil (CALAN SR) extended release tablet 120 mg  120 mg Oral Nightly Roland Plata,         verapamil (CALAN SR) extended release tablet 240 mg  240 mg Oral QAM Aron Plata, DO        albuterol (PROVENTIL) nebulizer solution 2.5 mg  2.5 mg Nebulization Q4H PRN Sourav Plata,            Allergies: Allergies   Allergen Reactions    Iodides Shortness Of Breath     CT Scan Dye (\"turned purple\")    Iodine Shortness Of Breath     Had trouble breathing and Skin color turned purple and stayed that way for  Hours.  Other Shortness Of Breath     Perfumes and smoke    Pcn [Penicillins] Itching       Problem List:    Patient Active Problem List   Diagnosis Code    Essential hypertension I10    PAULINA (obstructive sleep apnea) G47.33    Midline low back pain with right-sided sciatica M54.41    Morbid obesity with BMI of 40.0-44.9, adult ([facility]) E66.01, Z68.41    Urinary incontinence R32    Altered mental status R41.82    Closed fracture fibula, head, right, initial encounter S82.831A    Diabetes mellitus ([facility] E11.9    Neurogenic bladder N31.9    Sarcoidosis D86.9    Sleep apnea G47.30    Diffuse cerebral atrophy ([facility]) G31.9    Asthma J45.909    Diabetic peripheral neuropathy ([facility]) E11.42    Fibromyalgia M79.7    CKD (chronic kidney disease) stage 4, GFR 15-29 ml/min ([facility]) N18.4    Iron deficiency anemia D50.9    Sarcoid D86.9    Orthostatic hypotension I95.1    History of CVA (cerebrovascular accident) Z80.78    Combined forms of age-related cataract of both eyes H25.813    Atherosclerosis of artery of extremity without gangrene ([facility] I70.209    PVD (peripheral vascular disease) ([facility]) I73.9    Mirtha-prosthetic intertrochanteric fracture of femur M97. 8XXA, [id number]    Other fracture of right femur, initial encounter for closed fracture ([facility] S72.8X1A    Obesity E66.9    CKD (chronic kidney disease) stage 3, GFR 30-59 ml/min ([facility]) N18.30    Prolonged QT interval R94.31       Past Medical History:        Diagnosis Date    Asthma 1993    Cataract, right eye     Cerebral artery occlusion with cerebral infarction ([facility] 07/2017    CKD (chronic kidney disease) stage 4, GFR 15-29 ml/min ([facility] 2017  Degenerative disc disease     Degenerative joint disease     Diabetes mellitus ([facility] 2008    diet controlled    Diabetic peripheral neuropathy ([facility] 1998    Diffuse cerebral atrophy ([facility] 2012    Fibromyalgia 01/2012    [facility]    Glaucoma     Hypertension     Iron deficiency anemia 10/25/2020    Neurogenic bladder     Neuropathy     Sarcoidosis 1993    Sleep apnea 2009    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\"       Past Surgical History:        Procedure Laterality Date    ANKLE FRACTURE SURGERY Right 10/24/2020    RIGHT ANKLE OPEN REDUCTION INTERNAL FIXATION performed by aDrell Silverio MD at [facility]      laminectomy l3-4    BREAST SURGERY      biopsy    CARDIAC CATHETERIZATION  2012    MINIMAL CAD    CHOLECYSTECTOMY      COLONOSCOPY  08/2019    TUBULAR ADENOMA x 2    FOOT SURGERY Bilateral     right foot fracture; left foot charcot    HYSTERECTOMY      INTRACAPSULAR CATARACT EXTRACTION Right 7/22/2021    RIGHT EYE CATARACT EXTRACTION IOL IMPLANT [facility]  12/16/2016    right hip arthroplasty    OTHER SURGICAL HISTORY Right 02/06/2017    I & D right hip wound vaccum placement    TOTAL KNEE ARTHROPLASTY Bilateral        Social History:    Social History     Tobacco Use    Smoking status: Never Smoker    Smokeless tobacco: Never Used   Substance Use Topics    Alcohol use: No     Alcohol/week: 0.0 standard drinks                                Counseling given: Not Answered      Vital Signs (Current): There were no vitals filed for this visit.                                            BP Readings from Last 3 Encounters:   05/27/22 116/68   05/26/22 (!) 149/64   02/23/22 116/76       NPO Status:                                                                                 BMI:   Wt Readings from Last 3 Encounters:   05/27/22 262 lb 8 oz (119.1 kg)   05/26/22 245 lb (111.1 kg)   07/22/21 245 lb (111.1 kg)     There is no height or weight on file to calculate BMI.    CBC:   Lab Results   Component Value Date    WBC 9.3 05/28/2022    RBC 4.47 05/28/2022    HGB 12.9 05/28/2022    HCT 40.6 05/28/2022    MCV 90.8 05/28/2022    RDW 13.3 05/28/2022     05/28/2022       CMP:   Lab Results   Component Value Date     05/26/2022    K 4.2 05/26/2022     05/26/2022    CO2 22 05/26/2022    BUN 33 05/26/2022    CREATININE 1.5 05/26/2022    GFRAA 41 05/26/2022    LABGLOM 34 05/26/2022    GLUCOSE 130 05/27/2022    GLUCOSE 82 12/09/2011    PROT 7.7 05/26/2022    CALCIUM 9.1 05/26/2022    BILITOT 0.3 05/26/2022    ALKPHOS 162 05/26/2022    AST 21 05/26/2022    ALT 25 05/26/2022       POC Tests: No results for input(s): POCGLU, POCNA, POCK, POCCL, POCBUN, POCHEMO, POCHCT in the last 72 hours.     Coags:   Lab Results   Component Value Date    PROTIME 11.9 05/27/2022    PROTIME 12.0 12/09/2011    INR 1.1 05/27/2022    APTT 28.9 05/27/2022       HCG (If Applicable): No results found for: PREGTESTUR, PREGSERUM, HCG, HCGQUANT     ABGs: No results found for: PHART, PO2ART, EAJ7ZEW, LIJ8THS, BEART, O1SQWZEY     Type & Screen (If Applicable):  Lab Results   Component Value Date    LABABO B 12/09/2011    [facility] POS 12/09/2011       Drug/Infectious Status (If Applicable):  No results found for: HIV, HEPCAB    COVID-19 Screening (If Applicable):   Lab Results   Component Value Date    COVID19 Not Detected 12/14/2020    COVID19 Not Detected 11/16/2020         Anesthesia Evaluation  Patient summary reviewed and Nursing notes reviewed no history of anesthetic complications:   Airway: Mallampati: III  TM distance: >3 FB   Neck ROM: full  Mouth opening: > = 3 FB   Dental:      Comment: Chip upper front tooth    Pulmonary: breath sounds clear to auscultation  (+) sleep apnea: on CPAP,  asthma:     (-) not a current smoker                           Cardiovascular:    (+) hypertension:, generalized   cerebral volume loss. No extra-axial fluid collections or acute hemorrhage. The gray-white differentiation appears preserved without evidence of acute   cortical ischemia. The calvarium is intact. The visualized portions of the   paranasal sinuses and mastoid air cells are clear. There is right lens   replacement. CT cervical spine: There is no evidence of acute fracture. There is grade 1 anterolisthesis at   C3-4 and C7-T1. The remaining alignment is anatomic. There are no   compression deformities. There is narrowing of the intervertebral disc space   heights, most pronounced at C4-5 through C6-7. The facet joints are intact   at all levels with multilevel facet degeneration. The prevertebral soft   tissue structures are unremarkable. There is no gross evidence of central   canal stenosis. Impression   1. No acute intracranial abnormality. 2. Chronic small vessel ischemic disease and generalized cerebral volume loss. 3. Degenerative changes in the cervical spine without acute fracture or   subluxation. Exam: 2 views of the right hip. Comparison: Correlation made with CT performed today at 1539 hours. Findings:        Comminuted fracture involving femoral head. Fracture involving   superior margin of right acetabulum. Impression       Fracture of femoral head and superior margin of right acetabulum. Anesthesia Plan      general     ASA 4     (Pt agrees to [facility] ATHENS and IV sedation.)  Induction: intravenous. Anesthetic plan and risks discussed with patient. Use of blood products discussed with patient whom consented to blood products. Plan discussed with MARELY. Camille Peabody, DO   5/28/2022      Patient seen and examined, chart reviewed, agree with above findings. Anesthetic plan, risks, benefits, alternatives, and personnel involved discussed with patient.   Patient verbalized an understanding and agreed to proceed. NPO status confirmed. On Plavix. Anesthetic plan discussed with care team members and agreed upon.     Vik Victoria DO   5/28/2022  7:28 AM

## 2022-05-28 NOTE — OP NOTE
Operative Note      Patient: Jessy Montalvo  YOB: 1945  MRN: [id number]    Date of Procedure: 5/28/2022    Pre-Op Diagnosis: Right closed subtrochanteric periprosthetic fracture around a restoration modular total hip    Post-Op Diagnosis: Same         Procedure:  Open reduction internal fixation right subtrochanteric femur fracture around a total hip with plates, screws, and cables        Surgeon(s):  Mercedes Banda MD    Assistant:   Resident: Benjamin Tuttle DO; Alisson Mills DO; Pastora Davis DO    Anesthesia: General    Estimated Blood Loss (mL): 909     Complications: None    Specimens:   * No specimens in log *    Implants:  Implant Name Type Inv. Item Serial No.  Lot No. LRB No. Used Action   SYNTHES 3.5/4.5MM VA-LCP PPFX PROXIMAL FEMUR PLATE      Right 2 Implanted   SCREW BNE L16MM DIA3. 5MM PROX TIB S STL ST FULL THRD W/ T15 - [id number]  SCREW BNE L16MM DIA3. 5MM PROX TIB S STL ST FULL THRD W/ T15  DEPUY SYNTHES [employer]-WD  Right 1 Implanted         Drains:   Urinary Catheter Anaya (Active)   Site Assessment No urethral drainage 05/27/22 2200   Urine Color Yellow 05/27/22 2200   Urine Appearance Clear 05/27/22 2200   Collection Container Standard 05/27/22 2200   Securement Method Securing device (Describe) 05/27/22 2200   Catheter Care Completed Yes 05/27/22 2200   Catheter Best Practices  Drainage tube clipped to bed;Catheter secured to thigh; Tamper seal intact; Bag below bladder;Bag not on floor; Lack of dependent loop in tubing;Drainage bag less than half full 05/27/22 2200   Status Draining 05/27/22 2200   Output (mL) 750 mL 05/28/22 6332       Findings: Used a Synthes periprosthetic proximal femur plate and 2 cables with near-anatomic reduction    Detailed Description of Procedure:   Patient was brought to the operating room in a supine position on hospital room bed.   Patient was transferred to the operating room table by multiple individuals in the safe fashion with anesthesia and control the patient C-spine area. Once in the operating room table all points pressure identified well-padded. Patient was placed in a right lateral decubitus position. She had an axillary roll placed and all points pressure identified well-padded. She was held securely with the beanbag. Her right lower extremity was sterilely prepped and draped in the standard orthopedic fashion. A timeout was performed indicating the appropriate identification of the patient, the procedure to be performed, and the side to be performed upon. This is agreed upon by all individuals in the room. This point time we marked out her previous hip and revision surgery incision. We utilized a portion of this to start exposing a lateral approach the femur. Dissection was carried down to the iliotibial band which was incised line of this fibers. We did encounter fluid which was sent for culture it appeared to be a chronic seroma. There is no overt infection or odor. The bone was healthy once we got down the fracture site. Once was this was sent off for the lab we exposed our fracture site and reduce it anatomically with point reduction clamps. Our plate was then seen in the position we obtain bicortical fixation distally to the stem. Proximally placed multiple K wires in place to cables by using the passer directly on bone. Once these were passed they were tightened down to secure the medial spike from the fracture into position they were tightened and crimped and the cable was cut. Once the K wires were in position we then placed an additional bicortical screw distally. We placed multiple locking screws around the proximal portion of the greater trochanter. We placed 2 additional periprosthetic locking screws in the shaft to increase her working length. This point time final x-rays showed good positioning of hardware with the fracture well reduced. The bone was then josefa irrigated sterile saline.   Once it was josefa irrigated vancomycin powder was placed in the wound. The fascia of the vastus lateralis and iliotibial band were closed with 0 Vicryl. Subcutaneous tissue was closed with 2-0 Monocryl and skin with 3-0 nylon. Patient has sterile dressing put in position her compartments are soft compressible. She was taken to the PACU in stable condition. Postoperative plan:  1. Touchdown weightbearing right lower extremity    2. DVT prophylaxis most likely in the form of aspirin, we will seek medicine for input she will need DVT prophylaxis for approximately 6 weeks postoperative. 3.  Follow-up in the office in 2 weeks after she works with therapy in the hospital for suture removal and x-rays of the right hip and right femur.     Electronically signed by Bert Soni MD on 5/28/2022 at 9:36 AM

## 2022-05-29 LAB
ANION GAP SERPL CALCULATED.3IONS-SCNC: 14 MMOL/L (ref 7–16)
BUN BLDV-MCNC: 33 MG/DL (ref 6–23)
CALCIUM SERPL-MCNC: 8.1 MG/DL (ref 8.6–10.2)
CHLORIDE BLD-SCNC: 104 MMOL/L (ref 98–107)
CO2: 20 MMOL/L (ref 22–29)
CREAT SERPL-MCNC: 1.5 MG/DL (ref 0.5–1)
GFR AFRICAN AMERICAN: 41
GFR NON-AFRICAN AMERICAN: 34 ML/MIN/1.73
GLUCOSE BLD-MCNC: 285 MG/DL (ref 74–99)
GRAM STAIN ORDERABLE: NORMAL
GRAM STAIN ORDERABLE: NORMAL
HCT VFR BLD CALC: 35 % (ref 34–48)
HEMOGLOBIN: 11 G/DL (ref 11.5–15.5)
MCH RBC QN AUTO: 28.6 PG (ref 26–35)
MCHC RBC AUTO-ENTMCNC: 31.4 % (ref 32–34.5)
MCV RBC AUTO: 91.1 FL (ref 80–99.9)
METER GLUCOSE: 224 MG/DL (ref 74–99)
METER GLUCOSE: 258 MG/DL (ref 74–99)
METER GLUCOSE: 284 MG/DL (ref 74–99)
METER GLUCOSE: 352 MG/DL (ref 74–99)
PDW BLD-RTO: 13 FL (ref 11.5–15)
PLATELET # BLD: 256 E9/L (ref 130–450)
PMV BLD AUTO: 9.5 FL (ref 7–12)
POTASSIUM SERPL-SCNC: 5.3 MMOL/L (ref 3.5–5)
RBC # BLD: 3.84 E12/L (ref 3.5–5.5)
SODIUM BLD-SCNC: 138 MMOL/L (ref 132–146)
WBC # BLD: 18.1 E9/L (ref 4.5–11.5)

## 2022-05-29 PROCEDURE — 6370000000 HC RX 637 (ALT 250 FOR IP): Performed by: STUDENT IN AN ORGANIZED HEALTH CARE EDUCATION/TRAINING PROGRAM

## 2022-05-29 PROCEDURE — 2060000000 HC ICU INTERMEDIATE R&B

## 2022-05-29 PROCEDURE — 2500000003 HC RX 250 WO HCPCS: Performed by: STUDENT IN AN ORGANIZED HEALTH CARE EDUCATION/TRAINING PROGRAM

## 2022-05-29 PROCEDURE — 97166 OT EVAL MOD COMPLEX 45 MIN: CPT

## 2022-05-29 PROCEDURE — 97530 THERAPEUTIC ACTIVITIES: CPT

## 2022-05-29 PROCEDURE — 85027 COMPLETE CBC AUTOMATED: CPT

## 2022-05-29 PROCEDURE — 2580000003 HC RX 258: Performed by: STUDENT IN AN ORGANIZED HEALTH CARE EDUCATION/TRAINING PROGRAM

## 2022-05-29 PROCEDURE — 6370000000 HC RX 637 (ALT 250 FOR IP): Performed by: INTERNAL MEDICINE

## 2022-05-29 PROCEDURE — 36415 COLL VENOUS BLD VENIPUNCTURE: CPT

## 2022-05-29 PROCEDURE — 80048 BASIC METABOLIC PNL TOTAL CA: CPT

## 2022-05-29 PROCEDURE — 97161 PT EVAL LOW COMPLEX 20 MIN: CPT

## 2022-05-29 PROCEDURE — 97535 SELF CARE MNGMENT TRAINING: CPT

## 2022-05-29 PROCEDURE — 82962 GLUCOSE BLOOD TEST: CPT

## 2022-05-29 RX ORDER — LATANOPROST 50 UG/ML
1 SOLUTION/ DROPS OPHTHALMIC NIGHTLY
Status: DISCONTINUED | OUTPATIENT
Start: 2022-05-29 | End: 2022-05-31 | Stop reason: HOSPADM

## 2022-05-29 RX ORDER — GLIPIZIDE 5 MG/1
2.5 TABLET ORAL
Status: DISCONTINUED | OUTPATIENT
Start: 2022-05-30 | End: 2022-05-31 | Stop reason: HOSPADM

## 2022-05-29 RX ORDER — CLOPIDOGREL BISULFATE 75 MG/1
75 TABLET ORAL DAILY
Status: DISCONTINUED | OUTPATIENT
Start: 2022-05-29 | End: 2022-05-31 | Stop reason: HOSPADM

## 2022-05-29 RX ADMIN — HYDROXYCHLOROQUINE SULFATE 200 MG: 200 TABLET ORAL at 20:29

## 2022-05-29 RX ADMIN — HYDROCODONE BITARTRATE AND ACETAMINOPHEN 1 TABLET: 5; 325 TABLET ORAL at 19:59

## 2022-05-29 RX ADMIN — INSULIN LISPRO 10 UNITS: 100 INJECTION, SOLUTION INTRAVENOUS; SUBCUTANEOUS at 10:46

## 2022-05-29 RX ADMIN — VERAPAMIL HYDROCHLORIDE 240 MG: 240 TABLET, FILM COATED, EXTENDED RELEASE ORAL at 08:59

## 2022-05-29 RX ADMIN — Medication 10 ML: at 20:31

## 2022-05-29 RX ADMIN — CLOPIDOGREL BISULFATE 75 MG: 75 TABLET ORAL at 10:46

## 2022-05-29 RX ADMIN — HYDROCODONE BITARTRATE AND ACETAMINOPHEN 1 TABLET: 5; 325 TABLET ORAL at 10:50

## 2022-05-29 RX ADMIN — DULOXETINE 60 MG: 60 CAPSULE, DELAYED RELEASE ORAL at 08:59

## 2022-05-29 RX ADMIN — INSULIN LISPRO 6 UNITS: 100 INJECTION, SOLUTION INTRAVENOUS; SUBCUTANEOUS at 06:52

## 2022-05-29 RX ADMIN — CLINDAMYCIN IN 5 PERCENT DEXTROSE 600 MG: 12 INJECTION, SOLUTION INTRAVENOUS at 00:13

## 2022-05-29 RX ADMIN — ASPIRIN 81 MG: 81 TABLET, COATED ORAL at 08:59

## 2022-05-29 RX ADMIN — INSULIN LISPRO 4 UNITS: 100 INJECTION, SOLUTION INTRAVENOUS; SUBCUTANEOUS at 17:21

## 2022-05-29 RX ADMIN — ATORVASTATIN CALCIUM 20 MG: 20 TABLET, FILM COATED ORAL at 20:46

## 2022-05-29 RX ADMIN — OXYBUTYNIN CHLORIDE 15 MG: 10 TABLET, EXTENDED RELEASE ORAL at 10:46

## 2022-05-29 RX ADMIN — HYDROXYCHLOROQUINE SULFATE 200 MG: 200 TABLET ORAL at 08:59

## 2022-05-29 RX ADMIN — Medication 10 ML: at 09:00

## 2022-05-29 RX ADMIN — ASPIRIN 81 MG: 81 TABLET, COATED ORAL at 20:47

## 2022-05-29 RX ADMIN — INSULIN LISPRO 3 UNITS: 100 INJECTION, SOLUTION INTRAVENOUS; SUBCUTANEOUS at 20:37

## 2022-05-29 RX ADMIN — LATANOPROST 1 DROP: 50 SOLUTION OPHTHALMIC at 20:31

## 2022-05-29 RX ADMIN — VERAPAMIL HYDROCHLORIDE 120 MG: 240 TABLET, FILM COATED, EXTENDED RELEASE ORAL at 20:29

## 2022-05-29 ASSESSMENT — PAIN SCALES - GENERAL
PAINLEVEL_OUTOF10: 5
PAINLEVEL_OUTOF10: 0
PAINLEVEL_OUTOF10: 7

## 2022-05-29 ASSESSMENT — PAIN DESCRIPTION - DESCRIPTORS: DESCRIPTORS: ACHING;SHOOTING

## 2022-05-29 ASSESSMENT — PAIN DESCRIPTION - LOCATION: LOCATION: HIP

## 2022-05-29 ASSESSMENT — PAIN DESCRIPTION - ORIENTATION: ORIENTATION: RIGHT

## 2022-05-29 NOTE — PROGRESS NOTES
OCCUPATIONAL THERAPY INITIAL EVALUATION    [address] [address] [place]      Date:2022                                                  Patient Name: Isrrael Elena  MRN: [id number]  : 1945  Room: [address] [place] [address]    Evaluating OT: FAVIAN Painting, OTR/L [id number]  Referring Provider:Delvin Harkins DO  Specific Provider Orders: OT eval and treat   Recommended Adaptive Equipment: 24 hr physical assist     Diagnosis: R femur fx   Surgery:  ORIF R periprosthetic hip fx   Pertinent Medical History: none on file   Precautions:  Fall Risk, TTWB RLE (minimal just for balance)     Assessment of current deficits   [x] Functional mobility  [x]ADLs  [x] Strength               [x]Cognition   [x] Functional transfers   [x] IADLs         [x] Safety Awareness   [x]Endurance   [] Fine Coordination              [x] Balance      [] Vision/perception   [x]Sensation    []Gross Motor Coordination  [] ROM  [] Delirium                   [] Motor Control     OT PLAN OF CARE   OT POC based on physician orders, patient diagnosis and results of clinical assessment    Frequency/Duration: 2-5 days/wk for 2 weeks PRN   Specific OT Treatment to include:   * Instruction/training on adapted ADL techniques and AE recommendations to increase functional independence within precautions       * Training on energy conservation strategies, correct breathing pattern and techniques to improve independence/tolerance for self-care routine  * Functional transfer/mobility training/DME recommendations for increased independence, safety, and fall prevention  * Patient/Family education to increase follow through with safety techniques and functional independence  * Recommendation of environmental modifications for increased safety with functional transfers/mobility and ADLs  * Therapeutic exercise to improve motor endurance, ROM, and functional strength for ADLs/functional transfers  * Therapeutic activities to facilitate/challenge dynamic balance, stand tolerance for increased safety and independence with ADLs  * Neuro-muscular re-education: facilitation of righting/equilibrium reactions, midline orientation, scapular stability/mobility, normalization of muscle tone, and facilitation of volitional active controled movement    Home Living: Pt lives with a friend Kika Peter) in a 1 story home; bed/bath on main level   Bathroom setup: walk in shower   Equipment owned: cane, ww, w/c, shower chair, BSC  Prior Level of Function: assist with ADLs/IADLs; using rollator for functional mobility   Driving: no    Pain Level: surgical site  Cognition: A&O: 3/4; Follows 1 step directions, with repetition and increased time   Memory:  fair    Sequencing:  fair    Problem solving:  fair    Judgement/safety:  fair     Functional Assessment:  AM-PAC Daily Activity Raw Score: 12/24   Initial Eval Status  Date: 5/29/22 Treatment Status  Date: STGs=LTGs  Time Frame: 10-14 days   Feeding Min A (assist with positioning in bed)   SUP   Grooming SBA (seated EOB)   SUP   UB Dressing Min A   SBA   LB Dressing Dep (assist with socks)  Max A    Bathing Dep (simulated, 2 person)  Max A    Toileting Dep (2 person)  Max A   Bed Mobility  Log roll: Max A  Supine to sit: Max A   Sit to supine: Max A   Log roll Min A  Supine to sit: Min A   Sit to supine: Min A   Functional Transfers Sit to stand Max A  Stand to sit: Max A  Commode: NT  Mod A   Functional Mobility NT  Mod A   Balance Sitting: SBA  Standing: Max A     Activity Tolerance fair     Visual/  Perceptual Glasses: yes               UE ROM: BUE: elbow flex WFL, shoulder flex grossly 140'  Strength: RUE: grossly 3+/5 LUE: grossly 3+/5   Strength: B WFL  Fine Motor Coordination:  WFL     Hearing: WFL  Sensation:  No c/o numbness/tingling   Tone:  WFL  Edema: none noted                            Comments:Cleared by RN to see pt. Upon arrival, patient supine in bed and agreeable to OT session.  At end of session, patient supine in bed with call light and phone within reach, all lines and tubes intact. Pt would benefit from continued OT to increase functional independence and quality of life. Treatment: Pt required vc's and physical assist for proper technique/safety with hand placement/body mechanics/posture for bed mobility/ADLs/functional transfers. Pt required vc's for sequencing/initiation of ADLs/functional transfers. Pt able to  sit EOB  ~10 mins to increase core strength/balance/activity tolerance for ease with ADLs. Pt educated on WB status. Pt demo'ing F compliance with WB precautions. Pt required increased time to complete ADLs/functional transfers due to rest breaks. Pt instructed on use of call light for assistance and fall prevention. Pt demo'ing fair understanding of education provided. Continue to educate. Eval Complexity: moderate  · History: Expanded chart review of medical records and additional review of physical, cognitive, or psychosocial history related to current functional performance  · Exam: 3+ performance deficits  · Assistance/Modification: mod/max assistance or modifications required to perform tasks. May have comorbidities that affect occupational performance. Rehab Potential: Good for established goals, pt. assisted in establishment of goals. LTG: maximize independence with ADLs to return to PLOF    Patient  instructed on diagnosis, prognosis/goals and plan of care. Demonstrated fair understanding. [] Malnutrition indicators have been identified and nursing has been notified to ensure a dietitian consult is ordered. Evaluation time includes thorough review of current medical information, gathering information on past medical & social history & PLOF, completion of standardized testing, informal observation of tasks, consultation with other medical professions/disciplines, assessment of data & development of POC/goals.      Time In: 5492       Time Out: 1020 Total treatment time: 15       Treatment Charges: Mins Units   OT Eval Low 73645     OT Eval Medium 15262 X    OT Eval High [id number]     OT Re-Eval [id number]     Ther Ex  25041       Manual Therapy 91962       Thera Activities 05292       ADL/Home Mgt [id number] 15    Neuro Re-ed 82246       Group Therapy        Orthotic manage/training  08443       Non-Billable Time           Martha Kellogg, OTR/L [id number]

## 2022-05-29 NOTE — PLAN OF CARE
Problem: Discharge Planning  Goal: Discharge to home or other facility with appropriate resources  5/29/2022 1006 by Toan Vance RN  Outcome: Progressing  5/29/2022 0428 by Jose Leone RN  Outcome: Progressing     Problem: Pain  Goal: Verbalizes/displays adequate comfort level or baseline comfort level  5/29/2022 1006 by Toan Vance RN  Outcome: Progressing  5/29/2022 0428 by Jose Leone RN  Outcome: Progressing     Problem: Skin/Tissue Integrity  Goal: Absence of new skin breakdown  Description: 1. Monitor for areas of redness and/or skin breakdown  2. Assess vascular access sites hourly  3. Every 4-6 hours minimum:  Change oxygen saturation probe site  4. Every 4-6 hours:  If on nasal continuous positive airway pressure, respiratory therapy assess nares and determine need for appliance change or resting period.   5/29/2022 1006 by Toan Vance RN  Outcome: Progressing  5/29/2022 0428 by Jose Leone RN  Outcome: Progressing     Problem: Safety - Adult  Goal: Free from fall injury  5/29/2022 0428 by Jose Leone RN  Outcome: Progressing

## 2022-05-29 NOTE — PLAN OF CARE
Problem: Discharge Planning  Goal: Discharge to home or other facility with appropriate resources  Outcome: Progressing     Problem: Pain  Goal: Verbalizes/displays adequate comfort level or baseline comfort level  5/29/2022 0428 by Epi Almonte RN  Outcome: Progressing  5/28/2022 1849 by Gino Schaefer RN  Outcome: Progressing     Problem: Skin/Tissue Integrity  Goal: Absence of new skin breakdown  Description: 1. Monitor for areas of redness and/or skin breakdown  2. Assess vascular access sites hourly  3. Every 4-6 hours minimum:  Change oxygen saturation probe site  4. Every 4-6 hours:  If on nasal continuous positive airway pressure, respiratory therapy assess nares and determine need for appliance change or resting period.   Outcome: Progressing     Problem: Safety - Adult  Goal: Free from fall injury  Outcome: Progressing

## 2022-05-29 NOTE — PROGRESS NOTES
Physical Therapy  Physical Therapy Initial Assessment     Name: Saige Hill  : 1945  MRN: [id number]      Date of Service: 2022    Evaluating PT:  Teofilo Martinez PT, DPT    Room #:  1409/3014-P  Diagnosis:  Other fracture of right femur, initial encounter for closed fracture ([facility] [address] [S72.8X1A]  Periprosthetic fracture of proximal end of femur [M97. 8XXA, Z96.649]  Periprosthetic intertrochanteric fracture of femur, initial encounter [N29. Bertin Ayalae  PMHx/PSHx:  CKD, CVA, PAD, DM II, sarcoidosis  Procedure/Surgery:  S/p R periprosthetic hip ORIF   Precautions:  TTWB, fall risk  Equipment Needs:  TBD    SUBJECTIVE:    Pt lives with friend in a 1 story home with ramped entry. Bed/bath is on 1st floor. Pt ambulated with rollator PTA. OBJECTIVE:   Initial Evaluation  Date: 22 Treatment Short Term/ Long Term   Goals   AM-PAC 6 Clicks 58/30     Was pt agreeable to Eval/treatment? yes     Does pt have pain?  6/10 R hip     Bed Mobility  Rolling: min A  Supine to sit: min A (HOB elevated)  Sit to supine: NT  Scooting: min A  Rolling: mod I  Supine to sit: mod I  Sit to supine: mod I  Scooting: mod I   Transfers Sit to stand: mod A  Stand to sit: min A  Stand pivot: mod A with ww  Slideboard: NT  Sit to stand: mod I  Stand to sit: mod I  Stand pivot: mod I with AAD  Slideboard: mod I   Ambulation    NT  10'x2 with AAD mod I   Stair negotiation: ascended and descended  NT       Strength/ROM:   RLE grossly 2+/5  LLE grossly 4/5  RLE AROM limited by weakness and pain  LLE AROM WFL    Balance:   Static Sitting: SBA  Dynamic Sitting: CGA  Static Standing: min A with ww  Dynamic Standing: mod A with ww    Pt is A & O x 3  Sensation:  Pt reports decreased sensation in B feet  Edema:  RLE 1+    Therapeutic Exercises:    Bed mobility: supine>sit, cued for EOB positioning  Transfers: STSx2, stand pivotx1, cued for hand placement and postural correction  BLE AROM    Patient education  Pt educated on role of PT, importance of functional mobility during hospital stay, safety with functional mobility    Patient response to education:   Pt verbalized understanding Pt demonstrated skill Pt requires further education in this area   yes partial yes     ASSESSMENT:    Conditions Requiring Skilled Therapeutic Intervention:    [x]Decreased strength     [x]Decreased ROM  [x]Decreased functional mobility  [x]Decreased balance   [x]Decreased endurance   []Decreased posture  [x]Decreased sensation  []Decreased coordination   []Decreased vision  [x]Decreased safety awareness   [x]Increased pain       Comments:    Pt supine in bed upon entering, agreeable to participate. Pt instructed to transfer to EOB, completing transfer with assist of RLE, cueing for grab bars provided. Pt sitting upright with good static sitting balance. Pt with no c/o dizziness with position change. Pt educated on WB restrictions prior to attempting transfer. Pt cued for sequencing and instructed to stand from EOB, pt standing with ww, demonstrating fair static standing balance, intermittent use of RLE, however pt maintained TTWB well for majority of time in standing. Pt required seated rest break prior to attempting stand pivot transfer. Pt was cued for positioning and assisted with ww spacing with transfer to bedside chair, nursing present to observe pt's performance. Pt cued for hand placement and BLE positioning prior to sitting in chair. Pt made comfortable with BLE elevated while seated in chair. All needs met and call bell in reach prior to exiting.     Treatment:  Patient practiced and was instructed in the following treatment:     Bed mobility training - pt given verbal and tactile cues to facilitate proper sequencing and safety during rolling and supine>sit as well as provided with physical assistance to complete task   Sitting EOB for >5 minutes for upright tolerance, postural awareness and BLE ROM   STS and pivot transfer training - pt educated on proper hand and foot placement, safety and sequencing, and use of verbal and tactile cues to safely complete sit<>stand and pivot transfers with physical assistance to complete task safely        Pt's/ family goals   1. Return home    Prognosis is good for reaching above PT goals. Patient and or family understand(s) diagnosis, prognosis, and plan of care. yes    PHYSICAL THERAPY PLAN OF CARE:    PT POC is established based on physician order and patient diagnosis     Referring provider/PT Order:  Jorge Garcia DO  Diagnosis:  Other fracture of right femur, initial encounter for closed fracture ([facility] [address] [S72.8X1A]  Periprosthetic fracture of proximal end of femur [M97. 8XXA, Z96.649]  Periprosthetic intertrochanteric fracture of femur, initial encounter [J96. 8XXA, Z96.649]  Specific instructions for next treatment:  Progress as tolerated    Current Treatment Recommendations:     [x] Strengthening to improve independence with functional mobility   [] ROM to improve independence with functional mobility   [x] Balance Training to improve static/dynamic balance and to reduce fall risk  [x] Endurance Training to improve activity tolerance during functional mobility   [x] Transfer Training to improve safety and independence with all functional transfers   [x] Gait Training to improve gait mechanics, endurance and assess need for appropriate assistive device  [] Stair Training in preparation for safe discharge home and/or into the community   [x] Positioning to prevent skin breakdown and contractures  [x] Safety and Education Training   [x] Patient/Caregiver Education   [] HEP  [] Other     PT long term treatment goals are located in above grid    Frequency of treatments: 2-5x/week x 1-2 weeks.     Time in  1035  Time out  1100    Total Treatment Time  15 minutes     Evaluation Time includes thorough review of current medical information, gathering information on past medical history/social history and prior level of function, completion of standardized testing/informal observation of tasks, assessment of data and education on plan of care and goals.     CPT codes:  [x] Low Complexity PT evaluation 22067  [] Moderate Complexity PT evaluation 11683  [] High Complexity PT evaluation 10687  [] PT Re-evaluation 99119  [] Gait training 95900 -- minutes  [] Manual therapy [id number] -- minutes  [x] Therapeutic activities 29669 15 minutes  [] Therapeutic exercises 83657 -- minutes  [] Neuromuscular reeducation 05292 -- minutes     Kim Villa, PT, DPT  [id number]

## 2022-05-29 NOTE — PROGRESS NOTES
Department of Orthopedic Surgery  Resident Progress Note    POD 1. Patient seen and examined. Pain controlled. No new complaints. Denies chest pain, shortness of breath, dizziness/lightheadedness. VITALS:  BP (!) 107/59   Pulse 84   Temp 97.8 °F (36.6 °C) (Temporal)   Resp 18   Ht 5' 6\" (1.676 m)   Wt 262 lb 8 oz (119.1 kg)   SpO2 94%   BMI 42.37 kg/m²     General: in no acute distress and alert    MUSCULOSKELETAL:   right lower extremity:  · Dressing C/D/I  · Compartments soft and compressible  · Calf is soft and nontender  · +PF/DF/EHL  · Brisk Cap refill, Toes warm and perfused  · Distal sensation grossly intact to Peroneals, Sural, Saphenous, and tibial nrs    CBC:   Lab Results   Component Value Date    WBC 18.1 05/29/2022    HGB 11.0 05/29/2022    HCT 35.0 05/29/2022     05/29/2022     PT/INR:    Lab Results   Component Value Date    PROTIME 11.9 05/27/2022    PROTIME 12.0 12/09/2011    INR 1.1 05/27/2022       ASSESSMENT  · S/P right periprosthetic hip ORIF - 5/28/22    PLAN      · Continue physical therapy and protocol: TTWB - R LE  · 24 hour abx coverage to be completed today  · Deep venous thrombosis prophylaxis - ASA 81 mg bid, early mobilization  · PT/OT  · Pain Control: PO, wean off of IV  · Monitor H&H  · D/C Plan:  pending sw/cm and therapy recommendations.  Ok to discharge from orthopedic standpoint once appropriate discharge plan is in place    Electronically signed by Teodora Kiser DO on 5/29/2022 at 7:15 AM

## 2022-05-29 NOTE — PROGRESS NOTES
[facility] Medicine    Subjective:  Pt alert conversive c/o postop pain      Current Facility-Administered Medications:     bimatoprost (LUMIGAN) 0.01 % ophthalmic drops 1 drop, 1 drop, Both Eyes, Nightly, Romeo Plata DO    clopidogrel (PLAVIX) tablet 75 mg, 75 mg, Oral, Daily, Romeo Plata,     [START ON 5/30/2022] glimepiride (AMARYL) tablet 1 mg, 1 mg, Oral, QAM AC, Romeo Plata,     oxybutynin (DITROPAN-XL) extended release tablet 15 mg, 15 mg, Oral, Daily, Romeo Plata,     sodium chloride flush 0.9 % injection 5-40 mL, 5-40 mL, IntraVENous, 2 times per day, Agusto Police, DO, 10 mL at 05/29/22 0900    sodium chloride flush 0.9 % injection 5-40 mL, 5-40 mL, IntraVENous, PRN, Agusto Police, DO    0.9 % sodium chloride infusion, , IntraVENous, PRN, Agusto Police, DO    0.9 % sodium chloride infusion, , IntraVENous, Continuous, Agusto Police, DO, Last Rate: 100 mL/hr at 05/28/22 1349, New Bag at 05/28/22 1349    polyethylene glycol (GLYCOLAX) packet 17 g, 17 g, Oral, Daily PRN, Agusto Police, DO    aspirin EC tablet 81 mg, 81 mg, Oral, BID, Agusto Police, DO, 81 mg at 05/29/22 0859    glucose chewable tablet 16 g, 4 tablet, Oral, PRN, Romeo Plata, DO    dextrose bolus 10% 125 mL, 125 mL, IntraVENous, PRN **OR** dextrose bolus 10% 250 mL, 250 mL, IntraVENous, PRN, Romeo Wheelermer, DO    glucagon (rDNA) injection 1 mg, 1 mg, IntraMUSCular, PRN, Romeo Plata,     dextrose 5 % solution, 100 mL/hr, IntraVENous, PRN, Romeo Plata, DO    trimethobenzamide Ilir Karst) injection 200 mg, 200 mg, IntraMUSCular, Q6H PRN, Papi Arana, DO    atorvastatin (LIPITOR) tablet 20 mg, 20 mg, Oral, Nightly, Agusto Police, DO, 20 mg at 05/28/22 2011    DULoxetine (CYMBALTA) extended release capsule 60 mg, 60 mg, Oral, QAM, Agusto Stafford DO, 60 mg at 05/29/22 0859    hydroxychloroquine (PLAQUENIL) tablet 200 mg, 200 mg, Oral, BID, Agusto Stafford DO, 200 mg at 05/29/22 0859    acetaminophen (TYLENOL) tablet 650 mg, 650 mg, Oral, Q6H PRN **OR** acetaminophen (TYLENOL) suppository 650 mg, 650 mg, Rectal, Q6H PRN, Wylene Dorothy, DO    HYDROcodone-acetaminophen (NORCO) 5-325 MG per tablet 1 tablet, 1 tablet, Oral, Q6H PRN, Wylene Dorothy, DO, 1 tablet at 05/28/22 2246    morphine (PF) injection 2 mg, 2 mg, IntraVENous, Q4H PRN, Wylene Dorothy, DO, 2 mg at 05/27/22 1938    insulin lispro (HUMALOG) injection vial 0-12 Units, 0-12 Units, SubCUTAneous, TID WC, Wylene Dorothy, DO, 6 Units at 05/29/22 8043    insulin lispro (HUMALOG) injection vial 0-6 Units, 0-6 Units, SubCUTAneous, Nightly, Wylene Dorothy, DO, 4 Units at 05/28/22 2015    verapamil (CALAN SR) extended release tablet 120 mg, 120 mg, Oral, Nightly, Wylene Dorothy, DO, 120 mg at 05/28/22 2011    verapamil (CALAN SR) extended release tablet 240 mg, 240 mg, Oral, QAM, Wylene Dorothy, DO, 240 mg at 05/29/22 0859    albuterol (PROVENTIL) nebulizer solution 2.5 mg, 2.5 mg, Nebulization, Q4H PRN, Wylene Dorothy, DO    Objective:    /63   Pulse 80   Temp 98.7 °F (37.1 °C) (Temporal)   Resp 18   Ht 5' 6\" (1.676 m)   Wt 262 lb 8 oz (119.1 kg)   SpO2 93%   BMI 42.37 kg/m²     Heart:  reg  Lungs:  ctab  Abd: + bs soft nontender  Extrem:  Min edema legs    CBC with Differential:    Lab Results   Component Value Date    WBC 18.1 05/29/2022    RBC 3.84 05/29/2022    HGB 11.0 05/29/2022    HCT 35.0 05/29/2022     05/29/2022    MCV 91.1 05/29/2022    MCH 28.6 05/29/2022    MCHC 31.4 05/29/2022    RDW 13.0 05/29/2022    LYMPHOPCT 13.4 05/26/2022    MONOPCT 10.4 05/26/2022    BASOPCT 0.6 05/26/2022    MONOSABS 1.20 05/26/2022    LYMPHSABS 1.54 05/26/2022    EOSABS 0.26 05/26/2022    BASOSABS 0.07 05/26/2022     CMP:    Lab Results   Component Value Date     05/29/2022    K 5.3 05/29/2022    K 4.2 05/26/2022     05/29/2022    CO2 20 05/29/2022    BUN 33 05/29/2022    CREATININE 1.5 05/29/2022    GFRAA 41 05/29/2022    LABGLOM 34 05/29/2022    GLUCOSE 285 05/29/2022    GLUCOSE 82 12/09/2011    PROT 7.7 05/26/2022    LABALBU 4.0 05/26/2022    CALCIUM 8.1 05/29/2022    BILITOT 0.3 05/26/2022    ALKPHOS 162 05/26/2022    AST 21 05/26/2022    ALT 25 05/26/2022     Warfarin PT/INR:    Lab Results   Component Value Date    INR 1.1 05/27/2022    INR 1.0 10/24/2020    INR 2.2 04/30/2018    PROTIME 11.9 05/27/2022    PROTIME 11.3 10/24/2020    PROTIME 24.8 (H) 04/30/2018       Assessment:    Principal Problem:    Mirtha-prosthetic intertrochanteric fracture of femur  Active Problems:    Other fracture of right femur, initial encounter for closed fracture ([facility])    Obesity    CKD (chronic kidney disease) stage 3, GFR 30-59 ml/min ([facility])    Prolonged QT interval    Essential hypertension    Diabetes mellitus ([facility] [address]    Sarcoidosis    Sleep apnea    History of CVA (cerebrovascular accident)    PVD (peripheral vascular disease) ([facility] [address]  Resolved Problems:    * No resolved hospital problems.  *      Plan:  Cont postop care cont as per ortho dc planning serial lab pain control        Jo Morrison DO  9:50 AM  5/29/2022

## 2022-05-30 LAB
ANION GAP SERPL CALCULATED.3IONS-SCNC: 13 MMOL/L (ref 7–16)
BUN BLDV-MCNC: 36 MG/DL (ref 6–23)
CALCIUM SERPL-MCNC: 8.1 MG/DL (ref 8.6–10.2)
CHLORIDE BLD-SCNC: 104 MMOL/L (ref 98–107)
CO2: 22 MMOL/L (ref 22–29)
CREAT SERPL-MCNC: 1.5 MG/DL (ref 0.5–1)
GFR AFRICAN AMERICAN: 41
GFR NON-AFRICAN AMERICAN: 34 ML/MIN/1.73
GLUCOSE BLD-MCNC: 180 MG/DL (ref 74–99)
HCT VFR BLD CALC: 32.4 % (ref 34–48)
HEMOGLOBIN: 10.1 G/DL (ref 11.5–15.5)
MCH RBC QN AUTO: 28.9 PG (ref 26–35)
MCHC RBC AUTO-ENTMCNC: 31.2 % (ref 32–34.5)
MCV RBC AUTO: 92.8 FL (ref 80–99.9)
METER GLUCOSE: 143 MG/DL (ref 74–99)
METER GLUCOSE: 165 MG/DL (ref 74–99)
METER GLUCOSE: 196 MG/DL (ref 74–99)
METER GLUCOSE: 231 MG/DL (ref 74–99)
PDW BLD-RTO: 13.4 FL (ref 11.5–15)
PLATELET # BLD: 251 E9/L (ref 130–450)
PMV BLD AUTO: 9.3 FL (ref 7–12)
POTASSIUM SERPL-SCNC: 4.3 MMOL/L (ref 3.5–5)
RBC # BLD: 3.49 E12/L (ref 3.5–5.5)
SODIUM BLD-SCNC: 139 MMOL/L (ref 132–146)
WBC # BLD: 13.6 E9/L (ref 4.5–11.5)

## 2022-05-30 PROCEDURE — 2580000003 HC RX 258: Performed by: STUDENT IN AN ORGANIZED HEALTH CARE EDUCATION/TRAINING PROGRAM

## 2022-05-30 PROCEDURE — 36415 COLL VENOUS BLD VENIPUNCTURE: CPT

## 2022-05-30 PROCEDURE — 6370000000 HC RX 637 (ALT 250 FOR IP): Performed by: STUDENT IN AN ORGANIZED HEALTH CARE EDUCATION/TRAINING PROGRAM

## 2022-05-30 PROCEDURE — 6370000000 HC RX 637 (ALT 250 FOR IP): Performed by: INTERNAL MEDICINE

## 2022-05-30 PROCEDURE — 80048 BASIC METABOLIC PNL TOTAL CA: CPT

## 2022-05-30 PROCEDURE — 85027 COMPLETE CBC AUTOMATED: CPT

## 2022-05-30 PROCEDURE — 2060000000 HC ICU INTERMEDIATE R&B

## 2022-05-30 PROCEDURE — 82962 GLUCOSE BLOOD TEST: CPT

## 2022-05-30 RX ADMIN — CLOPIDOGREL BISULFATE 75 MG: 75 TABLET ORAL at 08:19

## 2022-05-30 RX ADMIN — Medication 10 ML: at 20:23

## 2022-05-30 RX ADMIN — INSULIN LISPRO 2 UNITS: 100 INJECTION, SOLUTION INTRAVENOUS; SUBCUTANEOUS at 16:01

## 2022-05-30 RX ADMIN — VERAPAMIL HYDROCHLORIDE 240 MG: 240 TABLET, FILM COATED, EXTENDED RELEASE ORAL at 08:22

## 2022-05-30 RX ADMIN — GLIPIZIDE 2.5 MG: 5 TABLET ORAL at 06:22

## 2022-05-30 RX ADMIN — INSULIN LISPRO 2 UNITS: 100 INJECTION, SOLUTION INTRAVENOUS; SUBCUTANEOUS at 11:19

## 2022-05-30 RX ADMIN — DULOXETINE 60 MG: 60 CAPSULE, DELAYED RELEASE ORAL at 08:24

## 2022-05-30 RX ADMIN — VERAPAMIL HYDROCHLORIDE 120 MG: 240 TABLET, FILM COATED, EXTENDED RELEASE ORAL at 20:18

## 2022-05-30 RX ADMIN — HYDROXYCHLOROQUINE SULFATE 200 MG: 200 TABLET ORAL at 08:20

## 2022-05-30 RX ADMIN — HYDROXYCHLOROQUINE SULFATE 200 MG: 200 TABLET ORAL at 20:18

## 2022-05-30 RX ADMIN — ASPIRIN 81 MG: 81 TABLET, COATED ORAL at 08:20

## 2022-05-30 RX ADMIN — ASPIRIN 81 MG: 81 TABLET, COATED ORAL at 20:18

## 2022-05-30 RX ADMIN — Medication 10 ML: at 08:41

## 2022-05-30 RX ADMIN — OXYBUTYNIN CHLORIDE 15 MG: 10 TABLET, EXTENDED RELEASE ORAL at 08:22

## 2022-05-30 RX ADMIN — ATORVASTATIN CALCIUM 20 MG: 20 TABLET, FILM COATED ORAL at 20:18

## 2022-05-30 RX ADMIN — INSULIN LISPRO 2 UNITS: 100 INJECTION, SOLUTION INTRAVENOUS; SUBCUTANEOUS at 20:22

## 2022-05-30 RX ADMIN — HYDROCODONE BITARTRATE AND ACETAMINOPHEN 1 TABLET: 5; 325 TABLET ORAL at 16:01

## 2022-05-30 RX ADMIN — INSULIN LISPRO 2 UNITS: 100 INJECTION, SOLUTION INTRAVENOUS; SUBCUTANEOUS at 06:22

## 2022-05-30 ASSESSMENT — PAIN SCALES - GENERAL
PAINLEVEL_OUTOF10: 0
PAINLEVEL_OUTOF10: 4
PAINLEVEL_OUTOF10: 0

## 2022-05-30 NOTE — PROGRESS NOTES
[facility] Medicine    Subjective:  Pt alert conversive sitting up in chair      Current Facility-Administered Medications:     latanoprost (XALATAN) 0.005 % ophthalmic solution 1 drop, 1 drop, Both Eyes, Nightly, Manuel Brown Malmer, DO, 1 drop at 05/29/22 2031    clopidogrel (PLAVIX) tablet 75 mg, 75 mg, Oral, Daily, med Kevin Malmer, DO, 75 mg at 05/30/22 1756    glipiZIDE (GLUCOTROL) tablet 2.5 mg, 2.5 mg, Oral, QAM AC, med Garden Malmer, DO, 2.5 mg at 05/30/22 1394    oxybutynin (DITROPAN-XL) extended release tablet 15 mg, 15 mg, Oral, Daily, med Kevin Malmer, DO, 15 mg at 05/30/22 1255    sodium chloride flush 0.9 % injection 5-40 mL, 5-40 mL, IntraVENous, 2 times per day, Gevena Guppy, DO, 10 mL at 05/30/22 0841    sodium chloride flush 0.9 % injection 5-40 mL, 5-40 mL, IntraVENous, PRN, Gevena Guppy, DO    0.9 % sodium chloride infusion, , IntraVENous, PRN, Gevena Guppy, DO    0.9 % sodium chloride infusion, , IntraVENous, Continuous, Gevena Guppy, DO, Last Rate: 100 mL/hr at 05/28/22 1349, New Bag at 05/28/22 1349    polyethylene glycol (GLYCOLAX) packet 17 g, 17 g, Oral, Daily PRN, Gevena Guppy, DO    aspirin EC tablet 81 mg, 81 mg, Oral, BID, Gevena Guppy, DO, 81 mg at 05/30/22 0820    glucose chewable tablet 16 g, 4 tablet, Oral, PRN, med Garden Malmer, DO    dextrose bolus 10% 125 mL, 125 mL, IntraVENous, PRN **OR** dextrose bolus 10% 250 mL, 250 mL, IntraVENous, PRN, Ahmed Garden Malmer, DO    glucagon (rDNA) injection 1 mg, 1 mg, IntraMUSCular, PRN, Michellemed Garden Malmer, DO    dextrose 5 % solution, 100 mL/hr, IntraVENous, PRN, med Kevin Malmer, DO    trimethobenzamide Huseyin Sabrina) injection 200 mg, 200 mg, IntraMUSCular, Q6H PRN, [place] L Sumit, DO    atorvastatin (LIPITOR) tablet 20 mg, 20 mg, Oral, Nightly, Gevena Guppy, DO, 20 mg at 05/29/22 2046    DULoxetine (CYMBALTA) extended release capsule 60 mg, 60 mg, Oral, QAM, Gevena Guppy, DO, 60 mg at 05/30/22 8317   hydroxychloroquine (PLAQUENIL) tablet 200 mg, 200 mg, Oral, BID, Paddy Ruiz, DO, 200 mg at 05/30/22 0820    acetaminophen (TYLENOL) tablet 650 mg, 650 mg, Oral, Q6H PRN **OR** acetaminophen (TYLENOL) suppository 650 mg, 650 mg, Rectal, Q6H PRN, Paddy Ruiz, DO    HYDROcodone-acetaminophen (NORCO) 5-325 MG per tablet 1 tablet, 1 tablet, Oral, Q6H PRN, Paddy Ruiz, DO, 1 tablet at 05/29/22 1959    morphine (PF) injection 2 mg, 2 mg, IntraVENous, Q4H PRN, Paddy Ruiz, DO, 2 mg at 05/27/22 1938    insulin lispro (HUMALOG) injection vial 0-12 Units, 0-12 Units, SubCUTAneous, TID WC, Paddy Ruiz, DO, 2 Units at 05/30/22 0622    insulin lispro (HUMALOG) injection vial 0-6 Units, 0-6 Units, SubCUTAneous, Nightly, Paddy Ruiz, DO, 3 Units at 05/29/22 2037    verapamil (CALAN SR) extended release tablet 120 mg, 120 mg, Oral, Nightly, Paddy Ruiz, DO, 120 mg at 05/29/22 2029    verapamil (CALAN SR) extended release tablet 240 mg, 240 mg, Oral, QAM, Paddy Ruiz, DO, 240 mg at 05/30/22 6699    albuterol (PROVENTIL) nebulizer solution 2.5 mg, 2.5 mg, Nebulization, Q4H PRN, Paddy Ruiz, DO    Objective:    BP (!) 131/59   Pulse 81   Temp 97.4 °F (36.3 °C) (Temporal)   Resp 18   Ht 5' 6\" (1.676 m)   Wt 262 lb 8 oz (119.1 kg)   SpO2 97%   BMI 42.37 kg/m²     Heart:  reg  Lungs:  ctab  Abd: + bs soft nontender  Extrem:  Min edema legs    CBC with Differential:    Lab Results   Component Value Date    WBC 13.6 05/30/2022    RBC 3.49 05/30/2022    HGB 10.1 05/30/2022    HCT 32.4 05/30/2022     05/30/2022    MCV 92.8 05/30/2022    MCH 28.9 05/30/2022    MCHC 31.2 05/30/2022    RDW 13.4 05/30/2022    LYMPHOPCT 13.4 05/26/2022    MONOPCT 10.4 05/26/2022    BASOPCT 0.6 05/26/2022    MONOSABS 1.20 05/26/2022    LYMPHSABS 1.54 05/26/2022    EOSABS 0.26 05/26/2022    BASOSABS 0.07 05/26/2022     CMP:    Lab Results   Component Value Date     05/30/2022    K 4.3 05/30/2022    K 4.2 05/26/2022     05/30/2022    CO2 22 05/30/2022    BUN 36 05/30/2022    CREATININE 1.5 05/30/2022    GFRAA 41 05/30/2022    LABGLOM 34 05/30/2022    GLUCOSE 180 05/30/2022    GLUCOSE 82 12/09/2011    PROT 7.7 05/26/2022    LABALBU 4.0 05/26/2022    CALCIUM 8.1 05/30/2022    BILITOT 0.3 05/26/2022    ALKPHOS 162 05/26/2022    AST 21 05/26/2022    ALT 25 05/26/2022     Warfarin PT/INR:    Lab Results   Component Value Date    INR 1.1 05/27/2022    INR 1.0 10/24/2020    INR 2.2 04/30/2018    PROTIME 11.9 05/27/2022    PROTIME 11.3 10/24/2020    PROTIME 24.8 (H) 04/30/2018       Assessment:    Principal Problem:    Mirtha-prosthetic intertrochanteric fracture of femur  Active Problems:    Other fracture of right femur, initial encounter for closed fracture ([facility])    Obesity    CKD (chronic kidney disease) stage 3, GFR 30-59 ml/min ([facility])    Prolonged QT interval    Essential hypertension    Diabetes mellitus ([facility] [address]    Sarcoidosis    Sleep apnea    History of CVA (cerebrovascular accident)    PVD (peripheral vascular disease) ([facility] [address]  Resolved Problems:    * No resolved hospital problems.  *      Plan:  Cont postop care dc planning / patient declines inpt rehab        Jo Morrison DO  10:00 AM  5/30/2022

## 2022-05-30 NOTE — PROGRESS NOTES
Department of Orthopedic Surgery  Resident Progress Note    POD 1. Patient seen and examined. Pain controlled. Some numbness however this is a chronic symptom she reports. No new complaints. Denies chest pain, shortness of breath, dizziness/lightheadedness. VITALS:  BP (!) 145/56   Pulse 79   Temp (!) 96.7 °F (35.9 °C) (Temporal)   Resp 16   Ht 5' 6\" (1.676 m)   Wt 262 lb 8 oz (119.1 kg)   SpO2 97%   BMI 42.37 kg/m²     General: in no acute distress and alert    MUSCULOSKELETAL:   right lower extremity:  · Dressing C/D/I  · Compartments soft and compressible  · Calf is soft and nontender  · +PF/DF/EHL  · Brisk Cap refill, Toes warm and perfused  · Distal sensation grossly diminished to Peroneals, Sural, Saphenous, and tibial nrs. Patient reports having neuropathy and this is her baseline. CBC:   Lab Results   Component Value Date    WBC 18.1 05/29/2022    HGB 11.0 05/29/2022    HCT 35.0 05/29/2022     05/29/2022     PT/INR:    Lab Results   Component Value Date    PROTIME 11.9 05/27/2022    PROTIME 12.0 12/09/2011    INR 1.1 05/27/2022       ASSESSMENT  · S/P right periprosthetic hip ORIF - 5/28/22    PLAN      · Continue physical therapy and protocol: TTWB - R LE  · 24 hour abx coverage to be completed today  · Deep venous thrombosis prophylaxis - ASA 81 mg bid, early mobilization  · PT/OT  · Pain Control: PO  · Monitor H&H: 10.1  · D/C Plan:  pending sw/cm and therapy recommendations.  Ok to discharge from orthopedic standpoint once appropriate discharge plan is in place    Electronically signed by Rodney Shore DO on 5/30/2022 at 6:10 AM

## 2022-05-31 VITALS
WEIGHT: 262.5 LBS | TEMPERATURE: 97 F | HEIGHT: 66 IN | SYSTOLIC BLOOD PRESSURE: 123 MMHG | HEART RATE: 73 BPM | BODY MASS INDEX: 42.19 KG/M2 | RESPIRATION RATE: 20 BRPM | OXYGEN SATURATION: 96 % | DIASTOLIC BLOOD PRESSURE: 57 MMHG

## 2022-05-31 LAB
METER GLUCOSE: 154 MG/DL (ref 74–99)
METER GLUCOSE: 193 MG/DL (ref 74–99)

## 2022-05-31 PROCEDURE — 82962 GLUCOSE BLOOD TEST: CPT

## 2022-05-31 PROCEDURE — 6370000000 HC RX 637 (ALT 250 FOR IP): Performed by: STUDENT IN AN ORGANIZED HEALTH CARE EDUCATION/TRAINING PROGRAM

## 2022-05-31 PROCEDURE — 6370000000 HC RX 637 (ALT 250 FOR IP): Performed by: INTERNAL MEDICINE

## 2022-05-31 PROCEDURE — 2580000003 HC RX 258: Performed by: STUDENT IN AN ORGANIZED HEALTH CARE EDUCATION/TRAINING PROGRAM

## 2022-05-31 RX ADMIN — INSULIN LISPRO 2 UNITS: 100 INJECTION, SOLUTION INTRAVENOUS; SUBCUTANEOUS at 11:46

## 2022-05-31 RX ADMIN — INSULIN LISPRO 2 UNITS: 100 INJECTION, SOLUTION INTRAVENOUS; SUBCUTANEOUS at 06:33

## 2022-05-31 RX ADMIN — VERAPAMIL HYDROCHLORIDE 240 MG: 240 TABLET, FILM COATED, EXTENDED RELEASE ORAL at 08:06

## 2022-05-31 RX ADMIN — Medication 10 ML: at 11:24

## 2022-05-31 RX ADMIN — HYDROXYCHLOROQUINE SULFATE 200 MG: 200 TABLET ORAL at 08:08

## 2022-05-31 RX ADMIN — ASPIRIN 81 MG: 81 TABLET, COATED ORAL at 08:08

## 2022-05-31 RX ADMIN — OXYBUTYNIN CHLORIDE 15 MG: 10 TABLET, EXTENDED RELEASE ORAL at 08:07

## 2022-05-31 RX ADMIN — DULOXETINE 60 MG: 60 CAPSULE, DELAYED RELEASE ORAL at 08:07

## 2022-05-31 RX ADMIN — CLOPIDOGREL BISULFATE 75 MG: 75 TABLET ORAL at 08:07

## 2022-05-31 RX ADMIN — GLIPIZIDE 2.5 MG: 5 TABLET ORAL at 08:08

## 2022-05-31 ASSESSMENT — PAIN SCALES - GENERAL
PAINLEVEL_OUTOF10: 0
PAINLEVEL_OUTOF10: 0

## 2022-05-31 NOTE — PROGRESS NOTES
[facility] Medicine    Subjective:  Pt seen this am alert conversive declines DONOVAN      Current Facility-Administered Medications:     latanoprost (XALATAN) 0.005 % ophthalmic solution 1 drop, 1 drop, Both Eyes, Nightly, Bert  Malmer, DO, 1 drop at 05/29/22 2031    clopidogrel (PLAVIX) tablet 75 mg, 75 mg, Oral, Daily, Bert  Malmer, DO, 75 mg at 05/31/22 1677    glipiZIDE (GLUCOTROL) tablet 2.5 mg, 2.5 mg, Oral, QAM AC, Bert  Malmer, DO, 2.5 mg at 05/31/22 0808    oxybutynin (DITROPAN-XL) extended release tablet 15 mg, 15 mg, Oral, Daily, Bert  Malmer, DO, 15 mg at 05/31/22 0807    sodium chloride flush 0.9 % injection 5-40 mL, 5-40 mL, IntraVENous, 2 times per day, Christian Bolds, DO, 10 mL at 05/31/22 1124    sodium chloride flush 0.9 % injection 5-40 mL, 5-40 mL, IntraVENous, PRN, Christian Bolds, DO    0.9 % sodium chloride infusion, , IntraVENous, PRN, Christian Bolds, DO    0.9 % sodium chloride infusion, , IntraVENous, Continuous, Christian Bolds, DO, Last Rate: 100 mL/hr at 05/28/22 1349, New Bag at 05/28/22 1349    polyethylene glycol (GLYCOLAX) packet 17 g, 17 g, Oral, Daily PRN, Christian Bolds, DO    aspirin EC tablet 81 mg, 81 mg, Oral, BID, Christian Bolds, DO, 81 mg at 05/31/22 0808    glucose chewable tablet 16 g, 4 tablet, Oral, PRN, Bert  Malmer, DO    dextrose bolus 10% 125 mL, 125 mL, IntraVENous, PRN **OR** dextrose bolus 10% 250 mL, 250 mL, IntraVENous, PRN, Bert  Malmer, DO    glucagon (rDNA) injection 1 mg, 1 mg, IntraMUSCular, PRN, Bert  Malmer, DO    dextrose 5 % solution, 100 mL/hr, IntraVENous, PRN, Bert  Malmer, DO    trimethobenzamide Morris Leopard) injection 200 mg, 200 mg, IntraMUSCular, Q6H PRN, Samantha Arana DO    atorvastatin (LIPITOR) tablet 20 mg, 20 mg, Oral, Nightly, Gino Schaefer DO, 20 mg at 05/30/22 2018    DULoxetine (CYMBALTA) extended release capsule 60 mg, 60 mg, Oral, QAM, Gino Schaefer, , 60 mg at 05/31/22 6974   hydroxychloroquine (PLAQUENIL) tablet 200 mg, 200 mg, Oral, BID, Alexander Kelly, DO, 200 mg at 05/31/22 0808    acetaminophen (TYLENOL) tablet 650 mg, 650 mg, Oral, Q6H PRN **OR** acetaminophen (TYLENOL) suppository 650 mg, 650 mg, Rectal, Q6H PRN, Alexander Kelly, DO    HYDROcodone-acetaminophen (NORCO) 5-325 MG per tablet 1 tablet, 1 tablet, Oral, Q6H PRN, Alexander Kelly, DO, 1 tablet at 05/30/22 1601    morphine (PF) injection 2 mg, 2 mg, IntraVENous, Q4H PRN, Alexander Kelly, DO, 2 mg at 05/27/22 1938    insulin lispro (HUMALOG) injection vial 0-12 Units, 0-12 Units, SubCUTAneous, TID WC, Alexander Kelly, DO, 2 Units at 05/31/22 1146    insulin lispro (HUMALOG) injection vial 0-6 Units, 0-6 Units, SubCUTAneous, Nightly, Alexander Kelly, DO, 2 Units at 05/30/22 2022    verapamil (CALAN SR) extended release tablet 120 mg, 120 mg, Oral, Nightly, Alexander Kelly, DO, 120 mg at 05/30/22 2018    verapamil (CALAN SR) extended release tablet 240 mg, 240 mg, Oral, QAM, Alexander Kelly, DO, 240 mg at 05/31/22 0806    albuterol (PROVENTIL) nebulizer solution 2.5 mg, 2.5 mg, Nebulization, Q4H PRN, Alexander Kelly, DO    Objective:    BP (!) 123/57   Pulse 73   Temp 97 °F (36.1 °C) (Temporal)   Resp 20   Ht 5' 6\" (1.676 m)   Wt 262 lb 8 oz (119.1 kg)   SpO2 96%   BMI 42.37 kg/m²     Heart:  Reg  Lungs:  ctab  Abd: + bs soft nontender  Extrem:  min edema legs    CBC with Differential:    Lab Results   Component Value Date    WBC 13.6 05/30/2022    RBC 3.49 05/30/2022    HGB 10.1 05/30/2022    HCT 32.4 05/30/2022     05/30/2022    MCV 92.8 05/30/2022    MCH 28.9 05/30/2022    MCHC 31.2 05/30/2022    RDW 13.4 05/30/2022    LYMPHOPCT 13.4 05/26/2022    MONOPCT 10.4 05/26/2022    BASOPCT 0.6 05/26/2022    MONOSABS 1.20 05/26/2022    LYMPHSABS 1.54 05/26/2022    EOSABS 0.26 05/26/2022    BASOSABS 0.07 05/26/2022     CMP:    Lab Results   Component Value Date     05/30/2022    K 4.3 05/30/2022    K 4.2 05/26/2022     05/30/2022    CO2 22 05/30/2022    BUN 36 05/30/2022    CREATININE 1.5 05/30/2022    GFRAA 41 05/30/2022    LABGLOM 34 05/30/2022    GLUCOSE 180 05/30/2022    GLUCOSE 82 12/09/2011    PROT 7.7 05/26/2022    LABALBU 4.0 05/26/2022    CALCIUM 8.1 05/30/2022    BILITOT 0.3 05/26/2022    ALKPHOS 162 05/26/2022    AST 21 05/26/2022    ALT 25 05/26/2022     Warfarin PT/INR:    Lab Results   Component Value Date    INR 1.1 05/27/2022    INR 1.0 10/24/2020    INR 2.2 04/30/2018    PROTIME 11.9 05/27/2022    PROTIME 11.3 10/24/2020    PROTIME 24.8 (H) 04/30/2018       Assessment:    Principal Problem:    Mirtha-prosthetic intertrochanteric fracture of femur  Active Problems:    Other fracture of right femur, initial encounter for closed fracture (HCC)    Obesity    CKD (chronic kidney disease) stage 3, GFR 30-59 ml/min ([facility])    Prolonged QT interval    Essential hypertension    Diabetes mellitus ([facility]    Sarcoidosis    Sleep apnea    History of CVA (cerebrovascular accident)    PVD (peripheral vascular disease) ([facility]  Resolved Problems:    * No resolved hospital problems.  *      Plan:  Dc home with home health        Adrian Pierce, DO  2:05 PM  5/31/2022

## 2022-05-31 NOTE — CARE COORDINATION
Met with pt at bedside to discuss discharge / transition of care plan. Pt wants [facility], list declined, chose [facility] however, they have no availability for two weeks; revisited at bedside, choiced for [facility], referral made to [facility], awaiting acceptance, bedside RN in AM Nx rounds aware need for order; pt would like ambulette service home, understands out of pocket; envelope and ambulette form completed in soft-chart. This CM will set-up transport once pt is accepted. [facility], accepted per [facility]. Transportation set-up for 1430 via [employer]. This CM spoke with Cheyanne Garcia (p) [phone], provided [facility] phone for payment, and updated on transport time 1430 and [facility]. Pt notified at bedside. The Plan for Transition of Care is related to the following treatment goals: medical stability    The Patient and/or patient representative was provided with a choice of provider and agrees   with the discharge plan. [x] Yes [] No    Freedom of choice list was provided with basic dialogue that supports the patient's individualized plan of care/goals, treatment preferences and shares the quality data associated with the providers.  [x] Yes [] No

## 2022-05-31 NOTE — FLOWSHEET NOTE
05/31/22 0909   Encounter Summary   Encounter Overview/Reason  Initial Encounter   Service Provided For: Patient   Referral/Consult From: [facility] Family members;Friends/neighbors   Last Encounter  05/31/22  (DL)   Complexity of Encounter Moderate   Spiritual/Emotional needs   Type Spiritual Support   Advance Care Planning   Type ACP conversation   Assessment/Intervention/Outcome   Assessment Calm;Coping   Intervention Active listening;Nurtured Hope;Prayer (assurance of)/Gulliver; Discussed illness injury and its impact   Outcome Comfort;Coping;Engaged in conversation;Expressed Gratitude     Consult for advance directives. Giana Montes does have documents for health care decision maker in her chart already. I made sure it was still current. Emotional and spiritual support provided.

## 2022-06-02 LAB
ANAEROBIC CULTURE: NORMAL
ANAEROBIC CULTURE: NORMAL
BODY FLUID CULTURE, STERILE: NORMAL
BODY FLUID CULTURE, STERILE: NORMAL
GRAM STAIN RESULT: NORMAL
GRAM STAIN RESULT: NORMAL

## 2022-06-06 ENCOUNTER — HOSPITAL ENCOUNTER (EMERGENCY)
Age: 77
Discharge: HOME | End: 2022-06-06
Attending: EMERGENCY MEDICINE
Payer: MEDICARE

## 2022-06-06 ENCOUNTER — APPOINTMENT (OUTPATIENT)
Dept: GENERAL RADIOLOGY | Age: 77
End: 2022-06-06
Payer: MEDICARE

## 2022-06-06 VITALS
HEIGHT: 66 IN | RESPIRATION RATE: 16 BRPM | OXYGEN SATURATION: 94 % | HEART RATE: 98 BPM | TEMPERATURE: 98 F | DIASTOLIC BLOOD PRESSURE: 65 MMHG | WEIGHT: 250 LBS | SYSTOLIC BLOOD PRESSURE: 144 MMHG | BODY MASS INDEX: 40.18 KG/M2

## 2022-06-06 DIAGNOSIS — S70.01XA HEMATOMA OF RIGHT HIP, INITIAL ENCOUNTER: Primary | ICD-10-CM

## 2022-06-06 PROCEDURE — 73502 X-RAY EXAM HIP UNI 2-3 VIEWS: CPT

## 2022-06-06 PROCEDURE — 99283 EMERGENCY DEPT VISIT LOW MDM: CPT

## 2022-06-06 ASSESSMENT — ENCOUNTER SYMPTOMS
CHEST TIGHTNESS: 0
ABDOMINAL PAIN: 0

## 2022-06-06 NOTE — CONSULTS
Department of Orthopedic Surgery  Resident Consult Note          Reason for Consult:  Right postoperative hip drainage    HISTORY OF PRESENT ILLNESS:    68-year-old female with history of right ORIF subtrochanteric femur fracture around restoration modulator form by Dr. Xin Neff on 5/27/2022 resents with postoperative hip drainage. Patient reports mild drainage last few days postoperatively with pain improvement. Patient indicated that she looked and saw a small pool of blood about to stop the refills. She decided to seek medical advice to rule out signs of infection given her prior history of chronic periprosthetic joint infection. Patient denies acute fever, chills, nausea, vomiting, chest pain shortness of breath. Patient reports improving pain however there is some mild pain that is non radiating. Denies NTP. Patient has a extensive medical history of diabetes with peripheral neuropathy, CKD, fibromyalgia and history of stroke which she currently takes Plavix for. She also has Charcot foot on the left. Original right total hip arthroplasty was performed in 2009 with subsequent explantation implant antibiotic coated spacer. 1 year later she was taken for revision total hip arthroplasty with restoration modular stem. No other orthopedic complaint at this time.          Past Medical History:        Diagnosis Date    Asthma 1993    Cataract, right eye     Cerebral artery occlusion with cerebral infarction ([address] 07/2017    CKD (chronic kidney disease) stage 4, GFR 15-29 ml/min ([address] 2017    Degenerative disc disease     Degenerative joint disease     Diabetes mellitus ([address] 2008    diet controlled    Diabetic peripheral neuropathy ([address] 1998    Diffuse cerebral atrophy ([address] 2012    Fibromyalgia 01/2012    [facility]    Glaucoma     Hypertension     Iron deficiency anemia 10/25/2020    Neurogenic bladder     Neuropathy     Sarcoidosis 1993    Sleep apnea 2009    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\"     Past Surgical History:        Procedure Laterality Date    ANKLE FRACTURE SURGERY Right 10/24/2020    RIGHT ANKLE OPEN REDUCTION INTERNAL FIXATION performed by Gloria Dinh MD at [facility]      laminectomy l3-4    BREAST SURGERY      biopsy    CARDIAC CATHETERIZATION  2012    MINIMAL CAD    CHOLECYSTECTOMY      COLONOSCOPY  08/2019    TUBULAR ADENOMA x 2    FOOT SURGERY Bilateral     right foot fracture; left foot charcot    HIP FRACTURE SURGERY Right 5/28/2022    OPEN REDUCTION INTERNAL FIXATION RIGHT PERIPROSTHETIC HIP FRACTURE performed by Rashad Rojo MD at [address] Right 7/22/2021    RIGHT EYE CATARACT EXTRACTION IOL IMPLANT performed by Katia Jang MD at [address]  12/16/2016    right hip arthroplasty    OTHER SURGICAL HISTORY Right 02/06/2017    I & D right hip wound vaccum placement    TOTAL KNEE ARTHROPLASTY Bilateral      Current Medications:   No current facility-administered medications for this encounter. Allergies: Iodides, Iodine, Other, and Pcn [penicillins]    Social History:   TOBACCO:   reports that she has never smoked. She has never used smokeless tobacco.  ETOH:   reports no history of alcohol use. DRUGS:   reports no history of drug use.   ACTIVITIES OF DAILY LIVING:    OCCUPATION:    Family History:       Problem Relation Age of Onset   [facility] Cancer Mother         pancreatic    Parkinsonism Father     Diabetes Maternal Grandfather     Cancer Brother         esophageal       REVIEW OF SYSTEMS:  CONSTITUTIONAL:  negative for  fevers, chills  EYES:  negative for blurred vision, visual disturbance  HEENT:  negative for  hearing loss, voice change  RESPIRATORY:  negative for  dyspnea, wheezing  CARDIOVASCULAR:  negative for  chest pain, palpitations  GASTROINTESTINAL:  negative for nausea, vomiting  GENITOURINARY: negative for frequency, urinary incontinence  HEMATOLOGIC/LYMPHATIC:  negative for bleeding and petechiae  MUSCULOSKELETAL:  positive for postoperative drainage  NEUROLOGICAL:  negative for headaches, dizziness  BEHAVIOR/PSYCH:  negative for increased agitation and anxiety    PHYSICAL EXAM:    VITALS:  BP (!) 148/57   Pulse 98   Temp 97.9 °F (36.6 °C) (Oral)   Resp 19   Ht 5' 6\" (1.676 m)   Wt 250 lb (113.4 kg)   SpO2 93%   BMI 40.35 kg/m²   CONSTITUTIONAL:  awake, alert, cooperative, no apparent distress, and appears stated age  MUSCULOSKELETAL:  Right lower extremity  · Dressing a little saturated. Dressing was removed to further examine surgical site and incision. Sutures intact with skin well approximated. Skin looks appropriate for current stage of healing. No signs of erythema suture irritation. No warmth felt with palpation when compared to the contralateral side. Mild swelling palpated. · + Mild TTP at surgical incision.  - TTP at ankle, tibia, and knee  · Compartments soft and compressible  · Calf is soft and nontender  · +PF/DF/EHL  · Brisk Cap refill, Toes warm and perfused  · Distal sensation grossly intact to Peroneals, Sural, Saphenous, and tibial nrs  ·     Secondary Exam:   bilateralUE: No obvious signs of trauma. -TTP to fingers, hand, wrist, forearm, elbow, humerus, shoulder or clavicle. leftLE: No obvious signs of trauma.    -TTP to foot, ankle, leg, knee, thigh, hip    · Pelvis: -TTP, -Log roll, -Heel strike     DATA:    CBC:   Lab Results   Component Value Date    WBC 13.6 05/30/2022    RBC 3.49 05/30/2022    HGB 10.1 05/30/2022    HCT 32.4 05/30/2022    MCV 92.8 05/30/2022    MCH 28.9 05/30/2022    MCHC 31.2 05/30/2022    RDW 13.4 05/30/2022     05/30/2022    MPV 9.3 05/30/2022     PT/INR:    Lab Results   Component Value Date    PROTIME 11.9 05/27/2022    PROTIME 12.0 12/09/2011    INR 1.1 05/27/2022     CRP/ESR:   Lab Results   Component Value Date    CRP 1.1 02/12/2018    SEDRATE 62 02/12/2018     Lactic Acid :   Lab Results   Component Value Date    LACTA 1.4 01/28/2020       Radiology Review:  06/06/22 - XR status post ORIF subtrochanteric fracture around restoration modulator. No acute interval changes compared to postoperative films      IMPRESSION:   · Right s/p ORIF right subtrochanteric femur fracture around the restoration modulator  · Right postoperative hematoma    PLAN:  Touchdown weightbearing RLE  After informed consent was verbally obtained, surgical site was cleansed with chlorhexidine wipes and new Aquacel dressing was applied and reinforced with ABDs. Neurovascularly intact pre and post reduction  Pain control per ED  No acute orthopedic intervention required at this time. Patient instructed to continue with scheduled follow-up appoint with Dr. Geogri Hiader in office next week.   Continue with pain control medication as prescribed  Continue ice and elevation to decrease swelling  · Discussed with Dr. Georgi Haider

## 2022-06-06 NOTE — PROGRESS NOTES
Acknowledging page for orthopedic surgery regarding 1 week post op wound check    Formal consult to follow

## 2022-06-06 NOTE — ED PROVIDER NOTES
79-year-old female presenting with bleeding or oozing from the right hip surgical site. Patient had a femur fracture and reportedly was fixed about a week ago. Did not go to rehab, has been at home. She has not rehab started yet and will start supposedly the next couple of days. No fever, no chills, no other pain. New bleeding that started today, she was on the toilet yesterday and could not get up and called 911 and they helped her up. Today she is here because of the bleeding. New problem, persistent, severity, 1 day duration, associate with her prior surgery. Family History   Problem Relation Age of Onset    Cancer Mother         pancreatic    Parkinsonism Father     Diabetes Maternal Grandfather     Cancer Brother         esophageal     Past Surgical History:   Procedure Laterality Date    ANKLE FRACTURE SURGERY Right 10/24/2020    RIGHT ANKLE OPEN REDUCTION INTERNAL FIXATION performed [facility] Roque [facility]      laminectomy l3-4    BREAST SURGERY      biopsy    CARDIAC CATHETERIZATION  2012    MINIMAL CAD    CHOLECYSTECTOMY      COLONOSCOPY  08/2019    TUBULAR ADENOMA x 2    FOOT SURGERY Bilateral     right foot fracture; left foot charcot    HIP FRACTURE SURGERY Right 5/28/2022    OPEN REDUCTION INTERNAL FIXATION RIGHT PERIPROSTHETIC HIP FRACTURE performed by Lizzie Maier MD at [address] Right 7/22/2021    RIGHT EYE CATARACT EXTRACTION IOL IMPLANT [facility]  12/16/2016    right hip arthroplasty    OTHER SURGICAL HISTORY Right 02/06/2017    I & D right hip wound vaccum placement    TOTAL KNEE ARTHROPLASTY Bilateral        Review of Systems   Constitutional: Negative for chills and fever. Respiratory: Negative for chest tightness. Cardiovascular: Negative for chest pain. Gastrointestinal: Negative for abdominal pain. Skin: Positive for wound. All other systems reviewed and are negative. Physical Exam  Constitutional:       General: She is not in acute distress. Appearance: She is well-developed. She is obese. HENT:      Head: Normocephalic and atraumatic. Eyes:      Conjunctiva/sclera: Conjunctivae normal.      Pupils: Pupils are equal, round, and reactive to light. Neck:      Thyroid: No thyromegaly. Cardiovascular:      Rate and Rhythm: Normal rate and regular rhythm. Pulmonary:      Effort: Pulmonary effort is normal. No respiratory distress. Breath sounds: Normal breath sounds. Abdominal:      General: There is no distension. Palpations: Abdomen is soft. Tenderness: There is no abdominal tenderness. There is no guarding or rebound. Musculoskeletal:         General: No tenderness. Normal range of motion. Cervical back: Normal range of motion. Skin:     General: Skin is warm and dry. Findings: No erythema. Comments: Intact sutures along the right hip, some swelling around the site, no purulent drainage, no fluctuance, no crepitus or induration, there is some dark red blood emerging from approximately mid incision   Neurological:      Mental Status: She is alert and oriented to person, place, and time. Cranial Nerves: No cranial nerve deficit.       Coordination: Coordination normal.          Procedures     MDM                --------------------------------------------- PAST HISTORY ---------------------------------------------  Past Medical History:  has a past medical history of Asthma, Cataract, right eye, Cerebral artery occlusion with cerebral infarction ([address] CKD (chronic kidney disease) stage 4, GFR 15-29 ml/min ([address] Degenerative disc disease, Degenerative joint disease, Diabetes mellitus ([address] Diabetic peripheral neuropathy ([address] Diffuse cerebral atrophy ([address] Fibromyalgia, Glaucoma, Hypertension, Iron deficiency anemia, Neurogenic bladder, Neuropathy, Sarcoidosis, Sleep apnea, and Spinal cord and nerve root disorder. Past Surgical History:  has a past surgical history that includes Foot surgery (Bilateral); Hysterectomy; back surgery; Total knee arthroplasty (Bilateral); Cholecystectomy; Colonoscopy (08/2019); other surgical history (Right, 02/06/2017); Breast surgery; joint replacement (12/16/2016); Cardiac catheterization (2012); Ankle fracture surgery (Right, 10/24/2020); Intracapsular cataract extraction (Right, 7/22/2021); and Hip fracture surgery (Right, 5/28/2022). Social History:  reports that she has never smoked. She has never used smokeless tobacco. She reports that she does not drink alcohol and does not use drugs. Family History: family history includes Cancer in her brother and mother; Diabetes in her maternal grandfather; Parkinsonism in her father. The patients home medications have been reviewed. Allergies: Iodides, Iodine, Other, and Pcn [penicillins]    -------------------------------------------------- RESULTS -------------------------------------------------  Labs:  No results found for this visit on 06/06/22. Radiology:  XR HIP RIGHT (2-3 VIEWS)   Final Result   Anatomically aligned fracture fragments at the right femur with intact   fixation hardware and anatomically aligned right hip arthroplasty. ------------------------- NURSING NOTES AND VITALS REVIEWED ---------------------------  Date / Time Roomed:  6/6/2022 11:02 AM  ED Bed Assignment:  94/30    The nursing notes within the ED encounter and vital signs as below have been reviewed.    BP (!) 148/57   Pulse 98   Temp 97.9 °F (36.6 °C) (Oral)   Resp 19   Ht 5' 6\" (1.676 m)   Wt 250 lb (113.4 kg)   SpO2 93%   BMI 40.35 kg/m²   Oxygen Saturation Interpretation: Normal      ------------------------------------------ PROGRESS NOTES ------------------------------------------  I have spoken with the patient and discussed todays results, in addition to providing specific details for the plan of care and counseling regarding the diagnosis and prognosis. Their questions are answered at this time and they are agreeable with the plan. I discussed at length with them reasons for immediate return here for re evaluation. They will followup with primary care by calling their office tomorrow. --------------------------------- ADDITIONAL PROVIDER NOTES ---------------------------------  At this time the patient is without objective evidence of an acute process requiring hospitalization or inpatient management. They have remained hemodynamically stable throughout their entire ED visit and are stable for discharge with outpatient follow-up. The plan has been discussed in detail and they are aware of the specific conditions for emergent return, as well as the importance of follow-up. New Prescriptions    No medications on file       Diagnosis:  1. Hematoma of right hip, initial encounter        Disposition:  Patient's disposition: Discharge to home  Patient's condition is stable.             John An DO  06/06/22 1413

## 2022-06-07 DIAGNOSIS — Z96.649 STATUS POST REVISION OF TOTAL HIP: Primary | ICD-10-CM

## 2022-06-09 ENCOUNTER — HOSPITAL ENCOUNTER (EMERGENCY)
Age: 77
Discharge: HOME | End: 2022-06-10
Attending: EMERGENCY MEDICINE
Payer: MEDICARE

## 2022-06-09 VITALS
SYSTOLIC BLOOD PRESSURE: 132 MMHG | HEART RATE: 69 BPM | TEMPERATURE: 97.8 F | DIASTOLIC BLOOD PRESSURE: 57 MMHG | OXYGEN SATURATION: 97 % | HEIGHT: 66 IN | BODY MASS INDEX: 40.18 KG/M2 | WEIGHT: 250 LBS | RESPIRATION RATE: 14 BRPM

## 2022-06-09 DIAGNOSIS — Z96.649 PERIPROSTHETIC INTERTROCHANTERIC FRACTURE OF FEMUR, INITIAL ENCOUNTER: ICD-10-CM

## 2022-06-09 DIAGNOSIS — T14.8XXA HEMATOMA: ICD-10-CM

## 2022-06-09 DIAGNOSIS — E66.01 MORBID OBESITY WITH BMI OF 40.0-44.9, ADULT (HCC): Chronic | ICD-10-CM

## 2022-06-09 DIAGNOSIS — N18.30 STAGE 3 CHRONIC KIDNEY DISEASE, UNSPECIFIED WHETHER STAGE 3A OR 3B CKD (HCC): Chronic | ICD-10-CM

## 2022-06-09 DIAGNOSIS — Z48.89 ENCOUNTER FOR POST SURGICAL WOUND CHECK: Primary | ICD-10-CM

## 2022-06-09 DIAGNOSIS — M97.8XXA PERIPROSTHETIC INTERTROCHANTERIC FRACTURE OF FEMUR, INITIAL ENCOUNTER: ICD-10-CM

## 2022-06-09 LAB
ALBUMIN SERPL-MCNC: 3.1 G/DL (ref 3.5–5.2)
ALP BLD-CCNC: 233 U/L (ref 35–104)
ALT SERPL-CCNC: 48 U/L (ref 0–32)
ANION GAP SERPL CALCULATED.3IONS-SCNC: 12 MMOL/L (ref 7–16)
ANISOCYTOSIS: ABNORMAL
AST SERPL-CCNC: 36 U/L (ref 0–31)
BASOPHILS ABSOLUTE: 0.07 E9/L (ref 0–0.2)
BASOPHILS RELATIVE PERCENT: 0.5 % (ref 0–2)
BILIRUB SERPL-MCNC: 0.4 MG/DL (ref 0–1.2)
BUN BLDV-MCNC: 31 MG/DL (ref 6–23)
C-REACTIVE PROTEIN: 6.6 MG/DL (ref 0–0.4)
CALCIUM SERPL-MCNC: 8.8 MG/DL (ref 8.6–10.2)
CHLORIDE BLD-SCNC: 102 MMOL/L (ref 98–107)
CO2: 25 MMOL/L (ref 22–29)
CREAT SERPL-MCNC: 1.5 MG/DL (ref 0.5–1)
EOSINOPHILS ABSOLUTE: 0.43 E9/L (ref 0.05–0.5)
EOSINOPHILS RELATIVE PERCENT: 3.2 % (ref 0–6)
GFR AFRICAN AMERICAN: 41
GFR NON-AFRICAN AMERICAN: 34 ML/MIN/1.73
GLUCOSE BLD-MCNC: 191 MG/DL (ref 74–99)
HCT VFR BLD CALC: 34.6 % (ref 34–48)
HEMOGLOBIN: 10.8 G/DL (ref 11.5–15.5)
IMMATURE GRANULOCYTES #: 0.09 E9/L
IMMATURE GRANULOCYTES %: 0.7 % (ref 0–5)
LYMPHOCYTES ABSOLUTE: 1.34 E9/L (ref 1.5–4)
LYMPHOCYTES RELATIVE PERCENT: 9.9 % (ref 20–42)
MCH RBC QN AUTO: 28.5 PG (ref 26–35)
MCHC RBC AUTO-ENTMCNC: 31.2 % (ref 32–34.5)
MCV RBC AUTO: 91.3 FL (ref 80–99.9)
MONOCYTES ABSOLUTE: 1.34 E9/L (ref 0.1–0.95)
MONOCYTES RELATIVE PERCENT: 9.9 % (ref 2–12)
NEUTROPHILS ABSOLUTE: 10.27 E9/L (ref 1.8–7.3)
NEUTROPHILS RELATIVE PERCENT: 75.8 % (ref 43–80)
PDW BLD-RTO: 13.8 FL (ref 11.5–15)
PLATELET # BLD: 447 E9/L (ref 130–450)
PMV BLD AUTO: 8.5 FL (ref 7–12)
POTASSIUM REFLEX MAGNESIUM: 5.1 MMOL/L (ref 3.5–5)
RBC # BLD: 3.79 E12/L (ref 3.5–5.5)
SEDIMENTATION RATE, ERYTHROCYTE: 90 MM/HR (ref 0–20)
SODIUM BLD-SCNC: 139 MMOL/L (ref 132–146)
TOTAL PROTEIN: 6.7 G/DL (ref 6.4–8.3)
WBC # BLD: 13.5 E9/L (ref 4.5–11.5)

## 2022-06-09 PROCEDURE — 80053 COMPREHEN METABOLIC PANEL: CPT

## 2022-06-09 PROCEDURE — 93005 ELECTROCARDIOGRAM TRACING: CPT | Performed by: EMERGENCY MEDICINE

## 2022-06-09 PROCEDURE — 85025 COMPLETE CBC W/AUTO DIFF WBC: CPT

## 2022-06-09 PROCEDURE — 86140 C-REACTIVE PROTEIN: CPT

## 2022-06-09 PROCEDURE — 87186 SC STD MICRODIL/AGAR DIL: CPT

## 2022-06-09 PROCEDURE — 85651 RBC SED RATE NONAUTOMATED: CPT

## 2022-06-09 PROCEDURE — 87077 CULTURE AEROBIC IDENTIFY: CPT

## 2022-06-09 PROCEDURE — 99284 EMERGENCY DEPT VISIT MOD MDM: CPT

## 2022-06-09 PROCEDURE — 87070 CULTURE OTHR SPECIMN AEROBIC: CPT

## 2022-06-09 ASSESSMENT — ENCOUNTER SYMPTOMS
RHINORRHEA: 0
WHEEZING: 0
CHEST TIGHTNESS: 0
VOMITING: 0
NAUSEA: 0
SHORTNESS OF BREATH: 0
COUGH: 0
ABDOMINAL PAIN: 0
EYE PAIN: 0
BLOOD IN STOOL: 0
BACK PAIN: 0
TROUBLE SWALLOWING: 0
DIARRHEA: 0

## 2022-06-09 ASSESSMENT — PAIN - FUNCTIONAL ASSESSMENT: PAIN_FUNCTIONAL_ASSESSMENT: NONE - DENIES PAIN

## 2022-06-09 NOTE — ED NOTES
Pt. Has a 33 cm surgical incision to R outer thigh with staple remaining. Bandage that had been covering site was completley saturated with blood and partially off. Site cleaned and covered with abd bandages.       Kandy Long RN  06/09/22 6430

## 2022-06-09 NOTE — ED PROVIDER NOTES
[facility]  eMERGENCY dEPARTMENT eNCOUnter      Pt Name: Sixto Mcfadden  MRN: [id number]  [place] 1945  Date of evaluation: 6/9/2022  Provider: Lee Ji MD     CHIEF COMPLAINT       Chief Complaint   Patient presents with    Wound Check     bleeding from surgical site right leg     Rectal Bleeding     dark stool for last several days         HISTORY OF PRESENT ILLNESS   (Location/Symptom, Timing/Onset,Context/Setting, Quality, Duration, Modifying Factors, Severity) Note limiting factors. Resents here for evaluation of a surgical site. Patient states that she had a femur fracture. She was actually seen here recently because she had a large hematoma and it was draining. States that she came here and was transferred downtown to be evaluated. It was determined at that time it was just a hematoma and she was discharged. She is at home. She refuses to go to rehab. She states that she is here today because physical therapy came and when he looked at the bandage it was bleeding and wanted her to come back here. She also states that he has had dark stools. She had been on iron but she does not know if she is still on iron. She denies any abdominal pain. She states that she has had sepsis before and she was worried about infection. She has not had a fever. There is been no significant redness. Sixto Mcfadden is a 67 y.o. female who presents to the emergency department      Nursing Notes were reviewed. REVIEW OF SYSTEMS    (2+ for4; 10+ for level 5)     Review of Systems   Constitutional: Negative for appetite change, chills, fatigue, fever and unexpected weight change. HENT: Negative for congestion, drooling, nosebleeds, rhinorrhea and trouble swallowing. Eyes: Negative for pain and visual disturbance. Respiratory: Negative for cough, chest tightness, shortness of breath and wheezing.     Cardiovascular: Negative for chest pain, palpitations and leg swelling. Gastrointestinal: Negative for abdominal pain, blood in stool, diarrhea, nausea and vomiting. Endocrine: Negative for polydipsia and polyuria. Genitourinary: Negative for difficulty urinating, dysuria, flank pain, frequency, hematuria and urgency. Musculoskeletal: Negative for arthralgias, back pain, myalgias and neck pain. Skin: Positive for wound. Negative for pallor and rash. Allergic/Immunologic: Negative for environmental allergies. Neurological: Negative for dizziness, syncope, speech difficulty, weakness, light-headedness, numbness and headaches. Hematological: Bruises/bleeds easily. Psychiatric/Behavioral: Negative for confusion and decreased concentration. All other systems reviewed and are negative.       PAST MEDICAL HISTORY     Past Medical History:   Diagnosis Date    Asthma 1993    Cataract, right eye     Cerebral artery occlusion with cerebral infarction ([address] 07/2017    CKD (chronic kidney disease) stage 4, GFR 15-29 ml/min ([address] 2017    Degenerative disc disease     Degenerative joint disease     Diabetes mellitus ([address] 2008    diet controlled    Diabetic peripheral neuropathy ([address] 1998    Diffuse cerebral atrophy ([address] 2012    Fibromyalgia 01/2012    [facility]    Glaucoma     Hypertension     Iron deficiency anemia 10/25/2020    Neurogenic bladder     Neuropathy     Sarcoidosis 1993    Sleep apnea 2009    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\"       SURGICALHISTORY       Past Surgical History:   Procedure Laterality Date    ANKLE FRACTURE SURGERY Right 10/24/2020    RIGHT ANKLE OPEN REDUCTION INTERNAL FIXATION performed by Jose Roberto Oseguera MD [facility]      laminectomy l3-4    BREAST SURGERY      biopsy    CARDIAC CATHETERIZATION  2012    MINIMAL CAD    CHOLECYSTECTOMY      COLONOSCOPY  08/2019    TUBULAR ADENOMA x 2    FOOT SURGERY Bilateral     right foot fracture; left foot charcot    HIP FRACTURE SURGERY Right 5/28/2022    OPEN REDUCTION INTERNAL FIXATION RIGHT PERIPROSTHETIC HIP FRACTURE performed by Haroon Rust MD at [facility] ([facility])      INTRACAPSULAR CATARACT EXTRACTION Right 7/22/2021    RIGHT EYE CATARACT EXTRACTION IOL IMPLANT performed by Markos Tanner MD at [address] [facility]  12/16/2016    right hip arthroplasty    OTHER SURGICAL HISTORY Right 02/06/2017    I & D right hip wound vaccum placement    TOTAL KNEE ARTHROPLASTY Bilateral        CURRENT MEDICATIONS       Previous Medications    ALBUTEROL SULFATE HFA (PROAIR HFA) 108 (90 BASE) MCG/ACT INHALER    Inhale 1 puff into the lungs every 4 hours as needed for Wheezing or Shortness of Breath    ASPIRIN EC 81 MG EC TABLET    Take 1 tablet by mouth 2 times daily for 28 days    ATORVASTATIN (LIPITOR) 20 MG TABLET    Take 1 tablet by mouth nightly    BIMATOPROST (LUMIGAN) 0.01 % SOLN OPHTHALMIC DROPS    Place 1 drop into both eyes nightly    CLOPIDOGREL (PLAVIX) 75 MG TABLET    Take 1 tablet by mouth daily    DULOXETINE (CYMBALTA) 60 MG CAPSULE    Take 60 mg by mouth every morning     GLIMEPIRIDE (AMARYL) 1 MG TABLET    Take 0.5 mg by mouth every morning (before breakfast)    HYDROXYCHLOROQUINE (PLAQUENIL) 200 MG TABLET    Take 200 mg by mouth 2 times daily     MULTIVITAMIN (THERAGRAN) PER TABLET    Take 1 tablet by mouth three times a week Given Monday,Wednesday,Friday    OXYBUTYNIN (DITROPAN XL) 15 MG EXTENDED RELEASE TABLET    Take 15 mg by mouth daily    VERAPAMIL (CALAN SR) 240 MG EXTENDED RELEASE TABLET    Take 240 mg by mouth every morning     VERAPAMIL (CALAN SR) 240 MG EXTENDED RELEASE TABLET    Take 120 mg by mouth nightly            Iodides, Iodine, Other, and Pcn [penicillins]    FAMILY HISTORY       Family History   Problem Relation Age of Onset    Cancer Mother         pancreatic    Parkinsonism Father     Diabetes Maternal Grandfather     Cancer Brother         esophageal          SOCIAL HISTORY       Social History     Socioeconomic History    Marital status: Single     Spouse name: None    Number of children: None    Years of education: None    Highest education level: None   Occupational History    None   Tobacco Use    Smoking status: Never Smoker    Smokeless tobacco: Never Used   Vaping Use    Vaping Use: Never used   Substance and Sexual Activity    Alcohol use: No     Alcohol/week: 0.0 standard drinks    Drug use: No    Sexual activity: Never   Other Topics Concern    None   Social History Narrative    None     Social Determinants of Health     Financial Resource Strain:     Difficulty of Paying Living Expenses: Not on file   Food Insecurity:     Worried About Running Out of Food in the Last Year: Not on file    Hugo of Food in the Last Year: Not on file   Transportation Needs:     Lack of Transportation (Medical): Not on file    Lack of Transportation (Non-Medical):  Not on file   Physical Activity:     Days of Exercise per Week: Not on file    Minutes of Exercise per Session: Not on file   Stress:     Feeling of Stress : Not on file   Social Connections:     Frequency of Communication with Friends and Family: Not on file    Frequency of Social Gatherings with Friends and Family: Not on file    Attends Yazidism Services: Not on file    Active Member of [employer] or Organizations: Not on file    Attends Club or Organization Meetings: Not on file    Marital Status: Not on file   Intimate Partner Violence:     Fear of Current or Ex-Partner: Not on file    Emotionally Abused: Not on file    Physically Abused: Not on file    Sexually Abused: Not on file   Housing Stability:     Unable to Pay for Housing in the Last Year: Not on file    Number of [place] in the Last Year: Not on file    Unstable Housing in the Last Year: Not on file       SCREENINGS    Vitaly Coma Scale  Eye Opening: darkish blood expressed. Really no significant erythema. No purulent material.   Lymphadenopathy:      Cervical: No cervical adenopathy. Skin:     General: Skin is warm and dry. Coloration: Skin is not pale. Findings: No erythema or rash. Neurological:      General: No focal deficit present. Mental Status: She is alert and oriented to person, place, and time. Cranial Nerves: No cranial nerve deficit. Motor: No abnormal muscle tone. Coordination: Coordination normal.   Psychiatric:         Mood and Affect: Mood normal.         Behavior: Behavior normal.         Thought Content: Thought content normal.         Judgment: Judgment normal.         DIAGNOSTIC RESULTS     EKG (Per Emergency Physician):       RADIOLOGY (Per [place]):        Interpretation per the Radiologist below, if available at the time of this note:  No results found.    :  Labs Reviewed   CBC WITH AUTO DIFFERENTIAL - Abnormal; Notable for the following components:       Result Value    WBC 13.5 (*)     Hemoglobin 10.8 (*)     MCHC 31.2 (*)     Lymphocytes % 9.9 (*)     Neutrophils Absolute 10.27 (*)     Lymphocytes Absolute 1.34 (*)     Monocytes Absolute 1.34 (*)     All other components within normal limits   COMPREHENSIVE METABOLIC PANEL W/ REFLEX TO MG FOR LOW K - Abnormal; Notable for the following components:    Potassium reflex Magnesium 5.1 (*)     Glucose 191 (*)     BUN 31 (*)     CREATININE 1.5 (*)     Albumin 3.1 (*)     Alkaline Phosphatase 233 (*)     ALT 48 (*)     AST 36 (*)     All other components within normal limits   C-REACTIVE PROTEIN - Abnormal; Notable for the following components:    CRP 6.6 (*)     All other components within normal limits   SEDIMENTATION RATE - Abnormal; Notable for the following components:    Sed Rate 90 (*)     All other components within normal limits   CULTURE, WOUND   URINALYSIS WITH MICROSCOPIC       All other labs were within normal range or not returned as of this dictation. EMERGENCY DEPARTMENT COURSE and DIFFERENTIALDIAGNOSIS/MDM:   Vitals:    Vitals:    06/09/22 1547 06/09/22 1555 06/09/22 1625 06/09/22 1856   BP: 85/60 (!) 112/46 (!) 111/59 (!) 132/57   Pulse: 76  76 69   Resp: 16  16 14   Temp: 97.9 °F (36.6 °C)  97.8 °F (36.6 °C)    TempSrc: Oral  Oral    SpO2: 96%  99% 97%   Weight: 250 lb (113.4 kg)      Height: 5' 6\" (1.676 m)          Medications - No data to display    MDM  Number of Diagnoses or Management Options  Encounter for post surgical wound check  Hematoma  Morbid obesity with BMI of 40.0-44.9, adult (HCC)  Periprosthetic intertrochanteric fracture of femur, initial encounter  Stage 3 chronic kidney disease, unspecified whether stage 3a or 3b CKD ([facility]  Diagnosis management comments: The patient presents here for evaluation of her postoperative wound. The dressing was saturated with a dark blood but it appears as though this is just the resolving hematoma. I cannot really express a lot but just a very small amount with pressure. The edges of the wound appear appropriate for wound healing and they do not appear exceptionally red. They are not warm. There is no streaking. There is no foul odor. Patient had labs obtained. Her white count is 13 6 which is where it has been previously and the last time it was checked. Her sed rate is elevated to 90. Clinically she does not appear ill. I did speak with Dr. Josafat Nelson who is covering for Dr. Patrick Bone. Presentation reviewed. Is felt patient can be discharged home. No antibiotics started at this time. She also was advised that if she has more problems and needs to be receiving she needs to present directly to [facility] where there are more orthopedic resources. She voices understanding.   The emergency provider has spoken with the patient and/or caregiver and discussed today´s results, in addition to providing specific details for the plan of care and counseling regarding the diagnosis and prognosis. Questions are answered at this time and they are agreeable with the plan. All results reviewed with pt and all questions answered. I discussed the differential, results and discharge plan with the patient and/or family/friend/caregiver if present. I emphasized the importance of follow-up with the physician I referred them to in the timeframe recommended. I explained reasons for the patient to return to the Emergency Department. Additional verbal discharge instructions were also given and discussed with the patient to supplement those generated by the EMR. We also discussed medications that were prescribed (if any) including common side effects and interactions. All questions were addressed. They understand return precautions and discharge instructions. The patient and/or family/friend/caregiver expressed understanding. Vitals were stable and they were in no distress at discharge  . CONSULTS:  None    PROCEDURES:  Unless otherwise noted below, none     Procedures    Patients symptoms are consistent with sepsis, severe sepsis, or septic shock (If yes use \".sepsiscoremeasure\"): FINAL IMPRESSION      1. Encounter for post surgical wound check    2. Hematoma    3. Stage 3 chronic kidney disease, unspecified whether stage 3a or 3b CKD ([facility]    4. Periprosthetic intertrochanteric fracture of femur, initial encounter    5.  Morbid obesity with BMI of 40.0-44.9, adult [facility])        DISPOSITION/PLAN   DISPOSITION Decision To Discharge 06/09/2022 08:02:45 PM      PATIENT REFERRED TO:  Yamilet Ingram MD  [address]  [phone]    Schedule an appointment as soon as possible for a visit   blank Amaro MD  [facility] [phone]            DISCHARGE MEDICATIONS:  New Prescriptions    No medications on file          (Please note:  Portions of this note were completed with a voice recognition program.  Efforts were made to edit thedictations but occasionally words and phrases are mis-transcribed.)    Form v2016. J.5-cn    Edmond Ayala MD (electronically signed)  Emergency Medicine Provider          Edmond Ayala MD  06/09/22 2017

## 2022-06-09 NOTE — ED NOTES
Pure Wick provided and installed for patient     [facility]  06/09/22 0003

## 2022-06-10 ENCOUNTER — TELEPHONE (OUTPATIENT)
Dept: ADMINISTRATIVE | Age: 77
End: 2022-06-10

## 2022-06-10 LAB
EKG ATRIAL RATE: 73 BPM
EKG ATRIAL RATE: 79 BPM
EKG P AXIS: 50 DEGREES
EKG P AXIS: 60 DEGREES
EKG P-R INTERVAL: 198 MS
EKG P-R INTERVAL: 202 MS
EKG Q-T INTERVAL: 390 MS
EKG Q-T INTERVAL: 404 MS
EKG QRS DURATION: 84 MS
EKG QRS DURATION: 86 MS
EKG QTC CALCULATION (BAZETT): 429 MS
EKG QTC CALCULATION (BAZETT): 463 MS
EKG R AXIS: 18 DEGREES
EKG R AXIS: 20 DEGREES
EKG T AXIS: 80 DEGREES
EKG T AXIS: 83 DEGREES
EKG VENTRICULAR RATE: 73 BPM
EKG VENTRICULAR RATE: 79 BPM

## 2022-06-10 NOTE — TELEPHONE ENCOUNTER
Pt could not find transportation to get her to her POST OP on 6/13 so she cancelled and would like a call to r/s.

## 2022-06-10 NOTE — ED NOTES
Discharge instructions given. Verbalized understanding. Denies further questions or concerns. A&Ox4. Pt taken from ED via PAS on stretcher.       Cisco Bennett RN  06/10/22 [id number]

## 2022-06-12 LAB
ORGANISM: ABNORMAL
WOUND/ABSCESS: ABNORMAL

## 2022-06-15 ENCOUNTER — HOSPITAL ENCOUNTER (INPATIENT)
Age: 77
LOS: 7 days | Discharge: HOME HEALTH | DRG: 857 | End: 2022-06-22
Attending: INTERNAL MEDICINE | Admitting: INTERNAL MEDICINE
Payer: MEDICARE

## 2022-06-15 ENCOUNTER — APPOINTMENT (OUTPATIENT)
Dept: ULTRASOUND IMAGING | Age: 77
DRG: 857 | End: 2022-06-15
Payer: MEDICARE

## 2022-06-15 ENCOUNTER — HOSPITAL ENCOUNTER (OUTPATIENT)
Dept: GENERAL RADIOLOGY | Age: 77
Discharge: HOME | DRG: 857 | End: 2022-06-17
Payer: MEDICARE

## 2022-06-15 ENCOUNTER — OFFICE VISIT (OUTPATIENT)
Dept: ORTHOPEDIC SURGERY | Age: 77
DRG: 857 | End: 2022-06-15
Payer: MEDICARE

## 2022-06-15 VITALS — DIASTOLIC BLOOD PRESSURE: 70 MMHG | HEART RATE: 70 BPM | SYSTOLIC BLOOD PRESSURE: 86 MMHG | TEMPERATURE: 97.8 F

## 2022-06-15 DIAGNOSIS — T81.49XA INFECTED SURGICAL WOUND: ICD-10-CM

## 2022-06-15 DIAGNOSIS — G89.18 ACUTE POST-OPERATIVE PAIN: ICD-10-CM

## 2022-06-15 DIAGNOSIS — Z96.649 STATUS POST REVISION OF TOTAL HIP: ICD-10-CM

## 2022-06-15 DIAGNOSIS — L08.9 WOUND INFECTION: ICD-10-CM

## 2022-06-15 DIAGNOSIS — T81.49XA POSTOPERATIVE WOUND INFECTION: Primary | ICD-10-CM

## 2022-06-15 DIAGNOSIS — Z96.649 PERIPROSTHETIC INTERTROCHANTERIC FRACTURE OF FEMUR, SUBSEQUENT ENCOUNTER: Primary | ICD-10-CM

## 2022-06-15 DIAGNOSIS — M97.8XXD PERIPROSTHETIC INTERTROCHANTERIC FRACTURE OF FEMUR, SUBSEQUENT ENCOUNTER: Primary | ICD-10-CM

## 2022-06-15 DIAGNOSIS — T14.8XXA WOUND INFECTION: ICD-10-CM

## 2022-06-15 LAB
ALBUMIN SERPL-MCNC: 3.1 G/DL (ref 3.5–5.2)
ALP BLD-CCNC: 228 U/L (ref 35–104)
ALT SERPL-CCNC: 29 U/L (ref 0–32)
ANION GAP SERPL CALCULATED.3IONS-SCNC: 17 MMOL/L (ref 7–16)
AST SERPL-CCNC: 29 U/L (ref 0–31)
BASOPHILS ABSOLUTE: 0.06 E9/L (ref 0–0.2)
BASOPHILS RELATIVE PERCENT: 0.5 % (ref 0–2)
BILIRUB SERPL-MCNC: 0.5 MG/DL (ref 0–1.2)
BUN BLDV-MCNC: 28 MG/DL (ref 6–23)
CALCIUM SERPL-MCNC: 8.4 MG/DL (ref 8.6–10.2)
CHLORIDE BLD-SCNC: 100 MMOL/L (ref 98–107)
CO2: 24 MMOL/L (ref 22–29)
CREAT SERPL-MCNC: 1.5 MG/DL (ref 0.5–1)
EOSINOPHILS ABSOLUTE: 0.46 E9/L (ref 0.05–0.5)
EOSINOPHILS RELATIVE PERCENT: 3.9 % (ref 0–6)
GFR AFRICAN AMERICAN: 41
GFR NON-AFRICAN AMERICAN: 34 ML/MIN/1.73
GLUCOSE BLD-MCNC: 126 MG/DL (ref 74–99)
HCT VFR BLD CALC: 36.9 % (ref 34–48)
HEMOGLOBIN: 11.5 G/DL (ref 11.5–15.5)
IMMATURE GRANULOCYTES #: 0.07 E9/L
IMMATURE GRANULOCYTES %: 0.6 % (ref 0–5)
LACTIC ACID, SEPSIS: 1.5 MMOL/L (ref 0.5–1.9)
LYMPHOCYTES ABSOLUTE: 1.23 E9/L (ref 1.5–4)
LYMPHOCYTES RELATIVE PERCENT: 10.5 % (ref 20–42)
MCH RBC QN AUTO: 28.5 PG (ref 26–35)
MCHC RBC AUTO-ENTMCNC: 31.2 % (ref 32–34.5)
MCV RBC AUTO: 91.3 FL (ref 80–99.9)
MONOCYTES ABSOLUTE: 1.1 E9/L (ref 0.1–0.95)
MONOCYTES RELATIVE PERCENT: 9.4 % (ref 2–12)
NEUTROPHILS ABSOLUTE: 8.83 E9/L (ref 1.8–7.3)
NEUTROPHILS RELATIVE PERCENT: 75.1 % (ref 43–80)
PDW BLD-RTO: 13.5 FL (ref 11.5–15)
PLATELET # BLD: 398 E9/L (ref 130–450)
PMV BLD AUTO: 8.4 FL (ref 7–12)
POTASSIUM REFLEX MAGNESIUM: 4.7 MMOL/L (ref 3.5–5)
RBC # BLD: 4.04 E12/L (ref 3.5–5.5)
SODIUM BLD-SCNC: 141 MMOL/L (ref 132–146)
TOTAL PROTEIN: 7 G/DL (ref 6.4–8.3)
WBC # BLD: 11.8 E9/L (ref 4.5–11.5)

## 2022-06-15 PROCEDURE — 87040 BLOOD CULTURE FOR BACTERIA: CPT

## 2022-06-15 PROCEDURE — 93971 EXTREMITY STUDY: CPT | Performed by: RADIOLOGY

## 2022-06-15 PROCEDURE — 1200000000 HC SEMI PRIVATE

## 2022-06-15 PROCEDURE — 99285 EMERGENCY DEPT VISIT HI MDM: CPT

## 2022-06-15 PROCEDURE — 93971 EXTREMITY STUDY: CPT

## 2022-06-15 PROCEDURE — 83605 ASSAY OF LACTIC ACID: CPT

## 2022-06-15 PROCEDURE — 85025 COMPLETE CBC W/AUTO DIFF WBC: CPT

## 2022-06-15 PROCEDURE — 99024 POSTOP FOLLOW-UP VISIT: CPT | Performed by: PHYSICIAN ASSISTANT

## 2022-06-15 PROCEDURE — 80053 COMPREHEN METABOLIC PANEL: CPT

## 2022-06-15 PROCEDURE — 73502 X-RAY EXAM HIP UNI 2-3 VIEWS: CPT

## 2022-06-15 RX ORDER — SODIUM CHLORIDE 9 MG/ML
INJECTION, SOLUTION INTRAVENOUS PRN
Status: DISCONTINUED | OUTPATIENT
Start: 2022-06-15 | End: 2022-06-16 | Stop reason: SDUPTHER

## 2022-06-15 RX ORDER — CEPHALEXIN 500 MG/1
500 CAPSULE ORAL 4 TIMES DAILY
Status: ON HOLD | COMMUNITY
Start: 2022-06-12 | End: 2022-06-22 | Stop reason: HOSPADM

## 2022-06-15 RX ORDER — SODIUM CHLORIDE 0.9 % (FLUSH) 0.9 %
5-40 SYRINGE (ML) INJECTION EVERY 12 HOURS SCHEDULED
Status: DISCONTINUED | OUTPATIENT
Start: 2022-06-15 | End: 2022-06-16 | Stop reason: SDUPTHER

## 2022-06-15 RX ORDER — ONDANSETRON 2 MG/ML
4 INJECTION INTRAMUSCULAR; INTRAVENOUS EVERY 6 HOURS PRN
Status: DISCONTINUED | OUTPATIENT
Start: 2022-06-15 | End: 2022-06-22 | Stop reason: HOSPADM

## 2022-06-15 RX ORDER — ACETAMINOPHEN 325 MG/1
650 TABLET ORAL EVERY 4 HOURS PRN
Status: DISCONTINUED | OUTPATIENT
Start: 2022-06-15 | End: 2022-06-16 | Stop reason: SDUPTHER

## 2022-06-15 RX ORDER — SODIUM CHLORIDE 0.9 % (FLUSH) 0.9 %
5-40 SYRINGE (ML) INJECTION PRN
Status: DISCONTINUED | OUTPATIENT
Start: 2022-06-15 | End: 2022-06-16 | Stop reason: SDUPTHER

## 2022-06-15 RX ORDER — ONDANSETRON 4 MG/1
4 TABLET, ORALLY DISINTEGRATING ORAL EVERY 8 HOURS PRN
Status: DISCONTINUED | OUTPATIENT
Start: 2022-06-15 | End: 2022-06-22 | Stop reason: HOSPADM

## 2022-06-15 RX ORDER — ENOXAPARIN SODIUM 100 MG/ML
30 INJECTION SUBCUTANEOUS 2 TIMES DAILY
Status: DISCONTINUED | OUTPATIENT
Start: 2022-06-15 | End: 2022-06-15

## 2022-06-15 ASSESSMENT — ENCOUNTER SYMPTOMS
COLOR CHANGE: 0
CHEST TIGHTNESS: 0
COUGH: 0
EYE REDNESS: 0
EYE DISCHARGE: 0
SHORTNESS OF BREATH: 0
EYE PAIN: 0

## 2022-06-15 ASSESSMENT — PAIN DESCRIPTION - ORIENTATION: ORIENTATION: RIGHT

## 2022-06-15 ASSESSMENT — PAIN - FUNCTIONAL ASSESSMENT: PAIN_FUNCTIONAL_ASSESSMENT: 0-10

## 2022-06-15 ASSESSMENT — PAIN DESCRIPTION - LOCATION: LOCATION: LEG

## 2022-06-15 ASSESSMENT — PAIN SCALES - GENERAL: PAINLEVEL_OUTOF10: 3

## 2022-06-15 NOTE — PROGRESS NOTES
Patient dressing from the right hip have been removed. Staples remained in place. Patient voiced that she has had low blood pressures for the last week and takes Verapamil 240mg for blood pressure control, she did take a dose thing morning. Patient ate breakfast around 0900, no other food since then. Patient c/o light headed, chills, low blood pressures, body aches. Patient denies any N/V/D. Patient will be taken to the [facility] ED for evaluation upon infection to the right hip.       Electronically signed by Bre Grace MA on 6/15/2022 at 2:46 PM

## 2022-06-15 NOTE — PROGRESS NOTES
Chief Complaint   Patient presents with    Post-Op Check     Right hip ORIF 5/28/2022. Patient states pain has been controlled for the most part. Patient was seen in ED on 6/6/2022 and 6/9/2022 for right hip hematoma and possible infection. Patient states that she has been having a lot of drainage from incision. She discontinued the aspirin due to bleeding. Orthopedic history and physical    OP:SURGEON: Dr. Una Sidhu MD  DATE OF PROCEDURE: 5-28-22  PROCEDURE: Open reduction internal fixation right subtrochanteric femur fracture around a total hip with plates, screws, and cables    POD: 2 weeks    Subjective:  Elly Dietz is following up from the above surgery. She is TDWB on right lower extremity. Pain to extremity is mild and is not taking prescribed pain medication. They denies numbness or tingling to the right lower extremity. Denies calf pain, chest pain, or shortness of breath. Patient continues to use DVT prophylaxis, ASA 81 mg BID. Patient is  participating in therapy at the skilled nursing facility. Patient states that she has had continued drainage since her surgery worsening. She has gone to the ED 2 times since surgery due to the drainage from her hip wound. Cultures in the ED on 6/9 grew proteus mirabilis. Currently she is on oral Keflex. He states the drainage has persisted from her hip wound soaking through the dressings. She states that she has a history of sepsis after right hip replacement surgery a couple years ago for which she was on PICC line. She states that she does feel mildly lightheaded with mild chills. Denies fevers.      Review of Systems -  All pertinent positives/negative in HPI     Objective:    General: Alert and oriented X 3, normocephalic atraumatic, external ears and eye normal, sclera clear, no acute distress, respirations easy and unlabored with no audible wheezes, skin warm and dry, speech and dress appropriate for noted age, affect euthymic. Extremity:  Right Lower Extremity  Incision is macerated with purulent drainage. Staples in place. Moderate erythema and swelling noted. There is malodor. No dehiscence. moderate edema noted  Compartments supple throughout thigh and leg  Calf supple and not tender  negative Homans  Demonstrates active with knee flexion/extension, ankle dorsi/plantar flexion  Presents in a wheelchair. States sensation intact to touch in sural, deep peroneal, superficial peroneal, saphenous, posterior tibial  nerve distributions to foot/ankle. Palpable dorsalis pedis and posterior tibialis pulses, cap refill brisk in toes, foot warm/perfused. BP 86/70          Right hip incision    BP 86/70 (Site: Right Upper Arm, Position: Supine, Cuff Size: Medium Adult)   Pulse 70   Temp 97.8 °F (36.6 °C) (Oral)     XR:   Pelvis and right hip films demonstrate ORIF right subtrochanteric femur fracture around a total hip with the plate, screws and cables in stable position and alignment. No evidence of hardware loosening or failure. Assessment:   Diagnosis Orders   1. Periprosthetic intertrochanteric fracture of femur, subsequent encounter     2. Status post revision of total hip     3. Infected surgical wound       Plan:  X-rays reviewed and discussed. Patient reviewed and discussed with Dr. Nimesh Kolb. Patient will be directly admitted through the ED to the [facility] by medicine. Plan for I&D right hip wound tomorrow by Dr. Nimesh Kolb. Infectious disease will be consulted for likely PICC line. Continue touchdown weightbearing right lower extremity. Electronically signed by JUAN Adam on 6/15/2022 at 2:52 PM  Note: This report was completed using [employer] voiced recognition software. Every effort has been made to ensure accuracy; however, inadvertent computerized transcription errors may be present.

## 2022-06-15 NOTE — ED NOTES
Department of Emergency Medicine  FIRST PROVIDER TRIAGE NOTE             Independent MLP           6/15/22  3:16 PM EDT    Date of Encounter: 6/15/22   MRN: [id number]      HPI: Jaclyn Pastrana is a 67 y.o. female who presents to the ED for No chief complaint on file. sent by Dr. Ross Hamilton for infected surgical wound    ROS: Negative for cp or sob. PE: Gen Appearance/Constitutional: alert  Musculoskeletal: moves all extremities x 4     Initial Plan of Care: All treatment areas with department are currently occupied. Plan to order/Initiate the following while awaiting opening in ED: labs.   Initiate Treatment-Testing, Proceed toTreatment Area When Bed Available for ED Attending/MLP to Continue Care    Electronically signed by RADHA Neal CNP   DD: 6/15/22       RADHA Mariano Si, CNP  06/15/22 [id number]

## 2022-06-16 ENCOUNTER — TELEPHONE (OUTPATIENT)
Dept: ORTHOPEDIC SURGERY | Age: 77
End: 2022-06-16

## 2022-06-16 ENCOUNTER — ANESTHESIA EVENT (OUTPATIENT)
Dept: OPERATING ROOM | Age: 77
DRG: 857 | End: 2022-06-16
Payer: MEDICARE

## 2022-06-16 ENCOUNTER — ANESTHESIA (OUTPATIENT)
Dept: OPERATING ROOM | Age: 77
DRG: 857 | End: 2022-06-16
Payer: MEDICARE

## 2022-06-16 ENCOUNTER — APPOINTMENT (OUTPATIENT)
Dept: GENERAL RADIOLOGY | Age: 77
DRG: 857 | End: 2022-06-16
Payer: MEDICARE

## 2022-06-16 LAB
BACTERIA: ABNORMAL /HPF
BILIRUBIN URINE: NEGATIVE
BLOOD, URINE: NEGATIVE
CHP ED QC CHECK: NORMAL
CLARITY: CLEAR
COLOR: YELLOW
GLUCOSE BLD-MCNC: 130 MG/DL
GLUCOSE URINE: NEGATIVE MG/DL
KETONES, URINE: NEGATIVE MG/DL
LACTIC ACID, SEPSIS: 1.3 MMOL/L (ref 0.5–1.9)
LACTIC ACID, SEPSIS: 1.6 MMOL/L (ref 0.5–1.9)
LEUKOCYTE ESTERASE, URINE: NEGATIVE
METER GLUCOSE: 104 MG/DL (ref 74–99)
METER GLUCOSE: 129 MG/DL (ref 74–99)
METER GLUCOSE: 130 MG/DL (ref 74–99)
NITRITE, URINE: NEGATIVE
PH UA: 5.5 (ref 5–9)
PROTEIN UA: NORMAL MG/DL
RBC UA: ABNORMAL /HPF (ref 0–2)
SPECIFIC GRAVITY UA: >=1.03 (ref 1–1.03)
UROBILINOGEN, URINE: 1 E.U./DL
WBC UA: ABNORMAL /HPF (ref 0–5)

## 2022-06-16 PROCEDURE — 7100000000 HC PACU RECOVERY - FIRST 15 MIN: Performed by: ORTHOPAEDIC SURGERY

## 2022-06-16 PROCEDURE — 2580000003 HC RX 258: Performed by: INTERNAL MEDICINE

## 2022-06-16 PROCEDURE — 82962 GLUCOSE BLOOD TEST: CPT

## 2022-06-16 PROCEDURE — 73552 X-RAY EXAM OF FEMUR 2/>: CPT

## 2022-06-16 PROCEDURE — 6360000002 HC RX W HCPCS: Performed by: ANESTHESIOLOGY

## 2022-06-16 PROCEDURE — 6360000002 HC RX W HCPCS: Performed by: NURSE ANESTHETIST, CERTIFIED REGISTERED

## 2022-06-16 PROCEDURE — 0J9L0ZX DRAINAGE OF RIGHT UPPER LEG SUBCUTANEOUS TISSUE AND FASCIA, OPEN APPROACH, DIAGNOSTIC: ICD-10-PCS | Performed by: ORTHOPAEDIC SURGERY

## 2022-06-16 PROCEDURE — 87116 MYCOBACTERIA CULTURE: CPT

## 2022-06-16 PROCEDURE — 3600000015 HC SURGERY LEVEL 5 ADDTL 15MIN: Performed by: ORTHOPAEDIC SURGERY

## 2022-06-16 PROCEDURE — 3700000001 HC ADD 15 MINUTES (ANESTHESIA): Performed by: ORTHOPAEDIC SURGERY

## 2022-06-16 PROCEDURE — 87077 CULTURE AEROBIC IDENTIFY: CPT

## 2022-06-16 PROCEDURE — 2580000003 HC RX 258: Performed by: NURSE ANESTHETIST, CERTIFIED REGISTERED

## 2022-06-16 PROCEDURE — 3700000000 HC ANESTHESIA ATTENDED CARE: Performed by: ORTHOPAEDIC SURGERY

## 2022-06-16 PROCEDURE — 2500000003 HC RX 250 WO HCPCS: Performed by: NURSE ANESTHETIST, CERTIFIED REGISTERED

## 2022-06-16 PROCEDURE — 36415 COLL VENOUS BLD VENIPUNCTURE: CPT

## 2022-06-16 PROCEDURE — 87040 BLOOD CULTURE FOR BACTERIA: CPT

## 2022-06-16 PROCEDURE — C1713 ANCHOR/SCREW BN/BN,TIS/BN: HCPCS | Performed by: ORTHOPAEDIC SURGERY

## 2022-06-16 PROCEDURE — 87015 SPECIMEN INFECT AGNT CONCNTJ: CPT

## 2022-06-16 PROCEDURE — 87070 CULTURE OTHR SPECIMN AEROBIC: CPT

## 2022-06-16 PROCEDURE — 73502 X-RAY EXAM HIP UNI 2-3 VIEWS: CPT

## 2022-06-16 PROCEDURE — 87102 FUNGUS ISOLATION CULTURE: CPT

## 2022-06-16 PROCEDURE — 1200000000 HC SEMI PRIVATE

## 2022-06-16 PROCEDURE — 87075 CULTR BACTERIA EXCEPT BLOOD: CPT

## 2022-06-16 PROCEDURE — 83605 ASSAY OF LACTIC ACID: CPT

## 2022-06-16 PROCEDURE — 87206 SMEAR FLUORESCENT/ACID STAI: CPT

## 2022-06-16 PROCEDURE — 6370000000 HC RX 637 (ALT 250 FOR IP): Performed by: INTERNAL MEDICINE

## 2022-06-16 PROCEDURE — 2709999900 HC NON-CHARGEABLE SUPPLY: Performed by: ORTHOPAEDIC SURGERY

## 2022-06-16 PROCEDURE — 2700000000 HC OXYGEN THERAPY PER DAY

## 2022-06-16 PROCEDURE — 26990 DRAINAGE OF PELVIS LESION: CPT | Performed by: ORTHOPAEDIC SURGERY

## 2022-06-16 PROCEDURE — 81001 URINALYSIS AUTO W/SCOPE: CPT

## 2022-06-16 PROCEDURE — 87205 SMEAR GRAM STAIN: CPT

## 2022-06-16 PROCEDURE — 3600000005 HC SURGERY LEVEL 5 BASE: Performed by: ORTHOPAEDIC SURGERY

## 2022-06-16 PROCEDURE — 7100000001 HC PACU RECOVERY - ADDTL 15 MIN: Performed by: ORTHOPAEDIC SURGERY

## 2022-06-16 PROCEDURE — 87186 SC STD MICRODIL/AGAR DIL: CPT

## 2022-06-16 PROCEDURE — C1729 CATH, DRAINAGE: HCPCS | Performed by: ORTHOPAEDIC SURGERY

## 2022-06-16 PROCEDURE — 99222 1ST HOSP IP/OBS MODERATE 55: CPT | Performed by: ORTHOPAEDIC SURGERY

## 2022-06-16 RX ORDER — ATORVASTATIN CALCIUM 20 MG/1
20 TABLET, FILM COATED ORAL NIGHTLY
Status: DISCONTINUED | OUTPATIENT
Start: 2022-06-16 | End: 2022-06-22 | Stop reason: HOSPADM

## 2022-06-16 RX ORDER — DULOXETIN HYDROCHLORIDE 60 MG/1
60 CAPSULE, DELAYED RELEASE ORAL EVERY MORNING
Status: DISCONTINUED | OUTPATIENT
Start: 2022-06-16 | End: 2022-06-22 | Stop reason: HOSPADM

## 2022-06-16 RX ORDER — SODIUM CHLORIDE 0.9 % (FLUSH) 0.9 %
5-40 SYRINGE (ML) INJECTION PRN
Status: DISCONTINUED | OUTPATIENT
Start: 2022-06-16 | End: 2022-06-22 | Stop reason: HOSPADM

## 2022-06-16 RX ORDER — SODIUM CHLORIDE 0.9 % (FLUSH) 0.9 %
5-40 SYRINGE (ML) INJECTION EVERY 12 HOURS SCHEDULED
Status: DISCONTINUED | OUTPATIENT
Start: 2022-06-16 | End: 2022-06-16 | Stop reason: HOSPADM

## 2022-06-16 RX ORDER — INSULIN LISPRO 100 [IU]/ML
0-12 INJECTION, SOLUTION INTRAVENOUS; SUBCUTANEOUS
Status: DISCONTINUED | OUTPATIENT
Start: 2022-06-16 | End: 2022-06-22 | Stop reason: HOSPADM

## 2022-06-16 RX ORDER — MORPHINE SULFATE 2 MG/ML
2 INJECTION, SOLUTION INTRAMUSCULAR; INTRAVENOUS EVERY 5 MIN PRN
Status: DISCONTINUED | OUTPATIENT
Start: 2022-06-16 | End: 2022-06-16 | Stop reason: HOSPADM

## 2022-06-16 RX ORDER — ROCURONIUM BROMIDE 10 MG/ML
INJECTION, SOLUTION INTRAVENOUS PRN
Status: DISCONTINUED | OUTPATIENT
Start: 2022-06-16 | End: 2022-06-16 | Stop reason: SDUPTHER

## 2022-06-16 RX ORDER — ALBUTEROL SULFATE 90 UG/1
1 AEROSOL, METERED RESPIRATORY (INHALATION) EVERY 6 HOURS PRN
COMMUNITY

## 2022-06-16 RX ORDER — SODIUM CHLORIDE 0.9 % (FLUSH) 0.9 %
5-40 SYRINGE (ML) INJECTION PRN
Status: DISCONTINUED | OUTPATIENT
Start: 2022-06-16 | End: 2022-06-16 | Stop reason: HOSPADM

## 2022-06-16 RX ORDER — SODIUM CHLORIDE 9 MG/ML
INJECTION, SOLUTION INTRAVENOUS PRN
Status: DISCONTINUED | OUTPATIENT
Start: 2022-06-16 | End: 2022-06-22 | Stop reason: HOSPADM

## 2022-06-16 RX ORDER — SODIUM CHLORIDE 9 MG/ML
INJECTION, SOLUTION INTRAVENOUS CONTINUOUS PRN
Status: DISCONTINUED | OUTPATIENT
Start: 2022-06-16 | End: 2022-06-16 | Stop reason: SDUPTHER

## 2022-06-16 RX ORDER — FENTANYL CITRATE 50 UG/ML
INJECTION, SOLUTION INTRAMUSCULAR; INTRAVENOUS PRN
Status: DISCONTINUED | OUTPATIENT
Start: 2022-06-16 | End: 2022-06-16 | Stop reason: SDUPTHER

## 2022-06-16 RX ORDER — VERAPAMIL HYDROCHLORIDE 240 MG/1
240 TABLET, FILM COATED, EXTENDED RELEASE ORAL EVERY MORNING
Status: DISCONTINUED | OUTPATIENT
Start: 2022-06-16 | End: 2022-06-22 | Stop reason: HOSPADM

## 2022-06-16 RX ORDER — HYDROXYCHLOROQUINE SULFATE 200 MG/1
200 TABLET, FILM COATED ORAL 2 TIMES DAILY
Status: DISCONTINUED | OUTPATIENT
Start: 2022-06-16 | End: 2022-06-22 | Stop reason: HOSPADM

## 2022-06-16 RX ORDER — SODIUM CHLORIDE 9 MG/ML
INJECTION, SOLUTION INTRAVENOUS PRN
Status: DISCONTINUED | OUTPATIENT
Start: 2022-06-16 | End: 2022-06-16 | Stop reason: HOSPADM

## 2022-06-16 RX ORDER — ACETAMINOPHEN 325 MG/1
650 TABLET ORAL EVERY 6 HOURS PRN
Status: DISCONTINUED | OUTPATIENT
Start: 2022-06-16 | End: 2022-06-22 | Stop reason: HOSPADM

## 2022-06-16 RX ORDER — PHENYLEPHRINE HCL IN 0.9% NACL 1 MG/10 ML
SYRINGE (ML) INTRAVENOUS PRN
Status: DISCONTINUED | OUTPATIENT
Start: 2022-06-16 | End: 2022-06-16 | Stop reason: SDUPTHER

## 2022-06-16 RX ORDER — SODIUM CHLORIDE, SODIUM LACTATE, POTASSIUM CHLORIDE, CALCIUM CHLORIDE 600; 310; 30; 20 MG/100ML; MG/100ML; MG/100ML; MG/100ML
INJECTION, SOLUTION INTRAVENOUS CONTINUOUS PRN
Status: DISCONTINUED | OUTPATIENT
Start: 2022-06-16 | End: 2022-06-16 | Stop reason: SDUPTHER

## 2022-06-16 RX ORDER — GLIPIZIDE 5 MG/1
2.5 TABLET ORAL
Status: DISCONTINUED | OUTPATIENT
Start: 2022-06-17 | End: 2022-06-22 | Stop reason: HOSPADM

## 2022-06-16 RX ORDER — POLYETHYLENE GLYCOL 3350 17 G/17G
17 POWDER, FOR SOLUTION ORAL DAILY PRN
Status: DISCONTINUED | OUTPATIENT
Start: 2022-06-16 | End: 2022-06-22 | Stop reason: HOSPADM

## 2022-06-16 RX ORDER — M-VIT,TX,IRON,MINS/CALC/FOLIC 27MG-0.4MG
1 TABLET ORAL
COMMUNITY

## 2022-06-16 RX ORDER — MIDAZOLAM HYDROCHLORIDE 1 MG/ML
INJECTION INTRAMUSCULAR; INTRAVENOUS PRN
Status: DISCONTINUED | OUTPATIENT
Start: 2022-06-16 | End: 2022-06-16 | Stop reason: SDUPTHER

## 2022-06-16 RX ORDER — INSULIN LISPRO 100 [IU]/ML
0-6 INJECTION, SOLUTION INTRAVENOUS; SUBCUTANEOUS NIGHTLY
Status: DISCONTINUED | OUTPATIENT
Start: 2022-06-16 | End: 2022-06-22 | Stop reason: HOSPADM

## 2022-06-16 RX ORDER — CLOPIDOGREL BISULFATE 75 MG/1
75 TABLET ORAL DAILY
Status: DISCONTINUED | OUTPATIENT
Start: 2022-06-16 | End: 2022-06-17 | Stop reason: SDUPTHER

## 2022-06-16 RX ORDER — DEXAMETHASONE SODIUM PHOSPHATE 10 MG/ML
INJECTION INTRAMUSCULAR; INTRAVENOUS PRN
Status: DISCONTINUED | OUTPATIENT
Start: 2022-06-16 | End: 2022-06-16 | Stop reason: SDUPTHER

## 2022-06-16 RX ORDER — GLYCOPYRROLATE 1 MG/5 ML
SYRINGE (ML) INTRAVENOUS PRN
Status: DISCONTINUED | OUTPATIENT
Start: 2022-06-16 | End: 2022-06-16 | Stop reason: SDUPTHER

## 2022-06-16 RX ORDER — ONDANSETRON 2 MG/ML
INJECTION INTRAMUSCULAR; INTRAVENOUS PRN
Status: DISCONTINUED | OUTPATIENT
Start: 2022-06-16 | End: 2022-06-16 | Stop reason: SDUPTHER

## 2022-06-16 RX ORDER — LATANOPROST 50 UG/ML
1 SOLUTION/ DROPS OPHTHALMIC NIGHTLY
Status: DISCONTINUED | OUTPATIENT
Start: 2022-06-16 | End: 2022-06-22 | Stop reason: HOSPADM

## 2022-06-16 RX ORDER — PROPOFOL 10 MG/ML
INJECTION, EMULSION INTRAVENOUS PRN
Status: DISCONTINUED | OUTPATIENT
Start: 2022-06-16 | End: 2022-06-16

## 2022-06-16 RX ORDER — SUCCINYLCHOLINE/SOD CL,ISO/PF 200MG/10ML
SYRINGE (ML) INTRAVENOUS PRN
Status: DISCONTINUED | OUTPATIENT
Start: 2022-06-16 | End: 2022-06-16 | Stop reason: SDUPTHER

## 2022-06-16 RX ORDER — PROPOFOL 10 MG/ML
INJECTION, EMULSION INTRAVENOUS PRN
Status: DISCONTINUED | OUTPATIENT
Start: 2022-06-16 | End: 2022-06-16 | Stop reason: SDUPTHER

## 2022-06-16 RX ORDER — CEFAZOLIN SODIUM 1 G/3ML
INJECTION, POWDER, FOR SOLUTION INTRAMUSCULAR; INTRAVENOUS PRN
Status: DISCONTINUED | OUTPATIENT
Start: 2022-06-16 | End: 2022-06-16 | Stop reason: SDUPTHER

## 2022-06-16 RX ORDER — ALBUTEROL SULFATE 2.5 MG/3ML
2.5 SOLUTION RESPIRATORY (INHALATION) EVERY 6 HOURS PRN
Status: DISCONTINUED | OUTPATIENT
Start: 2022-06-16 | End: 2022-06-22 | Stop reason: HOSPADM

## 2022-06-16 RX ORDER — ALBUTEROL SULFATE 90 UG/1
1 AEROSOL, METERED RESPIRATORY (INHALATION) PRN
Status: DISCONTINUED | OUTPATIENT
Start: 2022-06-16 | End: 2022-06-16 | Stop reason: CLARIF

## 2022-06-16 RX ORDER — GLIMEPIRIDE 1 MG/1
1 TABLET ORAL
Status: DISCONTINUED | OUTPATIENT
Start: 2022-06-16 | End: 2022-06-16 | Stop reason: CLARIF

## 2022-06-16 RX ORDER — ACETAMINOPHEN 650 MG/1
650 SUPPOSITORY RECTAL EVERY 6 HOURS PRN
Status: DISCONTINUED | OUTPATIENT
Start: 2022-06-16 | End: 2022-06-22 | Stop reason: HOSPADM

## 2022-06-16 RX ORDER — OXYCODONE HYDROCHLORIDE AND ACETAMINOPHEN 5; 325 MG/1; MG/1
1 TABLET ORAL EVERY 6 HOURS PRN
Qty: 28 TABLET | Refills: 0 | Status: SHIPPED | OUTPATIENT
Start: 2022-06-16 | End: 2022-06-23

## 2022-06-16 RX ORDER — VERAPAMIL HYDROCHLORIDE 240 MG/1
120 TABLET, FILM COATED, EXTENDED RELEASE ORAL NIGHTLY
Status: ON HOLD | COMMUNITY
End: 2022-06-22 | Stop reason: HOSPADM

## 2022-06-16 RX ORDER — LIDOCAINE HYDROCHLORIDE 20 MG/ML
INJECTION, SOLUTION INTRAVENOUS PRN
Status: DISCONTINUED | OUTPATIENT
Start: 2022-06-16 | End: 2022-06-16 | Stop reason: SDUPTHER

## 2022-06-16 RX ORDER — NEOSTIGMINE METHYLSULFATE 1 MG/ML
INJECTION, SOLUTION INTRAVENOUS PRN
Status: DISCONTINUED | OUTPATIENT
Start: 2022-06-16 | End: 2022-06-16 | Stop reason: SDUPTHER

## 2022-06-16 RX ORDER — SODIUM CHLORIDE 0.9 % (FLUSH) 0.9 %
5-40 SYRINGE (ML) INJECTION EVERY 12 HOURS SCHEDULED
Status: DISCONTINUED | OUTPATIENT
Start: 2022-06-16 | End: 2022-06-22 | Stop reason: HOSPADM

## 2022-06-16 RX ORDER — MORPHINE SULFATE 2 MG/ML
1 INJECTION, SOLUTION INTRAMUSCULAR; INTRAVENOUS EVERY 5 MIN PRN
Status: DISCONTINUED | OUTPATIENT
Start: 2022-06-16 | End: 2022-06-16 | Stop reason: HOSPADM

## 2022-06-16 RX ORDER — MEPERIDINE HYDROCHLORIDE 25 MG/ML
12.5 INJECTION INTRAMUSCULAR; INTRAVENOUS; SUBCUTANEOUS EVERY 5 MIN PRN
Status: DISCONTINUED | OUTPATIENT
Start: 2022-06-16 | End: 2022-06-16 | Stop reason: HOSPADM

## 2022-06-16 RX ADMIN — MORPHINE SULFATE 2 MG: 2 INJECTION, SOLUTION INTRAMUSCULAR; INTRAVENOUS at 18:47

## 2022-06-16 RX ADMIN — MORPHINE SULFATE 2 MG: 2 INJECTION, SOLUTION INTRAMUSCULAR; INTRAVENOUS at 18:54

## 2022-06-16 RX ADMIN — Medication 0.6 MG: at 17:42

## 2022-06-16 RX ADMIN — Medication 10 ML: at 20:42

## 2022-06-16 RX ADMIN — MIDAZOLAM 2 MG: 1 INJECTION INTRAMUSCULAR; INTRAVENOUS at 16:20

## 2022-06-16 RX ADMIN — ROCURONIUM BROMIDE 10 MG: 10 SOLUTION INTRAVENOUS at 16:32

## 2022-06-16 RX ADMIN — Medication 200 MCG: at 16:57

## 2022-06-16 RX ADMIN — MORPHINE SULFATE 1 MG: 2 INJECTION, SOLUTION INTRAMUSCULAR; INTRAVENOUS at 19:10

## 2022-06-16 RX ADMIN — ATORVASTATIN CALCIUM 20 MG: 20 TABLET, FILM COATED ORAL at 20:42

## 2022-06-16 RX ADMIN — FENTANYL CITRATE 50 MCG: 50 INJECTION, SOLUTION INTRAMUSCULAR; INTRAVENOUS at 17:32

## 2022-06-16 RX ADMIN — Medication 140 MG: at 16:32

## 2022-06-16 RX ADMIN — ROCURONIUM BROMIDE 20 MG: 10 SOLUTION INTRAVENOUS at 16:42

## 2022-06-16 RX ADMIN — LATANOPROST 1 DROP: 50 SOLUTION OPHTHALMIC at 20:42

## 2022-06-16 RX ADMIN — MORPHINE SULFATE 1 MG: 2 INJECTION, SOLUTION INTRAMUSCULAR; INTRAVENOUS at 19:05

## 2022-06-16 RX ADMIN — Medication 200 MCG: at 16:47

## 2022-06-16 RX ADMIN — HYDROXYCHLOROQUINE SULFATE 200 MG: 200 TABLET ORAL at 20:42

## 2022-06-16 RX ADMIN — Medication 10 ML: at 09:00

## 2022-06-16 RX ADMIN — SODIUM CHLORIDE: 9 INJECTION, SOLUTION INTRAVENOUS at 16:20

## 2022-06-16 RX ADMIN — DEXAMETHASONE SODIUM PHOSPHATE 10 MG: 10 INJECTION INTRAMUSCULAR; INTRAVENOUS at 16:32

## 2022-06-16 RX ADMIN — Medication 100 MCG: at 17:26

## 2022-06-16 RX ADMIN — Medication 3 MG: at 17:42

## 2022-06-16 RX ADMIN — PROPOFOL 150 MG: 10 INJECTION, EMULSION INTRAVENOUS at 16:32

## 2022-06-16 RX ADMIN — LIDOCAINE HYDROCHLORIDE 100 MG: 20 INJECTION, SOLUTION INTRAVENOUS at 16:32

## 2022-06-16 RX ADMIN — CEFAZOLIN 3000 MG: 1 INJECTION, POWDER, FOR SOLUTION INTRAMUSCULAR; INTRAVENOUS at 16:59

## 2022-06-16 RX ADMIN — SODIUM CHLORIDE, POTASSIUM CHLORIDE, SODIUM LACTATE AND CALCIUM CHLORIDE: 600; 310; 30; 20 INJECTION, SOLUTION INTRAVENOUS at 17:53

## 2022-06-16 RX ADMIN — Medication 100 MCG: at 17:37

## 2022-06-16 RX ADMIN — FENTANYL CITRATE 100 MCG: 50 INJECTION, SOLUTION INTRAMUSCULAR; INTRAVENOUS at 16:32

## 2022-06-16 RX ADMIN — ONDANSETRON 4 MG: 2 INJECTION INTRAMUSCULAR; INTRAVENOUS at 16:32

## 2022-06-16 ASSESSMENT — PAIN DESCRIPTION - PAIN TYPE
TYPE: SURGICAL PAIN

## 2022-06-16 ASSESSMENT — PAIN SCALES - GENERAL
PAINLEVEL_OUTOF10: 3
PAINLEVEL_OUTOF10: 6
PAINLEVEL_OUTOF10: 10
PAINLEVEL_OUTOF10: 4
PAINLEVEL_OUTOF10: 3
PAINLEVEL_OUTOF10: 9

## 2022-06-16 ASSESSMENT — PAIN DESCRIPTION - LOCATION
LOCATION: LEG
LOCATION: HIP

## 2022-06-16 ASSESSMENT — PAIN DESCRIPTION - ORIENTATION
ORIENTATION: RIGHT

## 2022-06-16 ASSESSMENT — PAIN DESCRIPTION - FREQUENCY
FREQUENCY: CONTINUOUS

## 2022-06-16 ASSESSMENT — PAIN DESCRIPTION - DESCRIPTORS
DESCRIPTORS: SHARP;DISCOMFORT
DESCRIPTORS: SHARP
DESCRIPTORS: ACHING;CRAMPING;DISCOMFORT
DESCRIPTORS: SHOOTING;CRAMPING

## 2022-06-16 ASSESSMENT — PAIN DESCRIPTION - ONSET
ONSET: PROGRESSIVE
ONSET: ON-GOING

## 2022-06-16 ASSESSMENT — LIFESTYLE VARIABLES: SMOKING_STATUS: 0

## 2022-06-16 NOTE — TELEPHONE ENCOUNTER
Robert Estrada [facility]) called in. Stated patient was seen in ur office yesterday and was taken to [address] from our office to be admitted for Right Hip infection. Possible surgery needed for I&D. Milly Shaikh stated she stayed with patient until 11 pm last night and patient was not admitted at that time. She has been unable to reach patient and the ER is saying patient does not have a room yet, did not have surgery, and is still in a wheelchair waiting by the ER as of 9 am this morning. Asking why patient has not been admitted. Informed Marylou patient does have a bed assigned to her and she is scheduled to have surgery done today by Dr. Jacey Reeder. She may be waiting on the room to get cleaned or previously patient in the room to be discharged, etc. Milly Shaikh stated she is going to go to the ER this morning to check on patient and find out more information.

## 2022-06-16 NOTE — ED NOTES
Unable to obtain PIV after multiple attempts. Will request assistance from another ED RN. Pt remains NPO for I&D procedure.      Kleber Verma RN  06/16/22 1763

## 2022-06-16 NOTE — ED PROVIDER NOTES
ED Attending shared visit  CC: No        Department of Emergency Medicine   ED  Provider Note  Admit Date/RoomTime: 6/15/2022 10:06 PM  ED Room: [place] [address]  HPI:  6/15/22, Time: 9:58 PM EDT      The patient is a 69-year-old female Sent to the emergency department from her orthopedic appointment due to an infection in her right hip incision. Patient had a right femur fracture about 3 weeks ago and had ORIF by Dr. Gem Camp  Patient has had issues with the healing since the surgery. She has had increased pain and continuous drainage from the surgical wound. She was seen in ED several times for the drainage and had cultures that grew proteus mirabilis which she has been taking oral keflex for. Patient was seen by orthopedic physician assistant today and was sent to the emergency department to be admitted to have incision and drainage tomorrow. Patient denies any fevers or chills, calf pain, redness or warmth to the lower extremity, nausea/vomiting, chest pain, shortness of breath. The history is provided by the patient. No  was used. REVIEW OF SYSTEMS:  Review of Systems   Constitutional: Negative for activity change, chills, fatigue and fever. Eyes: Negative for pain, discharge and redness. Respiratory: Negative for cough, chest tightness and shortness of breath. Cardiovascular: Negative for chest pain, palpitations and leg swelling. Musculoskeletal: Negative for arthralgias, joint swelling and myalgias. Skin: Positive for wound. Negative for color change, pallor and rash. Drainage from wound to right hip from ORIF   Neurological: Negative for dizziness, weakness, light-headedness and headaches. Hematological: Does not bruise/bleed easily. Psychiatric/Behavioral: Negative for agitation, behavioral problems and confusion.       Pertinent positives and negatives are stated within HPI, all other systems reviewed and are negative.      --------------------------------------------- PAST HISTORY ---------------------------------------------  Past Medical History:  has a past medical history of Asthma, Cataract, right eye, Cerebral artery occlusion with cerebral infarction ([facility] [address] CKD (chronic kidney disease) stage 4, GFR 15-29 ml/min ([facility] [address] Degenerative disc disease, Degenerative joint disease, Diabetes mellitus ([facility] [address] [facility] Diabetic peripheral neuropathy ([facility] [address] Diffuse cerebral atrophy (HCC), Fibromyalgia, Glaucoma, Hypertension, Iron deficiency anemia, Neurogenic bladder, Neuropathy, Sarcoidosis, Sleep apnea, and Spinal cord and nerve root disorder. Past Surgical History:  has a past surgical history that includes Foot surgery (Bilateral); Hysterectomy; back surgery; Total knee arthroplasty (Bilateral); Cholecystectomy; Colonoscopy (08/2019); other surgical history (Right, 02/06/2017); Breast surgery; joint replacement (12/16/2016); Cardiac catheterization (2012); Ankle fracture surgery (Right, 10/24/2020); Intracapsular cataract extraction (Right, 7/22/2021); and Hip fracture surgery (Right, 5/28/2022). Social History:  reports that she has never smoked. She has never used smokeless tobacco. She reports that she does not drink alcohol and does not use drugs. Family History: family history includes Cancer in her brother and mother; Diabetes in her maternal grandfather; Parkinsonism in her father. The patients home medications have been reviewed. Allergies:  Iodides, Iodine, Other, Pcn [penicillins], and Penicillin g    -------------------------------------------------- RESULTS -------------------------------------------------  All laboratory and radiology results have been personally reviewed by myself   LABS:  Results for orders placed or performed during the hospital encounter of 06/15/22   Lactate, Sepsis   Result Value Ref Range    Lactic Acid, Sepsis 1.5 0.5 - 1.9 mmol/L   CBC with Auto Differential   Result Value Ref Range    WBC 11.8 (H) 4.5 - 11.5 E9/L    RBC 4.04 3.50 - 5.50 E12/L    Hemoglobin 11.5 11.5 - 15.5 g/dL    Hematocrit 36.9 34.0 - 48.0 %    MCV 91.3 80.0 - 99.9 fL    MCH 28.5 26.0 - 35.0 pg    MCHC 31.2 (L) 32.0 - 34.5 %    RDW 13.5 11.5 - 15.0 fL    Platelets 934 570 - 685 E9/L    MPV 8.4 7.0 - 12.0 fL    Neutrophils % 75.1 43.0 - 80.0 %    Immature Granulocytes % 0.6 0.0 - 5.0 %    Lymphocytes % 10.5 (L) 20.0 - 42.0 %    Monocytes % 9.4 2.0 - 12.0 %    Eosinophils % 3.9 0.0 - 6.0 %    Basophils % 0.5 0.0 - 2.0 %    Neutrophils Absolute 8.83 (H) 1.80 - 7.30 E9/L    Immature Granulocytes # 0.07 E9/L    Lymphocytes Absolute 1.23 (L) 1.50 - 4.00 E9/L    Monocytes Absolute 1.10 (H) 0.10 - 0.95 E9/L    Eosinophils Absolute 0.46 0.05 - 0.50 E9/L    Basophils Absolute 0.06 0.00 - 0.20 E9/L   Comprehensive Metabolic Panel w/ Reflex to MG   Result Value Ref Range    Sodium 141 132 - 146 mmol/L    Potassium reflex Magnesium 4.7 3.5 - 5.0 mmol/L    Chloride 100 98 - 107 mmol/L    CO2 24 22 - 29 mmol/L    Anion Gap 17 (H) 7 - 16 mmol/L    Glucose 126 (H) 74 - 99 mg/dL    BUN 28 (H) 6 - 23 mg/dL    CREATININE 1.5 (H) 0.5 - 1.0 mg/dL    GFR Non-African American 34 >=60 mL/min/1.73    GFR African American 41     Calcium 8.4 (L) 8.6 - 10.2 mg/dL    Total Protein 7.0 6.4 - 8.3 g/dL    Albumin 3.1 (L) 3.5 - 5.2 g/dL    Total Bilirubin 0.5 0.0 - 1.2 mg/dL    Alkaline Phosphatase 228 (H) 35 - 104 U/L    ALT 29 0 - 32 U/L    AST 29 0 - 31 U/L       RADIOLOGY:  Interpreted by Radiologist.  US DUP LOWER EXTREMITY RIGHT ANGELINA   Final Result   Within the visualized vessels there is no evidence for deep venous   thrombosis                   ------------------------- NURSING NOTES AND VITALS REVIEWED ---------------------------   The nursing notes within the ED encounter and vital signs as below have been reviewed.    /74   Pulse 82   Temp 97.7 °F (36.5 °C) (Oral)   Resp 16   Ht 5' 4\" (1.626 m)   Wt 250 lb (113.4 kg)   SpO2 100% BMI 42.91 kg/m²   Oxygen Saturation Interpretation: Normal      ---------------------------------------------------PHYSICAL EXAM--------------------------------------    Physical Exam  Vitals and nursing note reviewed. Constitutional:       General: She is not in acute distress. Appearance: Normal appearance. She is well-developed. She is not ill-appearing. HENT:      Head: Normocephalic and atraumatic. Mouth/Throat:      Mouth: Mucous membranes are moist.      Pharynx: Oropharynx is clear. Neck:      Vascular: No JVD. Trachea: No tracheal deviation. Cardiovascular:      Rate and Rhythm: Normal rate and regular rhythm. Heart sounds: Normal heart sounds. No murmur heard. Pulmonary:      Effort: Pulmonary effort is normal. No respiratory distress. Breath sounds: Normal breath sounds. Musculoskeletal:         General: Swelling present. No deformity. Normal range of motion. Cervical back: Normal range of motion and neck supple. Comments: Staples in place to right hip incision. No wound dehiscence. Surrounding erythema with maceration and purulent drainage. Skin:     General: Skin is warm and dry. Capillary Refill: Capillary refill takes less than 2 seconds. Coloration: Skin is not pale. Findings: No erythema. Neurological:      Mental Status: She is alert and oriented to person, place, and time. Mental status is at baseline. Motor: No weakness. Psychiatric:         Mood and Affect: Mood normal.         Behavior: Behavior normal.         Thought Content:  Thought content normal.            ------------------------------ ED COURSE/MEDICAL DECISION MAKING----------------------  Medications   sodium chloride flush 0.9 % injection 5-40 mL (has no administration in time range)   sodium chloride flush 0.9 % injection 5-40 mL (has no administration in time range)   0.9 % sodium chloride infusion (has no administration in time range)   acetaminophen (TYLENOL) tablet 650 mg (has no administration in time range)   ondansetron (ZOFRAN-ODT) disintegrating tablet 4 mg (has no administration in time range)     Or   ondansetron (ZOFRAN) injection 4 mg (has no administration in time range)         ED COURSE:      US DUP LOWER EXTREMITY RIGHT ANGELINA   Final Result   Within the visualized vessels there is no evidence for deep venous   thrombosis                     Procedures:  Procedures     Medical Decision Making:   MDM   79-year-old female sent to the ED by orthopedics to be admitted for an incision and drainage of her right hip wound from an ORIF several weeks ago. The patient was evaluated in Dr. Theresa Molina office. On arrival to the ED she is afebrile and hemodynamically stable. Patient has had cultures of this wound in the past which grew protease Mirabella's which she is taking oral Keflex for. Patient has mild leukocytosis of 11.8 but lactic acid is negative. Blood cultures were obtained. The plan is for patient to be medically admitted under the care of Dr. Ritu Dunham with orthopedics and Infectious disease on consult. Patient is agreeable to the plan and stable at time of admission. Counseling: The emergency provider has spoken with the patient and discussed todays results, in addition to providing specific details for the plan of care and counseling regarding the diagnosis and prognosis. Questions are answered at this time and they are agreeable with the plan.      --------------------------------- IMPRESSION AND DISPOSITION ---------------------------------    IMPRESSION  1. Postoperative wound infection        DISPOSITION  Disposition: Admit to med/surg floor  Patient condition is good      Electronically signed by Agusto Mathis MD   DD: 6/15/22  **This report was transcribed using voice recognition software. Every effort was made to ensure accuracy; however, inadvertent computerized transcription errors may be present.   END OF ED PROVIDER NOTE          Delia Andres PA-C  06/15/22 9250  ATTENDING PROVIDER ATTESTATION:     I have personally performed and/or participated in the history, exam, medical decision making, and procedures and agree with all pertinent clinical information. I have also reviewed and agree with the past medical, family and social history unless otherwise noted. My findings/Plan: Presenting here because of wound to her right thigh. Patient had fractured femur about several weeks ago. Patient presenting here because of increased swelling and reportedly drainage. Patient had abnormal cultures. Patient was taking Keflex. Patient was sent here by orthopedic staff. Patient is scheduled for incision and drainage tomorrow. Patient here is reporting no fever no chills he reports no chest pain or difficulty breathing. Patient is awake alert orient x3 heart lung exam normal abdomen is soft. Wound noted there is noted swelling mild redness. Patient's right leg is swollen. All the way down to her calf. Patient pulses are intact. Labs noted reviewed ultrasound was ordered of the leg. We did speak to  he will admit we also spoke to orthopedics they will evaluate here in the emergency department. Patient was made aware of findings and plan.      Minesh Louis MD  06/15/22 0160       Minesh Louis MD  06/15/22 [id number]

## 2022-06-16 NOTE — CONSULTS
Department of Orthopedic Surgery  Resident Consult Note          Reason for Consult:  Right post surgical hip infection     HISTORY OF PRESENT ILLNESS:      69-year-old female with history of right ORIF subtrochanteric femur fracture around restoration modulator form by Dr. Mony Ryan on 5/27/2022 resents with postoperative hip drainage. Patient reports mild drainage last few days postoperatively with pain improvement. Patient stated that he went to [address] [place] [address] [place] a few weeks ago which he was discharge with oral antibiotics. He was seen in clinic yesterday and was advised to go to the ED for hospital admission. Patient denies acute fever, chills, nausea, vomiting, chest pain shortness of breath. Denies NTP. Patient has a extensive medical history of diabetes with peripheral neuropathy, CKD, fibromyalgia and history of stroke which she currently takes Plavix for. She also has Charcot foot on the left. Original right total hip arthroplasty was performed in 2009 with subsequent explantation implant antibiotic coated spacer. 1 year later she was taken for revision total hip arthroplasty with restoration modular stem.      Tobacco use: none  Alcohol use: none  Illicit drug use: no history of illicit drug use    Past Medical History:        Diagnosis Date    Asthma 1993    Cataract, right eye     Cerebral artery occlusion with cerebral infarction ([address] 07/2017    CKD (chronic kidney disease) stage 4, GFR 15-29 ml/min ([address] 2017    Degenerative disc disease     Degenerative joint disease     Diabetes mellitus ([address] 2008    diet controlled    Diabetic peripheral neuropathy ([address] 1998    Diffuse cerebral atrophy ([address] 2012    Fibromyalgia 01/2012    [facility]    Glaucoma     Hypertension     Iron deficiency anemia 10/25/2020    Neurogenic bladder     Neuropathy     Sarcoidosis 1993    Sleep apnea 2009    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\"     Past Surgical History:        Procedure Laterality Date    ANKLE FRACTURE SURGERY Right 10/24/2020    RIGHT ANKLE OPEN REDUCTION INTERNAL FIXATION performed by Glendora Landau, MD at [facility]      laminectomy l3-4    BREAST SURGERY      biopsy    CARDIAC CATHETERIZATION  2012    MINIMAL CAD    CHOLECYSTECTOMY      COLONOSCOPY  08/2019    TUBULAR ADENOMA x 2    FOOT SURGERY Bilateral     right foot fracture; left foot charcot    HIP FRACTURE SURGERY Right 5/28/2022    OPEN REDUCTION INTERNAL FIXATION RIGHT PERIPROSTHETIC HIP FRACTURE performed by Diego Joiner MD at [facility] (CERVIX STATUS UNKNOWN)      INTRACAPSULAR CATARACT EXTRACTION Right 7/22/2021    RIGHT EYE CATARACT EXTRACTION IOL IMPLANT [facility]rek [facility]  12/16/2016    right hip arthroplasty    OTHER SURGICAL HISTORY Right 02/06/2017    I & D right hip wound vaccum placement    TOTAL KNEE ARTHROPLASTY Bilateral      Current Medications:   Current Facility-Administered Medications: sodium chloride flush 0.9 % injection 5-40 mL, 5-40 mL, IntraVENous, 2 times per day  sodium chloride flush 0.9 % injection 5-40 mL, 5-40 mL, IntraVENous, PRN  0.9 % sodium chloride infusion, , IntraVENous, PRN  acetaminophen (TYLENOL) tablet 650 mg, 650 mg, Oral, Q4H PRN  ondansetron (ZOFRAN-ODT) disintegrating tablet 4 mg, 4 mg, Oral, Q8H PRN **OR** ondansetron (ZOFRAN) injection 4 mg, 4 mg, IntraVENous, Q6H PRN  Allergies: Iodides, Iodine, Other, Pcn [penicillins], and Penicillin g    Social History:   TOBACCO:   reports that she has never smoked. She has never used smokeless tobacco.  ETOH:   reports no history of alcohol use. DRUGS:   reports no history of drug use.   ACTIVITIES OF DAILY LIVING:    OCCUPATION:    Family History:       Problem Relation Age of Onset    Cancer Mother         pancreatic    Parkinsonism Father     Diabetes Maternal Grandfather     Cancer Brother         esophageal       REVIEW OF SYSTEMS:  CONSTITUTIONAL:  negative for  fevers, chills  EYES:  negative for blurred vision, visual disturbance  HEENT:  negative for  hearing loss, voice change  RESPIRATORY:  negative for  dyspnea, wheezing  CARDIOVASCULAR:  negative for  chest pain, palpitations  GASTROINTESTINAL:  negative for nausea, vomiting  GENITOURINARY:  negative for frequency, urinary incontinence  HEMATOLOGIC/LYMPHATIC:  negative for bleeding and petechiae  MUSCULOSKELETAL:  positive for  pain  NEUROLOGICAL:  negative for headaches, dizziness  BEHAVIOR/PSYCH:  negative for increased agitation and anxiety    PHYSICAL EXAM:    VITALS:  BP (!) 155/76   Pulse 82   Temp 98.1 °F (36.7 °C) (Oral)   Resp 20   Ht 5' 4\" (1.626 m)   Wt 250 lb (113.4 kg)   SpO2 100%   BMI 42.91 kg/m²   CONSTITUTIONAL:  awake, alert, cooperative, no apparent distress, and appears stated age  NECK:  supple, symmetrical, trachea midline  LUNGS:  no increased work of breathing, good air exchange and no retractions  CARDIOVASCULAR:  normal apical pulses, no edema and pulses 2 plus all extermities bilaterally  NEUROLOGIC:  Awake, alert, oriented to name, place and time. Cranial nerves II-XII are grossly intact. Motor is 5 out of 5 bilaterally. Cerebellar finger to nose, heel to shin intact. Sensory is intact. Babinski down going, Romberg negative, and gait is normal.  MUSCULOSKELETAL:  Right lower extremity    Media Information             Document Information    Clinical Consent:  Wound      06/15/2022 14:23   Attached To:    Office Visit on 6/15/22 with JUAN James     Source Information    Gonzalez James  [facility]     ·   · + Mild TTP at surgical incision.  - TTP at ankle, tibia, and knee  · Compartments soft and compressible  · Calf is soft and nontender  · +PF/DF/EHL  · Brisk Cap refill, Toes warm and perfused  · Distal sensation grossly intact to Peroneals, Sural, Saphenous, and tibial nrs    Secondary Exam:   bilateralUE: No obvious signs of trauma. -TTP to fingers, hand, wrist, forearm, elbow, humerus, shoulder or clavicle. leftLE: No obvious signs of trauma. -TTP to foot, ankle, leg, knee, thigh, hip    · Pelvis: -TTP, -Log roll, -Heel strike     DATA:    CBC:   Lab Results   Component Value Date    WBC 11.8 06/15/2022    RBC 4.04 06/15/2022    HGB 11.5 06/15/2022    HCT 36.9 06/15/2022    MCV 91.3 06/15/2022    MCH 28.5 06/15/2022    MCHC 31.2 06/15/2022    RDW 13.5 06/15/2022     06/15/2022    MPV 8.4 06/15/2022     PT/INR:    Lab Results   Component Value Date    PROTIME 11.9 05/27/2022    PROTIME 12.0 12/09/2011    INR 1.1 05/27/2022     CRP/ESR:   Lab Results   Component Value Date    CRP 6.6 06/09/2022    SEDRATE 90 06/09/2022     Lactic Acid :   Lab Results   Component Value Date    LACTA 1.4 01/28/2020     IMPRESSION:   Right s/p ORIF right subtrochanteric femur fracture around the restoration modulator 5/28    PLAN:  [address] for irrigation and debridement with Dr. Jo Santana on 6/16/2022   Treatment consent   Npo diet now   Pain Control ed   Preoperative antibiotics sent to OR   Continue ice and elevation to decrease swelling  · Discussed with Dr. Jo Santana  · Risks, benefits, and alternatives were discussed with the patient and their family. Risks include but are not limited to infection, blood loss, damage to neurovascular and musculoskeletal structures, malunion, nonunion, symptomatic hardware, need for further surgery, possible arthritis despite surgical stabilization, stiffness, and catastrophic anesthesia complications. They understand and wish to proceed. Orthopaedic Trauma Attending    I have seen and evaluated the patient and agree with the above assessment. I have performed the key components of the history and physical examination and concur completely with the findings as documented.     CC: Right hip wound drainage    HPI: This is a 78-year-old female with history of right ORIF subtrochanteric femur fracture around restoration modulator form by Dr. Tone Griffith on 5/27/2022 resents with postoperative hip drainage. Patient reports mild drainage last few days postoperatively with pain improvement. Patient stated that he went to [address] [place] [address] a few weeks ago which she was discharged with oral antibiotics. He was seen in clinic yesterday and was advised to go to the ED for hospital admission. Patient denies acute fever, chills, nausea, vomiting, chest pain shortness of breath. Denies NTP. Patient has a extensive medical history of diabetes with peripheral neuropathy, CKD, fibromyalgia and history of stroke which she currently takes Plavix for. She also has Charcot foot on the left. Original right total hip arthroplasty was performed in 2009 by outside physician with subsequent infection requiring explantation implant antibiotic coated spacer. 1 year later she was taken for revision total hip arthroplasty with restoration modular stem. ROS, medications, allergies, past medical/surgical/social/family histories reviewed and as above    PE:  BP (!) 133/56   Pulse 86   Temp 98.5 °F (36.9 °C)   Resp 17   Ht 5' 4\" (1.626 m)   Wt 250 lb (113.4 kg)   SpO2 100%   BMI 42.91 kg/m²   As above    Radiographic Review:  Right proximal femur periprosthetic fracture previous ORIF, fracture alignment is well-maintained, hardware intact without evidence of loosening. Prosthetic head located. ASSESSMENT:  Right proximal femur and thigh postoperative infection    PLAN:  Had lengthy discussion with patient regarding their diagnosis, typical prognosis, and expected outcomes. We discussed treatment options moving forward including conservative treatments with antibiotics alone versus surgical I&D with possible antibiotic beads. Patient has elected for surgical management despite associated risks.    Medical admit with surgical optimization. Planning for OR urgently on 6/16/2022 for right femur and thigh I&D, insertion absorbable antibiotic beads  We will also consult infectious disease for definitive management. We will take intraoperative tissue culture analysis. Electronically signed by   Brooklyn Sharp DO  6/16/2022     NOTE: This report was transcribed using voice recognition software.  Every effort was made to ensure accuracy; however, inadvertent computerized transcription errors may be present

## 2022-06-16 NOTE — ED NOTES
Pt assisted back to er 18B, pt noted incontinent urine, pericare given, pt on er cart placed in gown and waiting er dr, vital signs rechecked at this time. Pt voiced pain constant to rt leg incision, Rn aware request for pain medication. sr x 2 up and call light in reach.      Yareli Horowitz LPN  12/59/74 1854

## 2022-06-16 NOTE — ANESTHESIA PRE PROCEDURE
Department of Anesthesiology  Preprocedure Note       Name:  Jessy Montalvo   Age:  67 y.o.  :  1945                                          MRN:  [id number]         Date:  2022      Surgeon: Unique Avalos):  Kiley Vieira DO    Procedure: Procedure(s):  IRRIGATION AND DEBRIDEMENT RIGHT HIP WOUND INFECTION    Medications prior to admission:   Prior to Admission medications    Medication Sig Start Date End Date Taking?  Authorizing Provider   albuterol sulfate HFA (VENTOLIN HFA) 108 (90 Base) MCG/ACT inhaler Inhale 1 puff into the lungs every 6 hours as needed for Wheezing   Yes Historical Provider, MD   Multiple Vitamins-Minerals (THERAPEUTIC MULTIVITAMIN-MINERALS) tablet Take 1 tablet by mouth three times a week Given Monday,Wednesday,Friday   Yes Historical Provider, MD   verapamil (CALAN SR) 240 MG extended release tablet Take 120 mg by mouth nightly   Yes Historical Provider, MD   cephALEXin (KEFLEX) 500 MG capsule Take 500 mg by mouth 4 times daily  22  Historical Provider, MD   bimatoprost (LUMIGAN) 0.01 % SOLN ophthalmic drops Place 1 drop into both eyes nightly    Historical Provider, MD   oxybutynin (DITROPAN XL) 15 MG extended release tablet Take 15 mg by mouth daily    Historical Provider, MD   glimepiride (AMARYL) 1 MG tablet Take 0.5 mg by mouth every morning (before breakfast)    Historical Provider, MD   clopidogrel (PLAVIX) 75 MG tablet Take 1 tablet by mouth daily 20   Alex Camarillo MD   atorvastatin (LIPITOR) 20 MG tablet Take 1 tablet by mouth nightly 17   Lasha Daugherty MD   hydroxychloroquine (PLAQUENIL) 200 MG tablet Take 200 mg by mouth 2 times daily     Historical Provider, MD   verapamil (CALAN SR) 240 MG extended release tablet Take 240 mg by mouth every morning     Historical Provider, MD   DULoxetine (CYMBALTA) 60 MG capsule Take 60 mg by mouth every morning     Historical Provider, MD       Current medications:    Current Facility-Administered Medications   Medication Dose Route Frequency Provider Last Rate Last Admin    atorvastatin (LIPITOR) tablet 20 mg  20 mg Oral Nightly Nilson Plata DO        [Held by provider] clopidogrel (PLAVIX) tablet 75 mg  75 mg Oral Daily Nilson Plata DO        DULoxetine (CYMBALTA) extended release capsule 60 mg  60 mg Oral [facility] Aron Plata,         hydroxychloroquine (PLAQUENIL) tablet 200 mg  200 mg Oral BID Nilson Plata,         oxybutynin (DITROPAN-XL) extended release tablet 15 mg  15 mg Oral Daily Nilson Plata DO        verapamil (CALAN SR) extended release tablet 240 mg  240 mg Oral [facility] Aron Plata,         sodium chloride flush 0.9 % injection 5-40 mL  5-40 mL IntraVENous 2 times per day Nilson Plata, DO   10 mL at 06/16/22 0900    sodium chloride flush 0.9 % injection 5-40 mL  5-40 mL IntraVENous PRN Nilson Plata, DO        0.9 % sodium chloride infusion   IntraVENous PRN Nilson Plata DO        polyethylene glycol (GLYCOLAX) packet 17 g  17 g Oral Daily PRN Nilson Plata,         acetaminophen (TYLENOL) tablet 650 mg  650 mg Oral Q6H PRN Nilson Plata DO        Or    acetaminophen (TYLENOL) suppository 650 mg  650 mg Rectal Q6H PRN Nilson Plata,         insulin lispro (HUMALOG) injection vial 0-12 Units  0-12 Units SubCUTAneous TID  Nilson Plata,         insulin lispro (HUMALOG) injection vial 0-6 Units  0-6 Units SubCUTAneous Nightly Nilson Plata DO        albuterol (PROVENTIL) nebulizer solution 2.5 mg  2.5 mg Nebulization Q6H PRN Nilson Plata,         latanoprost (XALATAN) 0.005 % ophthalmic solution 1 drop  1 drop Both Eyes Nightly Daquan Bautista DO        [START ON 6/17/2022] glipiZIDE (GLUCOTROL) tablet 2.5 mg  2.5 mg Oral [facility] Aron Plata, DO        ondansetron (ZOFRAN-ODT) disintegrating tablet 4 mg  4 mg Oral Q8H PRN Viola Callahan PA-C        Or    ondansetron [facility]) injection 4 mg  4 mg IntraVENous Q6H PRN Megha Burr PA-C           Allergies: Allergies   Allergen Reactions    Iodides Shortness Of Breath     CT Scan Dye (\"turned purple\")    Iodine Shortness Of Breath     Had trouble breathing and Skin color turned purple and stayed that way for  Hours. Other reaction(s): Other: See Comments  Had trubel breathing and Skin color turned purple and stayed that way for  Hours.  Other Shortness Of Breath     Perfumes and smoke    Pcn [Penicillins] Itching    Penicillin G Itching       Problem List:    Patient Active Problem List   Diagnosis Code    Essential hypertension I10    PAULINA (obstructive sleep apnea) G47.33    Midline low back pain with right-sided sciatica M54.41    Morbid obesity with BMI of 40.0-44.9, adult ([facility]) E66.01, Z68.41    Urinary incontinence R32    Altered mental status R41.82    Closed fracture fibula, head, right, initial encounter S82.831A    Diabetes mellitus ([facility]) E11.9    Neurogenic bladder N31.9    Sarcoidosis D86.9    Sleep apnea G47.30    Diffuse cerebral atrophy ([facility]) G31.9    Asthma J45.909    Diabetic peripheral neuropathy ([facility]) E11.42    Fibromyalgia M79.7    CKD (chronic kidney disease) stage 4, GFR 15-29 ml/min ([facility]) N18.4    Iron deficiency anemia D50.9    Sarcoid D86.9    Orthostatic hypotension I95.1    History of CVA (cerebrovascular accident) Z80.78    Combined forms of age-related cataract of both eyes H25.813    Atherosclerosis of artery of extremity without gangrene ([facility] I70.209    PVD (peripheral vascular disease) ([facility]) I73.9    Mirtha-prosthetic intertrochanteric fracture of femur M97. 8XXA, [id number]    Other fracture of right femur, initial encounter for closed fracture ([facility] S72.8X1A    Obesity E66.9    CKD (chronic kidney disease) stage 3, GFR 30-59 ml/min ([facility]) N18.30    Prolonged QT interval R94.31    Wound infection T14. 8XXA, L08.9       Past Medical History:        Diagnosis Date    Asthma 1993    Cataract, right eye     Cerebral artery occlusion with cerebral infarction ([facility] 07/2017    CKD (chronic kidney disease) stage 4, GFR 15-29 ml/min ([facility] 2017    Degenerative disc disease     Degenerative joint disease     Diabetes mellitus ([facility] 2008    diet controlled    Diabetic peripheral neuropathy ([facility] 1998    Diffuse cerebral atrophy ([facility] 2012    Fibromyalgia 01/2012    [facility]    Glaucoma     Hypertension     Iron deficiency anemia 10/25/2020    Neurogenic bladder     Neuropathy     Sarcoidosis 1993    Sleep apnea 2009    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\"       Past Surgical History:        Procedure Laterality Date    ANKLE FRACTURE SURGERY Right 10/24/2020    RIGHT ANKLE OPEN REDUCTION INTERNAL FIXATION performed by Zeferino Bedolla MD at [address] [facility]      laminectomy l3-4    BREAST SURGERY      biopsy    CARDIAC CATHETERIZATION  2012    MINIMAL CAD    CHOLECYSTECTOMY      COLONOSCOPY  08/2019    TUBULAR ADENOMA x 2    FOOT SURGERY Bilateral     right foot fracture; left foot charcot    HIP FRACTURE SURGERY Right 5/28/2022    OPEN REDUCTION INTERNAL FIXATION RIGHT PERIPROSTHETIC HIP FRACTURE performed by Sheila Alford MD at [facility])      INTRACAPSULAR CATARACT EXTRACTION Right 7/22/2021    RIGHT EYE CATARACT EXTRACTION IOL IMPLANT [facility]  12/16/2016    right hip arthroplasty    OTHER SURGICAL HISTORY Right 02/06/2017    I & D right hip wound vaccum placement    TOTAL KNEE ARTHROPLASTY Bilateral        Social History:    Social History     Tobacco Use    Smoking status: Never Smoker    Smokeless tobacco: Never Used   Substance Use Topics    Alcohol use: No     Alcohol/week: 0.0 standard drinks                                Counseling given: Not Answered      Vital Signs (Current):   Vitals:    06/16/22 5209 06/16/22 1966 06/16/22 1200 06/16/22 1430   BP: 127/64 (!) 155/76 (!) 133/56 (!) 118/54   Pulse: 77 82 86 80   Resp:  20 17 18   Temp:  98.1 °F (36.7 °C) 98.5 °F (36.9 °C) 97.6 °F (36.4 °C)   TempSrc:  Oral  Temporal   SpO2: 97% 100% 100% 96%   Weight:       Height:                                                  BP Readings from Last 3 Encounters:   06/16/22 (!) 118/54   06/15/22 86/70   06/09/22 (!) 132/57       NPO Status:                                                                                 BMI:   Wt Readings from Last 3 Encounters:   06/15/22 250 lb (113.4 kg)   06/09/22 250 lb (113.4 kg)   06/06/22 250 lb (113.4 kg)     Body mass index is 42.91 kg/m². CBC:   Lab Results   Component Value Date    WBC 11.8 06/15/2022    RBC 4.04 06/15/2022    HGB 11.5 06/15/2022    HCT 36.9 06/15/2022    MCV 91.3 06/15/2022    RDW 13.5 06/15/2022     06/15/2022       CMP:   Lab Results   Component Value Date     06/15/2022    K 4.7 06/15/2022     06/15/2022    CO2 24 06/15/2022    BUN 28 06/15/2022    CREATININE 1.5 06/15/2022    GFRAA 41 06/15/2022    LABGLOM 34 06/15/2022    GLUCOSE 130 06/16/2022    GLUCOSE 82 12/09/2011    PROT 7.0 06/15/2022    CALCIUM 8.4 06/15/2022    BILITOT 0.5 06/15/2022    ALKPHOS 228 06/15/2022    AST 29 06/15/2022    ALT 29 06/15/2022       POC Tests: No results for input(s): POCGLU, POCNA, POCK, POCCL, POCBUN, POCHEMO, POCHCT in the last 72 hours.     Coags:   Lab Results   Component Value Date    PROTIME 11.9 05/27/2022    PROTIME 12.0 12/09/2011    INR 1.1 05/27/2022    APTT 28.9 05/27/2022       HCG (If Applicable): No results found for: PREGTESTUR, PREGSERUM, HCG, HCGQUANT     ABGs: No results found for: PHART, PO2ART, FYV5OKZ, EOZ1SRY, BEART, G1MXRVQF     Type & Screen (If Applicable):  Lab Results   Component Value Date    LABABO B 12/09/2011    [address] 12/09/2011       Drug/Infectious Status (If Applicable):  No results found for: HIV, HEPCAB    COVID-19 Screening (If Applicable):   Lab Results   Component Value Date    COVID19 Not Detected 12/14/2020    COVID19 Not Detected 11/16/2020         Anesthesia Evaluation  Patient summary reviewed and Nursing notes reviewed no history of anesthetic complications:   Airway: Mallampati: III  TM distance: >3 FB   Neck ROM: full  Mouth opening: > = 3 FB   Dental:      Comment: Chip upper front tooth    Pulmonary: breath sounds clear to auscultation  (+) sleep apnea: on CPAP,  asthma:     (-) not a current smoker                           Cardiovascular:    (+) hypertension:,         Rhythm: regular  Rate: normal                    Neuro/Psych:   (+) CVA (balance problems. CVA 5 yrs ago.): residual symptoms, neuromuscular disease:,              ROS comment: Diabetic peripheral neuropathy    fibromyalgia GI/Hepatic/Renal:   (+) renal disease (Stage 4 kidney dx.): CRI, morbid obesity          Endo/Other:    (+) DiabetesType II DM, , .                  ROS comment: Sarcoidosis    S/P fall resulting in Rt. Femur fx Abdominal:             Vascular: negative vascular ROS. Other Findings:        EXAMINATION:   CT OF THE HEAD WITHOUT CONTRAST; CT OF THE CERVICAL SPINE WITHOUT CONTRAST   5/26/2022 5:36 pm       TECHNIQUE:   CT of the head was performed without the administration of intravenous   contrast. Automated exposure control, iterative reconstruction, and/or weight   based adjustment of the mA/kV was utilized to reduce the radiation dose to as   low as reasonably achievable.; CT of the cervical spine was performed without   the administration of intravenous contrast. Multiplanar reformatted images   are provided for review. Automated exposure control, iterative   reconstruction, and/or weight based adjustment of the mA/kV was utilized to   reduce the radiation dose to as low as reasonably achievable.        COMPARISON:   CT head and cervical spine 02/01/2021       HISTORY:   ORDERING SYSTEM PROVIDED HISTORY: fall   TECHNOLOGIST PROVIDED HISTORY:   Reason for exam:->fall   Has a \"code stroke\" or \"stroke alert\" been called? ->No   Decision Support Exception - unselect if not a suspected or confirmed   emergency medical condition->Emergency Medical Condition (MA)       FINDINGS:   CT head: There is a hypodensity within the right cerebellar hemisphere. There is   patchy hypoattenuation within the white matter of the bilateral cerebral   hemispheres. There is no evidence of mass, mass effect, or midline shift. There is enlargement of the ventricles and sulci suggesting generalized   cerebral volume loss. No extra-axial fluid collections or acute hemorrhage. The gray-white differentiation appears preserved without evidence of acute   cortical ischemia. The calvarium is intact. The visualized portions of the   paranasal sinuses and mastoid air cells are clear. There is right lens   replacement. CT cervical spine: There is no evidence of acute fracture. There is grade 1 anterolisthesis at   C3-4 and C7-T1. The remaining alignment is anatomic. There are no   compression deformities. There is narrowing of the intervertebral disc space   heights, most pronounced at C4-5 through C6-7. The facet joints are intact   at all levels with multilevel facet degeneration. The prevertebral soft   tissue structures are unremarkable. There is no gross evidence of central   canal stenosis. Impression   1. No acute intracranial abnormality. 2. Chronic small vessel ischemic disease and generalized cerebral volume loss. 3. Degenerative changes in the cervical spine without acute fracture or   subluxation. Exam: 2 views of the right hip. Comparison: Correlation made with CT performed today at 1539 hours. Findings:        Comminuted fracture involving femoral head. Fracture involving   superior margin of right acetabulum.            Impression       Fracture of femoral head and superior margin of right acetabulum. Anesthesia Plan      general     ASA 4     (Pt agrees to [facility] and IV sedation.)  Induction: intravenous. Anesthetic plan and risks discussed with patient. Use of blood products discussed with patient whom consented to blood products. Plan discussed with CRNA. Rosanne Viera MD   6/16/2022      Patient seen and examined, chart reviewed, agree with above findings. Anesthetic plan, risks, benefits, alternatives, and personnel involved discussed with patient. Patient verbalized an understanding and agreed to proceed. NPO status confirmed. On Plavix. Anesthetic plan discussed with care team members and agreed upon. Rosanne Viera MD   6/16/2022  4:32 PM        Pt seen, examined, chart reviewed, plan discussed.   Rosanne Viera MD  6/16/2022  4:33 PM

## 2022-06-16 NOTE — CONSULTS
[address] [place] [address] [facility]    Consultation Note     Admit Date: 6/15/2022 10:06 PM    Reason for Consult:   Infected right hip wound    Attending Physician:  Kevin White DO     Chief Complaint: right hip drainage. HISTORY OF PRESENT ILLNESS:   The patient is a 67 y.o.  female known to the Infectious Diseases service. The patient has extensive orthopedics history. Pt had right hip arthroplasty in 2016  For right hip femoral head and superolateral acetabular fractures. She had periprosthetic infection in 2017. She had right lateral ankle fracture with ORIF in 10/2020. She had right hip periprosthetic hip fracture underwent ORIF on 5/28/22. Was discharged 5/231/22. She went to ER on 6/9 with wound drainage and wound cx was taken which showed Proteus mirabilis and she was called and antibiotic keflex was given which she tok until yesterday and followed up with orthopedics and was sent to ER for wound drainage. She did not have any fever or cough or SOB or diarrhea. Since admission, she is afebrile. HS stable. White count is 11.8, hgb is 11.5, platelet coun is 802, cr 1.5/28, Alk phos is 228, normal transaminases. X-ray pelvis showed right hip hardware intact. US duplex LEs did not show any DVT. pt is currently not on any antibiotics. Pending orthopedic intervention. I got consulted for antibiotic management. Past Medical History:    2/2017: pt was seen by Dr. Vikki Cornejo was treated for infected seroma over right prosthetic hip with IV vancomycin/.cefepime for 3 weeks and pt was discharged to [address] [place] [address].          Diagnosis Date    Asthma 1993    Cataract, right eye     Cerebral artery occlusion with cerebral infarction ([address] 07/2017    CKD (chronic kidney disease) stage 4, GFR 15-29 ml/min ([facility]) 2017    Degenerative disc disease     Degenerative joint disease     Diabetes mellitus ([address] 2008    diet controlled    Diabetic peripheral neuropathy ([address] 1998    Diffuse cerebral atrophy ([facility] 2012    Fibromyalgia 01/2012    [facility]    Glaucoma     Hypertension     Iron deficiency anemia 10/25/2020    Neurogenic bladder     Neuropathy     Sarcoidosis 1993    Sleep apnea 2009    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\"     Past Surgical History:        Procedure Laterality Date    ANKLE FRACTURE SURGERY Right 10/24/2020    RIGHT ANKLE OPEN REDUCTION INTERNAL FIXATION performed by Jennie Larry MD at [facility]      laminectomy l3-4    BREAST SURGERY      biopsy    CARDIAC CATHETERIZATION  2012    MINIMAL CAD    CHOLECYSTECTOMY      COLONOSCOPY  08/2019    TUBULAR ADENOMA x 2    FOOT SURGERY Bilateral     right foot fracture; left foot charcot    HIP FRACTURE SURGERY Right 5/28/2022    OPEN REDUCTION INTERNAL FIXATION RIGHT PERIPROSTHETIC HIP FRACTURE performed by Corrie Dance, MD at [address] [facility] ([facility])      INTRACAPSULAR CATARACT EXTRACTION Right 7/22/2021    RIGHT EYE CATARACT EXTRACTION IOL IMPLANT performed by Laina Andrew MD at [address] [address]  12/16/2016    right hip arthroplasty    OTHER SURGICAL HISTORY Right 02/06/2017    I & D right hip wound vaccum placement    TOTAL KNEE ARTHROPLASTY Bilateral      Current Medications:   Scheduled Meds:   atorvastatin  20 mg Oral Nightly    bimatoprost  1 drop Both Eyes Nightly    [Held by provider] clopidogrel  75 mg Oral Daily    DULoxetine  60 mg Oral QAM    glimepiride  1 mg Oral QAM AC    hydroxychloroquine  200 mg Oral BID    oxybutynin  15 mg Oral Daily    verapamil  240 mg Oral QAM    sodium chloride flush  5-40 mL IntraVENous 2 times per day    insulin lispro  0-12 Units SubCUTAneous TID WC    insulin lispro  0-6 Units SubCUTAneous Nightly     Continuous Infusions:   sodium chloride       PRN Meds:albuterol sulfate HFA, sodium chloride flush, sodium chloride, polyethylene glycol, acetaminophen on file     Family History:       Problem Relation Age of Onset    Cancer Mother         pancreatic    Parkinsonism Father     Diabetes Maternal Grandfather     Cancer Brother         esophageal   . Otherwise non-pertinent to the chief complaint. REVIEW OF SYSTEMS:    As mentioned in HPI, all other systems negative. PHYSICAL EXAM:    Vitals:    BP (!) 155/76   Pulse 82   Temp 98.1 °F (36.7 °C) (Oral)   Resp 20   Ht 5' 4\" (1.626 m)   Wt 250 lb (113.4 kg)   SpO2 100%   BMI 42.91 kg/m²   Constitutional: The patient is awake, alert, and oriented. Skin: Warm and dry. No jaundice. HEENT: Eyes show round, and reactive pupils. Moist mucous membranes, no ulcerations, no thrush. Neck: Supple to movements. No lymphadenopathy. Chest: clear to auscultation anteriorly   Cardiovascular: S1 and S2 are rhythmic and regular. No murmurs appreciated. Abdomen: soft non tener  Extremities: No clubbing, no cyanosis, no edema.   Musculoskeletal: no gross focal abnormalities  Neurological: alert, oriented   Lines: peripheral      CBC+dif:  Recent Labs     06/15/22  1526   WBC 11.8*   HGB 11.5   HCT 36.9   MCV 91.3      NEUTROABS 8.83*     Lab Results   Component Value Date    CRP 6.6 (H) 06/09/2022    CRP 1.1 (H) 02/12/2018    CRP 3.1 (H) 02/05/2018     No results found for: [facility]  Lab Results   Component Value Date    SEDRATE 90 (H) 06/09/2022    SEDRATE 62 (H) 02/12/2018    SEDRATE 73 (H) 02/05/2018     Lab Results   Component Value Date    ALT 29 06/15/2022    AST 29 06/15/2022    ALKPHOS 228 (H) 06/15/2022    BILITOT 0.5 06/15/2022     Lab Results   Component Value Date     06/15/2022    K 4.7 06/15/2022     06/15/2022    CO2 24 06/15/2022    BUN 28 06/15/2022    CREATININE 1.5 06/15/2022    GFRAA 41 06/15/2022    LABGLOM 34 06/15/2022    GLUCOSE 130 06/16/2022    GLUCOSE 82 12/09/2011    PROT 7.0 06/15/2022    LABALBU 3.1 06/15/2022    CALCIUM 8.4 06/15/2022    BILITOT 0.5 06/15/2022

## 2022-06-16 NOTE — ANESTHESIA POSTPROCEDURE EVALUATION
Department of Anesthesiology  Postprocedure Note    Patient: Deyvi Card  MRN: [id number]  YOB: 1945  Date of evaluation: 6/16/2022  Time:  7:30 PM     Procedure Summary     Date: 06/16/22 Room / [facility] OR 08 / [facility]    Anesthesia Start: 1621 Anesthesia Stop:     Procedure: IRRIGATION AND DEBRIDEMENT RIGHT HIP WOUND INFECTION (Right Hip) Diagnosis:       Wound infection      (Wound infection [T14. 8XXA, L08.9])    Surgeons: Cristal Concepcion DO Responsible Provider: Kimber Dash MD    Anesthesia Type: general ASA Status: 4          Anesthesia Type: No value filed. Sharon Phase I: Sharon Score: 9    Sharon Phase II:      Last vitals: Reviewed and per EMR flowsheets.        Anesthesia Post Evaluation    Patient location during evaluation: PACU  Patient participation: complete - patient participated  Level of consciousness: awake  Pain score: 3  Airway patency: patent  Nausea & Vomiting: no nausea and no vomiting  Complications: no  Cardiovascular status: blood pressure returned to baseline  Respiratory status: acceptable  Hydration status: euvolemic

## 2022-06-16 NOTE — ED NOTES
N2N to 5WE, Candi Kate Advised pt is NPO since midnight for I&D R hip. Will place pt in transport when room cleaned.      Tami Bird RN  06/16/22 8454

## 2022-06-16 NOTE — H&P
[address]                  [address] [facility]  [facility]                              HISTORY AND PHYSICAL    PATIENT NAME: Hermann Eubanks                     :        1945  MED REC NO:   [id number]                            ROOM:       8558  ACCOUNT NO:   [de-identified]                           ADMIT DATE: 06/15/2022  PROVIDER:     Jesus Parr DO    CHIEF COMPLAINT:  Right hip wound drainage. HISTORY OF PRESENT ILLNESS:  The patient is a 65-year-old   female who underwent surgery to the right femur on 2022. Since  then, she has had right hip drainage. She was put on some Keflex. She  was seen at the orthopedic clinic and sent to the emergency room. She  was admitted for further evaluation and treatment. MEDICATIONS PRIOR TO ADMISSION:  Ventolin inhaler, multivitamin, Calan  SR, Keflex, Lumigan eyedrops, Ditropan XL, Amaryl, Plavix, Lipitor,  Plaquenil, Cymbalta. SOCIAL HISTORY:  Nonsmoker. No alcohol. REVIEW OF SYSTEMS:  Remarkable for above-stated chief complaint plus  allergy to IODIDES, IODINE, PERFUMES and SMOKE, PENICILLIN G. PAST SURGICAL HISTORY:  Right femur surgery, right ankle fracture  surgery, lumbar laminectomy, breast biopsy, cardiac catheterization,  cholecystectomy, bilateral foot surgery, hysterectomy, right eye  cataract surgery, right hip replacement, bilateral total knee  arthroplasty. PAST MEDICAL HISTORY:  CKD, CVA, peripheral artery disease, elevated  cholesterol, diabetes mellitus type 2, sarcoidosis, neuropathy,  glaucoma, asthma, osteoarthritis. PRIMARY CARE PROVIDER:  Hebert Juarez MD    PHYSICAL EXAMINATION:  GENERAL APPEARANCE:  Physical exam reveals a 65-year-old   female who is alert and oriented x3, cooperative and a good historian. VITAL SIGNS:  On admission, temperature 97.7, pulse 84, respirations 16,  blood pressure 114/74.   HEENT:  Head:  Normocephalic,

## 2022-06-17 LAB
BASOPHILS ABSOLUTE: 0 E9/L (ref 0–0.2)
BASOPHILS RELATIVE PERCENT: 0.1 % (ref 0–2)
BURR CELLS: ABNORMAL
EOSINOPHILS ABSOLUTE: 0 E9/L (ref 0.05–0.5)
EOSINOPHILS RELATIVE PERCENT: 0 % (ref 0–6)
GRAM STAIN ORDERABLE: NORMAL
HCT VFR BLD CALC: 30.5 % (ref 34–48)
HEMOGLOBIN: 9.7 G/DL (ref 11.5–15.5)
LYMPHOCYTES ABSOLUTE: 0.53 E9/L (ref 1.5–4)
LYMPHOCYTES RELATIVE PERCENT: 3.5 % (ref 20–42)
MCH RBC QN AUTO: 28.5 PG (ref 26–35)
MCHC RBC AUTO-ENTMCNC: 31.8 % (ref 32–34.5)
MCV RBC AUTO: 89.7 FL (ref 80–99.9)
METER GLUCOSE: 227 MG/DL (ref 74–99)
METER GLUCOSE: 234 MG/DL (ref 74–99)
METER GLUCOSE: 250 MG/DL (ref 74–99)
METER GLUCOSE: 273 MG/DL (ref 74–99)
MONOCYTES ABSOLUTE: 0.53 E9/L (ref 0.1–0.95)
MONOCYTES RELATIVE PERCENT: 4.3 % (ref 2–12)
NEUTROPHILS ABSOLUTE: 12.24 E9/L (ref 1.8–7.3)
NEUTROPHILS RELATIVE PERCENT: 92.2 % (ref 43–80)
NUCLEATED RED BLOOD CELLS: 0.9 /100 WBC
PDW BLD-RTO: 13.3 FL (ref 11.5–15)
PLATELET # BLD: 405 E9/L (ref 130–450)
PMV BLD AUTO: 8.5 FL (ref 7–12)
POLYCHROMASIA: ABNORMAL
RBC # BLD: 3.4 E12/L (ref 3.5–5.5)
WBC # BLD: 13.3 E9/L (ref 4.5–11.5)

## 2022-06-17 PROCEDURE — 85025 COMPLETE CBC W/AUTO DIFF WBC: CPT

## 2022-06-17 PROCEDURE — 1200000000 HC SEMI PRIVATE

## 2022-06-17 PROCEDURE — 97530 THERAPEUTIC ACTIVITIES: CPT

## 2022-06-17 PROCEDURE — 97165 OT EVAL LOW COMPLEX 30 MIN: CPT

## 2022-06-17 PROCEDURE — 6370000000 HC RX 637 (ALT 250 FOR IP)

## 2022-06-17 PROCEDURE — 97535 SELF CARE MNGMENT TRAINING: CPT

## 2022-06-17 PROCEDURE — 36415 COLL VENOUS BLD VENIPUNCTURE: CPT

## 2022-06-17 PROCEDURE — 36569 INSJ PICC 5 YR+ W/O IMAGING: CPT

## 2022-06-17 PROCEDURE — 82962 GLUCOSE BLOOD TEST: CPT

## 2022-06-17 PROCEDURE — 6370000000 HC RX 637 (ALT 250 FOR IP): Performed by: INTERNAL MEDICINE

## 2022-06-17 PROCEDURE — 97161 PT EVAL LOW COMPLEX 20 MIN: CPT

## 2022-06-17 PROCEDURE — 2580000003 HC RX 258

## 2022-06-17 PROCEDURE — 6360000002 HC RX W HCPCS

## 2022-06-17 PROCEDURE — 76937 US GUIDE VASCULAR ACCESS: CPT

## 2022-06-17 PROCEDURE — 2580000003 HC RX 258: Performed by: STUDENT IN AN ORGANIZED HEALTH CARE EDUCATION/TRAINING PROGRAM

## 2022-06-17 PROCEDURE — C1751 CATH, INF, PER/CENT/MIDLINE: HCPCS

## 2022-06-17 PROCEDURE — 6360000002 HC RX W HCPCS: Performed by: STUDENT IN AN ORGANIZED HEALTH CARE EDUCATION/TRAINING PROGRAM

## 2022-06-17 RX ORDER — HEPARIN SODIUM (PORCINE) LOCK FLUSH IV SOLN 100 UNIT/ML 100 UNIT/ML
3 SOLUTION INTRAVENOUS EVERY 12 HOURS SCHEDULED
Status: DISCONTINUED | OUTPATIENT
Start: 2022-06-17 | End: 2022-06-22 | Stop reason: HOSPADM

## 2022-06-17 RX ORDER — LIDOCAINE HYDROCHLORIDE 10 MG/ML
5 INJECTION, SOLUTION EPIDURAL; INFILTRATION; INTRACAUDAL; PERINEURAL ONCE
Status: DISCONTINUED | OUTPATIENT
Start: 2022-06-17 | End: 2022-06-22 | Stop reason: HOSPADM

## 2022-06-17 RX ORDER — SODIUM CHLORIDE 9 MG/ML
INJECTION, SOLUTION INTRAVENOUS PRN
Status: DISCONTINUED | OUTPATIENT
Start: 2022-06-17 | End: 2022-06-22 | Stop reason: HOSPADM

## 2022-06-17 RX ORDER — SODIUM CHLORIDE 0.9 % (FLUSH) 0.9 %
5-40 SYRINGE (ML) INJECTION EVERY 12 HOURS SCHEDULED
Status: DISCONTINUED | OUTPATIENT
Start: 2022-06-17 | End: 2022-06-22 | Stop reason: HOSPADM

## 2022-06-17 RX ORDER — SODIUM CHLORIDE 0.9 % (FLUSH) 0.9 %
5-40 SYRINGE (ML) INJECTION PRN
Status: DISCONTINUED | OUTPATIENT
Start: 2022-06-17 | End: 2022-06-22 | Stop reason: HOSPADM

## 2022-06-17 RX ORDER — OXYCODONE HYDROCHLORIDE 5 MG/1
5 TABLET ORAL EVERY 4 HOURS PRN
Status: DISCONTINUED | OUTPATIENT
Start: 2022-06-17 | End: 2022-06-22 | Stop reason: HOSPADM

## 2022-06-17 RX ORDER — OXYCODONE HYDROCHLORIDE 10 MG/1
10 TABLET ORAL EVERY 4 HOURS PRN
Status: DISCONTINUED | OUTPATIENT
Start: 2022-06-17 | End: 2022-06-22 | Stop reason: HOSPADM

## 2022-06-17 RX ORDER — DEXTROSE MONOHYDRATE 50 MG/ML
100 INJECTION, SOLUTION INTRAVENOUS PRN
Status: DISCONTINUED | OUTPATIENT
Start: 2022-06-17 | End: 2022-06-22 | Stop reason: HOSPADM

## 2022-06-17 RX ORDER — HEPARIN SODIUM (PORCINE) LOCK FLUSH IV SOLN 100 UNIT/ML 100 UNIT/ML
3 SOLUTION INTRAVENOUS PRN
Status: DISCONTINUED | OUTPATIENT
Start: 2022-06-17 | End: 2022-06-22 | Stop reason: HOSPADM

## 2022-06-17 RX ORDER — CLOPIDOGREL BISULFATE 75 MG/1
75 TABLET ORAL DAILY
Status: DISCONTINUED | OUTPATIENT
Start: 2022-06-18 | End: 2022-06-22 | Stop reason: HOSPADM

## 2022-06-17 RX ORDER — ASPIRIN 81 MG/1
81 TABLET ORAL 2 TIMES DAILY
Status: DISCONTINUED | OUTPATIENT
Start: 2022-06-17 | End: 2022-06-22 | Stop reason: HOSPADM

## 2022-06-17 RX ADMIN — DULOXETINE HYDROCHLORIDE 60 MG: 60 CAPSULE, DELAYED RELEASE ORAL at 08:37

## 2022-06-17 RX ADMIN — Medication 10 ML: at 08:38

## 2022-06-17 RX ADMIN — GLIPIZIDE 2.5 MG: 5 TABLET ORAL at 08:35

## 2022-06-17 RX ADMIN — HYDROXYCHLOROQUINE SULFATE 200 MG: 200 TABLET ORAL at 20:21

## 2022-06-17 RX ADMIN — WATER 1000 MG: 1 INJECTION INTRAMUSCULAR; INTRAVENOUS; SUBCUTANEOUS at 05:30

## 2022-06-17 RX ADMIN — SODIUM CHLORIDE, PRESERVATIVE FREE 10 ML: 5 INJECTION INTRAVENOUS at 21:58

## 2022-06-17 RX ADMIN — CEFEPIME HYDROCHLORIDE 2000 MG: 2 INJECTION, POWDER, FOR SOLUTION INTRAVENOUS at 17:54

## 2022-06-17 RX ADMIN — OXYCODONE HYDROCHLORIDE 10 MG: 10 TABLET ORAL at 05:14

## 2022-06-17 RX ADMIN — VANCOMYCIN HYDROCHLORIDE 2250 MG: 10 INJECTION, POWDER, LYOPHILIZED, FOR SOLUTION INTRAVENOUS at 17:57

## 2022-06-17 RX ADMIN — INSULIN LISPRO 2 UNITS: 100 INJECTION, SOLUTION INTRAVENOUS; SUBCUTANEOUS at 20:21

## 2022-06-17 RX ADMIN — WATER 1000 MG: 1 INJECTION INTRAMUSCULAR; INTRAVENOUS; SUBCUTANEOUS at 11:40

## 2022-06-17 RX ADMIN — OXYBUTYNIN CHLORIDE 15 MG: 10 TABLET, EXTENDED RELEASE ORAL at 08:35

## 2022-06-17 RX ADMIN — INSULIN LISPRO 4 UNITS: 100 INJECTION, SOLUTION INTRAVENOUS; SUBCUTANEOUS at 17:43

## 2022-06-17 RX ADMIN — SODIUM CHLORIDE, PRESERVATIVE FREE 10 ML: 5 INJECTION INTRAVENOUS at 21:29

## 2022-06-17 RX ADMIN — Medication 10 ML: at 22:05

## 2022-06-17 RX ADMIN — VERAPAMIL HYDROCHLORIDE 240 MG: 240 TABLET, FILM COATED, EXTENDED RELEASE ORAL at 08:36

## 2022-06-17 RX ADMIN — ASPIRIN 81 MG: 81 TABLET, COATED ORAL at 20:21

## 2022-06-17 RX ADMIN — ATORVASTATIN CALCIUM 20 MG: 20 TABLET, FILM COATED ORAL at 20:21

## 2022-06-17 RX ADMIN — Medication 300 UNITS: at 21:30

## 2022-06-17 RX ADMIN — OXYCODONE 5 MG: 5 TABLET ORAL at 22:01

## 2022-06-17 RX ADMIN — HYDROXYCHLOROQUINE SULFATE 200 MG: 200 TABLET ORAL at 08:37

## 2022-06-17 RX ADMIN — LATANOPROST 1 DROP: 50 SOLUTION OPHTHALMIC at 21:33

## 2022-06-17 RX ADMIN — INSULIN LISPRO 6 UNITS: 100 INJECTION, SOLUTION INTRAVENOUS; SUBCUTANEOUS at 12:19

## 2022-06-17 RX ADMIN — INSULIN LISPRO 6 UNITS: 100 INJECTION, SOLUTION INTRAVENOUS; SUBCUTANEOUS at 08:38

## 2022-06-17 ASSESSMENT — PAIN - FUNCTIONAL ASSESSMENT: PAIN_FUNCTIONAL_ASSESSMENT: PREVENTS OR INTERFERES SOME ACTIVE ACTIVITIES AND ADLS

## 2022-06-17 ASSESSMENT — PAIN DESCRIPTION - DESCRIPTORS
DESCRIPTORS: ACHING;DISCOMFORT;SORE
DESCRIPTORS: ACHING;DISCOMFORT;SHARP

## 2022-06-17 ASSESSMENT — PAIN SCALES - GENERAL
PAINLEVEL_OUTOF10: 3
PAINLEVEL_OUTOF10: 0
PAINLEVEL_OUTOF10: 5
PAINLEVEL_OUTOF10: 0
PAINLEVEL_OUTOF10: 7

## 2022-06-17 ASSESSMENT — PAIN DESCRIPTION - LOCATION
LOCATION: ARM;HIP
LOCATION: HIP;LEG

## 2022-06-17 ASSESSMENT — PAIN DESCRIPTION - PAIN TYPE: TYPE: ACUTE PAIN

## 2022-06-17 ASSESSMENT — PAIN DESCRIPTION - ORIENTATION: ORIENTATION: RIGHT

## 2022-06-17 ASSESSMENT — PAIN DESCRIPTION - ONSET: ONSET: ON-GOING

## 2022-06-17 ASSESSMENT — PAIN DESCRIPTION - FREQUENCY: FREQUENCY: CONTINUOUS

## 2022-06-17 NOTE — PROGRESS NOTES
Physical Therapy  Physical Therapy Initial Assessment     Name: Roz Montilla  : 1945  MRN: [id number]      Date of Service: 2022    Evaluating PT:  Justin Ji, PT, DPT [id number]    Room #:  6732/5599-X  Diagnosis:  Postoperative wound infection [T81.49XA]  Wound infection [T14. 8XXA, L08.9]  PMHx/PSHx:    Past Medical History:   Diagnosis Date    Asthma     Cataract, right eye     Cerebral artery occlusion [facility] [address] 2017    CKD (chronic kidney disease) stage 4, GFR 15-29 ml/min ([facility] [address]     Degenerative disc disease     Degenerative joint disease     Diabetes mellitus ([facility] [address]     diet controlled    Diabetic peripheral neuropathy [facility] [address]     Diffuse cerebral atrophy [facility] [address]     Fibromyalgia 2012    [facility]    Glaucoma     Hypertension     Iron deficiency anemia 10/25/2020    Neurogenic bladder     Neuropathy     Sarcoidosis     Sleep apnea     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\"     Procedure/Surgery:  Right hip deep infected seroma I&D, insertion absorbable antibiotic beads  Precautions:  TTWB R LE, O2  Equipment Needs:  TBD- AAD    SUBJECTIVE:    Pt lives with her family in a single story home with a ramp to enter from the garage. Bed is on first floor and bath is on first floor. Pt ambulated with a rollator Independently PTA. Equipment Owned: rollator,w/c,bsc,and s/c at home    OBJECTIVE:   Initial Evaluation  Date: 22 Treatment Short Term/ Long Term   Goals   AM-PAC 6 Clicks /10     Was pt agreeable to Eval/treatment? Yes     Does pt have pain? Yes surgical 8/10     Bed Mobility  Rolling: SBA  Supine to sit: Min A  Sit to supine: SBA  Scooting: SBA  Rolling: Mod I with rails  Supine to sit: Independent  Sit to supine: Independent  Scooting: Independent   Transfers Sit to stand: NT  Stand to sit: NT  Stand pivot: NT  Sit to stand: Independent  Stand to sit: Independent  Stand pivot:  Mod I with FWW   Ambulation    NT  >50 feet with FWW with supervision   Stair negotiation: ascended and descended   NT  2 steps with bilateral rail SBA   ROM BUE:  See OT note  BLE:  WFL     Strength BUE:  See OT ntoe  BLE:  4-/5 R LE, 4+/5 L LE  4+/5   Balance Sitting EOB:  SBA  Dynamic Standing:  NT  Sitting EOB:  Independent  Dynamic Standing: Mod I with FWW supervision     Pt is A & O x 4  Sensation:  Pt denies numbness and tingling to extremities  Edema:  R LE mild     Vitals:  Blood Pressure at rest -- Blood Pressure post session --   Heart Rate at rest 92 Heart Rate post session 86   SPO2 at rest 100 SPO2 post session 100     Therapeutic Exercises:    Heel/Toe raise, knee extension, quad sets (supine) 20 reps    Patient education  Pt educated on role and benefits of physical therapy, safety and technique for mobility    Patient response to education:   Pt verbalized understanding Pt demonstrated skill Pt requires further education in this area   Yes Yes Yes     ASSESSMENT:    Conditions Requiring Skilled Therapeutic Intervention:    [x]Decreased strength     [x]Decreased ROM  [x]Decreased functional mobility  [x]Decreased balance   [x]Decreased endurance   []Decreased posture  []Decreased sensation  []Decreased coordination   []Decreased vision  [x]Decreased safety awareness   [x]Increased pain       Comments:  Patient deemed medically stable prior to therapy evaluation. Patient was supine in bed and pleasant upon therapy arrival. Patient provided consent to evaluation. Due to receiving occupational therapy services prior, patient noted she did want to participate but was uncertain of her ability to stand and walk. Education provided and reviewed regarding WB status. Although she required very little if any physical assist on bed mobility, she did require Mod VCs for safe execution. Once EOB patient did describe increased surgical pain and politely denied attempts to stand and ambulate.  She was supine with all lines managed at conclusion of session. Patient left with all needs met and call light in reach for safety. Treatment:  Patient practiced and was instructed in the following treatment:     Bed mobility training - pt given verbal and tactile cues to facilitate proper sequencing and safety during rolling and supine>sit as well as provided with physical assistance to complete task    Sitting EOB for >10 minutes for upright tolerance, postural awareness and BLE ROM   Therapeutic exercises were performed with education to improve circulation of the R LE with surgical and WB precautions considered. Pt's/ family goals   1. Return to PLOF    Prognosis is fair+ for reaching above PT goals. Patient and or family understand(s) diagnosis, prognosis, and plan of care. Yes    PHYSICAL THERAPY PLAN OF CARE:    PT POC is established based on physician order and patient diagnosis     Referring provider/PT Order:  JUAN Hayes One Time PT Jd 06/17/22  Diagnosis:  Postoperative wound infection [T81.49XA]  Wound infection [T14. 8XXA, L08.9]  Specific instructions for next treatment:  Progress mobility, re-attempt ambulation and transfers    Current Treatment Recommendations:     [x] Strengthening to improve independence with functional mobility   [x] ROM to improve independence with functional mobility   [x] Balance Training to improve static/dynamic balance and to reduce fall risk  [x] Endurance Training to improve activity tolerance during functional mobility   [x] Transfer Training to improve safety and independence with all functional transfers   [x] Gait Training to improve gait mechanics, endurance and assess need for appropriate assistive device  [x] Stair Training in preparation for safe discharge home and/or into the community   [x] Positioning to prevent skin breakdown and contractures  [x] Safety and Education Training   [x] Patient/Caregiver Education   [] HEP  [x] Other     PT long term treatment goals are located in above grid    Frequency of treatments: 2-5x/week x 1-2 weeks. Time in  1455  Time out  1520    Total Treatment Time  10 minutes     Evaluation Time includes thorough review of current medical information, gathering information on past medical history/social history and prior level of function, completion of standardized testing/informal observation of tasks, assessment of data and education on plan of care and goals.     CPT codes:  [x] Low Complexity PT evaluation 35180  [] Moderate Complexity PT evaluation 06845  [] High Complexity PT evaluation 56060  [] PT Re-evaluation 82034  [] Gait training 68893 -- minutes  [] Manual therapy [address] [facility] -- minutes  [x] Therapeutic activities 09919 10 minutes  [] Therapeutic exercises 17869 -- minutes  [] Neuromuscular reeducation 23898 -- minutes     Stephen Delgado, PT, DPT [id number]

## 2022-06-17 NOTE — PROGRESS NOTES
Department of Orthopedic Surgery  Resident Progress Note    Patient seen and examined. Pain controlled to right hip. Pt. W/ neuropathy and baseline N/T/P to RLE unchanged. Denies fever or chills at this time. No new complaints. Denies chest pain, shortness of breath, dizziness/lightheadedness. -BM, +flatulence. VITALS:  BP (!) 119/54   Pulse 84   Temp 97.7 °F (36.5 °C) (Oral)   Resp 18   Ht 5' 4\" (1.626 m)   Wt 250 lb (113.4 kg)   SpO2 99%   BMI 42.91 kg/m²     General: alert and oriented    MUSCULOSKELETAL:   right lower extremity:  · Dressing C/D/I, drain in place and working   · Compartments soft and compressible  · Able to minimally plantarflex and dorsiflex, unable to wiggle toes, this is patients baseline  · palpable DP & PT pulses, Brisk Cap refill, Toes warm and perfused  · Distal sensation significantly diminished to Peroneals, Sural, Saphenous, and tibial nrs    CBC:   Lab Results   Component Value Date    WBC 11.8 06/15/2022    HGB 11.5 06/15/2022    HCT 36.9 06/15/2022     06/15/2022     PT/INR:    Lab Results   Component Value Date    PROTIME 11.9 05/27/2022    PROTIME 12.0 12/09/2011    INR 1.1 05/27/2022       ASSESSMENT  · S/P irrigation and debridement right hip wound infection with antibiotic bead placement 6/16    PLAN    TTWB RLE  abx per ID   DVT prophylaxis- Plavix, early mobilization   Drain output overnight was 55ml, continue to monitor during morning shift if output is below 40ml we will pull drain today  PT/OT  Pain control- IV & PO  Monitor H&H-awaiting am labs, pt. Asymptomatic   Dispo: PT/OT/SW recs, ID recs, will pull drain when output appropriate   D/w attending     Orthopaedic Attending    I have seen and evaluated the patient with the resident and agree with the above assessments on today's visit. I have performed the key components of the history and physical examination and concur completely with the findings and plans as documented above.     Electronically signed by   Dyan Lea,   6/17/2022

## 2022-06-17 NOTE — PROGRESS NOTES
Infectious Disease  Progress Note  [facility]    Chief Complaint: right thigh hardware associated osteomyelitis. Subjective:  She is doing well post op. Pain is better. No fever    Scheduled Meds:   ceFAZolin  1,000 mg IntraVENous Q8H    cefepime  2,000 mg IntraVENous Q12H    atorvastatin  20 mg Oral Nightly    [Held by provider] clopidogrel  75 mg Oral Daily    DULoxetine  60 mg Oral QAM    hydroxychloroquine  200 mg Oral BID    oxybutynin  15 mg Oral Daily    verapamil  240 mg Oral QAM    sodium chloride flush  5-40 mL IntraVENous 2 times per day    insulin lispro  0-12 Units SubCUTAneous TID WC    insulin lispro  0-6 Units SubCUTAneous Nightly    latanoprost  1 drop Both Eyes Nightly    glipiZIDE  2.5 mg Oral QAM AC     Continuous Infusions:   dextrose      sodium chloride       PRN Meds:oxyCODONE **OR** oxyCODONE, glucose, dextrose bolus **OR** dextrose bolus, glucagon (rDNA), dextrose, sodium chloride flush, sodium chloride, polyethylene glycol, acetaminophen **OR** acetaminophen, albuterol, ondansetron **OR** ondansetron    ROS:  As mentioned in subjective, all other systems negative      BP (!) 139/58   Pulse 87   Temp 98.2 °F (36.8 °C) (Oral)   Resp 16   Ht 5' 4\" (1.626 m)   Wt 250 lb (113.4 kg)   SpO2 100%   BMI 42.91 kg/m²     Physical Exam  Const/Neuro- unchanged, no signs of acute distress, Alert  ENMT- Within Normal Limits, Normocephalic, mucous membranes pink/moist, No thrush  Neck: Neck supple  Heart- Regular, Rate, Rhythm- no murmur appreciated. Lungs- clear to ascultation. Respirations even and nonlabored. Abdomen- Soft, bowel sounds positive, non tender  Musculo/Extremities-  Equal and symmetrical, dressing over right thigh with drain in place  Neurological- No focal motor or sensory loss. No confusion  Skin:  Warm and dry, free from rashes.       Labs, Cultures reviewed    Radiology reviewed    Microbiology:  Wound cx 6/9: Proteus mirabilis  Blood cx 6/15: so far negative  Surgical cx 6/16: abundant PMNs, no organisms, cx in process     Assessment:  · Right femur fracture with ORIF with osteomyelitis:   ? S/p debridement on 6/16  ? Pt has been on Keflex for the past  5 Days before presentation. · Leucocytosis:     Plan:    · Started her on IV Vancomycin/cefepime. · She had hallucinations with IV vancomycin in the past but given daptomycin will be expensive outpatient, she agreed to try it.    · Will follow cx and  adjust antibiotics  · Place PICC   · Will follow with you      Electronically signed by Tomy Ghotra MD on 6/17/2022 at 10:32 AM

## 2022-06-17 NOTE — PROGRESS NOTES
Hospital Medicine    Subjective:  Pt pod # 1 alert conversive      Current Facility-Administered Medications:     oxyCODONE (ROXICODONE) immediate release tablet 5 mg, 5 mg, Oral, Q4H PRN **OR** oxyCODONE HCl (OXY-IR) immediate release tablet 10 mg, 10 mg, Oral, Q4H PRN, Gavi Maldonado DO, 10 mg at 06/17/22 0514    glucose chewable tablet 16 g, 4 tablet, Oral, PRN, Parviz Hendrixks Malmer, DO    dextrose bolus 10% 125 mL, 125 mL, IntraVENous, PRN **OR** dextrose bolus 10% 250 mL, 250 mL, IntraVENous, PRN, Parviz Folks Malmer, DO    glucagon (rDNA) injection 1 mg, 1 mg, IntraMUSCular, PRN, Parviz Harvey Malmer, DO    dextrose 5 % solution, 100 mL/hr, IntraVENous, PRN, Parviz Harvey Malmer, DO    cefepime (MAXIPIME) 2000 mg IVPB minibag, 2,000 mg, IntraVENous, Q12H, Iam Deluna MD    lidocaine PF 1 % injection 5 mL, 5 mL, IntraDERmal, Once, Iam Deluna MD    sodium chloride flush 0.9 % injection 5-40 mL, 5-40 mL, IntraVENous, 2 times per day, Iam Deluna MD    sodium chloride flush 0.9 % injection 5-40 mL, 5-40 mL, IntraVENous, PRN, Iam Deluna MD    0.9 % sodium chloride infusion, , IntraVENous, PRN, Iam Deluna MD    heparin flush 100 UNIT/ML injection 300 Units, 3 mL, IntraVENous, 2 times per day, Iam Deluna MD    heparin flush 100 UNIT/ML injection 300 Units, 3 mL, IntraCATHeter, PRN, Iam Deluna MD    vancomycin (VANCOCIN) 2,250 mg in dextrose 5 % 500 mL IVPB, 2,250 mg, IntraVENous, Once **FOLLOWED BY** [START ON 6/18/2022] vancomycin 1500 mg in dextrose 5% 300 mL IVPB, 1,500 mg, IntraVENous, Q24H, Iam Deluna MD    atorvastatin (LIPITOR) tablet 20 mg, 20 mg, Oral, Nightly, Javier Singh DO, 20 mg at 06/16/22 2042    [Held by provider] clopidogrel (PLAVIX) tablet 75 mg, 75 mg, Oral, Daily, Parviz Plata DO    DULoxetine (CYMBALTA) extended release capsule 60 mg, 60 mg, Oral, Javier MARKS DO, 60 mg at 06/17/22 0837    hydroxychloroquine (PLAQUENIL) tablet 200 mg, 200 mg, Oral, BID, Lita Castaneda DO, 200 mg at 06/17/22 0837    oxybutynin (DITROPAN-XL) extended release tablet 15 mg, 15 mg, Oral, Daily, Javier Singh, DO, 15 mg at 06/17/22 0835    verapamil (CALAN SR) extended release tablet 240 mg, 240 mg, Oral, QAM, Javier Singh, DO, 240 mg at 06/17/22 0836    sodium chloride flush 0.9 % injection 5-40 mL, 5-40 mL, IntraVENous, 2 times per day, Lita Castaneda DO, 10 mL at 06/17/22 0710    sodium chloride flush 0.9 % injection 5-40 mL, 5-40 mL, IntraVENous, PRN, Lita Castaneda DO    0.9 % sodium chloride infusion, , IntraVENous, PRN, Lita Castaneda DO    polyethylene glycol (GLYCOLAX) packet 17 g, 17 g, Oral, Daily PRN, Lita Castaneda DO    acetaminophen (TYLENOL) tablet 650 mg, 650 mg, Oral, Q6H PRN **OR** acetaminophen (TYLENOL) suppository 650 mg, 650 mg, Rectal, Q6H PRN, Lita Castaneda DO    insulin lispro (HUMALOG) injection vial 0-12 Units, 0-12 Units, SubCUTAneous, TID WC, Lita Castaneda DO, 6 Units at 06/17/22 1219    insulin lispro (HUMALOG) injection vial 0-6 Units, 0-6 Units, SubCUTAneous, Nightly, Jaiver Singh DO    albuterol (PROVENTIL) nebulizer solution 2.5 mg, 2.5 mg, Nebulization, Q6H PRN, Lita Castaneda DO    latanoprost (XALATAN) 0.005 % ophthalmic solution 1 drop, 1 drop, Both Eyes, Nightly, Javier Singh DO, 1 drop at 06/16/22 2042    glipiZIDE (GLUCOTROL) tablet 2.5 mg, 2.5 mg, Oral, QAM AC, Javier Singh, DO, 2.5 mg at 06/17/22 0835    ondansetron (ZOFRAN-ODT) disintegrating tablet 4 mg, 4 mg, Oral, Q8H PRN **OR** ondansetron (ZOFRAN) injection 4 mg, 4 mg, IntraVENous, Q6H PRN, Lita Castaneda DO    Objective:    BP (!) 139/58   Pulse 87   Temp 98.2 °F (36.8 °C) (Oral)   Resp 16   Ht 5' 4\" (1.626 m)   Wt 250 lb (113.4 kg)   SpO2 100%   BMI 42.91 kg/m²     Heart:  reg  Lungs:  ctab  Abd: + bs soft nontender  Extrem:  Edema legs    CBC with Differential:    Lab Results   Component Value Date    WBC 13.3 06/17/2022    RBC 3.40 06/17/2022    HGB 9.7 06/17/2022 HCT 30.5 06/17/2022     06/17/2022    MCV 89.7 06/17/2022    MCH 28.5 06/17/2022    MCHC 31.8 06/17/2022    RDW 13.3 06/17/2022    NRBC 0.9 06/17/2022    LYMPHOPCT 3.5 06/17/2022    MONOPCT 4.3 06/17/2022    BASOPCT 0.1 06/17/2022    MONOSABS 0.53 06/17/2022    LYMPHSABS 0.53 06/17/2022    EOSABS 0.00 06/17/2022    BASOSABS 0.00 06/17/2022     CMP:    Lab Results   Component Value Date     06/15/2022    K 4.7 06/15/2022     06/15/2022    CO2 24 06/15/2022    BUN 28 06/15/2022    CREATININE 1.5 06/15/2022    GFRAA 41 06/15/2022    LABGLOM 34 06/15/2022    GLUCOSE 130 06/16/2022    GLUCOSE 82 12/09/2011    PROT 7.0 06/15/2022    LABALBU 3.1 06/15/2022    CALCIUM 8.4 06/15/2022    BILITOT 0.5 06/15/2022    ALKPHOS 228 06/15/2022    AST 29 06/15/2022    ALT 29 06/15/2022     Warfarin PT/INR:    Lab Results   Component Value Date    INR 1.1 05/27/2022    INR 1.0 10/24/2020    INR 2.2 04/30/2018    PROTIME 11.9 05/27/2022    PROTIME 11.3 10/24/2020    PROTIME 24.8 (H) 04/30/2018       Assessment:    Principal Problem:    Wound infection  Resolved Problems:    * No resolved hospital problems.  *      Plan:  Cont postop care atb per id        Kevin White DO  1:57 PM  6/17/2022

## 2022-06-17 NOTE — PROGRESS NOTES
Called Dr. Flako Dang about restarting pt's Plavix 75mg po daily and asa 81mg po bid. Orders given to restart.

## 2022-06-17 NOTE — PROGRESS NOTES
Vascular Access Procedure Note    Procedure Date:   6/17/2022    Pre-procedure Verification/Time-Out:  The proposed risks versus benefits of this procedure were discussed in detail by the physician with patient. written consent was obtained from the patient. Relevant documentation was reviewed prior to procedure including signed consent form and medications. All necessary equipment for procedure is available at time of procedure yes. An audible time out was done at 1610PM by team members, correctly identifying patients name, medical record number, correct side, correct site, and correct procedure to be performed with registered nurse members of the procedure team all in agreement.         Indication for Procedure:   Reason for Insertion: intravenous antibiotics    ASA Assessment (Required for Moderate & Deep Sedation):      Procedure:   Reason for Consultation: power PICC line    Clinician Performing Procedure:   Edgar Rodas rn    Assistant:  none    Sedation:   Analgesia Used: lidocaine 1%    Procedure Details/Findings:  Catheter Latvian Size: 5.5  Lot Number: [id number]  Product #: [id number]  Expiration Date: 08/31 2023  Maximum Barrier Precautions: cap, eye shield, full body drape, gloves, gown, handwashing and mask  Skin Prep: chlorhexidine  Technique: modified seldinger and ultrasound guided with VPS  Attempts: 1  Exposed (cm): 2  Total (cm): 44  Placement Site: left brachial vein  Vessel Size: 0.50  Dressing: securement device, transparent dressing and biopatch  Blood Return: Yes   Ultrasound Guidance: Yes   Arm Circumference Mid-Bicep (cm): 28  Chest X-Ray Ordered: VasoNova VPS  End Placement: caj    Complications:   none     Post-operative Condition:  stable  Patient Tolerated Procedure: well     Comments/Post-operative Education:   Post Procedure Interventions: patient verbalized understanding of education    Axel Bautista RN  06/17/22  4:57 PM

## 2022-06-17 NOTE — PROGRESS NOTES
OCCUPATIONAL THERAPY INITIAL EVALUATION    [facility] [address] [address]        Date:2022                                                  Patient Name: Hemant Dietrich    MRN: [id number]    : 1945    Room: [address] [place] [address]          Evaluating OT: Alistair Agustin OTR/L; [id number]       Referring Provider: Kolleen Gosselin, DO    Specific Provider Orders/Date: OT Eval and Treat 22       Diagnosis: Wound infection R hip    Surgery: 22 Right hip deep infected seroma I&D, insertion absorbable antibiotic beads    Pertinent Medical History:  has a past medical history of Asthma, Cataract, right eye, Cerebral artery occlusion with cerebral infarction [facility]), CKD (chronic kidney disease) stage 4, GFR 15-29 ml/min ([address] Degenerative disc disease, Degenerative joint disease, Diabetes mellitus ([address] Diabetic peripheral neuropathy ([address] Diffuse cerebral atrophy ([address] Fibromyalgia, Glaucoma, Hypertension, Iron deficiency anemia, Neurogenic bladder, Neuropathy, Sarcoidosis, Sleep apnea, and Spinal cord and nerve root disorder.    Recommended Adaptive Equipment: AE for LE bathing and dressing PRN     Precautions:  Fall Risk, TTWB RLE (per perfectserve to ortho), external catheter, O2     Assessment of current deficits    [x] Functional mobility  [x]ADLs  [x] Strength               []Cognition    [x] Functional transfers   [x] IADLs         [x] Safety Awareness   [x]Endurance    [] Fine Coordination              [x] Balance      [] Vision/perception   []Sensation     []Gross Motor Coordination  [] ROM  [] Delirium                   [] Motor Control     OT PLAN OF CARE   OT POC based on physician orders, patient diagnosis and results of clinical assessment    Frequency/Duration 3-5 days/wk for 2 weeks PRN   Specific OT Treatment Interventions to include:   * Instruction/training on adapted ADL techniques and AE recommendations to increase functional independence within precautions       * Training on energy conservation strategies, correct breathing pattern and techniques to improve independence/tolerance for self-care routine  * Functional transfer/mobility training/DME recommendations for increased independence, safety, and fall prevention  * Patient/Family education to increase follow through with safety techniques and functional independence  * Recommendation of environmental modifications for increased safety with functional transfers/mobility and ADLs  * Therapeutic exercise to improve motor endurance, ROM, and functional strength for ADLs/functional transfers  * Therapeutic activities to facilitate/challenge dynamic balance, stand tolerance for increased safety and independence with ADLs  * Positioning to improve skin integrity, interaction with environment and functional independence    Home Living: Pt lives with family in 1 story home with ramp entry.     Bathroom setup: Walk-in shower   Equipment owned: rollator, reacher, sockaid, BSC    Prior Level of Function: assist with ADLs , assist with IADLs; engaged in functional mobility with use of  rollator  Driving: No  Occupation: None reported    Pain Level: Pt c/o RLE pain; therapist provided repositioning techniques  Cognition: A&O: 4/4; Follows multi step directions   Memory:  Good   Sequencing:  Fair+   Problem solving:  Fair+   Judgement/safety:  Fair     Functional Assessment:  AM-PAC Daily Activity Raw Score: 13/24   Initial Eval Status  Date: 6/17/22 Treatment Status  Date: STGs = LTGs  Time frame: 10-14 days   Feeding Stand by Assist   Independent    Grooming Minimal Assist   Modified [place]    UB Dressing Minimal Assist   Modified [place]    LB Dressing Dependent   Minimal Assist    Bathing Maximal Assist  Minimal Assist    Toileting Dependent   Minimal Assist    Bed Mobility  Log Roll: Minimal Assist  Supine to sit: Minimal Assist   Sit to supine: Minimal Assist   Supine to sit: Independent   Sit to supine: Independent    Functional Transfers Sit to stand:Maximal Assist   Stand to sit:Maximal Assist  Stand pivot: Maximal Assist  Commode: NT  Sit to stand:SBA   Stand to sit:SBA  Stand pivot: SBA  Commode: SBA    Functional Mobility Maximal Assist  Use of ww shorter than household distance  Stand by Assist with use of Appropriate AD   Balance Sitting:     Static - SBA     Dynamic - Minimal Assist  Standing: Maximal Assist  Sitting:     Static: Independent     Dynamic: Independent  Standing: SBA   Activity Tolerance Fair  Good   Visual/  Perceptual Glasses: Yes  Appears WFL      Safety Fair  Good  during ADL completion following TTWB RLE precautions     Hand Dominance Right   AROM (PROM) Strength Additional Info:  Goal:   RUE  WFL 4/5 grossly tested good  and wfl FMC/dexterity noted during ADL tasks   Improve overall RUE strength WFL for participation in functional tasks       LUE WFL 4/5 grossly tested Good  and wfl FMC/dexterity noted during ADL tasks   Improve overall LUE strength WFL for participation in functional tasks       Hearing: [facility]  Sensation:  No c/o numbness or tingling BUE  Tone: WFL BUE  Edema: Unremarkable    Comment: Cleared by RN to see pt. Upon arrival patient supine in bed and agreeable to OT session. At end of session, patient supine in bed with call light and phone within reach, all lines and tubes intact. Overall patient demonstrated decreased independence and safety during completion of ADL/functional transfer/mobility tasks. Therapist facilitated ADL tasks, functional transfers, functional mobility, bed mobility to address safety awareness, implementation of fall prevention strategies, & engagement throughout functional tasks. Pt educated on TTWB RLE & activity modifications/adaptations to promote skin/joint intregity & safety awareness throughout daily activities.  Pt would benefit from continued skilled OT to increase safety and independence with completion of ADL/IADL tasks for functional independence and quality of life. Treatment: OT treatment provided this date includes:    ADL-  Instruction/training on safety and adapted techniques for completion of ADLs: Pt required assist to don socks seated EOB. Pt incontinent of urine & BM during session requiring assist for posterior hygiene care & dynamic standing balance with use of ww. Pt educated on TTWB RLE to maintain skin/joint integrity, safety awareness, & functional engagement throughout ADLs.   Mobility-  Instruction/training on safety and improved independence with bed mobility/functional transfers and functional mobility. Pt completed sit<>stand transfer x2 with assist for proper body mechanics/TTWB RLE. Pt completed x4 heel to toe side steps with assist for upright posture, maintaining TTWB RLE, & ww management/safety.   Sitting EOB ~15 minutes to improve dynamic sitting balance and activity tolerance during ADLs.   Activity tolerance- Instruction/training on energy conservation/work simplification for completion of ADLs: Pt easily fatigued with light activity requiring seated rest breaks throughout session. Pt educated on activity modifications/adpataiosn to address endurance & safety awareness.  Skilled positioning/alignment-  Proper Positioning/Alignment. Pt required assist to maintain proper body mechanics & TTWB RLE throughout session to promote skin/joint integrity, safety awareness, & implementation of fall prevention strategies throughout functional tasks. Rehab Potential: Good for established goals     LTG: maximize independence with ADLs to return to PLOF    Patient and/or family were instructed on functional diagnosis, prognosis/goals and OT plan of care. Demonstrated fair understanding.        Eval Complexity: Low  · History: Expanded chart review of medical records and additional review of physical, cognitive, or psychosocial history related to current functional performance  · Exam: 3+ performance deficits  · Assistance/Modification: Min/mod assistance or modifications required to perform tasks. May have comorbidities that affect occupational performance. Evaluation time includes thorough review of current medical information, gathering information on past medical & social history & PLOF, completion of standardized testing, informal observation of tasks, consultation with other medical professions/disciplines, assessment of data & development of POC/goals. Time In: 2:25p  Time Out: 3:00p  Total Treatment Time: 20 minutes    Min Units   OT Eval Low 14411 x     OT Eval Medium 65318      OT Eval High 06092      OT Re-Eval [id number]       Therapeutic Ex 97427       Therapeutic Activities 74338       ADL/Self Care 78742  20 1    Orthotic Management 34912       Manual 74518     Neuro Re-Ed 25115       Non-Billable Time          Evaluation Time additionally includes thorough review of current medical information, gathering information on past medical history/social history and prior level of function, interpretation of standardized testing/informal observation of tasks, assessment of data and development of plan of care and goals.             Elaine Redmond OTR/L; [id number]

## 2022-06-17 NOTE — PROGRESS NOTES
Pharmacy Consultation Note  (Antibiotic Dosing and Monitoring)    Initial consult date: 6/17/22  Consulting physician/provider:  Dr. Uyen Orantes  Drug: Vancomycin  Indication: bone and joint infection    Age/  Gender Height Weight IBW  Allergy Information   76 y.o./female 5' 4\" (162.6 cm) 250 lb (113.4 kg)     Ideal body weight: 54.7 kg (120 lb 9.5 oz)  Adjusted ideal body weight: 78.2 kg (172 lb 5.7 oz)   Iodides, Iodine, Other, Pcn [penicillins], and Penicillin g      Renal Function:  Recent Labs     06/15/22  1526   BUN 28*   CREATININE 1.5*       Intake/Output Summary (Last 24 hours) at 6/17/2022 1243  Last data filed at 6/17/2022 1200  Gross per 24 hour   Intake 1340 ml   Output 1055 ml   Net 285 ml       Vancomycin Monitoring:  Trough:  No results for input(s): VANCOTROUGH in the last 72 hours. Random:  No results for input(s): VANCORANDOM in the last 72 hours. Vancomycin Administration Times:  Recent vancomycin administrations      No vancomycin IV orders with administrations found. Orders not given:          vancomycin (VANCOCIN) 2,250 mg in dextrose 5 % 500 mL IVPB    vancomycin 1500 mg in dextrose 5% 300 mL IVPB                Assessment:  · Patient is a 67 y.o. female who has been initiated on vancomycin for right femur fracture with ORIF with post op infection  · Estimated Creatinine Clearance: 39 mL/min (A) (based on SCr of 1.5 mg/dL (H)).   · To dose vancomycin, pharmacy will be utilizing [employer] calculation software for goal AUC/YESICA 400-600 mg/L-hr    Plan:  · Will initiate vancomycin 2250 mg IV  X 1 followed by vancomycin 1500 mg every 24 hours  · Will check vancomycin levels when appropriate  · Will continue to monitor renal function   · Clinical pharmacy to follow      Bao Ontiveros, PharmD  Pharmacy Resident  P: [id number]  6/17/2022 12:43 PM

## 2022-06-17 NOTE — CARE COORDINATION
6/17/2022 social work transition of care planning  Pt is from home with family. Pt reported that she has a rollator,w/c,bsc,and s/c at home. Pt pcp is Dr Woody Mcclendon and pharmacy is [facility]. Pt is active with [facility]. Pt has hx of [place] and [facility] [place]. Pt declined snf and ltach at this time. Explained sw role in transition of care planning. Pt plan is home, pt will use [employer] (private pay) at discharge. Await final plan for iv atb. [facility] following.   Electronically signed by ANTHONY Rizo on 6/17/2022 at 1:27 PM

## 2022-06-18 LAB
METER GLUCOSE: 145 MG/DL (ref 74–99)
METER GLUCOSE: 187 MG/DL (ref 74–99)
METER GLUCOSE: 194 MG/DL (ref 74–99)
METER GLUCOSE: 214 MG/DL (ref 74–99)

## 2022-06-18 PROCEDURE — 1200000000 HC SEMI PRIVATE

## 2022-06-18 PROCEDURE — 2580000003 HC RX 258: Performed by: STUDENT IN AN ORGANIZED HEALTH CARE EDUCATION/TRAINING PROGRAM

## 2022-06-18 PROCEDURE — 6360000002 HC RX W HCPCS: Performed by: STUDENT IN AN ORGANIZED HEALTH CARE EDUCATION/TRAINING PROGRAM

## 2022-06-18 PROCEDURE — 6370000000 HC RX 637 (ALT 250 FOR IP)

## 2022-06-18 PROCEDURE — 6360000002 HC RX W HCPCS

## 2022-06-18 PROCEDURE — 2580000003 HC RX 258

## 2022-06-18 PROCEDURE — 82962 GLUCOSE BLOOD TEST: CPT

## 2022-06-18 PROCEDURE — 6370000000 HC RX 637 (ALT 250 FOR IP): Performed by: INTERNAL MEDICINE

## 2022-06-18 RX ADMIN — SODIUM CHLORIDE, PRESERVATIVE FREE 10 ML: 5 INJECTION INTRAVENOUS at 23:45

## 2022-06-18 RX ADMIN — Medication 300 UNITS: at 23:59

## 2022-06-18 RX ADMIN — Medication 10 ML: at 23:47

## 2022-06-18 RX ADMIN — GLIPIZIDE 2.5 MG: 5 TABLET ORAL at 06:16

## 2022-06-18 RX ADMIN — DULOXETINE HYDROCHLORIDE 60 MG: 60 CAPSULE, DELAYED RELEASE ORAL at 08:22

## 2022-06-18 RX ADMIN — OXYCODONE HYDROCHLORIDE 10 MG: 10 TABLET ORAL at 23:58

## 2022-06-18 RX ADMIN — INSULIN LISPRO 2 UNITS: 100 INJECTION, SOLUTION INTRAVENOUS; SUBCUTANEOUS at 17:55

## 2022-06-18 RX ADMIN — SODIUM CHLORIDE, PRESERVATIVE FREE 10 ML: 5 INJECTION INTRAVENOUS at 08:21

## 2022-06-18 RX ADMIN — ASPIRIN 81 MG: 81 TABLET, COATED ORAL at 23:59

## 2022-06-18 RX ADMIN — INSULIN LISPRO 2 UNITS: 100 INJECTION, SOLUTION INTRAVENOUS; SUBCUTANEOUS at 12:19

## 2022-06-18 RX ADMIN — VERAPAMIL HYDROCHLORIDE 240 MG: 240 TABLET, FILM COATED, EXTENDED RELEASE ORAL at 08:22

## 2022-06-18 RX ADMIN — ATORVASTATIN CALCIUM 20 MG: 20 TABLET, FILM COATED ORAL at 23:59

## 2022-06-18 RX ADMIN — OXYBUTYNIN CHLORIDE 15 MG: 10 TABLET, EXTENDED RELEASE ORAL at 08:22

## 2022-06-18 RX ADMIN — CLOPIDOGREL BISULFATE 75 MG: 75 TABLET ORAL at 08:23

## 2022-06-18 RX ADMIN — VANCOMYCIN HYDROCHLORIDE 1250 MG: 10 INJECTION, POWDER, LYOPHILIZED, FOR SOLUTION INTRAVENOUS at 12:16

## 2022-06-18 RX ADMIN — ASPIRIN 81 MG: 81 TABLET, COATED ORAL at 08:22

## 2022-06-18 RX ADMIN — SODIUM CHLORIDE 50 ML: 9 INJECTION, SOLUTION INTRAVENOUS at 12:14

## 2022-06-18 RX ADMIN — HYDROXYCHLOROQUINE SULFATE 200 MG: 200 TABLET ORAL at 08:23

## 2022-06-18 RX ADMIN — HYDROXYCHLOROQUINE SULFATE 200 MG: 200 TABLET ORAL at 23:59

## 2022-06-18 RX ADMIN — CEFEPIME HYDROCHLORIDE 2000 MG: 2 INJECTION, POWDER, FOR SOLUTION INTRAVENOUS at 04:04

## 2022-06-18 RX ADMIN — ACETAMINOPHEN 650 MG: 325 TABLET, FILM COATED ORAL at 23:58

## 2022-06-18 RX ADMIN — OXYCODONE HYDROCHLORIDE 10 MG: 10 TABLET ORAL at 08:23

## 2022-06-18 RX ADMIN — Medication 300 UNITS: at 08:21

## 2022-06-18 RX ADMIN — INSULIN LISPRO 4 UNITS: 100 INJECTION, SOLUTION INTRAVENOUS; SUBCUTANEOUS at 08:24

## 2022-06-18 RX ADMIN — CEFEPIME HYDROCHLORIDE 2000 MG: 2 INJECTION, POWDER, FOR SOLUTION INTRAVENOUS at 17:10

## 2022-06-18 ASSESSMENT — PAIN DESCRIPTION - DESCRIPTORS: DESCRIPTORS: ACHING;DISCOMFORT;DULL

## 2022-06-18 ASSESSMENT — PAIN SCALES - GENERAL
PAINLEVEL_OUTOF10: 7
PAINLEVEL_OUTOF10: 7
PAINLEVEL_OUTOF10: 3
PAINLEVEL_OUTOF10: 7

## 2022-06-18 ASSESSMENT — PAIN - FUNCTIONAL ASSESSMENT: PAIN_FUNCTIONAL_ASSESSMENT: PREVENTS OR INTERFERES SOME ACTIVE ACTIVITIES AND ADLS

## 2022-06-18 NOTE — PROGRESS NOTES
Pharmacy Consultation Note  (Antibiotic Dosing and Monitoring)    Initial consult date: 6/17/22  Consulting physician/provider:  Dr. Jessa Hargrove  Drug: Vancomycin  Indication: bone and joint infection    Age/  Gender Height Weight IBW  Allergy Information   76 y.o./female 5' 4\" (162.6 cm) 250 lb (113.4 kg)     Ideal body weight: 54.7 kg (120 lb 9.5 oz)  Adjusted ideal body weight: 78.2 kg (172 lb 5.7 oz)   Iodides, Iodine, Other, Pcn [penicillins], and Penicillin g      Renal Function:  Recent Labs     06/15/22  1526   BUN 28*   CREATININE 1.5*       Intake/Output Summary (Last 24 hours) at 6/18/2022 0837  Last data filed at 6/18/2022 0405  Gross per 24 hour   Intake 420 ml   Output 2130 ml   Net -1710 ml       Vancomycin Monitoring:  Trough:  No results for input(s): VANCOTROUGH in the last 72 hours. Random:  No results for input(s): VANCORANDOM in the last 72 hours. Vancomycin Administration Times:  Recent vancomycin administrations                   vancomycin (VANCOCIN) 2,250 mg in dextrose 5 % 500 mL IVPB (mg) 2,250 mg New Bag 06/17/22 9183                Assessment:  · Patient is a 67 y.o. female who has been initiated on vancomycin for right femur fracture with ORIF with post op infection  Estimated Creatinine Clearance: 39 mL/min (A) (based on SCr of 1.5 mg/dL (H)).   · To dose vancomycin, pharmacy will be utilizing [employer] calculation software for goal AUC/YESICA 400-600 mg/L-hr    Plan:  · vancomycin 1250 mg every 24 hours  · Will check vancomycin level tomorrow morning  · Will continue to monitor renal function   · Clinical pharmacy to follow      María HortaD  Pharmacy Resident  P: [id number]  6/18/2022 8:37 AM

## 2022-06-18 NOTE — PROGRESS NOTES
Hospital Medicine    Subjective: pain and swelling right leg  No other issues      Current Facility-Administered Medications:     [COMPLETED] vancomycin (VANCOCIN) 2,250 mg in dextrose 5 % 500 mL IVPB, 2,250 mg, IntraVENous, Once, Stopped at 06/17/22 2129 **FOLLOWED BY** vancomycin (VANCOCIN) 1,250 mg in dextrose 5 % 250 mL IVPB, 1,250 mg, IntraVENous, Q24H, Melba Polscottine, H, Stopped at 06/18/22 1350    oxyCODONE (ROXICODONE) immediate release tablet 5 mg, 5 mg, Oral, Q4H PRN, 5 mg at 06/17/22 2201 **OR** oxyCODONE HCl (OXY-IR) immediate release tablet 10 mg, 10 mg, Oral, Q4H PRN, Adali Dickens, DO, 10 mg at 06/18/22 4267    glucose chewable tablet 16 g, 4 tablet, Oral, PRN, Lola Plata,     dextrose bolus 10% 125 mL, 125 mL, IntraVENous, PRN **OR** dextrose bolus 10% 250 mL, 250 mL, IntraVENous, PRN, Lola Plata DO    glucagon (rDNA) injection 1 mg, 1 mg, IntraMUSCular, PRN, Lola Plata DO    dextrose 5 % solution, 100 mL/hr, IntraVENous, PRN, Lola Plata DO    cefepime (MAXIPIME) 2000 mg IVPB minibag, 2,000 mg, IntraVENous, Q12H, Shay Mullins MD, Last Rate: 12.5 mL/hr at 06/18/22 1816, Rate Verify at 06/18/22 1816    lidocaine PF 1 % injection 5 mL, 5 mL, IntraDERmal, Once, Shay Mullins MD    sodium chloride flush 0.9 % injection 5-40 mL, 5-40 mL, IntraVENous, 2 times per day, Shay Mullins MD, 10 mL at 06/18/22 0821    sodium chloride flush 0.9 % injection 5-40 mL, 5-40 mL, IntraVENous, PRN, Shay Mullins MD    0.9 % sodium chloride infusion, , IntraVENous, PRN, Shay Mullins MD    heparin flush 100 UNIT/ML injection 300 Units, 3 mL, IntraVENous, 2 times per day, Shay Mullins MD, 300 Units at 06/18/22 0821    heparin flush 100 UNIT/ML injection 300 Units, 3 mL, IntraCATHeter, PRN, Shay Mullins MD    aspirin EC tablet 81 mg, 81 mg, Oral, BID, Lola Plata DO, 81 mg at 06/18/22 4123    clopidogrel (PLAVIX) tablet 75 mg, 75 mg, Oral, Daily, Tono Dotson, DO, 75 mg at 06/18/22 0582    atorvastatin (LIPITOR) tablet 20 mg, 20 mg, Oral, Nightly, Randalyn Orange, DO, 20 mg at 06/17/22 2021    DULoxetine (CYMBALTA) extended release capsule 60 mg, 60 mg, Oral, QAM, Javier Vilson, DO, 60 mg at 06/18/22 9247    hydroxychloroquine (PLAQUENIL) tablet 200 mg, 200 mg, Oral, BID, Randalyn Orange, DO, 200 mg at 06/18/22 7208    oxybutynin (DITROPAN-XL) extended release tablet 15 mg, 15 mg, Oral, Daily, Randalyn Orange, DO, 15 mg at 06/18/22 1647    verapamil (CALAN SR) extended release tablet 240 mg, 240 mg, Oral, QAM, Javier Vilson, DO, 240 mg at 06/18/22 2992    sodium chloride flush 0.9 % injection 5-40 mL, 5-40 mL, IntraVENous, 2 times per day, Randalyn Orange, DO, 10 mL at 06/17/22 2205    sodium chloride flush 0.9 % injection 5-40 mL, 5-40 mL, IntraVENous, PRN, Randalyn Orange, DO    0.9 % sodium chloride infusion, , IntraVENous, PRN, Randalyn Orange, DO, Last Rate: 25 mL/hr at 06/18/22 1214, 50 mL at 06/18/22 1214    polyethylene glycol (GLYCOLAX) packet 17 g, 17 g, Oral, Daily PRN, Randalyn Orange, DO    acetaminophen (TYLENOL) tablet 650 mg, 650 mg, Oral, Q6H PRN **OR** acetaminophen (TYLENOL) suppository 650 mg, 650 mg, Rectal, Q6H PRN, Randalyn Orange, DO    insulin lispro (HUMALOG) injection vial 0-12 Units, 0-12 Units, SubCUTAneous, TID WC, Randalyn Orange, DO, 2 Units at 06/18/22 1755    insulin lispro (HUMALOG) injection vial 0-6 Units, 0-6 Units, SubCUTAneous, Nightly, Randalyn Orange, DO, 2 Units at 06/17/22 2021    albuterol (PROVENTIL) nebulizer solution 2.5 mg, 2.5 mg, Nebulization, Q6H PRN, Christin Lange DO    latanoprost (XALATAN) 0.005 % ophthalmic solution 1 drop, 1 drop, Both Eyes, Nightly, Javier Singh DO, 1 drop at 06/17/22 2133    glipiZIDE (GLUCOTROL) tablet 2.5 mg, 2.5 mg, Oral, QAM AC, Javier Singh DO, 2.5 mg at 06/18/22 0616    ondansetron (ZOFRAN-ODT) disintegrating tablet 4 mg, 4 mg, Oral, Q8H PRN **OR** ondansetron (ZOFRAN) injection 4 mg, 4 mg, IntraVENous, Q6H PRN, Tia Mitchell DO    Objective:    /84   Pulse 70   Temp 98.9 °F (37.2 °C) (Temporal)   Resp 19   Ht 5' 4\" (1.626 m)   Wt 250 lb (113.4 kg)   SpO2 98%   BMI 42.91 kg/m²     Heart:  reg  Lungs:  ctab  Abd: + bs soft nontender  Extrem:  Edema legs    CBC with Differential:    Lab Results   Component Value Date    WBC 13.3 06/17/2022    RBC 3.40 06/17/2022    HGB 9.7 06/17/2022    HCT 30.5 06/17/2022     06/17/2022    MCV 89.7 06/17/2022    MCH 28.5 06/17/2022    MCHC 31.8 06/17/2022    RDW 13.3 06/17/2022    NRBC 0.9 06/17/2022    LYMPHOPCT 3.5 06/17/2022    MONOPCT 4.3 06/17/2022    BASOPCT 0.1 06/17/2022    MONOSABS 0.53 06/17/2022    LYMPHSABS 0.53 06/17/2022    EOSABS 0.00 06/17/2022    BASOSABS 0.00 06/17/2022     CMP:    Lab Results   Component Value Date     06/15/2022    K 4.7 06/15/2022     06/15/2022    CO2 24 06/15/2022    BUN 28 06/15/2022    CREATININE 1.5 06/15/2022    GFRAA 41 06/15/2022    LABGLOM 34 06/15/2022    GLUCOSE 130 06/16/2022    GLUCOSE 82 12/09/2011    PROT 7.0 06/15/2022    LABALBU 3.1 06/15/2022    CALCIUM 8.4 06/15/2022    BILITOT 0.5 06/15/2022    ALKPHOS 228 06/15/2022    AST 29 06/15/2022    ALT 29 06/15/2022     Warfarin PT/INR:    Lab Results   Component Value Date    INR 1.1 05/27/2022    INR 1.0 10/24/2020    INR 2.2 04/30/2018    PROTIME 11.9 05/27/2022    PROTIME 11.3 10/24/2020    PROTIME 24.8 (H) 04/30/2018       Assessment:    Principal Problem:    Wound infection  Resolved Problems:    * No resolved hospital problems.  *      Plan:  Final antibiotic recommendations awaited from AMANDA Jean MD  6:33 PM  6/18/2022

## 2022-06-18 NOTE — PROGRESS NOTES
` Department of Orthopedic Surgery  Resident Progress Note    Patient seen and examined. Pain controlled to right hip. Pt. W/ neuropathy and baseline N/T/P to RLE unchanged. Denies fever or chills at this time. No new complaints. Denies chest pain, shortness of breath, dizziness/lightheadedness. -BM, +flatulence. VITALS:  BP 92/68   Pulse 85   Temp 97.9 °F (36.6 °C) (Oral)   Resp 18   Ht 5' 4\" (1.626 m)   Wt 250 lb (113.4 kg)   SpO2 98%   BMI 42.91 kg/m²     General: alert and oriented    MUSCULOSKELETAL:   right lower extremity:  · Dressing C/D/I, drain in place and working, draining blood and serosanguineous fluid  · Compartments soft and compressible  · Able to minimally plantarflex and dorsiflex, unable to wiggle toes, this is patients baseline  · palpable DP & PT pulses, Brisk Cap refill, Toes warm and perfused  · Distal sensation significantly diminished to Peroneals, Sural, Saphenous, and tibial nrs    CBC:   Lab Results   Component Value Date    WBC 13.3 06/17/2022    HGB 9.7 06/17/2022    HCT 30.5 06/17/2022     06/17/2022     PT/INR:    Lab Results   Component Value Date    PROTIME 11.9 05/27/2022    PROTIME 12.0 12/09/2011    INR 1.1 05/27/2022       ASSESSMENT  · S/P irrigation and debridement right hip wound infection with antibiotic bead placement 6/16    PLAN    TTWB RLE  abx per ID   DVT prophylaxis- Plavix, early mobilization   Drain output overnight was 30 ml, there is brown serosanguineous fluid  PT/OT  Pain control- IV & PO  Monitor H&H-awaiting am labs, pt.  Asymptomatic   Dispo: PT/OT/SW recs, ID recs, continue to monitor, consider second washout and drain removal 6/20/22  D/w attending

## 2022-06-18 NOTE — PROGRESS NOTES
Infectious Disease  Progress Note  [facility]    Chief Complaint: right thigh hardware associated osteomyelitis. Subjective:  She is doing well post op. Had severe pain at the right thigh when drain was removed so it was leftin place. No fever    Scheduled Meds:   vancomycin  1,250 mg IntraVENous Q24H    cefepime  2,000 mg IntraVENous Q12H    lidocaine PF  5 mL IntraDERmal Once    sodium chloride flush  5-40 mL IntraVENous 2 times per day    heparin flush  3 mL IntraVENous 2 times per day    aspirin  81 mg Oral BID    clopidogrel  75 mg Oral Daily    atorvastatin  20 mg Oral Nightly    DULoxetine  60 mg Oral QAM    hydroxychloroquine  200 mg Oral BID    oxybutynin  15 mg Oral Daily    verapamil  240 mg Oral QAM    sodium chloride flush  5-40 mL IntraVENous 2 times per day    insulin lispro  0-12 Units SubCUTAneous TID WC    insulin lispro  0-6 Units SubCUTAneous Nightly    latanoprost  1 drop Both Eyes Nightly    glipiZIDE  2.5 mg Oral QAM AC     Continuous Infusions:   dextrose      sodium chloride      sodium chloride 50 mL (06/18/22 1214)     PRN Meds:oxyCODONE **OR** oxyCODONE, glucose, dextrose bolus **OR** dextrose bolus, glucagon (rDNA), dextrose, sodium chloride flush, sodium chloride, heparin flush, sodium chloride flush, sodium chloride, polyethylene glycol, acetaminophen **OR** acetaminophen, albuterol, ondansetron **OR** ondansetron    ROS:  As mentioned in subjective, all other systems negative      /87   Pulse 72   Temp 99.2 °F (37.3 °C) (Temporal)   Resp 18   Ht 5' 4\" (1.626 m)   Wt 250 lb (113.4 kg)   SpO2 97%   BMI 42.91 kg/m²     Physical Exam  Const/Neuro- unchanged, no signs of acute distress, Alert  ENMT- Within Normal Limits, Normocephalic, mucous membranes pink/moist, No thrush  Neck: Neck supple  Heart- Regular, Rate, Rhythm- no murmur appreciated. Lungs- clear to ascultation. Respirations even and nonlabored.   Abdomen- Soft, bowel sounds positive, non tender  Musculo/Extremities-  Equal and symmetrical, dressing over right thigh with drain in place  Neurological- No focal motor or sensory loss. No confusion  Skin:  Warm and dry, free from rashes. Lines: PICC left UE    Labs, Cultures reviewed  Radiology reviewed    Microbiology:  Wound cx 6/9: Proteus mirabilis  Blood cx 6/15: so far negative  Surgical cx 6/16: gram negative rods, Strep species     Assessment:  · Right femur fracture with ORIF with osteomyelitis:   ? S/p debridement on 6/16  ? Pt has been on Keflex for the past  5 Days before presentation. ? PICC placed 6/17  · Leucocytosis:     Plan:    · Continue IV vancomycin/cefepime  · Following cultures and will adjust abx based on final results.   · Will follow with you      Electronically signed by Selvin Weaver MD on 6/18/2022 at 1:48 PM

## 2022-06-19 LAB
ANAEROBIC CULTURE: NORMAL
ANION GAP SERPL CALCULATED.3IONS-SCNC: 12 MMOL/L (ref 7–16)
BASOPHILS ABSOLUTE: 0.08 E9/L (ref 0–0.2)
BASOPHILS RELATIVE PERCENT: 0.8 % (ref 0–2)
BUN BLDV-MCNC: 40 MG/DL (ref 6–23)
CALCIUM SERPL-MCNC: 8.4 MG/DL (ref 8.6–10.2)
CHLORIDE BLD-SCNC: 101 MMOL/L (ref 98–107)
CO2: 22 MMOL/L (ref 22–29)
CREAT SERPL-MCNC: 1.6 MG/DL (ref 0.5–1)
EOSINOPHILS ABSOLUTE: 0.58 E9/L (ref 0.05–0.5)
EOSINOPHILS RELATIVE PERCENT: 5.6 % (ref 0–6)
GFR AFRICAN AMERICAN: 38
GFR NON-AFRICAN AMERICAN: 31 ML/MIN/1.73
GLUCOSE BLD-MCNC: 223 MG/DL (ref 74–99)
HCT VFR BLD CALC: 29.2 % (ref 34–48)
HEMOGLOBIN: 9 G/DL (ref 11.5–15.5)
IMMATURE GRANULOCYTES #: 0.14 E9/L
IMMATURE GRANULOCYTES %: 1.3 % (ref 0–5)
LYMPHOCYTES ABSOLUTE: 1.11 E9/L (ref 1.5–4)
LYMPHOCYTES RELATIVE PERCENT: 10.7 % (ref 20–42)
MCH RBC QN AUTO: 28.2 PG (ref 26–35)
MCHC RBC AUTO-ENTMCNC: 30.8 % (ref 32–34.5)
MCV RBC AUTO: 91.5 FL (ref 80–99.9)
METER GLUCOSE: 194 MG/DL (ref 74–99)
METER GLUCOSE: 195 MG/DL (ref 74–99)
METER GLUCOSE: 210 MG/DL (ref 74–99)
METER GLUCOSE: 268 MG/DL (ref 74–99)
MONOCYTES ABSOLUTE: 1.5 E9/L (ref 0.1–0.95)
MONOCYTES RELATIVE PERCENT: 14.4 % (ref 2–12)
NEUTROPHILS ABSOLUTE: 6.98 E9/L (ref 1.8–7.3)
NEUTROPHILS RELATIVE PERCENT: 67.2 % (ref 43–80)
PDW BLD-RTO: 13.6 FL (ref 11.5–15)
PLATELET # BLD: 351 E9/L (ref 130–450)
PMV BLD AUTO: 8.4 FL (ref 7–12)
POTASSIUM SERPL-SCNC: 4.3 MMOL/L (ref 3.5–5)
RBC # BLD: 3.19 E12/L (ref 3.5–5.5)
SODIUM BLD-SCNC: 135 MMOL/L (ref 132–146)
VANCOMYCIN RANDOM: 24 MCG/ML (ref 5–40)
WBC # BLD: 10.4 E9/L (ref 4.5–11.5)

## 2022-06-19 PROCEDURE — 36415 COLL VENOUS BLD VENIPUNCTURE: CPT

## 2022-06-19 PROCEDURE — 6370000000 HC RX 637 (ALT 250 FOR IP): Performed by: INTERNAL MEDICINE

## 2022-06-19 PROCEDURE — 6370000000 HC RX 637 (ALT 250 FOR IP)

## 2022-06-19 PROCEDURE — 97530 THERAPEUTIC ACTIVITIES: CPT

## 2022-06-19 PROCEDURE — 82962 GLUCOSE BLOOD TEST: CPT

## 2022-06-19 PROCEDURE — 1200000000 HC SEMI PRIVATE

## 2022-06-19 PROCEDURE — 36591 DRAW BLOOD OFF VENOUS DEVICE: CPT

## 2022-06-19 PROCEDURE — 80048 BASIC METABOLIC PNL TOTAL CA: CPT

## 2022-06-19 PROCEDURE — 6360000002 HC RX W HCPCS: Performed by: STUDENT IN AN ORGANIZED HEALTH CARE EDUCATION/TRAINING PROGRAM

## 2022-06-19 PROCEDURE — 80202 ASSAY OF VANCOMYCIN: CPT

## 2022-06-19 PROCEDURE — 2580000003 HC RX 258: Performed by: STUDENT IN AN ORGANIZED HEALTH CARE EDUCATION/TRAINING PROGRAM

## 2022-06-19 PROCEDURE — 85025 COMPLETE CBC W/AUTO DIFF WBC: CPT

## 2022-06-19 RX ADMIN — OXYCODONE 5 MG: 5 TABLET ORAL at 20:43

## 2022-06-19 RX ADMIN — CEFAZOLIN 2000 MG: 2 INJECTION, POWDER, FOR SOLUTION INTRAMUSCULAR; INTRAVENOUS at 20:43

## 2022-06-19 RX ADMIN — SODIUM CHLORIDE, PRESERVATIVE FREE 10 ML: 5 INJECTION INTRAVENOUS at 20:46

## 2022-06-19 RX ADMIN — OXYCODONE HYDROCHLORIDE 10 MG: 10 TABLET ORAL at 08:20

## 2022-06-19 RX ADMIN — INSULIN LISPRO 1 UNITS: 100 INJECTION, SOLUTION INTRAVENOUS; SUBCUTANEOUS at 00:00

## 2022-06-19 RX ADMIN — CEFEPIME HYDROCHLORIDE 2000 MG: 2 INJECTION, POWDER, FOR SOLUTION INTRAVENOUS at 06:13

## 2022-06-19 RX ADMIN — ASPIRIN 81 MG: 81 TABLET, COATED ORAL at 20:43

## 2022-06-19 RX ADMIN — Medication 300 UNITS: at 20:43

## 2022-06-19 RX ADMIN — LATANOPROST 1 DROP: 50 SOLUTION OPHTHALMIC at 00:06

## 2022-06-19 RX ADMIN — SODIUM CHLORIDE, PRESERVATIVE FREE 10 ML: 5 INJECTION INTRAVENOUS at 08:19

## 2022-06-19 RX ADMIN — ATORVASTATIN CALCIUM 20 MG: 20 TABLET, FILM COATED ORAL at 20:43

## 2022-06-19 RX ADMIN — ASPIRIN 81 MG: 81 TABLET, COATED ORAL at 08:19

## 2022-06-19 RX ADMIN — HYDROXYCHLOROQUINE SULFATE 200 MG: 200 TABLET ORAL at 20:43

## 2022-06-19 RX ADMIN — Medication 300 UNITS: at 08:19

## 2022-06-19 RX ADMIN — CLOPIDOGREL BISULFATE 75 MG: 75 TABLET ORAL at 08:20

## 2022-06-19 RX ADMIN — CEFAZOLIN 2000 MG: 2 INJECTION, POWDER, FOR SOLUTION INTRAMUSCULAR; INTRAVENOUS at 13:25

## 2022-06-19 RX ADMIN — DULOXETINE HYDROCHLORIDE 60 MG: 60 CAPSULE, DELAYED RELEASE ORAL at 08:19

## 2022-06-19 RX ADMIN — INSULIN LISPRO 4 UNITS: 100 INJECTION, SOLUTION INTRAVENOUS; SUBCUTANEOUS at 12:40

## 2022-06-19 RX ADMIN — HYDROXYCHLOROQUINE SULFATE 200 MG: 200 TABLET ORAL at 08:20

## 2022-06-19 RX ADMIN — INSULIN LISPRO 3 UNITS: 100 INJECTION, SOLUTION INTRAVENOUS; SUBCUTANEOUS at 23:04

## 2022-06-19 RX ADMIN — INSULIN LISPRO 2 UNITS: 100 INJECTION, SOLUTION INTRAVENOUS; SUBCUTANEOUS at 17:45

## 2022-06-19 RX ADMIN — VERAPAMIL HYDROCHLORIDE 240 MG: 240 TABLET, FILM COATED, EXTENDED RELEASE ORAL at 08:19

## 2022-06-19 RX ADMIN — INSULIN LISPRO 2 UNITS: 100 INJECTION, SOLUTION INTRAVENOUS; SUBCUTANEOUS at 08:21

## 2022-06-19 RX ADMIN — LATANOPROST 1 DROP: 50 SOLUTION OPHTHALMIC at 22:59

## 2022-06-19 RX ADMIN — OXYBUTYNIN CHLORIDE 15 MG: 10 TABLET, EXTENDED RELEASE ORAL at 08:19

## 2022-06-19 ASSESSMENT — PAIN SCALES - GENERAL
PAINLEVEL_OUTOF10: 7
PAINLEVEL_OUTOF10: 1
PAINLEVEL_OUTOF10: 5

## 2022-06-19 ASSESSMENT — PAIN DESCRIPTION - DESCRIPTORS
DESCRIPTORS: ACHING;DISCOMFORT;DULL
DESCRIPTORS: ACHING

## 2022-06-19 ASSESSMENT — PAIN - FUNCTIONAL ASSESSMENT
PAIN_FUNCTIONAL_ASSESSMENT: PREVENTS OR INTERFERES SOME ACTIVE ACTIVITIES AND ADLS
PAIN_FUNCTIONAL_ASSESSMENT: PREVENTS OR INTERFERES SOME ACTIVE ACTIVITIES AND ADLS

## 2022-06-19 ASSESSMENT — PAIN DESCRIPTION - LOCATION
LOCATION: HIP
LOCATION: HIP

## 2022-06-19 ASSESSMENT — PAIN DESCRIPTION - ORIENTATION
ORIENTATION: RIGHT
ORIENTATION: RIGHT

## 2022-06-19 NOTE — PROGRESS NOTES
 Vancomycin has been discontinued   [address] to [place] [address] Physician to re-consult pharmacy if future dosing is needed    Thank you for the consult,    Noy Buckner, PharmD  Pharmacy Resident  P: [phone]    Pharmacy Consultation Note  (Antibiotic Dosing and Monitoring)    Initial consult date: 6/17/22  Consulting physician/provider:  Dr. Kelly Houlton  Drug: Vancomycin  Indication: bone and joint infection    Age/  Gender Height Weight IBW  Allergy Information   76 y.o./female 5' 4\" (162.6 cm) 250 lb (113.4 kg)     Ideal body weight: 54.7 kg (120 lb 9.5 oz)  Adjusted ideal body weight: 78.2 kg (172 lb 5.7 oz)   Iodides, Iodine, Other, Pcn [penicillins], and Penicillin g      Renal Function:  Recent Labs     06/19/22  0610   BUN 40*   CREATININE 1.6*       Intake/Output Summary (Last 24 hours) at 6/19/2022 5573  Last data filed at 6/19/2022 0102  Gross per 24 hour   Intake 356.1 ml   Output 1000 ml   Net -643.9 ml       Vancomycin Monitoring:  Trough:  No results for input(s): VANCOTROUGH in the last 72 hours. Random:    Recent Labs     06/19/22  0610   VANCORANDOM 24.0       Vancomycin Administration Times:  Recent vancomycin administrations                   vancomycin (VANCOCIN) 1,250 mg in dextrose 5 % 250 mL IVPB (mg) 1,250 mg New Bag 06/18/22 1216    vancomycin (VANCOCIN) 2,250 mg in dextrose 5 % 500 mL IVPB (mg) 2,250 mg New Bag 06/17/22 9937                Assessment:  · Patient is a 67 y.o. female who has been initiated on vancomycin for right femur fracture with ORIF with post op infection  Estimated Creatinine Clearance: 36 mL/min (A) (based on SCr of 1.6 mg/dL (H)).   · To dose vancomycin, pharmacy will be utilizing 3D Forms calculation software for goal AUC/YESICA 400-600 mg/L-hr   · 6/19: Random level 24 mg/L (~16.5 hours post infusion)    Plan:  · vancomycin 750 mg every 24 hours for now  · Will check vancomycin level when appropriate  · Will continue to monitor renal function   · Clinical

## 2022-06-19 NOTE — PROGRESS NOTES
Physical Therapy  Treatment Note    Name: Stephanie Samayoa  : 1945  MRN: [id number]      Date of Service: 2022    Evaluating PT:  Kendy Robert PT, DPT [id number]    Room #:  3135/0995-D  Diagnosis:  Postoperative wound infection [T81.49XA]  Wound infection [T14. 8XXA, L08.9]  PMHx/PSHx:    Past Medical History:   Diagnosis Date    Asthma     Cataract, right eye     Cerebral artery occlusion with [facility] infarction [facility] 2017    CKD (chronic kidney disease) stage 4, GFR 15-29 ml/min ([facility]     Degenerative disc disease     Degenerative joint disease     Diabetes mellitus ([facility] [address]     diet controlled    Di[facility]     Diffuse cerebral atrophy (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 Bed is on first floor and bath is on first floor. Pt ambulated with a rollator Independently PTA. Equipment Owned: rollator,w/c,bsc,and s/c at home    OBJECTIVE:   Initial Evaluation  Date: 22 Treatment  Date: 22 Short Term/ Long Term   Goals   AM-PAC 6 Clicks 50/52 78/79    Was pt agreeable to Eval/treatment? Yes Yes    Does pt have pain? Yes surgical 8/10 Denied pain at rest, some pain with movement in R hip    Bed Mobility  Rolling: SBA  Supine to sit: Min A  Sit to supine: SBA  Scooting: SBA Rolling: SBA  Supine to sit: Jennifer  Sit to supine: ModA  Scooting: Jennifer Rolling: Mod I with rails  Supine to sit:  Independent  Sit to supine: Independent  Scooting: Independent   Transfers Sit to stand: NT  Stand to sit: NT  Stand pivot: NT Sit to stand: ModA  Stand to sit: Jennifer  Stand pivot: NT Sit to stand: Independent  Stand to sit: Independent  Stand pivot: Mod I with FWW   Ambulation    NT NT >50 feet with FWW with supervision   Stair negotiation: ascended and descended   NT NT 2 steps with bilateral rail SBA   ROM BUE:  See OT note  BLE:  WFL BLE: WFL    Strength BUE:  See OT ntoe  BLE:  4-/5 R LE, 4+/5 L LE RLE: 4-/5  LLE: 4+/5 4+/5   Balance Sitting EOB:  SBA  Dynamic Standing:  NT Sitting: SBA  Standing: Jennifer with [employer] Sitting EOB:  Independent  Dynamic Standing: Mod I with FWW supervision     Pt is A & O x 4  Sensation:  Denied   Edema: Moderate RLE edema    Therapeutic Exercises:    Sit<>stand x3 with ModA  Seated balance activities at EOB:  -static holds with cues for hand placement with SBA  -weight shifts in coronal and sagittal planes with SBA    Patient education  Pt educated on bed mobility, transfer technique. Patient response to education:   Pt verbalized understanding Pt demonstrated skill Pt requires further education in this area   Yes Yes, with cues Yes     ASSESSMENT:    Comments: The pt was able to get to the EOB with little physical assistance, to return to the bed she needed assistance to manage the RLE despite multiple attempts. The pt was able to stand x3 with good adherence to her precautions, unable to remain standing even statically due to fatigue. The pt was left supine in bed at the end of the session with call light in reach and all needs met.      Treatment:  Patient practiced and was instructed in the following treatment:     Bed mobility training - pt given verbal and tactile cues to facilitate proper sequencing and safety during rolling and supine>sit as well as provided with physical assistance to complete task   Seated balance activities: performed to improve core strength/stability, increase Daniels with seated ADLs, and progress toward advanced transfers.  Transfer training: verbal cues for hand placement during sit to stand transfer and assistance for anterior weight shift in order to facilitate initiation of the stand.  Skilled repositioning in bed to promote improved posture and improved cardiorespiratory function. Pt positioned with bed in semi-chair position to enhance skin/joint protection. PLAN:    Patient is making good progress towards established goals. Will continue with current POC. Time in  0833  Time out  0858    Total Treatment Time  25 minutes     CPT codes:  [] Gait training 99714 0 minutes  [] Manual therapy 93743 0 minutes  [x] Therapeutic activities 90478 25 minutes  [] Therapeutic exercises 75928 0 minutes  [] Neuromuscular reeducation 73655 0 minutes    Murali Mckeon, [address]  number:  [phone] [id number]

## 2022-06-19 NOTE — PLAN OF CARE
Problem: Chronic Conditions and Co-morbidities  Goal: Patient's chronic conditions and co-morbidity symptoms are monitored and maintained or improved  6/19/2022 1104 by Anamaria Gao RN  Outcome: Progressing  6/19/2022 0344 by Iza Ennis RN  Outcome: Progressing     Problem: Discharge Planning  Goal: Discharge to home or other facility with appropriate resources  6/19/2022 1104 by Anamaria Gao RN  Outcome: Progressing  6/19/2022 0344 by Iza Ennis RN  Outcome: Progressing

## 2022-06-19 NOTE — PROGRESS NOTES
[facility] Medicine    Subjective: pain and swelling right leg  No other issues      Current Facility-Administered Medications:     ceFAZolin (ANCEF) 2,000 mg in sterile water 20 mL IV syringe, 2,000 mg, IntraVENous, Q8H, Eric Maki MD    oxyCODONE (ROXICODONE) immediate release tablet 5 mg, 5 mg, Oral, Q4H PRN, 5 mg at 06/17/22 2201 **OR** oxyCODONE HCl (OXY-IR) immediate release tablet 10 mg, 10 mg, Oral, Q4H PRN, Mariaa Figueroa DO, 10 mg at 06/19/22 0820    glucose chewable tablet 16 g, 4 tablet, Oral, PRN, Garret Plata,     dextrose bolus 10% 125 mL, 125 mL, IntraVENous, PRN **OR** dextrose bolus 10% 250 mL, 250 mL, IntraVENous, PRN, Garret Plata DO    glucagon (rDNA) injection 1 mg, 1 mg, IntraMUSCular, PRN, Garret Plata DO    dextrose 5 % solution, 100 mL/hr, IntraVENous, PRN, Garret Plata DO    lidocaine PF 1 % injection 5 mL, 5 mL, IntraDERmal, Once, Eric Maki MD    sodium chloride flush 0.9 % injection 5-40 mL, 5-40 mL, IntraVENous, 2 times per day, Eric Maki MD, 10 mL at 06/19/22 0819    sodium chloride flush 0.9 % injection 5-40 mL, 5-40 mL, IntraVENous, PRN, Eric Maki MD    0.9 % sodium chloride infusion, , IntraVENous, PRN, Eric Maki MD    heparin flush 100 UNIT/ML injection 300 Units, 3 mL, IntraVENous, 2 times per day, Eric Maki MD, 300 Units at 06/19/22 0819    heparin flush 100 UNIT/ML injection 300 Units, 3 mL, IntraCATHeter, PRN, Eric Maki MD    aspirin EC tablet 81 mg, 81 mg, Oral, BID, Garret Plata DO, 81 mg at 06/19/22 2149    clopidogrel (PLAVIX) tablet 75 mg, 75 mg, Oral, Daily, Garret Plata DO, 75 mg at 06/19/22 0820    atorvastatin (LIPITOR) tablet 20 mg, 20 mg, Oral, Nightly, Javier Singh DO, 20 mg at 06/18/22 1443    DULoxetine (CYMBALTA) extended release capsule 60 mg, 60 mg, Oral, QAM, Javier Singh DO, 60 mg at 06/19/22 0819    hydroxychloroquine (PLAQUENIL) tablet 200 mg, 200 mg, Oral, BID, Results   Component Value Date    WBC 10.4 06/19/2022    RBC 3.19 06/19/2022    HGB 9.0 06/19/2022    HCT 29.2 06/19/2022     06/19/2022    MCV 91.5 06/19/2022    MCH 28.2 06/19/2022    MCHC 30.8 06/19/2022    RDW 13.6 06/19/2022    NRBC 0.9 06/17/2022    LYMPHOPCT 10.7 06/19/2022    MONOPCT 14.4 06/19/2022    BASOPCT 0.8 06/19/2022    MONOSABS 1.50 06/19/2022    LYMPHSABS 1.11 06/19/2022    EOSABS 0.58 06/19/2022    BASOSABS 0.08 06/19/2022     CMP:    Lab Results   Component Value Date     06/19/2022    K 4.3 06/19/2022    K 4.7 06/15/2022     06/19/2022    CO2 22 06/19/2022    BUN 40 06/19/2022    CREATININE 1.6 06/19/2022    GFRAA 38 06/19/2022    LABGLOM 31 06/19/2022    GLUCOSE 223 06/19/2022    GLUCOSE 82 12/09/2011    PROT 7.0 06/15/2022    LABALBU 3.1 06/15/2022    CALCIUM 8.4 06/19/2022    BILITOT 0.5 06/15/2022    ALKPHOS 228 06/15/2022    AST 29 06/15/2022    ALT 29 06/15/2022     Warfarin PT/INR:    Lab Results   Component Value Date    INR 1.1 05/27/2022    INR 1.0 10/24/2020    INR 2.2 04/30/2018    PROTIME 11.9 05/27/2022    PROTIME 11.3 10/24/2020    PROTIME 24.8 (H) 04/30/2018       Assessment:    Principal Problem:    Wound infection  Resolved Problems:    * No resolved hospital problems.  *  Morbid obesity  Sarcoidosis  Diabetes mellitus type 2  Peripheral vascular arterial disease  Prior history of stroke  Diabetic neuropathy  Chronic kidney disease    Plan:  Apparently for repeat I&D  Await final antibiotic recommendations  Wound cultures growing strep and Proteus mirabilis  Patient is s/p PICC line  Currently on Romeo Najera MD  12:54 PM  6/19/2022

## 2022-06-19 NOTE — PROGRESS NOTES
` Department of Orthopedic Surgery  Resident Progress Note    Patient seen and examined. Pain controlled to right hip. Pt. W/ neuropathy and baseline N/T/P to RLE unchanged. Denies fever or chills at this time. No new complaints. Denies chest pain, shortness of breath, dizziness/lightheadedness. -BM, +flatulence. VITALS:  BP (!) 106/53   Pulse 71   Temp 98.1 °F (36.7 °C) (Temporal)   Resp 18   Ht 5' 4\" (1.626 m)   Wt 250 lb (113.4 kg)   SpO2 98%   BMI 42.91 kg/m²     General: alert and oriented    MUSCULOSKELETAL:   right lower extremity:  · Dressing with drainage around drain site, drain in place and working, draining blood and serosanguineous fluid  · Compartments soft and compressible  · Able to minimally plantarflex and dorsiflex, unable to wiggle toes, this is patients baseline  · palpable DP & PT pulses, Brisk Cap refill, Toes warm and perfused  · Distal sensation significantly diminished to Peroneals, Sural, Saphenous, and tibial nrs    CBC:   Lab Results   Component Value Date    WBC 13.3 06/17/2022    HGB 9.7 06/17/2022    HCT 30.5 06/17/2022     06/17/2022     PT/INR:    Lab Results   Component Value Date    PROTIME 11.9 05/27/2022    PROTIME 12.0 12/09/2011    INR 1.1 05/27/2022       ASSESSMENT  · S/P irrigation and debridement right hip wound infection with antibiotic bead placement 6/16    PLAN    TTWB RLE  NPO at midnight for second washout I&D to right hip  abx per ID, follow cultures:  Strep species and gram negative rods  DVT prophylaxis- Plavix, early mobilization   PT/OT  Pain control- IV & PO  Monitor H&H-awaiting am labs, pt.  Asymptomatic   Dispo: PT/OT/SW recs, ID recs, continue to monitor, take back to OR 6/20  Treatment consent   D/w attending

## 2022-06-19 NOTE — PROGRESS NOTES
Infectious Disease  Progress Note  [facility]    Chief Complaint: right thigh hardware associated osteomyelitis. Subjective:  She is doing well . Awaiting another I&D procedure. Pain at the right thigh only with movement. Scheduled Meds:   ceFAZolin  2,000 mg IntraVENous Q8H    lidocaine PF  5 mL IntraDERmal Once    sodium chloride flush  5-40 mL IntraVENous 2 times per day    heparin flush  3 mL IntraVENous 2 times per day    aspirin  81 mg Oral BID    clopidogrel  75 mg Oral Daily    atorvastatin  20 mg Oral Nightly    DULoxetine  60 mg Oral QAM    hydroxychloroquine  200 mg Oral BID    oxybutynin  15 mg Oral Daily    verapamil  240 mg Oral QAM    sodium chloride flush  5-40 mL IntraVENous 2 times per day    insulin lispro  0-12 Units SubCUTAneous TID WC    insulin lispro  0-6 Units SubCUTAneous Nightly    latanoprost  1 drop Both Eyes Nightly    glipiZIDE  2.5 mg Oral QAM AC     Continuous Infusions:   dextrose      sodium chloride      sodium chloride 50 mL (06/18/22 1214)     PRN Meds:oxyCODONE **OR** oxyCODONE, glucose, dextrose bolus **OR** dextrose bolus, glucagon (rDNA), dextrose, sodium chloride flush, sodium chloride, heparin flush, sodium chloride flush, sodium chloride, polyethylene glycol, acetaminophen **OR** acetaminophen, albuterol, ondansetron **OR** ondansetron    ROS:  As mentioned in subjective, all other systems negative      BP (!) 106/53   Pulse 71   Temp 98.1 °F (36.7 °C) (Temporal)   Resp 16   Ht 5' 4\" (1.626 m)   Wt 250 lb (113.4 kg)   SpO2 98%   BMI 42.91 kg/m²     Physical Exam  Const/Neuro- unchanged, no signs of acute distress, Alert  ENMT- Within Normal Limits, Normocephalic, mucous membranes pink/moist, No thrush  Neck: Neck supple  Heart- Regular, Rate, Rhythm- no murmur appreciated. Lungs- clear to ascultation. Respirations even and nonlabored.   Abdomen- Soft, bowel sounds positive, non tender  Musculo/Extremities-  Equal and symmetrical, dressing over right thigh with drain in place  Neurological- No focal motor or sensory loss. No confusion  Skin:  Warm and dry, free from rashes. Lines: PICC left UE    Labs, Cultures reviewed  Radiology reviewed    Microbiology:  Wound cx 6/9: Proteus mirabilis  Blood cx 6/15: so far negative  Surgical cx 6/16: Proteus mirabilis, Strep species     Assessment:  · Right femur fracture with ORIF with osteomyelitis:   ? S/p debridement on 6/16  ? Pt has been on Keflex for the past  5 Days before presentation. ? PICC placed 6/17  · Leucocytosis:     Plan:    · Deescalated antibiotics to IV cefazolin 2gr q8  · Will need 6 weeks of IV antibiotics.    · Will follow with you      Electronically signed by Char Trotter MD on 6/19/2022 at 2:36 PM

## 2022-06-20 ENCOUNTER — ANESTHESIA (OUTPATIENT)
Dept: OPERATING ROOM | Age: 77
DRG: 857 | End: 2022-06-20
Payer: MEDICARE

## 2022-06-20 ENCOUNTER — ANESTHESIA EVENT (OUTPATIENT)
Dept: OPERATING ROOM | Age: 77
DRG: 857 | End: 2022-06-20
Payer: MEDICARE

## 2022-06-20 LAB
BLOOD CULTURE, ROUTINE: NORMAL
BODY FLUID CULTURE, STERILE: ABNORMAL
GRAM STAIN RESULT: ABNORMAL
METER GLUCOSE: 166 MG/DL (ref 74–99)
METER GLUCOSE: 181 MG/DL (ref 74–99)
METER GLUCOSE: 244 MG/DL (ref 74–99)
METER GLUCOSE: 280 MG/DL (ref 74–99)
ORGANISM: ABNORMAL

## 2022-06-20 PROCEDURE — 2580000003 HC RX 258: Performed by: STUDENT IN AN ORGANIZED HEALTH CARE EDUCATION/TRAINING PROGRAM

## 2022-06-20 PROCEDURE — 0LBJ0ZZ EXCISION OF RIGHT HIP TENDON, OPEN APPROACH: ICD-10-PCS | Performed by: ORTHOPAEDIC SURGERY

## 2022-06-20 PROCEDURE — 2500000003 HC RX 250 WO HCPCS

## 2022-06-20 PROCEDURE — 6360000002 HC RX W HCPCS: Performed by: ORTHOPAEDIC SURGERY

## 2022-06-20 PROCEDURE — 1200000000 HC SEMI PRIVATE

## 2022-06-20 PROCEDURE — 6360000002 HC RX W HCPCS: Performed by: STUDENT IN AN ORGANIZED HEALTH CARE EDUCATION/TRAINING PROGRAM

## 2022-06-20 PROCEDURE — 6370000000 HC RX 637 (ALT 250 FOR IP): Performed by: STUDENT IN AN ORGANIZED HEALTH CARE EDUCATION/TRAINING PROGRAM

## 2022-06-20 PROCEDURE — 26990 DRAINAGE OF PELVIS LESION: CPT | Performed by: ORTHOPAEDIC SURGERY

## 2022-06-20 PROCEDURE — 2580000003 HC RX 258

## 2022-06-20 PROCEDURE — 82962 GLUCOSE BLOOD TEST: CPT

## 2022-06-20 PROCEDURE — 6360000002 HC RX W HCPCS

## 2022-06-20 PROCEDURE — 2709999900 HC NON-CHARGEABLE SUPPLY: Performed by: ORTHOPAEDIC SURGERY

## 2022-06-20 PROCEDURE — 7100000000 HC PACU RECOVERY - FIRST 15 MIN: Performed by: ORTHOPAEDIC SURGERY

## 2022-06-20 PROCEDURE — 3600000004 HC SURGERY LEVEL 4 BASE: Performed by: ORTHOPAEDIC SURGERY

## 2022-06-20 PROCEDURE — 7100000001 HC PACU RECOVERY - ADDTL 15 MIN: Performed by: ORTHOPAEDIC SURGERY

## 2022-06-20 PROCEDURE — C1713 ANCHOR/SCREW BN/BN,TIS/BN: HCPCS | Performed by: ORTHOPAEDIC SURGERY

## 2022-06-20 PROCEDURE — 3600000014 HC SURGERY LEVEL 4 ADDTL 15MIN: Performed by: ORTHOPAEDIC SURGERY

## 2022-06-20 PROCEDURE — 6360000002 HC RX W HCPCS: Performed by: ANESTHESIOLOGY

## 2022-06-20 PROCEDURE — 3700000000 HC ANESTHESIA ATTENDED CARE: Performed by: ORTHOPAEDIC SURGERY

## 2022-06-20 PROCEDURE — 3700000001 HC ADD 15 MINUTES (ANESTHESIA): Performed by: ORTHOPAEDIC SURGERY

## 2022-06-20 PROCEDURE — 6370000000 HC RX 637 (ALT 250 FOR IP)

## 2022-06-20 PROCEDURE — 2720000010 HC SURG SUPPLY STERILE: Performed by: ORTHOPAEDIC SURGERY

## 2022-06-20 DEVICE — RESORBABLE BEAD KIT - FAST CURE
Type: IMPLANTABLE DEVICE | Site: HIP | Status: FUNCTIONAL
Brand: OSTEOSET

## 2022-06-20 RX ORDER — MIDAZOLAM HYDROCHLORIDE 1 MG/ML
INJECTION INTRAMUSCULAR; INTRAVENOUS PRN
Status: DISCONTINUED | OUTPATIENT
Start: 2022-06-20 | End: 2022-06-20 | Stop reason: SDUPTHER

## 2022-06-20 RX ORDER — NEOSTIGMINE METHYLSULFATE 1 MG/ML
INJECTION, SOLUTION INTRAVENOUS PRN
Status: DISCONTINUED | OUTPATIENT
Start: 2022-06-20 | End: 2022-06-20 | Stop reason: SDUPTHER

## 2022-06-20 RX ORDER — SODIUM CHLORIDE 9 MG/ML
INJECTION, SOLUTION INTRAVENOUS PRN
Status: DISCONTINUED | OUTPATIENT
Start: 2022-06-20 | End: 2022-06-20 | Stop reason: HOSPADM

## 2022-06-20 RX ORDER — PROPOFOL 10 MG/ML
INJECTION, EMULSION INTRAVENOUS PRN
Status: DISCONTINUED | OUTPATIENT
Start: 2022-06-20 | End: 2022-06-20 | Stop reason: SDUPTHER

## 2022-06-20 RX ORDER — SODIUM CHLORIDE 0.9 % (FLUSH) 0.9 %
5-40 SYRINGE (ML) INJECTION PRN
Status: DISCONTINUED | OUTPATIENT
Start: 2022-06-20 | End: 2022-06-20 | Stop reason: HOSPADM

## 2022-06-20 RX ORDER — SODIUM CHLORIDE 0.9 % (FLUSH) 0.9 %
5-40 SYRINGE (ML) INJECTION EVERY 12 HOURS SCHEDULED
Status: DISCONTINUED | OUTPATIENT
Start: 2022-06-20 | End: 2022-06-20 | Stop reason: HOSPADM

## 2022-06-20 RX ORDER — VANCOMYCIN HYDROCHLORIDE 1 G/20ML
INJECTION, POWDER, LYOPHILIZED, FOR SOLUTION INTRAVENOUS PRN
Status: DISCONTINUED | OUTPATIENT
Start: 2022-06-20 | End: 2022-06-20 | Stop reason: ALTCHOICE

## 2022-06-20 RX ORDER — ROCURONIUM BROMIDE 10 MG/ML
INJECTION, SOLUTION INTRAVENOUS PRN
Status: DISCONTINUED | OUTPATIENT
Start: 2022-06-20 | End: 2022-06-20 | Stop reason: SDUPTHER

## 2022-06-20 RX ORDER — ONDANSETRON 2 MG/ML
4 INJECTION INTRAMUSCULAR; INTRAVENOUS
Status: DISCONTINUED | OUTPATIENT
Start: 2022-06-20 | End: 2022-06-20 | Stop reason: HOSPADM

## 2022-06-20 RX ORDER — TOBRAMYCIN 1.2 G/30ML
INJECTION, POWDER, LYOPHILIZED, FOR SOLUTION INTRAVENOUS PRN
Status: DISCONTINUED | OUTPATIENT
Start: 2022-06-20 | End: 2022-06-20 | Stop reason: ALTCHOICE

## 2022-06-20 RX ORDER — SODIUM CHLORIDE 9 MG/ML
INJECTION, SOLUTION INTRAVENOUS CONTINUOUS PRN
Status: DISCONTINUED | OUTPATIENT
Start: 2022-06-20 | End: 2022-06-20 | Stop reason: SDUPTHER

## 2022-06-20 RX ORDER — ONDANSETRON 2 MG/ML
INJECTION INTRAMUSCULAR; INTRAVENOUS PRN
Status: DISCONTINUED | OUTPATIENT
Start: 2022-06-20 | End: 2022-06-20 | Stop reason: SDUPTHER

## 2022-06-20 RX ORDER — LIDOCAINE HYDROCHLORIDE 20 MG/ML
INJECTION, SOLUTION INTRAVENOUS PRN
Status: DISCONTINUED | OUTPATIENT
Start: 2022-06-20 | End: 2022-06-20 | Stop reason: SDUPTHER

## 2022-06-20 RX ORDER — PHENYLEPHRINE HCL IN 0.9% NACL 1 MG/10 ML
SYRINGE (ML) INTRAVENOUS PRN
Status: DISCONTINUED | OUTPATIENT
Start: 2022-06-20 | End: 2022-06-20 | Stop reason: SDUPTHER

## 2022-06-20 RX ORDER — DEXAMETHASONE SODIUM PHOSPHATE 10 MG/ML
INJECTION INTRAMUSCULAR; INTRAVENOUS PRN
Status: DISCONTINUED | OUTPATIENT
Start: 2022-06-20 | End: 2022-06-20 | Stop reason: SDUPTHER

## 2022-06-20 RX ORDER — FENTANYL CITRATE 50 UG/ML
INJECTION, SOLUTION INTRAMUSCULAR; INTRAVENOUS PRN
Status: DISCONTINUED | OUTPATIENT
Start: 2022-06-20 | End: 2022-06-20 | Stop reason: SDUPTHER

## 2022-06-20 RX ORDER — GLYCOPYRROLATE 1 MG/5 ML
SYRINGE (ML) INTRAVENOUS PRN
Status: DISCONTINUED | OUTPATIENT
Start: 2022-06-20 | End: 2022-06-20 | Stop reason: SDUPTHER

## 2022-06-20 RX ADMIN — PROPOFOL 140 MG: 10 INJECTION, EMULSION INTRAVENOUS at 13:22

## 2022-06-20 RX ADMIN — Medication 100 MCG: at 13:39

## 2022-06-20 RX ADMIN — HYDROMORPHONE HYDROCHLORIDE 0.5 MG: 1 INJECTION, SOLUTION INTRAMUSCULAR; INTRAVENOUS; SUBCUTANEOUS at 15:13

## 2022-06-20 RX ADMIN — MIDAZOLAM 2 MG: 1 INJECTION INTRAMUSCULAR; INTRAVENOUS at 13:16

## 2022-06-20 RX ADMIN — ROCURONIUM BROMIDE 40 MG: 10 SOLUTION INTRAVENOUS at 13:22

## 2022-06-20 RX ADMIN — FENTANYL CITRATE 100 MCG: 50 INJECTION, SOLUTION INTRAMUSCULAR; INTRAVENOUS at 13:22

## 2022-06-20 RX ADMIN — CEFAZOLIN 2000 MG: 2 INJECTION, POWDER, FOR SOLUTION INTRAMUSCULAR; INTRAVENOUS at 13:31

## 2022-06-20 RX ADMIN — Medication 3 MG: at 14:04

## 2022-06-20 RX ADMIN — SODIUM CHLORIDE, PRESERVATIVE FREE 10 ML: 5 INJECTION INTRAVENOUS at 10:13

## 2022-06-20 RX ADMIN — ATORVASTATIN CALCIUM 20 MG: 20 TABLET, FILM COATED ORAL at 21:01

## 2022-06-20 RX ADMIN — DEXAMETHASONE SODIUM PHOSPHATE 10 MG: 10 INJECTION INTRAMUSCULAR; INTRAVENOUS at 13:51

## 2022-06-20 RX ADMIN — OXYCODONE HYDROCHLORIDE 10 MG: 10 TABLET ORAL at 10:05

## 2022-06-20 RX ADMIN — Medication 300 UNITS: at 21:02

## 2022-06-20 RX ADMIN — FENTANYL CITRATE 50 MCG: 50 INJECTION, SOLUTION INTRAMUSCULAR; INTRAVENOUS at 14:06

## 2022-06-20 RX ADMIN — SODIUM CHLORIDE, PRESERVATIVE FREE 10 ML: 5 INJECTION INTRAVENOUS at 21:01

## 2022-06-20 RX ADMIN — ONDANSETRON 4 MG: 2 INJECTION INTRAMUSCULAR; INTRAVENOUS at 14:04

## 2022-06-20 RX ADMIN — CEFAZOLIN 2000 MG: 2 INJECTION, POWDER, FOR SOLUTION INTRAMUSCULAR; INTRAVENOUS at 21:00

## 2022-06-20 RX ADMIN — Medication 100 MCG: at 13:59

## 2022-06-20 RX ADMIN — INSULIN LISPRO 2 UNITS: 100 INJECTION, SOLUTION INTRAVENOUS; SUBCUTANEOUS at 18:06

## 2022-06-20 RX ADMIN — Medication 10 ML: at 10:13

## 2022-06-20 RX ADMIN — Medication 0.6 MG: at 14:04

## 2022-06-20 RX ADMIN — INSULIN LISPRO 3 UNITS: 100 INJECTION, SOLUTION INTRAVENOUS; SUBCUTANEOUS at 21:03

## 2022-06-20 RX ADMIN — LIDOCAINE HYDROCHLORIDE 60 MG: 20 INJECTION, SOLUTION INTRAVENOUS at 13:22

## 2022-06-20 RX ADMIN — HYDROMORPHONE HYDROCHLORIDE 0.5 MG: 1 INJECTION, SOLUTION INTRAMUSCULAR; INTRAVENOUS; SUBCUTANEOUS at 15:21

## 2022-06-20 RX ADMIN — HYDROXYCHLOROQUINE SULFATE 200 MG: 200 TABLET ORAL at 21:01

## 2022-06-20 RX ADMIN — CEFAZOLIN 2000 MG: 2 INJECTION, POWDER, FOR SOLUTION INTRAMUSCULAR; INTRAVENOUS at 05:33

## 2022-06-20 RX ADMIN — ASPIRIN 81 MG: 81 TABLET, COATED ORAL at 21:01

## 2022-06-20 RX ADMIN — LATANOPROST 1 DROP: 50 SOLUTION OPHTHALMIC at 21:21

## 2022-06-20 RX ADMIN — SODIUM CHLORIDE: 9 INJECTION, SOLUTION INTRAVENOUS at 13:16

## 2022-06-20 ASSESSMENT — PAIN SCALES - GENERAL
PAINLEVEL_OUTOF10: 8
PAINLEVEL_OUTOF10: 0
PAINLEVEL_OUTOF10: 8

## 2022-06-20 ASSESSMENT — PAIN DESCRIPTION - ORIENTATION
ORIENTATION: RIGHT

## 2022-06-20 ASSESSMENT — PAIN DESCRIPTION - LOCATION
LOCATION: LEG
LOCATION: LEG
LOCATION: HIP

## 2022-06-20 ASSESSMENT — PAIN DESCRIPTION - DESCRIPTORS
DESCRIPTORS: SHARP
DESCRIPTORS: ACHING
DESCRIPTORS: SHARP

## 2022-06-20 ASSESSMENT — LIFESTYLE VARIABLES: SMOKING_STATUS: 0

## 2022-06-20 NOTE — PROGRESS NOTES
Occupational Therapy  OT BEDSIDE TREATMENT NOTE   [address] [facility] [address] [address]      Date:2022  Patient Name: Tony Vigil  MRN: [id number]  : 1945  Room: OR LISET Quintana       Pt's chart reviewed and treatment attempted, pt currently off floor. Will attempt at a later time/date.           Anayeli Lacey 86

## 2022-06-20 NOTE — PROGRESS NOTES
Physical Therapy    Pt on PT caseload for daily treatment. Pt off unit at time of attempt. Will reattempt as able. Thank you.      Alicia Retana PT, DPT  [id number]

## 2022-06-20 NOTE — PLAN OF CARE
Problem: Chronic Conditions and Co-morbidities  Goal: Patient's chronic conditions and co-morbidity symptoms are monitored and maintained or improved  6/20/2022 0051 by Adenike Paige RN  Outcome: Progressing  6/19/2022 1104 by Lucille Finch RN  Outcome: Progressing     Problem: Discharge Planning  Goal: Discharge to home or other facility with appropriate resources  6/20/2022 0051 by Adenike Paige RN  Outcome: Progressing  6/19/2022 1104 by Lucille Finch RN  Outcome: Progressing     Problem: Skin/Tissue Integrity  Goal: Absence of new skin breakdown  Description: 1. Monitor for areas of redness and/or skin breakdown  2. Assess vascular access sites hourly  3. Every 4-6 hours minimum:  Change oxygen saturation probe site  4. Every 4-6 hours:  If on nasal continuous positive airway pressure, respiratory therapy assess nares and determine need for appliance change or resting period.   6/20/2022 0051 by Adenike Paige RN  Outcome: Progressing  6/19/2022 1104 by Lucille Finch RN  Outcome: Progressing     Problem: Safety - Adult  Goal: Free from fall injury  6/20/2022 0051 by Adenike Paige RN  Outcome: Progressing  6/19/2022 1104 by Lucille Finch RN  Outcome: Progressing     Problem: ABCDS Injury Assessment  Goal: Absence of physical injury  6/20/2022 0051 by Adenike Paige RN  Outcome: Progressing  6/19/2022 1104 by Lucille Finch RN  Outcome: Progressing     Problem: Pain  Goal: Verbalizes/displays adequate comfort level or baseline comfort level  6/20/2022 0051 by Adenike Paige RN  Outcome: Progressing  6/19/2022 1104 by Lucille Finch RN  Outcome: Progressing

## 2022-06-20 NOTE — OP NOTE
Operative Note      Patient: Aida Garcia  YOB: 1945  MRN: [id number]    Date of Procedure: 6/20/2022    Pre-Op diagnosis  Right thigh abscess, postoperative    Post-Op Diagnosis: Same       Procedure(s):  RIGHT HIP INCISION AND DRAINAGE    Surgeon(s):  Lucita Kevin MD    Assistant:   Resident: Vinny Alexandra DO; Christin Lange DO    Anesthesia: General    Estimated Blood Loss (mL): Minimal    Complications: None    Specimens:   * No specimens in log *    Implants:  * No implants in log *      Drains:   Closed/Suction Drain Right Hip Accordion (Active)   Site Description Clean, dry & intact 06/19/22 2045   Dressing Status Clean, dry & intact 06/19/22 2045   Drainage Appearance Bloody 06/19/22 2045   Drain Status Compressed 06/19/22 2045   Output (ml) 0 ml 06/19/22 2045       [REMOVED] Urinary Catheter Anaya (Removed)   Site Assessment No urethral drainage 05/29/22 1002   Urine Color Yellow 05/29/22 1002   Urine Appearance Clear 05/29/22 1002   Collection Container Standard 05/29/22 1002   Securement Method Securing device (Describe) 05/29/22 1002   Catheter Care Completed Yes 05/27/22 2200   Catheter Best Practices  Drainage tube clipped to bed;Catheter secured to thigh; Tamper seal intact; Bag below bladder;Bag not on floor; Lack of dependent loop in tubing;Drainage bag less than half full 05/29/22 1002   Status Draining 05/29/22 1002   Output (mL) 400 mL 05/29/22 1300       Findings: Wound looks much  drain was safely removed. Detailed Description of Procedure:   Is brought to the operating room in a supine position on hospital bed. Patient was transferred to the operating room table by multiple individuals in safe fashion with anesthesia and control patients C-spine and airway. Once operating room table she was placed in right lateral decubitus position. She was held securely with the beanbag. All points pressure identified well-padded. Axillary roll was placed.   Right lower extremity was sterilely prepped and draped in standard orthopedic fashion. A timeout was performed indicating the appropriate indication of the patient, the procedure to be performed, and the side to be performed upon. This is agreed upon by all individuals in the room. This point time all sutures removed. The bone was redissected down to the iliotibial band. There is no koki pus there was some cloudy fluid and some necrotic fat which was meticulously debrided with rongeur and curettes. Excisional debridement was performed down to the iliotibial band. The drain was safely removed. It had knotted on itself. After it was removed we meticulously debrided the wound was josefa irrigated with sterile normal saline with pulsatile lavage. We then placed Vanco and tobramycin absorbable beads into the wound. We then closed with retention sutures which were monofilament. She then had a Prevena dressing put in position. Compartments are soft compressible. She was taken to the PACU in stable condition.       Postoperative plan:  Touchdown weightbearing right lower extremity    DVT prophylaxis    Continue infectious disease consultation    Will follow clinically if continues to improve will work on home-going antibiotics    Electronically signed by Christina Stoner MD on 6/20/2022 at 2:02 PM

## 2022-06-20 NOTE — ANESTHESIA PRE PROCEDURE
Department of Anesthesiology  Preprocedure Note       Name:  Deyvi Card   Age:  67 y.o.  :  1945                                          MRN:  [id number]         Date:  2022      Surgeon: Les De León):  Jenna Velazquez MD    Procedure: Procedure(s):  RIGHT HIP INCISION AND DRAINAGE    Medications prior to admission:   Prior to Admission medications    Medication Sig Start Date End Date Taking? Authorizing Provider   albuterol sulfate HFA (VENTOLIN HFA) 108 (90 Base) MCG/ACT inhaler Inhale 1 puff into the lungs every 6 hours as needed for Wheezing    Historical Provider, MD   Multiple Vitamins-Minerals (THERAPEUTIC MULTIVITAMIN-MINERALS) tablet Take 1 tablet by mouth three times a week Given Monday,Wednesday,Friday    Historical Provider, MD   verapamil (CALAN SR) 240 MG extended release tablet Take 120 mg by mouth nightly    Historical Provider, MD   oxyCODONE-acetaminophen (PERCOCET) 5-325 MG per tablet Take 1 tablet by mouth every 6 hours as needed for Pain for up to 7 days. Intended supply: 7 days.  Take lowest dose possible to manage pain 22  Walt Ear, DO   bimatoprost (LUMIGAN) 0.01 % SOLN ophthalmic drops Place 1 drop into both eyes nightly    Historical Provider, MD   oxybutynin (DITROPAN XL) 15 MG extended release tablet Take 15 mg by mouth daily    Historical Provider, MD   glimepiride (AMARYL) 1 MG tablet Take 0.5 mg by mouth every morning (before breakfast)    Historical Provider, MD   clopidogrel (PLAVIX) 75 MG tablet Take 1 tablet by mouth daily 20   Pattie Ni MD   atorvastatin (LIPITOR) 20 MG tablet Take 1 tablet by mouth nightly 17   Mendoza Gupta MD   hydroxychloroquine (PLAQUENIL) 200 MG tablet Take 200 mg by mouth 2 times daily     Historical Provider, MD   verapamil (CALAN SR) 240 MG extended release tablet Take 240 mg by mouth every morning     Historical Provider, MD   DULoxetine (CYMBALTA) 60 MG capsule Take 60 mg by mouth every morning Historical Provider, MD       Current medications:    No current facility-administered medications for this visit. Current Outpatient Medications   Medication Sig Dispense Refill    oxyCODONE-acetaminophen (PERCOCET) 5-325 MG per tablet Take 1 tablet by mouth every 6 hours as needed for Pain for up to 7 days. Intended supply: 7 days.  Take lowest dose possible to manage pain 28 tablet 0     Facility-Administered Medications Ordered in Other Visits   Medication Dose Route Frequency Provider Last Rate Last Admin    ceFAZolin (ANCEF) 2,000 mg in sterile water 20 mL IV syringe  2,000 mg IntraVENous Q8H Grayson Lujan MD   2,000 mg at 06/20/22 0533    oxyCODONE (ROXICODONE) immediate release tablet 5 mg  5 mg Oral Q4H PRN Ximena Hernández, DO   5 mg at 06/19/22 2043    Or    oxyCODONE HCl (OXY-IR) immediate release tablet 10 mg  10 mg Oral Q4H PRN Ximena Hernández, DO   10 mg at 06/20/22 1005    glucose chewable tablet 16 g  4 tablet Oral PRN Charlene Plata, DO        dextrose bolus 10% 125 mL  125 mL IntraVENous PRN Charlene Plata, DO        Or    dextrose bolus 10% 250 mL  250 mL IntraVENous PRN Charlene Plata, DO        glucagon (rDNA) injection 1 mg  1 mg IntraMUSCular PRN Charlene Plata, DO        dextrose 5 % solution  100 mL/hr IntraVENous PRN Charlene Plata, DO        lidocaine PF 1 % injection 5 mL  5 mL IntraDERmal Once Grayson Lujan MD        sodium chloride flush 0.9 % injection 5-40 mL  5-40 mL IntraVENous 2 times per day Grayson Lujan MD   10 mL at 06/20/22 1013    sodium chloride flush 0.9 % injection 5-40 mL  5-40 mL IntraVENous PRN Grayson Lujan MD        0.9 % sodium chloride infusion   IntraVENous PRN Grayson Lujan MD        heparin flush 100 UNIT/ML injection 300 Units  3 mL IntraVENous 2 times per day Grayson Lujan MD   300 Units at 06/19/22 2043    heparin flush 100 UNIT/ML injection 300 Units  3 mL IntraCATHeter PRN Grayson Lujan MD        aspirin EC tablet 81 mg  81 mg Oral BID Sridevi Pltaa, DO   81 mg at 06/19/22 2043    clopidogrel (PLAVIX) tablet 75 mg  75 mg Oral Daily Sridevi Plata, DO   75 mg at 06/19/22 0820    atorvastatin (LIPITOR) tablet 20 mg  20 mg Oral Nightly Javier Vilson, DO   20 mg at 06/19/22 2043    DULoxetine (CYMBALTA) extended release capsule 60 mg  60 mg Oral QAM Javier Vilson, DO   60 mg at 06/19/22 0819    hydroxychloroquine (PLAQUENIL) tablet 200 mg  200 mg Oral BID Ellamae Solid, DO   200 mg at 06/19/22 2043    oxybutynin (DITROPAN-XL) extended release tablet 15 mg  15 mg Oral Daily Javier Vilson, DO   15 mg at 06/19/22 0819    verapamil (CALAN SR) extended release tablet 240 mg  240 mg Oral QAM Javier Vilson, DO   240 mg at 06/19/22 0819    sodium chloride flush 0.9 % injection 5-40 mL  5-40 mL IntraVENous 2 times per day Ellamae Solid, DO   10 mL at 06/20/22 1013    sodium chloride flush 0.9 % injection 5-40 mL  5-40 mL IntraVENous PRN Ellamae Solid, DO        0.9 % sodium chloride infusion   IntraVENous PRN Ellamae Solid, DO 25 mL/hr at 06/18/22 1214 50 mL at 06/18/22 1214    polyethylene glycol (GLYCOLAX) packet 17 g  17 g Oral Daily PRN Ellamae Solid, DO        acetaminophen (TYLENOL) tablet 650 mg  650 mg Oral Q6H PRN Ellamae Solid, DO   650 mg at 06/18/22 2358    Or    acetaminophen (TYLENOL) suppository 650 mg  650 mg Rectal Q6H PRN Ellamae Solid, DO        insulin lispro (HUMALOG) injection vial 0-12 Units  0-12 Units SubCUTAneous TID WC Javier Joshison, DO   2 Units at 06/19/22 1745    insulin lispro (HUMALOG) injection vial 0-6 Units  0-6 Units SubCUTAneous Nightly Ellamae Solid, DO   3 Units at 06/19/22 2304    albuterol (PROVENTIL) nebulizer solution 2.5 mg  2.5 mg Nebulization Q6H PRN Ellamae Solid, DO        latanoprost (XALATAN) 0.005 % ophthalmic solution 1 drop  1 drop Both Eyes Nightly Javier Singh DO   1 drop at 06/19/22 2250    glipiZIDE (GLUCOTROL) tablet 2.5 mg  2.5 mg Oral QAM AC Javier Singh DO   2.5 mg at 06/18/22 0666    ondansetron (ZOFRAN-ODT) disintegrating tablet 4 mg  4 mg Oral Q8H PRN Walt Ear, DO        Or    ondansetron (ZOFRAN) injection 4 mg  4 mg IntraVENous Q6H PRN Walt Ear, DO           Allergies: Allergies   Allergen Reactions    Iodides Shortness Of Breath     CT Scan Dye (\"turned purple\")    Iodine Shortness Of Breath     Had trouble breathing and Skin color turned purple and stayed that way for  Hours. Other reaction(s): Other: See Comments  Had trubel breathing and Skin color turned purple and stayed that way for  Hours.  Other Shortness Of Breath     Perfumes and smoke    Pcn [Penicillins] Itching    Penicillin G Itching       Problem List:    Patient Active Problem List   Diagnosis Code    Essential hypertension I10    PAULINA (obstructive sleep apnea) G47.33    Midline low back pain with right-sided sciatica M54.41    Morbid obesity with BMI of 40.0-44.9, adult ([facility]) E66.01, Z68.41    Urinary incontinence R32    Altered mental status R41.82    Closed fracture fibula, head, right, initial encounter S82.831A    Diabetes mellitus ([facility]) E11.9    Neurogenic bladder N31.9    Sarcoidosis D86.9    Sleep apnea G47.30    Diffuse cerebral atrophy ([facility]) G31.9    Asthma J45.909    Diabetic peripheral neuropathy ([facility]) E11.42    Fibromyalgia M79.7    CKD (chronic kidney disease) stage 4, GFR 15-29 ml/min ([facility]) N18.4    Iron deficiency anemia D50.9    Sarcoid D86.9    Orthostatic hypotension I95.1    History of CVA (cerebrovascular accident) Z80.78    Combined forms of age-related cataract of both eyes H25.813    Atherosclerosis of artery of extremity without gangrene ([facility] I70.209    PVD (peripheral vascular disease) ([facility]) I73.9    Mirtha-prosthetic intertrochanteric fracture of femur M97. 8XXA, [id number]    Other fracture of right femur, initial encounter for closed fracture ([facility] S72.8X1A    Obesity E66.9    CKD (chronic kidney disease) stage 3, GFR 30-59 ml/min ([facility]) N18.30    Prolonged QT interval R94.31    Wound infection T14. 8XXA, L08.9       Past Medical History:        Diagnosis Date    Asthma 1993    Cataract, right eye     Cerebral artery occlusion with cerebral infarction ([facility] 07/2017    CKD (chronic kidney disease) stage 4, GFR 15-29 ml/min ([facility] 2017    Degenerative disc disease     Degenerative joint disease     Diabetes mellitus ([facility] 2008    diet controlled    Diabetic peripheral neuropathy ([facility] 1998    Diffuse cerebral atrophy ([facility] 2012    Fibromyalgia 01/2012    [facility]    Glaucoma     Hypertension     Iron deficiency anemia 10/25/2020    Neurogenic bladder     Neuropathy     Sarcoidosis 1993    Sleep apnea 2009    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\"       Past Surgical History:        Procedure Laterality Date    ANKLE FRACTURE SURGERY Right 10/24/2020    RIGHT ANKLE OPEN REDUCTION INTERNAL FIXATION performed by Roque Chang MD at [address]      laminectomy l3-4    BREAST SURGERY      biopsy    CARDIAC CATHETERIZATION  2012    MINIMAL CAD    CHOLECYSTECTOMY      COLONOSCOPY  08/2019    TUBULAR ADENOMA x 2    FOOT SURGERY Bilateral     right foot fracture; left foot charcot    HIP FRACTURE SURGERY Right 5/28/2022    OPEN REDUCTION INTERNAL FIXATION RIGHT PERIPROSTHETIC HIP FRACTURE performed by Lizzie Maier MD at [facility] ([facility])      INTRACAPSULAR CATARACT EXTRACTION Right 7/22/2021    RIGHT EYE CATARACT EXTRACTION IOL IMPLANT [facility]  12/16/2016    right hip arthroplasty    OTHER SURGICAL HISTORY Right 02/06/2017    I & D right hip wound vaccum placement    TOTAL HIP ARTHROPLASTY Right 6/16/2022    IRRIGATION AND DEBRIDEMENT RIGHT HIP WOUND INFECTION WITH ANTIBIOTIC BEAD PLACEMENT performed by Roger Johnson DO at [address] [facility] [place]        Social History:    Social History     Tobacco Use    Smoking status: Never Smoker    Smokeless tobacco: Never Used   Substance Use Topics    Alcohol use: No     Alcohol/week: 0.0 standard drinks                                Counseling given: Not Answered      Vital Signs (Current): There were no vitals filed for this visit. BP Readings from Last 3 Encounters:   06/20/22 (!) 125/46   06/15/22 86/70   06/09/22 (!) 132/57       NPO Status:                                                                                 BMI:   Wt Readings from Last 3 Encounters:   06/15/22 250 lb (113.4 kg)   06/09/22 250 lb (113.4 kg)   06/06/22 250 lb (113.4 kg)     There is no height or weight on file to calculate BMI.    CBC:   Lab Results   Component Value Date    WBC 10.4 06/19/2022    RBC 3.19 06/19/2022    HGB 9.0 06/19/2022    HCT 29.2 06/19/2022    MCV 91.5 06/19/2022    RDW 13.6 06/19/2022     06/19/2022       CMP:   Lab Results   Component Value Date     06/19/2022    K 4.3 06/19/2022    K 4.7 06/15/2022     06/19/2022    CO2 22 06/19/2022    BUN 40 06/19/2022    CREATININE 1.6 06/19/2022    GFRAA 38 06/19/2022    LABGLOM 31 06/19/2022    GLUCOSE 223 06/19/2022    GLUCOSE 82 12/09/2011    PROT 7.0 06/15/2022    CALCIUM 8.4 06/19/2022    BILITOT 0.5 06/15/2022    ALKPHOS 228 06/15/2022    AST 29 06/15/2022    ALT 29 06/15/2022       POC Tests: No results for input(s): POCGLU, POCNA, POCK, POCCL, POCBUN, POCHEMO, POCHCT in the last 72 hours.     Coags:   Lab Results   Component Value Date    PROTIME 11.9 05/27/2022    PROTIME 12.0 12/09/2011    INR 1.1 05/27/2022    APTT 28.9 05/27/2022       HCG (If Applicable): No results found for: PREGTESTUR, PREGSERUM, HCG, HCGQUANT     ABGs: No results found for: PHART, PO2ART, JWQ0RER, LCE1QFJ, BEART, G1RGCVEB     Type & Screen (If Applicable):  Lab Results   Component Value Date    LABABO B 12/09/2011    [address] 12/09/2011 Drug/Infectious Status (If Applicable):  No results found for: HIV, HEPCAB    COVID-19 Screening (If Applicable):   Lab Results   Component Value Date    COVID19 Not Detected 12/14/2020    COVID19 Not Detected 11/16/2020         Anesthesia Evaluation  Patient summary reviewed and Nursing notes reviewed no history of anesthetic complications:   Airway: Mallampati: III  TM distance: >3 FB   Neck ROM: full  Mouth opening: > = 3 FB   Dental: normal exam     Comment: Chip upper front tooth    Pulmonary: breath sounds clear to auscultation  (+) sleep apnea: on noncompliant and CPAP,  asthma:     (-) not a current smoker                           Cardiovascular:    (+) hypertension:,         Rhythm: regular  Rate: normal                    Neuro/Psych:   (+) CVA (balance problems. CVA 5 yrs ago.): residual symptoms, neuromuscular disease:,              ROS comment: Diabetic peripheral neuropathy    fibromyalgia GI/Hepatic/Renal:   (+) renal disease (Stage 4 kidney dx.): CRI, morbid obesity          Endo/Other:    (+) DiabetesType II DM, , .                  ROS comment: Sarcoidosis    S/P fall resulting in Rt. Femur fx Abdominal:             Vascular: negative vascular ROS. Other Findings:        EXAMINATION:   CT OF THE HEAD WITHOUT CONTRAST; CT OF THE CERVICAL SPINE WITHOUT CONTRAST   5/26/2022 5:36 pm       TECHNIQUE:   CT of the head was performed without the administration of intravenous   contrast. Automated exposure control, iterative reconstruction, and/or weight   based adjustment of the mA/kV was utilized to reduce the radiation dose to as   low as reasonably achievable.; CT of the cervical spine was performed without   the administration of intravenous contrast. Multiplanar reformatted images   are provided for review. Automated exposure control, iterative   reconstruction, and/or weight based adjustment of the mA/kV was utilized to   reduce the radiation dose to as low as reasonably achievable. COMPARISON:   CT head and cervical spine 02/01/2021       HISTORY:   ORDERING SYSTEM PROVIDED HISTORY: fall   TECHNOLOGIST PROVIDED HISTORY:   Reason for exam:->fall   Has a \"code stroke\" or \"stroke alert\" been called? ->No   Decision Support Exception - unselect if not a suspected or confirmed   emergency medical condition->Emergency Medical Condition (MA)       FINDINGS:   CT head: There is a hypodensity within the right cerebellar hemisphere. There is   patchy hypoattenuation within the white matter of the bilateral cerebral   hemispheres. There is no evidence of mass, mass effect, or midline shift. There is enlargement of the ventricles and sulci suggesting generalized   cerebral volume loss. No extra-axial fluid collections or acute hemorrhage. The gray-white differentiation appears preserved without evidence of acute   cortical ischemia. The calvarium is intact. The visualized portions of the   paranasal sinuses and mastoid air cells are clear. There is right lens   replacement. CT cervical spine: There is no evidence of acute fracture. There is grade 1 anterolisthesis at   C3-4 and C7-T1. The remaining alignment is anatomic. There are no   compression deformities. There is narrowing of the intervertebral disc space   heights, most pronounced at C4-5 through C6-7. The facet joints are intact   at all levels with multilevel facet degeneration. The prevertebral soft   tissue structures are unremarkable. There is no gross evidence of central   canal stenosis. Impression   1. No acute intracranial abnormality. 2. Chronic small vessel ischemic disease and generalized cerebral volume loss. 3. Degenerative changes in the cervical spine without acute fracture or   subluxation. Exam: 2 views of the right hip. Comparison: Correlation made with CT performed today at 1539 hours. Findings:        Comminuted fracture involving femoral head.  Fracture

## 2022-06-20 NOTE — PROGRESS NOTES
Hospital Medicine    Subjective:  Pt seen this am sched for OR today      Current Facility-Administered Medications:     ceFAZolin (ANCEF) 2,000 mg in sterile water 20 mL IV syringe, 2,000 mg, IntraVENous, Q8H, Mary Kate Pratt MD, 2,000 mg at 06/20/22 0533    oxyCODONE (ROXICODONE) immediate release tablet 5 mg, 5 mg, Oral, Q4H PRN, 5 mg at 06/19/22 2043 **OR** oxyCODONE HCl (OXY-IR) immediate release tablet 10 mg, 10 mg, Oral, Q4H PRN, Brefreddy Stephen, DO, 10 mg at 06/20/22 1005    glucose chewable tablet 16 g, 4 tablet, Oral, PRN, Elisa Plata, DO    dextrose bolus 10% 125 mL, 125 mL, IntraVENous, PRN **OR** dextrose bolus 10% 250 mL, 250 mL, IntraVENous, PRN, Elisa Plata, DO    glucagon (rDNA) injection 1 mg, 1 mg, IntraMUSCular, PRN, Elisa Plata, DO    dextrose 5 % solution, 100 mL/hr, IntraVENous, PRN, Elisa Plata, DO    lidocaine PF 1 % injection 5 mL, 5 mL, IntraDERmal, Once, Mary Kate Pratt MD    sodium chloride flush 0.9 % injection 5-40 mL, 5-40 mL, IntraVENous, 2 times per day, Mary Kate Pratt MD, 10 mL at 06/20/22 1013    sodium chloride flush 0.9 % injection 5-40 mL, 5-40 mL, IntraVENous, PRN, Mary Kate Pratt MD    0.9 % sodium chloride infusion, , IntraVENous, PRN, Mary Kate Pratt MD    heparin flush 100 UNIT/ML injection 300 Units, 3 mL, IntraVENous, 2 times per day, Mary Kate Pratt MD, 300 Units at 06/19/22 2043    heparin flush 100 UNIT/ML injection 300 Units, 3 mL, IntraCATHeter, PRN, Mary Kate Pratt MD    aspirin EC tablet 81 mg, 81 mg, Oral, BID, Elisa Plata DO, 81 mg at 06/19/22 2043    clopidogrel (PLAVIX) tablet 75 mg, 75 mg, Oral, Daily, Elisa Plata DO, 75 mg at 06/19/22 0820    atorvastatin (LIPITOR) tablet 20 mg, 20 mg, Oral, Nightly, Javier Singh DO, 20 mg at 06/19/22 2043    DULoxetine (CYMBALTA) extended release capsule 60 mg, 60 mg, Oral, QAM, Javier Singh DO, 60 mg at 06/19/22 0819    hydroxychloroquine (PLAQUENIL) tablet 200 mg, 200 Lab Results   Component Value Date    WBC 10.4 06/19/2022    RBC 3.19 06/19/2022    HGB 9.0 06/19/2022    HCT 29.2 06/19/2022     06/19/2022    MCV 91.5 06/19/2022    MCH 28.2 06/19/2022    MCHC 30.8 06/19/2022    RDW 13.6 06/19/2022    NRBC 0.9 06/17/2022    LYMPHOPCT 10.7 06/19/2022    MONOPCT 14.4 06/19/2022    BASOPCT 0.8 06/19/2022    MONOSABS 1.50 06/19/2022    LYMPHSABS 1.11 06/19/2022    EOSABS 0.58 06/19/2022    BASOSABS 0.08 06/19/2022     CMP:    Lab Results   Component Value Date     06/19/2022    K 4.3 06/19/2022    K 4.7 06/15/2022     06/19/2022    CO2 22 06/19/2022    BUN 40 06/19/2022    CREATININE 1.6 06/19/2022    GFRAA 38 06/19/2022    LABGLOM 31 06/19/2022    GLUCOSE 223 06/19/2022    GLUCOSE 82 12/09/2011    PROT 7.0 06/15/2022    LABALBU 3.1 06/15/2022    CALCIUM 8.4 06/19/2022    BILITOT 0.5 06/15/2022    ALKPHOS 228 06/15/2022    AST 29 06/15/2022    ALT 29 06/15/2022     Warfarin PT/INR:    Lab Results   Component Value Date    INR 1.1 05/27/2022    INR 1.0 10/24/2020    INR 2.2 04/30/2018    PROTIME 11.9 05/27/2022    PROTIME 11.3 10/24/2020    PROTIME 24.8 (H) 04/30/2018       Assessment:    Principal Problem:    Wound infection  Resolved Problems:    * No resolved hospital problems.  *      Plan:  For OR today cont atb per AMANDA Dotson DO  1:28 PM  6/20/2022

## 2022-06-20 NOTE — CARE COORDINATION
6/20/2022 social work transition of care planning  Pt plan remains for home with [employer] [employer]. Option care to discuss pricing of atb with pt.   Electronically signed by ANTHONY Ayers on 6/20/2022 at 4:15 PM

## 2022-06-21 LAB
CULTURE, BLOOD 2: NORMAL
METER GLUCOSE: 231 MG/DL (ref 74–99)
METER GLUCOSE: 250 MG/DL (ref 74–99)
METER GLUCOSE: 258 MG/DL (ref 74–99)
METER GLUCOSE: 312 MG/DL (ref 74–99)
TOTAL CK: 19 U/L (ref 20–180)

## 2022-06-21 PROCEDURE — 82550 ASSAY OF CK (CPK): CPT

## 2022-06-21 PROCEDURE — 97530 THERAPEUTIC ACTIVITIES: CPT

## 2022-06-21 PROCEDURE — 36415 COLL VENOUS BLD VENIPUNCTURE: CPT

## 2022-06-21 PROCEDURE — 1200000000 HC SEMI PRIVATE

## 2022-06-21 PROCEDURE — 6370000000 HC RX 637 (ALT 250 FOR IP): Performed by: STUDENT IN AN ORGANIZED HEALTH CARE EDUCATION/TRAINING PROGRAM

## 2022-06-21 PROCEDURE — 6360000002 HC RX W HCPCS: Performed by: STUDENT IN AN ORGANIZED HEALTH CARE EDUCATION/TRAINING PROGRAM

## 2022-06-21 PROCEDURE — 2580000003 HC RX 258: Performed by: STUDENT IN AN ORGANIZED HEALTH CARE EDUCATION/TRAINING PROGRAM

## 2022-06-21 PROCEDURE — 97535 SELF CARE MNGMENT TRAINING: CPT

## 2022-06-21 PROCEDURE — A4216 STERILE WATER/SALINE, 10 ML: HCPCS | Performed by: STUDENT IN AN ORGANIZED HEALTH CARE EDUCATION/TRAINING PROGRAM

## 2022-06-21 PROCEDURE — 82962 GLUCOSE BLOOD TEST: CPT

## 2022-06-21 RX ADMIN — CLOPIDOGREL BISULFATE 75 MG: 75 TABLET ORAL at 09:24

## 2022-06-21 RX ADMIN — INSULIN LISPRO 3 UNITS: 100 INJECTION, SOLUTION INTRAVENOUS; SUBCUTANEOUS at 21:02

## 2022-06-21 RX ADMIN — OXYCODONE HYDROCHLORIDE 10 MG: 10 TABLET ORAL at 21:02

## 2022-06-21 RX ADMIN — INSULIN LISPRO 4 UNITS: 100 INJECTION, SOLUTION INTRAVENOUS; SUBCUTANEOUS at 12:46

## 2022-06-21 RX ADMIN — Medication 300 UNITS: at 21:05

## 2022-06-21 RX ADMIN — INSULIN LISPRO 8 UNITS: 100 INJECTION, SOLUTION INTRAVENOUS; SUBCUTANEOUS at 18:22

## 2022-06-21 RX ADMIN — OXYCODONE HYDROCHLORIDE 10 MG: 10 TABLET ORAL at 05:58

## 2022-06-21 RX ADMIN — Medication 300 UNITS: at 09:23

## 2022-06-21 RX ADMIN — HYDROXYCHLOROQUINE SULFATE 200 MG: 200 TABLET ORAL at 21:02

## 2022-06-21 RX ADMIN — HYDROXYCHLOROQUINE SULFATE 200 MG: 200 TABLET ORAL at 09:22

## 2022-06-21 RX ADMIN — SODIUM CHLORIDE, PRESERVATIVE FREE 10 ML: 5 INJECTION INTRAVENOUS at 09:23

## 2022-06-21 RX ADMIN — OXYBUTYNIN CHLORIDE 15 MG: 10 TABLET, EXTENDED RELEASE ORAL at 09:23

## 2022-06-21 RX ADMIN — VERAPAMIL HYDROCHLORIDE 240 MG: 240 TABLET, FILM COATED, EXTENDED RELEASE ORAL at 09:22

## 2022-06-21 RX ADMIN — SODIUM CHLORIDE, PRESERVATIVE FREE 10 ML: 5 INJECTION INTRAVENOUS at 21:01

## 2022-06-21 RX ADMIN — DAPTOMYCIN 450 MG: 500 INJECTION, POWDER, LYOPHILIZED, FOR SOLUTION INTRAVENOUS at 17:30

## 2022-06-21 RX ADMIN — CEFAZOLIN 2000 MG: 2 INJECTION, POWDER, FOR SOLUTION INTRAMUSCULAR; INTRAVENOUS at 05:58

## 2022-06-21 RX ADMIN — ATORVASTATIN CALCIUM 20 MG: 20 TABLET, FILM COATED ORAL at 21:02

## 2022-06-21 RX ADMIN — ASPIRIN 81 MG: 81 TABLET, COATED ORAL at 09:29

## 2022-06-21 RX ADMIN — GLIPIZIDE 2.5 MG: 5 TABLET ORAL at 05:59

## 2022-06-21 RX ADMIN — CEFAZOLIN 2000 MG: 2 INJECTION, POWDER, FOR SOLUTION INTRAMUSCULAR; INTRAVENOUS at 21:01

## 2022-06-21 RX ADMIN — Medication 10 ML: at 09:29

## 2022-06-21 RX ADMIN — ASPIRIN 81 MG: 81 TABLET, COATED ORAL at 21:02

## 2022-06-21 RX ADMIN — INSULIN LISPRO 6 UNITS: 100 INJECTION, SOLUTION INTRAVENOUS; SUBCUTANEOUS at 09:22

## 2022-06-21 RX ADMIN — DULOXETINE HYDROCHLORIDE 60 MG: 60 CAPSULE, DELAYED RELEASE ORAL at 09:22

## 2022-06-21 RX ADMIN — CEFAZOLIN 2000 MG: 2 INJECTION, POWDER, FOR SOLUTION INTRAMUSCULAR; INTRAVENOUS at 12:47

## 2022-06-21 RX ADMIN — LATANOPROST 1 DROP: 50 SOLUTION OPHTHALMIC at 21:06

## 2022-06-21 ASSESSMENT — PAIN DESCRIPTION - ORIENTATION
ORIENTATION: RIGHT
ORIENTATION: RIGHT

## 2022-06-21 ASSESSMENT — PAIN - FUNCTIONAL ASSESSMENT
PAIN_FUNCTIONAL_ASSESSMENT: PREVENTS OR INTERFERES SOME ACTIVE ACTIVITIES AND ADLS
PAIN_FUNCTIONAL_ASSESSMENT: PREVENTS OR INTERFERES WITH MANY ACTIVE NOT PASSIVE ACTIVITIES

## 2022-06-21 ASSESSMENT — PAIN DESCRIPTION - LOCATION
LOCATION: HIP
LOCATION: LEG

## 2022-06-21 ASSESSMENT — PAIN SCALES - GENERAL
PAINLEVEL_OUTOF10: 7
PAINLEVEL_OUTOF10: 4
PAINLEVEL_OUTOF10: 2
PAINLEVEL_OUTOF10: 7

## 2022-06-21 ASSESSMENT — PAIN DESCRIPTION - FREQUENCY
FREQUENCY: INTERMITTENT
FREQUENCY: INTERMITTENT

## 2022-06-21 ASSESSMENT — PAIN DESCRIPTION - DESCRIPTORS
DESCRIPTORS: ACHING;DISCOMFORT
DESCRIPTORS: ACHING;DISCOMFORT;DULL

## 2022-06-21 ASSESSMENT — PAIN DESCRIPTION - ONSET
ONSET: GRADUAL
ONSET: GRADUAL

## 2022-06-21 ASSESSMENT — PAIN DESCRIPTION - PAIN TYPE
TYPE: SURGICAL PAIN
TYPE: SURGICAL PAIN

## 2022-06-21 NOTE — PROGRESS NOTES
Comprehensive Nutrition Assessment    Type and Reason for Visit:  Initial,Wound    Nutrition Recommendations/Plan:   1. Continue current diet  2. Start ONS ; Nik BID  3. Continue to monitor     Malnutrition Assessment:  Malnutrition Status:  No malnutrition (06/21/22 1056)    Context:  Acute Illness     Findings of the 6 clinical characteristics of malnutrition:  Energy Intake:  No significant decrease in energy intake  Weight Loss:  No significant weight loss     Body Fat Loss:  No significant body fat loss     Muscle Mass Loss:  No significant muscle mass loss    Fluid Accumulation:  No significant fluid accumulation     Strength:  Not Performed    Nutrition Assessment:    Pt presented to ED d/t worsening Rt Hip post-op incision, s/p recent Rt Hip Frx/ORIF on 5/28 ; PMHX of CVA/CKD(4)/DM/PVD ; Pt adm w/ +OM to Rt Hip, s/p Debridement 6/16, s/p R-hip Abscess drainage 6/20 ; Pt at nutritional risk d/t increased needs for wound healing ; Will start ONS and monitor    Nutrition Related Findings:    -I&O (-3.5 L) ; A&O x 4 ; edema +2 non-yennifer ; +BS ; Abd WNL Wound Type: Surgical Incision,Multiple (surgical x 2)       Current Nutrition Intake & Therapies:    Average Meal Intake: %     ADULT DIET; Regular  ADULT ORAL NUTRITION SUPPLEMENT; Breakfast, Dinner; Wound Healing Oral Supplement    Anthropometric Measures:  Height: 5' 4\" (162.6 cm)  Ideal Body Weight (IBW): 120 lbs (55 kg)    Admission Body Weight: 260 lb (117.9 kg) (6/21, first actual BS)  Current Body Weight: 260 lb (117.9 kg) (6/21, BS), 216.7 % IBW. Weight Source: Bed Scale  Current BMI (kg/m2): 44.6  Usual Body Weight: 255 lb (115.7 kg) (EMR shows past weight of 255# on 2/4/21 BS)  % Weight Change (Calculated):  2  Weight Adjustment For: No Adjustment                 BMI Categories: Obese Class 3 (BMI 40.0 or greater)    Estimated Daily Nutrient Needs:  Energy Requirements Based On: Formula  Weight Used for Energy Requirements: Current  Energy (kcal/day): 1800-2000kcal (REE x 1.2 SF)  Weight Used for Protein Requirements: Ideal  Protein (g/day): 100-110g (1.8-2g/kg); as tolerated w/ CKD  Method Used for Fluid Requirements: 1 ml/kcal  Fluid (ml/day): 1800-2000ml    Nutrition Diagnosis:   · Increased nutrient needs related to increase demand for energy/nutrients as evidenced by wounds      Nutrition Interventions:   Food and/or Nutrient Delivery: Continue Current Diet,Start Oral Nutrition Supplement  Nutrition Education/Counseling: No recommendation at this time  Coordination of Nutrition Care: Continue to monitor while inpatient       Goals:     Goals: PO intake 75% or greater,by next RD assessment       Nutrition Monitoring and Evaluation:   Behavioral-Environmental Outcomes: None Identified  Food/Nutrient Intake Outcomes: Food and Nutrient Intake,Supplement Intake  Physical Signs/Symptoms Outcomes: Biochemical Data,GI Status,Fluid Status or Edema,Hemodynamic Status,Nutrition Focused Physical Findings,Skin,Weight    Discharge Planning:     Too soon to determine     Emile Salazar  Contact: [phone]

## 2022-06-21 NOTE — PROGRESS NOTES
Infectious Disease  Progress Note  [facility]    Chief Complaint: right thigh hardware associated osteomyelitis. Subjective:  She is doing well . She has pain at the right hip only with movement. No fever. Scheduled Meds:   daptomycin (CUBICIN) in NS IV syringe  8 mg/kg (Ideal) IntraVENous Q24H    ceFAZolin  2,000 mg IntraVENous Q8H    lidocaine PF  5 mL IntraDERmal Once    sodium chloride flush  5-40 mL IntraVENous 2 times per day    heparin flush  3 mL IntraVENous 2 times per day    aspirin  81 mg Oral BID    clopidogrel  75 mg Oral Daily    atorvastatin  20 mg Oral Nightly    DULoxetine  60 mg Oral QAM    hydroxychloroquine  200 mg Oral BID    oxybutynin  15 mg Oral Daily    verapamil  240 mg Oral QAM    sodium chloride flush  5-40 mL IntraVENous 2 times per day    insulin lispro  0-12 Units SubCUTAneous TID WC    insulin lispro  0-6 Units SubCUTAneous Nightly    latanoprost  1 drop Both Eyes Nightly    glipiZIDE  2.5 mg Oral QAM AC     Continuous Infusions:   dextrose      sodium chloride      sodium chloride 50 mL (06/18/22 1214)     PRN Meds:oxyCODONE **OR** oxyCODONE, glucose, dextrose bolus **OR** dextrose bolus, glucagon (rDNA), dextrose, sodium chloride flush, sodium chloride, heparin flush, sodium chloride flush, sodium chloride, polyethylene glycol, acetaminophen **OR** acetaminophen, albuterol, ondansetron **OR** ondansetron    ROS:  As mentioned in subjective, all other systems negative      BP (!) 113/53   Pulse 84   Temp 96.8 °F (36 °C) (Temporal)   Resp 18   Ht 5' 4\" (1.626 m)   Wt 260 lb (117.9 kg)   SpO2 95%   BMI 44.63 kg/m²     Physical Exam  Const/Neuro- unchanged, no signs of acute distress, Alert  ENMT- Within Normal Limits, Normocephalic, mucous membranes pink/moist, No thrush  Neck: Neck supple  Heart- Regular, Rate, Rhythm- no murmur appreciated. Lungs- clear to ascultation. Respirations even and nonlabored.   Abdomen- Soft, bowel sounds positive, non tender  Musculo/Extremities-  Equal and symmetrical, dressing over right thigh with drain in place  Neurological- No focal motor or sensory loss. No confusion  Skin:  Warm and dry, free from rashes. Lines: PICC left UE    Labs, Cultures reviewed  Radiology reviewed    Microbiology:  Wound cx 6/9: Proteus mirabilis  Blood cx 6/15: so far negative  Surgical cx 6/16: Proteus mirabilis, enterococcus faecalis and Enterococcus faecium (VRE)     Assessment:  · Right femur fracture with ORIF with osteomyelitis:   ? S/p debridement on 6/16  ? Pt has been on Keflex for the past  5 Days before presentation. ? PICC placed 6/17  · Leucocytosis:     Plan:    · Started on IV daptomycin 8 mg per g (450mg) and continue cefazolin 2 g IV every 8  · Can go home on IV daptomycin and IV ceftriaxone 2 g daily.   Scripts in the chart  · Discussed with case management: Pending dispo and antibiotic arrangement  · Will follow with you      Electronically signed by Thomas Oseguera MD on 6/21/2022 at 12:25 PM

## 2022-06-21 NOTE — PROGRESS NOTES
Hospital Medicine    Subjective:  Pt seen this am declines snf placement      Current Facility-Administered Medications:     DAPTOmycin (CUBICIN) 450 mg in sodium chloride (PF) 9 mL IV syringe, 8 mg/kg (Ideal), IntraVENous, Q24H, Nadya Warren MD    ceFAZolin (ANCEF) 2,000 mg in sterile water 20 mL IV syringe, 2,000 mg, IntraVENous, Q8H, Akira Savers, DO, 2,000 mg at 06/21/22 1247    oxyCODONE (ROXICODONE) immediate release tablet 5 mg, 5 mg, Oral, Q4H PRN, 5 mg at 06/19/22 2043 **OR** oxyCODONE HCl (OXY-IR) immediate release tablet 10 mg, 10 mg, Oral, Q4H PRN, Akira Savers, DO, 10 mg at 06/21/22 0558    glucose chewable tablet 16 g, 4 tablet, Oral, PRN, Akira Savers, DO    dextrose bolus 10% 125 mL, 125 mL, IntraVENous, PRN **OR** dextrose bolus 10% 250 mL, 250 mL, IntraVENous, PRN, Akira Savers, DO    glucagon (rDNA) injection 1 mg, 1 mg, IntraMUSCular, PRN, Akira Savers, DO    dextrose 5 % solution, 100 mL/hr, IntraVENous, PRN, Akira Savers, DO    lidocaine PF 1 % injection 5 mL, 5 mL, IntraDERmal, Once, Akira Savers, DO    sodium chloride flush 0.9 % injection 5-40 mL, 5-40 mL, IntraVENous, 2 times per day, Akira Savers, DO, 10 mL at 06/21/22 8779    sodium chloride flush 0.9 % injection 5-40 mL, 5-40 mL, IntraVENous, PRN, Akira Savers, DO    0.9 % sodium chloride infusion, , IntraVENous, PRN, Akira Savers, DO    heparin flush 100 UNIT/ML injection 300 Units, 3 mL, IntraVENous, 2 times per day, Akira Savers, DO, 300 Units at 06/21/22 0923    heparin flush 100 UNIT/ML injection 300 Units, 3 mL, IntraCATHeter, PRN, Akira Savers, DO    aspirin EC tablet 81 mg, 81 mg, Oral, BID, Akira Savers, DO, 81 mg at 06/21/22 2129    clopidogrel (PLAVIX) tablet 75 mg, 75 mg, Oral, Daily, Akira Savers, DO, 75 mg at 06/21/22 4381    atorvastatin (LIPITOR) tablet 20 mg, 20 mg, Oral, Nightly, Akira Savers, DO, 20 mg at 06/20/22 2101    DULoxetine (CYMBALTA) extended release capsule 60 mg, 60 mg, Oral, QAM, Clenton Brittney, DO, 60 mg at 06/21/22 9737    hydroxychloroquine (PLAQUENIL) tablet 200 mg, 200 mg, Oral, BID, Clenton Brittney, DO, 200 mg at 06/21/22 9725    oxybutynin (DITROPAN-XL) extended release tablet 15 mg, 15 mg, Oral, Daily, Clenton Brittney, DO, 15 mg at 06/21/22 9658    verapamil (CALAN SR) extended release tablet 240 mg, 240 mg, Oral, QAM, Clenton Brittney, DO, 240 mg at 06/21/22 3037    sodium chloride flush 0.9 % injection 5-40 mL, 5-40 mL, IntraVENous, 2 times per day, Clenton Brittney, DO, 10 mL at 06/21/22 0103    sodium chloride flush 0.9 % injection 5-40 mL, 5-40 mL, IntraVENous, PRN, Clenton Brittney, DO    0.9 % sodium chloride infusion, , IntraVENous, PRN, Clenton Brittney, DO, Last Rate: 25 mL/hr at 06/18/22 1214, 50 mL at 06/18/22 1214    polyethylene glycol (GLYCOLAX) packet 17 g, 17 g, Oral, Daily PRN, Clenton Brittney, DO    acetaminophen (TYLENOL) tablet 650 mg, 650 mg, Oral, Q6H PRN, 650 mg at 06/18/22 8238 **OR** acetaminophen (TYLENOL) suppository 650 mg, 650 mg, Rectal, Q6H PRN, Clenton Brittney, DO    insulin lispro (HUMALOG) injection vial 0-12 Units, 0-12 Units, SubCUTAneous, TID WC, Clenton Brittney, DO, 4 Units at 06/21/22 1246    insulin lispro (HUMALOG) injection vial 0-6 Units, 0-6 Units, SubCUTAneous, Nightly, Clenton Brittney, DO, 3 Units at 06/20/22 2103    albuterol (PROVENTIL) nebulizer solution 2.5 mg, 2.5 mg, Nebulization, Q6H PRN, Clenton Brittney, DO    latanoprost (XALATAN) 0.005 % ophthalmic solution 1 drop, 1 drop, Both Eyes, Nightly, Clenton Brittney, DO, 1 drop at 06/20/22 2121    glipiZIDE (GLUCOTROL) tablet 2.5 mg, 2.5 mg, Oral, QAM AC, Brayden Lugo DO, 2.5 mg at 06/21/22 0559    ondansetron (ZOFRAN-ODT) disintegrating tablet 4 mg, 4 mg, Oral, Q8H PRN **OR** ondansetron (ZOFRAN) injection 4 mg, 4 mg, IntraVENous, Q6H PRN, Brayden Lugo DO    Objective:    BP (!) 106/48   Pulse 77   Temp 98.1 °F (36.7 °C) (Oral)   Resp 18   Ht 5' 4\" (1.626 m)   Wt 260 lb (117.9 kg)   SpO2 97%   BMI 44.63 kg/m²     Heart:  reg  Lungs:  ctab  Abd: + bs soft nontender  Extrem:  Edema legs    CBC with Differential:    Lab Results   Component Value Date    WBC 10.4 06/19/2022    RBC 3.19 06/19/2022    HGB 9.0 06/19/2022    HCT 29.2 06/19/2022     06/19/2022    MCV 91.5 06/19/2022    MCH 28.2 06/19/2022    MCHC 30.8 06/19/2022    RDW 13.6 06/19/2022    NRBC 0.9 06/17/2022    LYMPHOPCT 10.7 06/19/2022    MONOPCT 14.4 06/19/2022    BASOPCT 0.8 06/19/2022    MONOSABS 1.50 06/19/2022    LYMPHSABS 1.11 06/19/2022    EOSABS 0.58 06/19/2022    BASOSABS 0.08 06/19/2022     CMP:    Lab Results   Component Value Date     06/19/2022    K 4.3 06/19/2022    K 4.7 06/15/2022     06/19/2022    CO2 22 06/19/2022    BUN 40 06/19/2022    CREATININE 1.6 06/19/2022    GFRAA 38 06/19/2022    LABGLOM 31 06/19/2022    GLUCOSE 223 06/19/2022    GLUCOSE 82 12/09/2011    PROT 7.0 06/15/2022    LABALBU 3.1 06/15/2022    CALCIUM 8.4 06/19/2022    BILITOT 0.5 06/15/2022    ALKPHOS 228 06/15/2022    AST 29 06/15/2022    ALT 29 06/15/2022     Warfarin PT/INR:    Lab Results   Component Value Date    INR 1.1 05/27/2022    INR 1.0 10/24/2020    INR 2.2 04/30/2018    PROTIME 11.9 05/27/2022    PROTIME 11.3 10/24/2020    PROTIME 24.8 (H) 04/30/2018       Assessment:    Principal Problem:    Wound infection  Active Problems:    Postoperative wound infection  Resolved Problems:    * No resolved hospital problems.  *      Plan:  Dc planning cont atb per id dc home when arrangements made        Amada Rajput DO  4:57 PM  6/21/2022

## 2022-06-21 NOTE — PROGRESS NOTES
Occupational Therapy  OT BEDSIDE TREATMENT NOTE   [address] [facility] [address]  [address] [place], [place]      Date:2022  Patient Name: Roz Montilla  MRN: [id number]  : 1945  Room: [address] [place] [address]     Evaluating OT: Sarah Howard OTR/L; [id number]        Referring Provider: Richard Sanchez DO    Specific Provider Orders/Date: OT Eval and Treat 22        Diagnosis: Wound infection R hip    Surgery: 22 Right hip deep infected seroma I&D, insertion absorbable antibiotic beads    Pertinent Medical History:  has a past medical history of Asthma, Cataract, right eye, Cerebral artery occlusion with cerebral infarction [facility]), CKD (chronic kidney disease) stage 4, GFR 15-29 ml/min ([facility] [address] Degenerative disc disease, Degenerative joint disease, Diabetes mellitus ([address] Diabetic peripheral neuropathy ([address] Diffuse cerebral atrophy ([address] Fibromyalgia, Glaucoma, Hypertension, Iron deficiency anemia, Neurogenic bladder, Neuropathy, Sarcoidosis, Sleep apnea, and Spinal cord and nerve root disorder.    Recommended Adaptive Equipment: AE for LE bathing and dressing PRN      Precautions:  Fall Risk, TTWB RLE (per perfectserve to ortho), external catheter, O2      Assessment of current deficits    [x] Functional mobility            [x]ADLs           [x] Strength                  []Cognition    [x] Functional transfers          [x] IADLs         [x] Safety Awareness   [x]Endurance    [] Fine Coordination                         [x] Balance      [] Vision/perception   []Sensation      []Gross Motor Coordination             [] ROM           [] Delirium                   [] Motor Control      OT PLAN OF CARE   OT POC based on physician orders, patient diagnosis and results of clinical assessment     Frequency/Duration 3-5 days/wk for 2 weeks PRN   Specific OT Treatment Interventions to include:   * Instruction/training on adapted ADL techniques and AE recommendations to increase functional independence within precautions       * Training on energy conservation strategies, correct breathing pattern and techniques to improve independence/tolerance for self-care routine  * Functional transfer/mobility training/DME recommendations for increased independence, safety, and fall prevention  * Patient/Family education to increase follow through with safety techniques and functional independence  * Recommendation of environmental modifications for increased safety with functional transfers/mobility and ADLs  * Therapeutic exercise to improve motor endurance, ROM, and functional strength for ADLs/functional transfers  * Therapeutic activities to facilitate/challenge dynamic balance, stand tolerance for increased safety and independence with ADLs  * Positioning to improve skin integrity, interaction with environment and functional independence     Home Living: Pt lives with family in 1 story home with ramp entry.     Bathroom setup: Walk-in shower   Equipment owned: rollator, reacher, sockaid, BSC     Prior Level of Function: assist with ADLs , assist with IADLs; engaged in functional mobility with use of  rollator  Driving: No  Occupation: None reported     Pain Level: Pt c/o RLE pain; therapist provided repositioning techniques  Cognition: A&O: 4/4; Follows multi step directions              Memory:  Good              Sequencing:  Fair+              Problem solving:  Fair+              Judgement/safety:  Fair                Functional Assessment:  AM-PAC Daily Activity Raw Score: 13/24    Initial Eval Status  Date: 6/17/22 Treatment Status  Date: 6/21/22 STGs = LTGs  Time frame: 10-14 days   Feeding Stand by Assist  Set up  Independent    Grooming Minimal Assist  SBA  To comb hair seated EOB  Modified Sully    UB Dressing Minimal Assist   Per last tx Modified Sully    LB Dressing Dependent  Dependent  Minimal Assist    Bathing Maximal Assist Per last tx  Minimal Assist Toileting Dependent   NT Minimal Assist    Bed Mobility  Log Roll: Minimal Assist  Supine to sit: Minimal Assist   Sit to supine: Minimal Assist   Min A- supine<->sit  Educated pt on technique to increase independence. Supine to sit: Independent   Sit to supine: Independent    Functional Transfers Sit to stand:Maximal Assist   Stand to sit:Maximal Assist  Stand pivot: Maximal Assist  Commode: NT  Max A x 2- sit<->stand  Cuing for hand placement and body mechanics Sit to stand:SBA   Stand to sit:SBA  Stand pivot: SBA  Commode: SBA    Functional Mobility Maximal Assist  Use of ww shorter than household distance Per last tx  Stand by Assist with use of Appropriate AD   Balance Sitting:     Static - SBA     Dynamic - Minimal Assist  Standing: Maximal Assist Sitting:     Static - SBA     Dynamic - Minimal Assist  Standing: Maximal Assist  Sitting:     Static: Independent     Dynamic: Independent  Standing: SBA   Activity Tolerance Fair Fair  Good   Visual/  Perceptual Glasses: Yes  Appears WFL        Safety Fair  Fair Good  during ADL completion following TTWB RLE precautions       Comments: Upon arrival pt supine in bed. Pt educated on techniques to increase independence and safety during ADL's, bed mobility, and functional transfers. At end of session pt left seated upright in bed, call light within reach. Pt has made fair progress towards set goals.      Continue with current plan of care    Treatment Time In: 3:13            Treatment Time Out: 3:45             Treatment Charges: Mins Units   Ther Ex  [id number]     Manual Therapy [id number] [facility]     Thera Activities 11584 20 1   ADL/Home Mgt 18624 13 1   Neuro Re-ed [id number]     Group Therapy      Orthotic manage/training  [id number]     Non-Billable Time     Total Timed Treatment 32 2     [address]

## 2022-06-21 NOTE — PROGRESS NOTES
Physical Therapy  Treatment Note    Name: [place]  : 1945  MRN: [id number]      Date of Service: 2022    Evaluating PT:  Grey Suárez PT, DPT [id number]    Room #:  5052/7702-Y  Diagnosis:  Postoperative wound infection [T81.49XA]  Wound infection [T14. 8XXA, L08.9]  PMHx/PSHx:    Past Medical History:   Diagnosis Date    Asthma     Cataract, right eye     Cerebral artery occlusion with cerebral infarction ([facility] [address] 2017    CKD (chronic kidney disease) stage 4, GFR 15-29 ml/min ([facility] [address]     Degenerative disc disease     Degenerative joint disease     Diabetes mellitus ([facility] [address]     diet controlled    Diabetic peripheral neuropathy ([facility] [address]     Diffuse cerebral atrophy ([facility] [address]     Fibromyalgia 2012    [facility]    Glaucoma     Hypertension     Iron deficiency anemia 10/25/2020    Neurogenic bladder     Neuropathy     Sarcoidosis     Sleep apnea 2009    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\"     Procedure/Surgery:  Right hip deep infected seroma I&D, insertion absorbable antibiotic beads  Precautions:  TTWB R LE, O2, drain to R hip, contact iso  Equipment Needs:  TBD- AAD    SUBJECTIVE:    Pt lives with her family in a single story home with a ramp to enter from the garage. Bed is on first floor and bath is on first floor. Pt ambulated with a rollator Independently PTA. Equipment Owned: rollator,w/c,bsc,and s/c at home    OBJECTIVE:   Initial Evaluation  Date: 22 Treatment  Date: 22 Short Term/ Long Term   Goals   AM-PAC 6 Clicks /30     Was pt agreeable to Eval/treatment? Yes Yes    Does pt have pain? Yes surgical 8/10 4/10  R hip    Bed Mobility  Rolling: SBA  Supine to sit: Min A  Sit to supine: SBA  Scooting: SBA Rolling: SBA  Supine to sit: Jennifer  Sit to supine: Jennifer  Scooting: Jennifer Rolling: Mod I with rails  Supine to sit:  Independent  Sit to supine: Independent  Scooting: Independent   Transfers Sit to stand: NT  Stand to sit: NT  Stand pivot: NT Sit to stand: MaxA x2 from elevated EOB  Stand to sit: MaxA x2  Stand pivot: NT, unable Sit to stand: Independent  Stand to sit: Independent  Stand pivot: Mod I with FWW   Ambulation    NT NT >50 feet with FWW with supervision   Stair negotiation: ascended and descended   NT NT 2 steps with bilateral rail SBA   ROM BUE:  See OT note  BLE:  WFL BLE: WFL    Strength BUE:  See OT ntoe  BLE:  4-/5 R LE, 4+/5 L LE RLE: 4-/5  LLE: 4+/5 4+/5   Balance Sitting EOB:  SBA  Dynamic Standing:  NT Sitting: SBA  Standing: NT Sitting EOB:  Independent  Dynamic Standing: Mod I with FWW supervision     Pt is A & O x 3  Sensation:  NT  Edema:  None noted      Patient education  Pt educated on role of PT, safety during mobility    Patient response to education:   Pt verbalized understanding Pt demonstrated skill Pt requires further education in this area   yes yes yes     ASSESSMENT:    Comments:  Patient semi-supine in bed upon entry and agreeable to PT treatment. Patient able to complete bed mobility with light assist to trunk. Dizziness at EOB that resolved with seated rest. Attempt at STS from low bed height unsuccessful with two person assist. STS from elevated EOB successful with assist of two. Pt reported significant dizziness and required seated rest. Dizziness resolved. Second STS completed with dizziness reported requiring seated rest again. Pt instructed to avoid breath holding requiring reminders throughout. Seated lateral scooting completed with assist. Pt returned to bed at end of session with all needs met. Treatment:  Patient practiced and was instructed in the following treatment:     Bed Mobility: VCs provided for sequencing and safety during mobility. Manual assist provided for completion of task.  Transfer Training: Verbal and tactile cueing provided for sequencing and safety during mobility.  Manual assist provided for completion of task    Therapeutic Activities:  Pt

## 2022-06-21 NOTE — ANESTHESIA POSTPROCEDURE EVALUATION
Department of Anesthesiology  Postprocedure Note    Patient: Isrrael Elena  MRN: [id number]  YOB: 1945  Date of evaluation: 6/21/2022      Procedure Summary     Date: 06/20/22 Room / Location: [place] [address] / [facility]    Anesthesia Start: 1316 Anesthesia Stop: 1749    Procedure: RIGHT HIP INCISION AND DRAINAGE (Right Hip) Diagnosis:       Infected abrasion of right hip, initial encounter      (Infected abrasion of right hip, initial encounter [Z94.049Q, L08.9])    Surgeons: Lizzie Maier MD Responsible Provider: Mikael Dimas MD    Anesthesia Type: general ASA Status: 4          Anesthesia Type: No value filed. Sharon Phase I: Sharon Score: 10    Sharon Phase II:      Last vitals:   Vitals Value Taken Time   /56 06/20/22 1531   Temp 97.1 °F (36.2 °C) 06/20/22 1436   Pulse 74 06/20/22 1540   Resp 22 06/20/22 1540   SpO2 96 % 06/20/22 1540   Vitals shown include unvalidated device data.       Anesthesia Post Evaluation    Patient location during evaluation: PACU  Patient participation: complete - patient participated  Level of consciousness: awake  Pain score: 0  Airway patency: patent  Nausea & Vomiting: no nausea  Complications: no  Cardiovascular status: hemodynamically stable  Respiratory status: acceptable  Hydration status: stable

## 2022-06-21 NOTE — PROGRESS NOTES
Department of Orthopedic Surgery  Resident Progress Note      SUBJECTIVE  Pt seen and examined. Some postoperative pain reported. 6 out of 10.  -/- BM. Denies acute fever, chills, nausea, vomiting, chest pain or shortness of breath. Denies NTP. No new complaints. OBJECTIVE    Physical    VITALS:  BP (!) 102/48   Pulse 78   Temp 97.7 °F (36.5 °C) (Temporal)   Resp 16   Ht 5' 4\" (1.626 m)   Wt 250 lb (113.4 kg)   SpO2 99%   BMI 42.91 kg/m²     right LE:   · Dressing C/D/I. Negative pressure dressing operating appropriately. · Distal sensory diminished to superficial and deep peroneal nerves. Patient reports that this is slightly under her baseline. Intact to saphenous/sural/tibial nerve distribution  · +2/4 PT and DP  · +PF/DF/EHL  · Compartments soft and compressible    Data    CBC:   Lab Results   Component Value Date    WBC 10.4 06/19/2022    RBC 3.19 06/19/2022    HGB 9.0 06/19/2022    HCT 29.2 06/19/2022    MCV 91.5 06/19/2022    MCH 28.2 06/19/2022    MCHC 30.8 06/19/2022    RDW 13.6 06/19/2022     06/19/2022    MPV 8.4 06/19/2022     PT/INR:    Lab Results   Component Value Date    PROTIME 11.9 05/27/2022    PROTIME 12.0 12/09/2011    INR 1.1 05/27/2022         ASSESSMENT   · Right hip I&D 6/21/2022  · S/p right hip I&D with antibiotic bead placement 6/16    PLAN    · Touchdown weightbearing to the right lower extremity   · 24 hour abx coverage. Antibiotics per ID recommendation  · Deep venous thrombosis prophylaxis -per primary, early mobilization  · PT/OT when able  · Pain Control: IV and PO  · Monitor H&H: Pending a.m. labs. Patient is currently asymptomatic during this encounter  · D/C Plan: Pending CM/SW recommendations.

## 2022-06-22 VITALS
TEMPERATURE: 97.1 F | RESPIRATION RATE: 16 BRPM | HEART RATE: 64 BPM | SYSTOLIC BLOOD PRESSURE: 128 MMHG | WEIGHT: 260 LBS | HEIGHT: 64 IN | BODY MASS INDEX: 44.39 KG/M2 | DIASTOLIC BLOOD PRESSURE: 60 MMHG | OXYGEN SATURATION: 95 %

## 2022-06-22 LAB
METER GLUCOSE: 160 MG/DL (ref 74–99)
METER GLUCOSE: 205 MG/DL (ref 74–99)

## 2022-06-22 PROCEDURE — 2580000003 HC RX 258: Performed by: STUDENT IN AN ORGANIZED HEALTH CARE EDUCATION/TRAINING PROGRAM

## 2022-06-22 PROCEDURE — 6360000002 HC RX W HCPCS: Performed by: STUDENT IN AN ORGANIZED HEALTH CARE EDUCATION/TRAINING PROGRAM

## 2022-06-22 PROCEDURE — 6370000000 HC RX 637 (ALT 250 FOR IP): Performed by: STUDENT IN AN ORGANIZED HEALTH CARE EDUCATION/TRAINING PROGRAM

## 2022-06-22 PROCEDURE — A4216 STERILE WATER/SALINE, 10 ML: HCPCS | Performed by: STUDENT IN AN ORGANIZED HEALTH CARE EDUCATION/TRAINING PROGRAM

## 2022-06-22 PROCEDURE — 82962 GLUCOSE BLOOD TEST: CPT

## 2022-06-22 RX ORDER — ASPIRIN 81 MG/1
81 TABLET ORAL 2 TIMES DAILY
Qty: 30 TABLET | Refills: 3 | COMMUNITY
Start: 2022-06-22 | End: 2022-08-04

## 2022-06-22 RX ADMIN — OXYBUTYNIN CHLORIDE 15 MG: 10 TABLET, EXTENDED RELEASE ORAL at 10:29

## 2022-06-22 RX ADMIN — CEFTRIAXONE 2000 MG: 2 INJECTION, POWDER, FOR SOLUTION INTRAMUSCULAR; INTRAVENOUS at 17:06

## 2022-06-22 RX ADMIN — SODIUM CHLORIDE, PRESERVATIVE FREE 10 ML: 5 INJECTION INTRAVENOUS at 10:36

## 2022-06-22 RX ADMIN — CEFAZOLIN 2000 MG: 2 INJECTION, POWDER, FOR SOLUTION INTRAMUSCULAR; INTRAVENOUS at 04:44

## 2022-06-22 RX ADMIN — GLIPIZIDE 2.5 MG: 5 TABLET ORAL at 06:13

## 2022-06-22 RX ADMIN — Medication 10 ML: at 10:34

## 2022-06-22 RX ADMIN — VERAPAMIL HYDROCHLORIDE 240 MG: 240 TABLET, FILM COATED, EXTENDED RELEASE ORAL at 10:32

## 2022-06-22 RX ADMIN — HYDROXYCHLOROQUINE SULFATE 200 MG: 200 TABLET ORAL at 10:32

## 2022-06-22 RX ADMIN — SODIUM CHLORIDE, PRESERVATIVE FREE 10 ML: 5 INJECTION INTRAVENOUS at 04:44

## 2022-06-22 RX ADMIN — INSULIN LISPRO 2 UNITS: 100 INJECTION, SOLUTION INTRAVENOUS; SUBCUTANEOUS at 12:33

## 2022-06-22 RX ADMIN — CLOPIDOGREL BISULFATE 75 MG: 75 TABLET ORAL at 10:31

## 2022-06-22 RX ADMIN — DULOXETINE HYDROCHLORIDE 60 MG: 60 CAPSULE, DELAYED RELEASE ORAL at 10:31

## 2022-06-22 RX ADMIN — DAPTOMYCIN 450 MG: 500 INJECTION, POWDER, LYOPHILIZED, FOR SOLUTION INTRAVENOUS at 17:21

## 2022-06-22 RX ADMIN — ASPIRIN 81 MG: 81 TABLET, COATED ORAL at 10:28

## 2022-06-22 RX ADMIN — CEFAZOLIN 2000 MG: 2 INJECTION, POWDER, FOR SOLUTION INTRAMUSCULAR; INTRAVENOUS at 12:29

## 2022-06-22 RX ADMIN — INSULIN LISPRO 4 UNITS: 100 INJECTION, SOLUTION INTRAVENOUS; SUBCUTANEOUS at 10:38

## 2022-06-22 RX ADMIN — Medication 300 UNITS: at 10:35

## 2022-06-22 RX ADMIN — OXYCODONE 5 MG: 5 TABLET ORAL at 18:09

## 2022-06-22 ASSESSMENT — PAIN SCALES - GENERAL
PAINLEVEL_OUTOF10: 3
PAINLEVEL_OUTOF10: 6

## 2022-06-22 ASSESSMENT — PAIN DESCRIPTION - LOCATION: LOCATION: HIP

## 2022-06-22 ASSESSMENT — PAIN DESCRIPTION - DESCRIPTORS: DESCRIPTORS: DISCOMFORT

## 2022-06-22 ASSESSMENT — PAIN DESCRIPTION - ORIENTATION: ORIENTATION: RIGHT

## 2022-06-22 NOTE — PROGRESS NOTES
Infectious Disease  Progress Note  [facility]    Chief Complaint: right thigh hardware associated osteomyelitis. Subjective:  She is doing well . She has pain at the right hip only with movement. No fever. Scheduled Meds:   daptomycin (CUBICIN) in NS IV syringe  8 mg/kg (Ideal) IntraVENous Q24H    ceFAZolin  2,000 mg IntraVENous Q8H    lidocaine PF  5 mL IntraDERmal Once    sodium chloride flush  5-40 mL IntraVENous 2 times per day    heparin flush  3 mL IntraVENous 2 times per day    aspirin  81 mg Oral BID    clopidogrel  75 mg Oral Daily    atorvastatin  20 mg Oral Nightly    DULoxetine  60 mg Oral QAM    hydroxychloroquine  200 mg Oral BID    oxybutynin  15 mg Oral Daily    verapamil  240 mg Oral QAM    sodium chloride flush  5-40 mL IntraVENous 2 times per day    insulin lispro  0-12 Units SubCUTAneous TID WC    insulin lispro  0-6 Units SubCUTAneous Nightly    latanoprost  1 drop Both Eyes Nightly    glipiZIDE  2.5 mg Oral QAM AC     Continuous Infusions:   dextrose      sodium chloride      sodium chloride 50 mL (06/18/22 1214)     PRN Meds:oxyCODONE **OR** oxyCODONE, glucose, dextrose bolus **OR** dextrose bolus, glucagon (rDNA), dextrose, sodium chloride flush, sodium chloride, heparin flush, sodium chloride flush, sodium chloride, polyethylene glycol, acetaminophen **OR** acetaminophen, albuterol, ondansetron **OR** ondansetron    ROS:  As mentioned in subjective, all other systems negative      /60   Pulse 64   Temp 97.1 °F (36.2 °C) (Temporal)   Resp 16   Ht 5' 4\" (1.626 m)   Wt 260 lb (117.9 kg)   SpO2 95%   BMI 44.63 kg/m²     Physical Exam  Const/Neuro- unchanged, no signs of acute distress, Alert  ENMT- Within Normal Limits, Normocephalic, mucous membranes pink/moist, No thrush  Neck: Neck supple  Heart- Regular, Rate, Rhythm- no murmur appreciated. Lungs- clear to ascultation. Respirations even and nonlabored.   Abdomen- Soft, bowel sounds positive, non tender  Musculo/Extremities-  Equal and symmetrical, dressing over right thigh with drain in place  Neurological- No focal motor or sensory loss. No confusion  Skin:  Warm and dry, free from rashes. Lines: PICC left UE    Labs, Cultures reviewed  Radiology reviewed    Microbiology:  Wound cx 6/9: Proteus mirabilis  Blood cx 6/15: so far negative  Surgical cx 6/16: Proteus mirabilis, enterococcus faecalis and Enterococcus faecium (VRE)     Assessment:  · Right femur fracture with ORIF with osteomyelitis:   ? S/p debridement on 6/16  ? Pt has been on Keflex for the past  5 Days before presentation. ? PICC placed 6/17  · Leucocytosis:     Plan:    · Can go home on IV daptomycin and IV ceftriaxone 2 g daily. Scripts in the chart. · Will give one dose of ceftriaxone today so she is covered until tomorrow. · Discussed with case management: planning discharge today.   · Will follow with you      Electronically signed by Ben Blanton MD on 6/22/2022 at 12:41 PM

## 2022-06-22 NOTE — PROGRESS NOTES
Department of Orthopedic Surgery  Resident Progress Note      SUBJECTIVE  Pt seen and examined. Pain controlled. Denies acute fever, chills, nausea, vomiting, chest pain or shortness of breath. Denies NTP. No new complaints. OBJECTIVE    Physical    VITALS:  /60   Pulse 79   Temp 97.9 °F (36.6 °C) (Temporal)   Resp 18   Ht 5' 4\" (1.626 m)   Wt 260 lb (117.9 kg)   SpO2 96%   BMI 44.63 kg/m²     right LE:   · Dressing C/D/I. Negative pressure dressing operating appropriately. · Distal sensory diminished to superficial and deep peroneal nerves. At baseline. Intact to saphenous/sural/tibial nerve distribution  · +2/4 PT and DP  · +PF/DF/EHL  · Compartments soft and compressible    Data    CBC:   Lab Results   Component Value Date    WBC 10.4 06/19/2022    RBC 3.19 06/19/2022    HGB 9.0 06/19/2022    HCT 29.2 06/19/2022    MCV 91.5 06/19/2022    MCH 28.2 06/19/2022    MCHC 30.8 06/19/2022    RDW 13.6 06/19/2022     06/19/2022    MPV 8.4 06/19/2022     PT/INR:    Lab Results   Component Value Date    PROTIME 11.9 05/27/2022    PROTIME 12.0 12/09/2011    INR 1.1 05/27/2022         ASSESSMENT   · Right hip I&D 6/21/2022  · S/p right hip I&D with antibiotic bead placement 6/16    PLAN    · Touchdown weightbearing to the right lower extremity   · Antibiotics per ID recommendation  · Deep venous thrombosis prophylaxis -per primary, early mobilization  · PT/OT when able  · Pain Control: IV and PO  · Monitor H&H: Pending a.m. labs.   Patient is currently asymptomatic during this encounter  · D/C Plan: PT/OT/JOCELYNN recs, ok to d/c from orthopedic standpoint   · D/w attending

## 2022-06-22 NOTE — PROGRESS NOTES
Orthopedic surgery paged to reexamine  Negative pressure dressing. Operating unit was turned off. Unit was then plugged into outlet and operating appropriately. Will keep plugged in until fully charge. Unit intended to last approximately 7 days until next charge. Continue plan stated in recent orthopedic progress note.

## 2022-06-22 NOTE — PROGRESS NOTES
Hospital Medicine    Subjective:  Pt seen this am alert conversive      Current Facility-Administered Medications:     cefTRIAXone (ROCEPHIN) 2,000 mg in sterile water 20 mL IV syringe, 2,000 mg, IntraVENous, Q24H, Ruben Nielson MD    DAPTOmycin (CUBICIN) 450 mg in sodium chloride (PF) 9 mL IV syringe, 8 mg/kg (Ideal), IntraVENous, Q24H, Ruben Nielson MD, 450 mg at 06/21/22 1730    oxyCODONE (ROXICODONE) immediate release tablet 5 mg, 5 mg, Oral, Q4H PRN, 5 mg at 06/19/22 2043 **OR** oxyCODONE HCl (OXY-IR) immediate release tablet 10 mg, 10 mg, Oral, Q4H PRN, Sivan Sam, DO, 10 mg at 06/21/22 2102    glucose chewable tablet 16 g, 4 tablet, Oral, PRN, Sivan Led, DO    dextrose bolus 10% 125 mL, 125 mL, IntraVENous, PRN **OR** dextrose bolus 10% 250 mL, 250 mL, IntraVENous, PRN, Sivan Sam, DO    glucagon (rDNA) injection 1 mg, 1 mg, IntraMUSCular, PRN, Sivan Sam, DO    dextrose 5 % solution, 100 mL/hr, IntraVENous, PRN, Sivan Led, DO    lidocaine PF 1 % injection 5 mL, 5 mL, IntraDERmal, Once, Sivan Sam, DO    sodium chloride flush 0.9 % injection 5-40 mL, 5-40 mL, IntraVENous, 2 times per day, Sivan Led, DO, 10 mL at 06/22/22 1036    sodium chloride flush 0.9 % injection 5-40 mL, 5-40 mL, IntraVENous, PRN, Sivan Sam, DO, 10 mL at 06/22/22 0444    0.9 % sodium chloride infusion, , IntraVENous, PRN, Sivan Led, DO    heparin flush 100 UNIT/ML injection 300 Units, 3 mL, IntraVENous, 2 times per day, Sivan Sam, DO, 300 Units at 06/22/22 1035    heparin flush 100 UNIT/ML injection 300 Units, 3 mL, IntraCATHeter, PRN, Sivan Led, DO    aspirin EC tablet 81 mg, 81 mg, Oral, BID, Sivan Sam, DO, 81 mg at 06/22/22 1028    clopidogrel (PLAVIX) tablet 75 mg, 75 mg, Oral, Daily, Sivan Sam DO, 75 mg at 06/22/22 1031    atorvastatin (LIPITOR) tablet 20 mg, 20 mg, Oral, Nightly, Sivan Sam DO, 20 mg at 06/21/22 2102    DULoxetine (CYMBALTA) extended release capsule 60 mg, 60 mg, Oral, QAM, John Emmanuel, DO, 60 mg at 06/22/22 1031    hydroxychloroquine (PLAQUENIL) tablet 200 mg, 200 mg, Oral, BID, John Emmanuel DO, 200 mg at 06/22/22 1032    oxybutynin (DITROPAN-XL) extended release tablet 15 mg, 15 mg, Oral, Daily, John Emmanuel DO, 15 mg at 06/22/22 1029    verapamil (CALAN SR) extended release tablet 240 mg, 240 mg, Oral, QAM, John Emmanuel, DO, 240 mg at 06/22/22 1032    sodium chloride flush 0.9 % injection 5-40 mL, 5-40 mL, IntraVENous, 2 times per day, Jonh Emmanuel DO, 10 mL at 06/22/22 1034    sodium chloride flush 0.9 % injection 5-40 mL, 5-40 mL, IntraVENous, PRN, John Emmanuel DO    0.9 % sodium chloride infusion, , IntraVENous, PRN, John Emmanuel DO, Last Rate: 25 mL/hr at 06/18/22 1214, 50 mL at 06/18/22 1214    polyethylene glycol (GLYCOLAX) packet 17 g, 17 g, Oral, Daily PRN, John Emmanuel DO    acetaminophen (TYLENOL) tablet 650 mg, 650 mg, Oral, Q6H PRN, 650 mg at 06/18/22 5928 **OR** acetaminophen (TYLENOL) suppository 650 mg, 650 mg, Rectal, Q6H PRN, John Emmanuel DO    insulin lispro (HUMALOG) injection vial 0-12 Units, 0-12 Units, SubCUTAneous, TID WC, John Emmanuel DO, 2 Units at 06/22/22 1233    insulin lispro (HUMALOG) injection vial 0-6 Units, 0-6 Units, SubCUTAneous, Nightly, John Emmanuel DO, 3 Units at 06/21/22 2102    albuterol (PROVENTIL) nebulizer solution 2.5 mg, 2.5 mg, Nebulization, Q6H PRN, John Emmanuel DO    latanoprost (XALATAN) 0.005 % ophthalmic solution 1 drop, 1 drop, Both Eyes, Nightly, John Emmanuel DO, 1 drop at 06/21/22 2106    glipiZIDE (GLUCOTROL) tablet 2.5 mg, 2.5 mg, Oral, [facility], 2.5 mg at 06/22/22 2087    ondansetron (ZOFRAN-ODT) disintegrating tablet 4 mg, 4 mg, Oral, Q8H PRN **OR** ondansetron (ZOFRAN) injection 4 mg, 4 mg, IntraVENous, Q6H PRN, John Emmanuel DO    Objective:    /60   Pulse 64   Temp 97.1 °F (36.2 °C) (Temporal)   Resp 16   Ht 5' 4\" (1.626 m)   Wt 260 lb (117.9 kg) SpO2 95%   BMI 44.63 kg/m²     Heart:  reg  Lungs:  ctab  Abd: + bs soft nontender  Extrem:  Edema legs    CBC with Differential:    Lab Results   Component Value Date    WBC 10.4 06/19/2022    RBC 3.19 06/19/2022    HGB 9.0 06/19/2022    HCT 29.2 06/19/2022     06/19/2022    MCV 91.5 06/19/2022    MCH 28.2 06/19/2022    MCHC 30.8 06/19/2022    RDW 13.6 06/19/2022    NRBC 0.9 06/17/2022    LYMPHOPCT 10.7 06/19/2022    MONOPCT 14.4 06/19/2022    BASOPCT 0.8 06/19/2022    MONOSABS 1.50 06/19/2022    LYMPHSABS 1.11 06/19/2022    EOSABS 0.58 06/19/2022    BASOSABS 0.08 06/19/2022     CMP:    Lab Results   Component Value Date     06/19/2022    K 4.3 06/19/2022    K 4.7 06/15/2022     06/19/2022    CO2 22 06/19/2022    BUN 40 06/19/2022    CREATININE 1.6 06/19/2022    GFRAA 38 06/19/2022    LABGLOM 31 06/19/2022    GLUCOSE 223 06/19/2022    GLUCOSE 82 12/09/2011    PROT 7.0 06/15/2022    LABALBU 3.1 06/15/2022    CALCIUM 8.4 06/19/2022    BILITOT 0.5 06/15/2022    ALKPHOS 228 06/15/2022    AST 29 06/15/2022    ALT 29 06/15/2022     Warfarin PT/INR:    Lab Results   Component Value Date    INR 1.1 05/27/2022    INR 1.0 10/24/2020    INR 2.2 04/30/2018    PROTIME 11.9 05/27/2022    PROTIME 11.3 10/24/2020    PROTIME 24.8 (H) 04/30/2018       Assessment:    Principal Problem:    Wound infection  Active Problems:    Postoperative wound infection  Resolved Problems:    * No resolved hospital problems.  *      Plan:  Dc home on iv atb per id wound care per ortho        Ashia Suarez DO  3:36 PM  6/22/2022

## 2022-06-29 DIAGNOSIS — Z96.649 PERIPROSTHETIC INTERTROCHANTERIC FRACTURE OF FEMUR, SUBSEQUENT ENCOUNTER: ICD-10-CM

## 2022-06-29 DIAGNOSIS — M97.8XXD PERIPROSTHETIC INTERTROCHANTERIC FRACTURE OF FEMUR, SUBSEQUENT ENCOUNTER: ICD-10-CM

## 2022-06-29 DIAGNOSIS — Z96.649 STATUS POST REVISION OF TOTAL HIP: Primary | ICD-10-CM

## 2022-06-30 ENCOUNTER — HOSPITAL ENCOUNTER (OUTPATIENT)
Dept: GENERAL RADIOLOGY | Age: 77
Discharge: HOME | End: 2022-07-02
Payer: MEDICARE

## 2022-06-30 ENCOUNTER — OFFICE VISIT (OUTPATIENT)
Dept: ORTHOPEDIC SURGERY | Age: 77
End: 2022-06-30
Payer: MEDICARE

## 2022-06-30 DIAGNOSIS — M97.8XXD PERIPROSTHETIC INTERTROCHANTERIC FRACTURE OF FEMUR, SUBSEQUENT ENCOUNTER: Primary | ICD-10-CM

## 2022-06-30 DIAGNOSIS — T81.49XA POSTOPERATIVE WOUND INFECTION: ICD-10-CM

## 2022-06-30 DIAGNOSIS — Z96.649 STATUS POST REVISION OF TOTAL HIP: ICD-10-CM

## 2022-06-30 DIAGNOSIS — M97.8XXD PERIPROSTHETIC INTERTROCHANTERIC FRACTURE OF FEMUR, SUBSEQUENT ENCOUNTER: ICD-10-CM

## 2022-06-30 DIAGNOSIS — Z96.649 PERIPROSTHETIC INTERTROCHANTERIC FRACTURE OF FEMUR, SUBSEQUENT ENCOUNTER: ICD-10-CM

## 2022-06-30 DIAGNOSIS — Z96.649 PERIPROSTHETIC INTERTROCHANTERIC FRACTURE OF FEMUR, SUBSEQUENT ENCOUNTER: Primary | ICD-10-CM

## 2022-06-30 PROCEDURE — 99024 POSTOP FOLLOW-UP VISIT: CPT | Performed by: ORTHOPAEDIC SURGERY

## 2022-06-30 PROCEDURE — 73502 X-RAY EXAM HIP UNI 2-3 VIEWS: CPT

## 2022-06-30 PROCEDURE — 99213 OFFICE O/P EST LOW 20 MIN: CPT

## 2022-06-30 PROCEDURE — 73552 X-RAY EXAM OF FEMUR 2/>: CPT

## 2022-06-30 RX ORDER — CEFTRIAXONE 2 G/1
INJECTION, POWDER, FOR SOLUTION INTRAMUSCULAR; INTRAVENOUS
COMMUNITY
Start: 2022-06-27 | End: 2022-06-30 | Stop reason: CLARIF

## 2022-06-30 RX ORDER — DAPTOMYCIN 50 MG/ML
INJECTION, POWDER, LYOPHILIZED, FOR SOLUTION INTRAVENOUS
COMMUNITY
Start: 2022-06-27 | End: 2022-07-25 | Stop reason: ALTCHOICE

## 2022-06-30 NOTE — PROGRESS NOTES
Patient presents today for 2wk post op RT hip I&D 6/16/22 secondary to RT s/p ORIF RT subtrochanteric femur fx 5/28; KAVITHA    She states [facility] is coming out a few times a week to see her. Patient states she is living at home, she is having 6/10 after moving around. If she isn't moving around she is having minimal pain.
distributions to foot/ankle. Palpable dorsalis pedis and posterior tibialis pulses, cap refill brisk in toes, foot warm/perfused. Right hip incision    There were no vitals taken for this visit. XR:   Pelvis and right hip films demonstrate ORIF right subtrochanteric femur fracture around a total hip with the plate, screws and cables in stable position and alignment. No evidence of hardware loosening or failure. Assessment:   Diagnosis Orders   1. Periprosthetic intertrochanteric fracture of femur, subsequent encounter     2. Postoperative wound infection       Plan:  Every other suture removed today, dry absorbent dressing applied  Patient to continue with antibiotics per ID  Discussed continue with therapy for overall conditioning and strengthening, she still to be toe-touch weightbearing right lower extremity  Like to see the patient back in office in 2 weeks for wound evaluation, no x-rays needed at that time  Patient to contact the office if any interval questions or concerns    Electronically signed by Quinton Dolan DO on 6/30/2022     Note: This report was completed using computerize voiced recognition software. Every effort has been made to ensure accuracy; however, inadvertent computerized transcription errors may be present.

## 2022-07-04 LAB
FUNGUS (MYCOLOGY) CULTURE: NORMAL
FUNGUS (MYCOLOGY) CULTURE: NORMAL
FUNGUS STAIN: NORMAL
FUNGUS STAIN: NORMAL

## 2022-07-07 ENCOUNTER — HOSPITAL ENCOUNTER (OUTPATIENT)
Dept: GENERAL RADIOLOGY | Age: 77
Discharge: HOME | End: 2022-07-09
Payer: MEDICARE

## 2022-07-07 ENCOUNTER — HOSPITAL ENCOUNTER (OUTPATIENT)
Age: 77
Discharge: HOME | End: 2022-07-09
Payer: MEDICARE

## 2022-07-07 DIAGNOSIS — R05.9 COUGH: ICD-10-CM

## 2022-07-07 PROCEDURE — 71046 X-RAY EXAM CHEST 2 VIEWS: CPT

## 2022-07-07 NOTE — DISCHARGE SUMMARY
[address]                  [address] [facility],  [facility]                               DISCHARGE SUMMARY    PATIENT NAME: Sydnee Martinez                     :        1945  MED REC NO:   [id number]                            ROOM:       [id number]  ACCOUNT NO:   [de-identified]                           ADMIT DATE: 2022  PROVIDER:     Marilyn Oakley DO                  DISCHARGE DATE:  2022    DISCHARGE DIAGNOSES:  1. Right femur fracture. 2.  Prolonged QT. 3.  Hypertension. 4.  Obstructive sleep apnea. 5.  Elevated cholesterol. 6.  Diabetes mellitus type 2.  7.  CKD III. 8.  Sarcoidosis. 9.  Glaucoma. HOSPITAL COURSE:  The patient is a 26-year-old  female who  presented to the emergency room after a fall at home in her house. Diagnostic evaluation in the emergency room revealed a fracture of the  right femur. She was admitted to the hospital.  She was seen by Ortho. She underwent surgery, ORIF right femur fracture. Postop course was  unremarkable. The patient refused rehab placement. She was discharged  to home in stable condition on 2022. DISCHARGE MEDICATIONS:  As per discharge med rec which include,  1. Atorvastatin. 2.  Lumigan eyedrops. 3.  Plavix. 4.  Cymbalta. 5.  Amaryl. 6.  Plaquenil. 7.  Oxybutynin. 8.  Verapamil. 9.  Percocet per Ortho. DISCHARGE INSTRUCTIONS:  The patient instructed to follow up with Ortho,  call office for appointment; follow up with Dr. Natali Villagomez, call office for  appointment.         Farhan Alfonso DO    D: 2022 17:35:51       T: 2022 17:38:11     [email or web address]  Job#: [id number]     Doc#: [id number]    CC:

## 2022-07-14 ENCOUNTER — OFFICE VISIT (OUTPATIENT)
Dept: ORTHOPEDIC SURGERY | Age: 77
End: 2022-07-14
Payer: MEDICARE

## 2022-07-14 VITALS — HEIGHT: 64 IN | WEIGHT: 265 LBS | BODY MASS INDEX: 45.24 KG/M2

## 2022-07-14 DIAGNOSIS — T81.49XA POSTOPERATIVE WOUND INFECTION: ICD-10-CM

## 2022-07-14 PROCEDURE — 99024 POSTOP FOLLOW-UP VISIT: CPT | Performed by: ORTHOPAEDIC SURGERY

## 2022-07-14 PROCEDURE — 99212 OFFICE O/P EST SF 10 MIN: CPT | Performed by: ORTHOPAEDIC SURGERY

## 2022-07-14 RX ORDER — LINEZOLID 600 MG/1
TABLET, FILM COATED ORAL
Status: ON HOLD | COMMUNITY
Start: 2022-07-08 | End: 2022-09-06 | Stop reason: HOSPADM

## 2022-07-14 NOTE — PROGRESS NOTES
Patient here for a 4 week wound check post op RT hip I&D 6/16/22 secondary to RT s/p ORIF RT subtrochanteric femur fx 5/28/2022. Patient states that [facility] comes to the home twice a week, and patient changes her hip dressing daily. Patient states that there is some improvement in the wound since last visit, with still some drainage.         Electronically signed by Beverley Florentino MA on 7/14/2022 at 1:13 PM
neuropathy, decreased sensation bilaterally to the level of the mid to proximal shin  Palpable dorsalis pedis and posterior tibialis pulses, cap refill brisk in toes, foot warm/perfused. Right hip incision with evidence of healing at different intervals along the incision. Some tension sutures still in place. There is evidence of skin lesion with healing along the dermis. Moderate levels of the epidermis are healed. Ht 5' 4\" (1.626 m)   Wt 265 lb (120.2 kg)   BMI 45.49 kg/m²     Assessment:  Status post open reduction internal fixation of subtrochanteric femur fracture around diaphyseal fit stem, status post irrigation debridement for right hip drainage x2  Plan:  Remainder the sutures removed today, multiple Steri-Strips and dry occlusive dressing placed  Patient to continue with antibiotics per ID  Discussed continue with therapy for overall conditioning and strengthening, she still to be toe-touch weightbearing right lower extremity  Like to see the patient back in office in 2-3 weeks for wound evaluation, no x-rays needed at that time  Patient to contact the office if any interval questions or concerns    Electronically signed by Mike Cortez DO on 7/14/2022     Orthopaedic Attending    I have seen and evaluated the patient with the resident and agree with the above assessments on today's visit. I have performed the key components of the history and physical examination and concur completely with the findings and plans as documented above. Electronically signed by   Richard Gunn DO  7/14/22    Note: This report was completed using [employer] voiced recognition software. Every effort has been made to ensure accuracy; however, inadvertent computerized transcription errors may be present.

## 2022-07-14 NOTE — PATIENT INSTRUCTIONS
Non-weightbearing right lower extremity  Steri-Strips and dry dressing applied today, okay to take off dry dressing in 1 to 2 days. Apply new dry dressing if there is any drainage. Return to clinic in 2 to 3 weeks for repeat evaluation.   Continue physical therapy  Call the clinic if you have any questions or concerns prior to her next visit

## 2022-07-19 LAB
AFB CULTURE (MYCOBACTERIA): NORMAL
AFB CULTURE (MYCOBACTERIA): NORMAL
AFB SMEAR: NORMAL
AFB SMEAR: NORMAL

## 2022-07-25 PROBLEM — M86.9 OSTEOMYELITIS OF RIGHT FEMUR (HCC): Status: ACTIVE | Noted: 2022-07-25

## 2022-07-25 LAB
FUNGUS (MYCOLOGY) CULTURE: NORMAL
FUNGUS STAIN: NORMAL

## 2022-07-28 ENCOUNTER — TELEPHONE (OUTPATIENT)
Dept: ORTHOPEDIC SURGERY | Age: 77
End: 2022-07-28

## 2022-07-28 NOTE — TELEPHONE ENCOUNTER
Called mistakenly, trying to reach I&D Dr. Franklyn Goel. Number provided to [facility].

## 2022-08-01 DIAGNOSIS — M97.8XXD PERIPROSTHETIC INTERTROCHANTERIC FRACTURE OF FEMUR, SUBSEQUENT ENCOUNTER: Primary | ICD-10-CM

## 2022-08-01 DIAGNOSIS — Z96.649 PERIPROSTHETIC INTERTROCHANTERIC FRACTURE OF FEMUR, SUBSEQUENT ENCOUNTER: Primary | ICD-10-CM

## 2022-08-02 LAB
AFB CULTURE (MYCOBACTERIA): NORMAL
AFB SMEAR: NORMAL

## 2022-08-03 NOTE — DISCHARGE SUMMARY
[address]                  [address] [facility],  [facility]                               DISCHARGE SUMMARY    PATIENT NAME: Tom Ogden                     :        1945  MED REC NO:   [id number]                            ROOM:       [id number]  ACCOUNT NO:   [de-identified]                           ADMIT DATE: 06/15/2022  PROVIDER:     Derrell Bassett DO                  DISCHARGE DATE:  2022    DISCHARGE DIAGNOSES:  1. Postop wound infection, right hip, status post right femur fracture  surgery on 2022. 2.  CKD III. 3.  History of CVA. 4.  Hyperlipidemia. 5.  Peripheral artery disease. 6.  Prolonged QT interval.  7.  Diabetes mellitus type 2.  8.  Hypertension. 9.  Sarcoidosis. HOSPITAL COURSE:  The patient is a 76-year-old  female who  underwent surgery to the right femur on 2022. She presented to  the emergency room with right hip drainage. She was admitted to the  hospital, seen by Orthopedics and Infectious Disease. She underwent  surgery on 2022, right hip deep infected seroma I and D with  insertion of antibiotic beads. She underwent second surgery on  2022, right hip I and D. Ultrasound of right leg was negative for  DVT. She was treated with antibiotics. She was discharged home in stable condition on  antibiotics per ID on 2022. DISCHARGE MEDICATIONS:  As per discharge med rec which include,  1. Aspirin. 2.  Rocephin per ID. 3.  Verapamil. 4.  Atorvastatin. 5.  Lumigan eyedrops. 6.  Plavix. 7.  Cymbalta. 8.  Amaryl. 9.  Plaquenil. 10.  Ditropan XL. 11.  Multivitamin. 12.  Ventolin inhaler p.r.n. DISCHARGE INSTRUCTIONS:  The patient was instructed to follow up with  Dr. Tone Griffith, call office for appointment. Follow up with Infectious  Disease, call office for appointment.         Devonte Morales DO    D: 2022 17:07:38       T: 2022 17:10:35 [email or web address]  Job#: [id number]     Doc#: [id number]    CC:

## 2022-08-04 ENCOUNTER — HOSPITAL ENCOUNTER (OUTPATIENT)
Dept: GENERAL RADIOLOGY | Age: 77
Discharge: HOME | End: 2022-08-06
Payer: MEDICARE

## 2022-08-04 ENCOUNTER — OFFICE VISIT (OUTPATIENT)
Dept: ORTHOPEDIC SURGERY | Age: 77
End: 2022-08-04
Payer: MEDICARE

## 2022-08-04 VITALS — HEIGHT: 64 IN | BODY MASS INDEX: 43.54 KG/M2 | WEIGHT: 255 LBS

## 2022-08-04 DIAGNOSIS — Z96.649 PERIPROSTHETIC INTERTROCHANTERIC FRACTURE OF FEMUR, SUBSEQUENT ENCOUNTER: Primary | ICD-10-CM

## 2022-08-04 DIAGNOSIS — M97.8XXD PERIPROSTHETIC INTERTROCHANTERIC FRACTURE OF FEMUR, SUBSEQUENT ENCOUNTER: ICD-10-CM

## 2022-08-04 DIAGNOSIS — Z96.649 PERIPROSTHETIC INTERTROCHANTERIC FRACTURE OF FEMUR, SUBSEQUENT ENCOUNTER: ICD-10-CM

## 2022-08-04 DIAGNOSIS — M97.8XXD PERIPROSTHETIC INTERTROCHANTERIC FRACTURE OF FEMUR, SUBSEQUENT ENCOUNTER: Primary | ICD-10-CM

## 2022-08-04 DIAGNOSIS — T81.49XA INFECTED SURGICAL WOUND: ICD-10-CM

## 2022-08-04 PROCEDURE — 73502 X-RAY EXAM HIP UNI 2-3 VIEWS: CPT

## 2022-08-04 PROCEDURE — 73552 X-RAY EXAM OF FEMUR 2/>: CPT

## 2022-08-04 PROCEDURE — 99024 POSTOP FOLLOW-UP VISIT: CPT | Performed by: PHYSICIAN ASSISTANT

## 2022-08-04 PROCEDURE — 99212 OFFICE O/P EST SF 10 MIN: CPT

## 2022-08-04 NOTE — PATIENT INSTRUCTIONS
Start progressive weightbearing on the right lower extremity. Start with 25% of body weight for 7-10 days and if tolerating well, no significant increased pain or swelling ok to progress to 50% of body weight for 7-10 days. Then to 75% of body weight for 7-10 days, then to 100% right lower extremity. Patient will be set up for home therapy with [facility]. Antibiotics per ID. Patient has a follow-up with ID next week. Follow-up as needed. Call if any questions or concerns.

## 2022-08-04 NOTE — PROGRESS NOTES
Chief Complaint   Patient presents with    Post-Op Check     7 week Right hip I&D and right femur        OP:SURGEON: Dr. Dennie Cookey, MD  DATE OF PROCEDURE: 6-20-22  PROCEDURE: RIGHT HIP INCISION AND DRAINAGE     POD: 7 weeks    Subjective:  Sara Whitt is following up from the above surgery. She is TTWB on right lower extremity. Pain to extremity is none and is not taking prescribed pain medication. They denies numbness or tingling to the right lower extremity. Denies calf pain, chest pain, or shortness of breath. Patient continues to use DVT prophylaxis. Patient is not participating in therapy at this time. She denies any incisional wound issues. Denies fever, chills or night sweats. She does have a PICC line and follows up with ID next week. Overall she is doing well after surgery. Review of Systems -  All pertinent positives/negative in HPI     Objective:    General: Alert and oriented X 3, normocephalic atraumatic, external ears and eye normal, sclera clear, no acute distress, respirations easy and unlabored with no audible wheezes, skin warm and dry, speech and dress appropriate for noted age, affect euthymic. Extremity:  Right Lower Extremity  Skin clean dry and intact, without signs of infection   Incision well-healed, no erythema, dehiscence or swelling. no edema noted  Compartments supple throughout thigh and leg  Calf supple and not tender  negative Homans  Demonstrates active motion with hip flexion, knee flexion/extension and ankle dorsi/plantar flexion  Presents in a wheelchair. States sensation intact to touch in sural, deep peroneal, superficial peroneal, saphenous, posterior tibial  nerve distributions to foot/ankle. Palpable dorsalis pedis and posterior tibialis pulses, cap refill brisk in toes, foot warm/perfused.     Ht 5' 4\" (1.626 m)   Wt 255 lb (115.7 kg)   BMI 43.77 kg/m²     XR:   Pelvis, right hip and right femur films demonstrate ORIF right subtrochanteric femur fracture around a total hip with the plate, screws and cables in stable position and alignment. No evidence of hardware loosening or failure. Assessment:   Diagnosis Orders   1. Periprosthetic intertrochanteric fracture of femur, subsequent encounter  External Referral To Home Health      2. Infected surgical wound  External Referral To Home Health        Plan:  X-rays reviewed and discussed. Start progressive weightbearing on the right lower extremity. Start with 25% of body weight for 7-10 days and if tolerating well, no significant increased pain or swelling ok to progress to 50% of body weight for 7-10 days. Then to 75% of body weight for 7-10 days, then to 100% right lower extremity. Patient will be set up for home therapy with [facility]. Antibiotics per ID. Patient has a follow-up with ID next week. Follow-up as needed. Call if any questions or concerns. Electronically signed by JUAN Pineda on 8/4/2022 at 1:47 PM  Note: This report was completed using [employer] voiced recognition software. Every effort has been made to ensure accuracy; however, inadvertent computerized transcription errors may be present.

## 2022-09-01 ENCOUNTER — APPOINTMENT (OUTPATIENT)
Dept: CT IMAGING | Age: 77
DRG: 393 | End: 2022-09-01
Payer: MEDICARE

## 2022-09-01 ENCOUNTER — HOSPITAL ENCOUNTER (INPATIENT)
Age: 77
LOS: 5 days | Discharge: HOME HEALTH | DRG: 393 | End: 2022-09-06
Attending: EMERGENCY MEDICINE | Admitting: FAMILY MEDICINE
Payer: MEDICARE

## 2022-09-01 DIAGNOSIS — R19.7 DIARRHEA, UNSPECIFIED TYPE: Primary | ICD-10-CM

## 2022-09-01 DIAGNOSIS — E87.20 LACTIC ACIDOSIS: ICD-10-CM

## 2022-09-01 PROBLEM — D64.9 CHRONIC ANEMIA: Status: ACTIVE | Noted: 2022-09-01

## 2022-09-01 PROBLEM — E66.9 OBESITY (BMI 30-39.9): Status: ACTIVE | Noted: 2022-09-01

## 2022-09-01 PROBLEM — N18.9 CKD (CHRONIC KIDNEY DISEASE): Status: ACTIVE | Noted: 2022-09-01

## 2022-09-01 PROBLEM — Z78.9 UNABLE TO CARE FOR SELF: Status: ACTIVE | Noted: 2022-09-01

## 2022-09-01 LAB
ALBUMIN SERPL-MCNC: 3.8 G/DL (ref 3.5–5.2)
ALP BLD-CCNC: 102 U/L (ref 35–104)
ALT SERPL-CCNC: 14 U/L (ref 0–32)
ANION GAP SERPL CALCULATED.3IONS-SCNC: 17 MMOL/L (ref 7–16)
AST SERPL-CCNC: 17 U/L (ref 0–31)
BASOPHILS ABSOLUTE: 0.03 E9/L (ref 0–0.2)
BASOPHILS RELATIVE PERCENT: 0.5 % (ref 0–2)
BILIRUB SERPL-MCNC: 0.4 MG/DL (ref 0–1.2)
BUN BLDV-MCNC: 27 MG/DL (ref 6–23)
CALCIUM SERPL-MCNC: 8.8 MG/DL (ref 8.6–10.2)
CHLORIDE BLD-SCNC: 100 MMOL/L (ref 98–107)
CO2: 20 MMOL/L (ref 22–29)
CREAT SERPL-MCNC: 1.4 MG/DL (ref 0.5–1)
EOSINOPHILS ABSOLUTE: 0.09 E9/L (ref 0.05–0.5)
EOSINOPHILS RELATIVE PERCENT: 1.5 % (ref 0–6)
GFR AFRICAN AMERICAN: 44
GFR NON-AFRICAN AMERICAN: 36 ML/MIN/1.73
GLUCOSE BLD-MCNC: 127 MG/DL (ref 74–99)
HCT VFR BLD CALC: 29.6 % (ref 34–48)
HEMOGLOBIN: 9.2 G/DL (ref 11.5–15.5)
IMMATURE GRANULOCYTES #: 0.03 E9/L
IMMATURE GRANULOCYTES %: 0.5 % (ref 0–5)
LACTIC ACID: 2.9 MMOL/L (ref 0.5–2.2)
LACTIC ACID: 5 MMOL/L (ref 0.5–2.2)
LIPASE: 24 U/L (ref 13–60)
LYMPHOCYTES ABSOLUTE: 1.02 E9/L (ref 1.5–4)
LYMPHOCYTES RELATIVE PERCENT: 16.9 % (ref 20–42)
MAGNESIUM: 1.9 MG/DL (ref 1.6–2.6)
MCH RBC QN AUTO: 26.6 PG (ref 26–35)
MCHC RBC AUTO-ENTMCNC: 31.1 % (ref 32–34.5)
MCV RBC AUTO: 85.5 FL (ref 80–99.9)
METER GLUCOSE: 116 MG/DL (ref 74–99)
MONOCYTES ABSOLUTE: 0.59 E9/L (ref 0.1–0.95)
MONOCYTES RELATIVE PERCENT: 9.8 % (ref 2–12)
NEUTROPHILS ABSOLUTE: 4.28 E9/L (ref 1.8–7.3)
NEUTROPHILS RELATIVE PERCENT: 70.8 % (ref 43–80)
PDW BLD-RTO: 15.9 FL (ref 11.5–15)
PLATELET # BLD: 94 E9/L (ref 130–450)
PLATELET CONFIRMATION: NORMAL
PMV BLD AUTO: 9.3 FL (ref 7–12)
POTASSIUM SERPL-SCNC: 4.6 MMOL/L (ref 3.5–5)
RBC # BLD: 3.46 E12/L (ref 3.5–5.5)
SODIUM BLD-SCNC: 137 MMOL/L (ref 132–146)
TOTAL PROTEIN: 6.5 G/DL (ref 6.4–8.3)
WBC # BLD: 6 E9/L (ref 4.5–11.5)

## 2022-09-01 PROCEDURE — 83735 ASSAY OF MAGNESIUM: CPT

## 2022-09-01 PROCEDURE — 2580000003 HC RX 258: Performed by: NURSE PRACTITIONER

## 2022-09-01 PROCEDURE — 6370000000 HC RX 637 (ALT 250 FOR IP): Performed by: NURSE PRACTITIONER

## 2022-09-01 PROCEDURE — 83690 ASSAY OF LIPASE: CPT

## 2022-09-01 PROCEDURE — 6360000002 HC RX W HCPCS: Performed by: NURSE PRACTITIONER

## 2022-09-01 PROCEDURE — 82962 GLUCOSE BLOOD TEST: CPT

## 2022-09-01 PROCEDURE — 83605 ASSAY OF LACTIC ACID: CPT

## 2022-09-01 PROCEDURE — 85025 COMPLETE CBC W/AUTO DIFF WBC: CPT

## 2022-09-01 PROCEDURE — 80053 COMPREHEN METABOLIC PANEL: CPT

## 2022-09-01 PROCEDURE — 74176 CT ABD & PELVIS W/O CONTRAST: CPT

## 2022-09-01 PROCEDURE — 99285 EMERGENCY DEPT VISIT HI MDM: CPT

## 2022-09-01 PROCEDURE — 87077 CULTURE AEROBIC IDENTIFY: CPT

## 2022-09-01 PROCEDURE — 87045 FECES CULTURE AEROBIC BACT: CPT

## 2022-09-01 PROCEDURE — 2060000000 HC ICU INTERMEDIATE R&B

## 2022-09-01 RX ORDER — VERAPAMIL HYDROCHLORIDE 240 MG/1
240 TABLET, FILM COATED, EXTENDED RELEASE ORAL DAILY
Status: DISCONTINUED | OUTPATIENT
Start: 2022-09-01 | End: 2022-09-06 | Stop reason: HOSPADM

## 2022-09-01 RX ORDER — INSULIN LISPRO 100 [IU]/ML
0-4 INJECTION, SOLUTION INTRAVENOUS; SUBCUTANEOUS
Status: DISCONTINUED | OUTPATIENT
Start: 2022-09-02 | End: 2022-09-06 | Stop reason: HOSPADM

## 2022-09-01 RX ORDER — OXYBUTYNIN CHLORIDE 5 MG/1
15 TABLET, EXTENDED RELEASE ORAL DAILY
Status: DISCONTINUED | OUTPATIENT
Start: 2022-09-01 | End: 2022-09-06 | Stop reason: HOSPADM

## 2022-09-01 RX ORDER — INSULIN LISPRO 100 [IU]/ML
0-4 INJECTION, SOLUTION INTRAVENOUS; SUBCUTANEOUS NIGHTLY
Status: DISCONTINUED | OUTPATIENT
Start: 2022-09-01 | End: 2022-09-06 | Stop reason: HOSPADM

## 2022-09-01 RX ORDER — LATANOPROST 50 UG/ML
1 SOLUTION/ DROPS OPHTHALMIC NIGHTLY
Status: DISCONTINUED | OUTPATIENT
Start: 2022-09-01 | End: 2022-09-06 | Stop reason: HOSPADM

## 2022-09-01 RX ORDER — ALBUTEROL SULFATE 2.5 MG/3ML
2.5 SOLUTION RESPIRATORY (INHALATION) EVERY 6 HOURS PRN
Status: DISCONTINUED | OUTPATIENT
Start: 2022-09-01 | End: 2022-09-06 | Stop reason: HOSPADM

## 2022-09-01 RX ORDER — DULOXETIN HYDROCHLORIDE 60 MG/1
60 CAPSULE, DELAYED RELEASE ORAL EVERY MORNING
Status: DISCONTINUED | OUTPATIENT
Start: 2022-09-02 | End: 2022-09-06 | Stop reason: HOSPADM

## 2022-09-01 RX ORDER — ATORVASTATIN CALCIUM 20 MG/1
20 TABLET, FILM COATED ORAL NIGHTLY
COMMUNITY

## 2022-09-01 RX ORDER — VERAPAMIL HYDROCHLORIDE 240 MG/1
240 TABLET, FILM COATED, EXTENDED RELEASE ORAL DAILY
COMMUNITY

## 2022-09-01 RX ORDER — SODIUM CHLORIDE 9 MG/ML
INJECTION, SOLUTION INTRAVENOUS CONTINUOUS
Status: DISCONTINUED | OUTPATIENT
Start: 2022-09-01 | End: 2022-09-06 | Stop reason: HOSPADM

## 2022-09-01 RX ORDER — 0.9 % SODIUM CHLORIDE 0.9 %
1000 INTRAVENOUS SOLUTION INTRAVENOUS ONCE
Status: COMPLETED | OUTPATIENT
Start: 2022-09-01 | End: 2022-09-01

## 2022-09-01 RX ORDER — ALBUTEROL SULFATE 90 UG/1
1 AEROSOL, METERED RESPIRATORY (INHALATION) EVERY 6 HOURS PRN
Status: DISCONTINUED | OUTPATIENT
Start: 2022-09-01 | End: 2022-09-01 | Stop reason: CLARIF

## 2022-09-01 RX ORDER — SODIUM CHLORIDE 0.9 % (FLUSH) 0.9 %
5-40 SYRINGE (ML) INJECTION EVERY 12 HOURS SCHEDULED
Status: DISCONTINUED | OUTPATIENT
Start: 2022-09-01 | End: 2022-09-06 | Stop reason: HOSPADM

## 2022-09-01 RX ORDER — SODIUM CHLORIDE 9 MG/ML
INJECTION, SOLUTION INTRAVENOUS PRN
Status: DISCONTINUED | OUTPATIENT
Start: 2022-09-01 | End: 2022-09-06 | Stop reason: HOSPADM

## 2022-09-01 RX ORDER — HEPARIN SODIUM 10000 [USP'U]/ML
5000 INJECTION, SOLUTION INTRAVENOUS; SUBCUTANEOUS EVERY 8 HOURS
Status: DISCONTINUED | OUTPATIENT
Start: 2022-09-01 | End: 2022-09-06 | Stop reason: HOSPADM

## 2022-09-01 RX ORDER — SODIUM CHLORIDE 0.9 % (FLUSH) 0.9 %
5-40 SYRINGE (ML) INJECTION PRN
Status: DISCONTINUED | OUTPATIENT
Start: 2022-09-01 | End: 2022-09-06 | Stop reason: HOSPADM

## 2022-09-01 RX ORDER — ACETAMINOPHEN 650 MG/1
650 SUPPOSITORY RECTAL EVERY 6 HOURS PRN
Status: DISCONTINUED | OUTPATIENT
Start: 2022-09-01 | End: 2022-09-06 | Stop reason: HOSPADM

## 2022-09-01 RX ORDER — CLOPIDOGREL BISULFATE 75 MG/1
75 TABLET ORAL DAILY
Status: DISCONTINUED | OUTPATIENT
Start: 2022-09-01 | End: 2022-09-06 | Stop reason: HOSPADM

## 2022-09-01 RX ORDER — POLYETHYLENE GLYCOL 3350 17 G/17G
17 POWDER, FOR SOLUTION ORAL DAILY PRN
Status: DISCONTINUED | OUTPATIENT
Start: 2022-09-01 | End: 2022-09-06 | Stop reason: HOSPADM

## 2022-09-01 RX ORDER — ACETAMINOPHEN 325 MG/1
650 TABLET ORAL EVERY 6 HOURS PRN
Status: DISCONTINUED | OUTPATIENT
Start: 2022-09-01 | End: 2022-09-06 | Stop reason: HOSPADM

## 2022-09-01 RX ORDER — CLOPIDOGREL BISULFATE 75 MG/1
75 TABLET ORAL DAILY
COMMUNITY

## 2022-09-01 RX ORDER — HYDROXYCHLOROQUINE SULFATE 200 MG/1
200 TABLET, FILM COATED ORAL 2 TIMES DAILY
Status: DISCONTINUED | OUTPATIENT
Start: 2022-09-01 | End: 2022-09-06 | Stop reason: HOSPADM

## 2022-09-01 RX ORDER — DEXTROSE MONOHYDRATE 100 MG/ML
INJECTION, SOLUTION INTRAVENOUS CONTINUOUS PRN
Status: DISCONTINUED | OUTPATIENT
Start: 2022-09-01 | End: 2022-09-06 | Stop reason: HOSPADM

## 2022-09-01 RX ORDER — ATORVASTATIN CALCIUM 20 MG/1
20 TABLET, FILM COATED ORAL NIGHTLY
Status: DISCONTINUED | OUTPATIENT
Start: 2022-09-01 | End: 2022-09-06 | Stop reason: HOSPADM

## 2022-09-01 RX ADMIN — ATORVASTATIN CALCIUM 20 MG: 20 TABLET, FILM COATED ORAL at 21:49

## 2022-09-01 RX ADMIN — HYDROXYCHLOROQUINE SULFATE 200 MG: 200 TABLET ORAL at 21:50

## 2022-09-01 RX ADMIN — SODIUM CHLORIDE: 9 INJECTION, SOLUTION INTRAVENOUS at 21:58

## 2022-09-01 RX ADMIN — SODIUM CHLORIDE 1000 ML: 9 INJECTION, SOLUTION INTRAVENOUS at 15:50

## 2022-09-01 RX ADMIN — VERAPAMIL HYDROCHLORIDE 240 MG: 240 TABLET, FILM COATED, EXTENDED RELEASE ORAL at 21:49

## 2022-09-01 RX ADMIN — OXYBUTYNIN CHLORIDE 15 MG: 5 TABLET, EXTENDED RELEASE ORAL at 21:50

## 2022-09-01 RX ADMIN — HEPARIN SODIUM 5000 UNITS: 10000 INJECTION INTRAVENOUS; SUBCUTANEOUS at 21:51

## 2022-09-01 RX ADMIN — Medication 10 ML: at 21:50

## 2022-09-01 RX ADMIN — CLOPIDOGREL BISULFATE 75 MG: 75 TABLET ORAL at 21:50

## 2022-09-01 RX ADMIN — LATANOPROST 1 DROP: 50 SOLUTION OPHTHALMIC at 21:56

## 2022-09-01 ASSESSMENT — PAIN SCALES - GENERAL: PAINLEVEL_OUTOF10: 0

## 2022-09-01 NOTE — ED PROVIDER NOTES
ED Attending shared visit  CC: No  HPI:  9/1/22, Time: 12:49 PM EDT         Jaclyn Pastrana is a 65 y.o. female presenting to the ED for diarrhea with nausea, beginning few weeks ago. The complaint has been persistent, mild in severity, and worsened by nothing. Patient by EMS from home where she resides with her sister with complaints of persistent diarrhea which is been present for the last several weeks. She did have orthopedic surgery May 28, 2022. She did have postop infection and had a revision on 6/20/2022. She has had a PICC line to the left antecubital area and has been followed by infectious disease for the antibiotics. States that she has had 2 stool samples evaluated which have been negative for C. difficile. She denies any abdominal pain. Denies any fever. She states the diarrhea has been pretty much continuous over the last several weeks. States she is concerned that she is becoming dehydrated due to the persistent diarrhea. ROS:   Pertinent positives and negatives are stated within HPI, all other systems reviewed and are negative.  --------------------------------------------- PAST HISTORY ---------------------------------------------  Past Medical History:  has a past medical history of Asthma, Cataract, right eye, Cerebral artery occlusion with cerebral infarction ([address] CKD (chronic kidney disease) stage 4, GFR 15-29 ml/min ([address] Degenerative disc disease, Degenerative joint disease, Diabetes mellitus ([address] Diabetic peripheral neuropathy ([address] Diffuse cerebral atrophy ([address] Fibromyalgia, Glaucoma, Hypertension, Iron deficiency anemia, Neurogenic bladder, Neuropathy, Sarcoidosis, Sleep apnea, and Spinal cord and nerve root disorder. Past Surgical History:  has a past surgical history that includes Foot surgery (Bilateral); Hysterectomy; back surgery; Total knee arthroplasty (Bilateral); Cholecystectomy; Colonoscopy (08/2019); other surgical history (Right, 02/06/2017);  Breast surgery; joint replacement (12/16/2016); Cardiac catheterization (2012); Ankle fracture surgery (Right, 10/24/2020); Intracapsular cataract extraction (Right, 7/22/2021); Hip fracture surgery (Right, 5/28/2022); Total hip arthroplasty (Right, 6/16/2022); and hip surgery (Right, 6/20/2022). Social History:  reports that she has never smoked. She has never used smokeless tobacco. She reports that she does not drink alcohol and does not use drugs. Family History: family history includes Cancer in her brother and mother; Diabetes in her maternal grandfather; Parkinsonism in her father. The patients home medications have been reviewed. Allergies: Iodides, Iodine, Other, Daptomycin, Pcn [penicillins], and Penicillin g    ---------------------------------------------------PHYSICAL EXAM--------------------------------------    Constitutional/General: Alert and oriented x3, well appearing, non toxic in NAD  Head: Normocephalic and atraumatic  Eyes: PERRL, EOMI  Mouth: Oropharynx clear, handling secretions, no trismus  Neck: Supple, full ROM, non tender to palpation in the midline, no stridor, no crepitus, no meningeal signs  Pulmonary: Lungs clear to auscultation bilaterally, no wheezes, rales, or rhonchi. Not in respiratory distress  Cardiovascular:  Regular rate. Regular rhythm. No murmurs, gallops, or rubs. 2+ distal pulses  Chest: no chest wall tenderness  Abdomen: Soft. Non tender. Non distended. +BS. No rebound, guarding, or rigidity. No pulsatile masses appreciated. Musculoskeletal: Moves all extremities x 4. Warm and well perfused, no clubbing, cyanosis, or edema. Capillary refill <3 seconds  Skin: warm and dry. No rashes.    Neurologic: GCS 15, CN 2-12 grossly intact, no focal deficits, symmetric strength 5/5 in the upper and lower extremities bilaterally  Psych: Normal Affect    -------------------------------------------------- RESULTS -------------------------------------------------  I have personally reviewed all laboratory and imaging results for this patient. Results are listed below.      LABS:  Results for orders placed or performed during the hospital encounter of 09/01/22   CBC with Auto Differential   Result Value Ref Range    WBC 6.0 4.5 - 11.5 E9/L    RBC 3.46 (L) 3.50 - 5.50 E12/L    Hemoglobin 9.2 (L) 11.5 - 15.5 g/dL    Hematocrit 29.6 (L) 34.0 - 48.0 %    MCV 85.5 80.0 - 99.9 fL    MCH 26.6 26.0 - 35.0 pg    MCHC 31.1 (L) 32.0 - 34.5 %    RDW 15.9 (H) 11.5 - 15.0 fL    Platelets 94 (L) 152 - 450 E9/L    MPV 9.3 7.0 - 12.0 fL    Neutrophils % 70.8 43.0 - 80.0 %    Immature Granulocytes % 0.5 0.0 - 5.0 %    Lymphocytes % 16.9 (L) 20.0 - 42.0 %    Monocytes % 9.8 2.0 - 12.0 %    Eosinophils % 1.5 0.0 - 6.0 %    Basophils % 0.5 0.0 - 2.0 %    Neutrophils Absolute 4.28 1.80 - 7.30 E9/L    Immature Granulocytes # 0.03 E9/L    Lymphocytes Absolute 1.02 (L) 1.50 - 4.00 E9/L    Monocytes Absolute 0.59 0.10 - 0.95 E9/L    Eosinophils Absolute 0.09 0.05 - 0.50 E9/L    Basophils Absolute 0.03 0.00 - 0.20 E9/L   Comprehensive Metabolic Panel   Result Value Ref Range    Sodium 137 132 - 146 mmol/L    Potassium 4.6 3.5 - 5.0 mmol/L    Chloride 100 98 - 107 mmol/L    CO2 20 (L) 22 - 29 mmol/L    Anion Gap 17 (H) 7 - 16 mmol/L    Glucose 127 (H) 74 - 99 mg/dL    BUN 27 (H) 6 - 23 mg/dL    Creatinine 1.4 (H) 0.5 - 1.0 mg/dL    GFR Non-African American 36 >=60 mL/min/1.73    GFR African American 44     Calcium 8.8 8.6 - 10.2 mg/dL    Total Protein 6.5 6.4 - 8.3 g/dL    Albumin 3.8 3.5 - 5.2 g/dL    Total Bilirubin 0.4 0.0 - 1.2 mg/dL    Alkaline Phosphatase 102 35 - 104 U/L    ALT 14 0 - 32 U/L    AST 17 0 - 31 U/L   Magnesium   Result Value Ref Range    Magnesium 1.9 1.6 - 2.6 mg/dL   Lactic Acid   Result Value Ref Range    Lactic Acid 5.0 (HH) 0.5 - 2.2 mmol/L   Lipase   Result Value Ref Range    Lipase 24 13 - 60 U/L   Platelet Confirmation   Result Value Ref Range    Platelet Confirmation SEE BELOW Lactic Acid   Result Value Ref Range    Lactic Acid 2.9 (H) 0.5 - 2.2 mmol/L   Basic Metabolic Panel w/ Reflex to MG   Result Value Ref Range    Sodium 137 132 - 146 mmol/L    Potassium reflex Magnesium 4.7 3.5 - 5.0 mmol/L    Chloride 105 98 - 107 mmol/L    CO2 23 22 - 29 mmol/L    Anion Gap 9 7 - 16 mmol/L    Glucose 113 (H) 74 - 99 mg/dL    BUN 25 (H) 6 - 23 mg/dL    Creatinine 1.4 (H) 0.5 - 1.0 mg/dL    GFR Non-African American 36 >=60 mL/min/1.73    GFR African American 44     Calcium 8.1 (L) 8.6 - 10.2 mg/dL   CBC with Auto Differential   Result Value Ref Range    WBC 5.6 4.5 - 11.5 E9/L    RBC 2.84 (L) 3.50 - 5.50 E12/L    Hemoglobin 7.4 (L) 11.5 - 15.5 g/dL    Hematocrit 23.7 (L) 34.0 - 48.0 %    MCV 83.5 80.0 - 99.9 fL    MCH 26.1 26.0 - 35.0 pg    MCHC 31.2 (L) 32.0 - 34.5 %    RDW 15.8 (H) 11.5 - 15.0 fL    Platelets 95 (L) 726 - 450 E9/L    MPV 9.6 7.0 - 12.0 fL    Neutrophils % 54.7 43.0 - 80.0 %    Immature Granulocytes % 0.4 0.0 - 5.0 %    Lymphocytes % 27.0 20.0 - 42.0 %    Monocytes % 12.6 (H) 2.0 - 12.0 %    Eosinophils % 4.6 0.0 - 6.0 %    Basophils % 0.7 0.0 - 2.0 %    Neutrophils Absolute 3.09 1.80 - 7.30 E9/L    Immature Granulocytes # 0.02 E9/L    Lymphocytes Absolute 1.52 1.50 - 4.00 E9/L    Monocytes Absolute 0.71 0.10 - 0.95 E9/L    Eosinophils Absolute 0.26 0.05 - 0.50 E9/L    Basophils Absolute 0.04 0.00 - 0.20 E9/L   Lactic Acid   Result Value Ref Range    Lactic Acid 1.7 0.5 - 2.2 mmol/L   Platelet Confirmation   Result Value Ref Range    Platelet Confirmation SEE BELOW    Hemoglobin and Hematocrit   Result Value Ref Range    Hemoglobin 8.0 (L) 11.5 - 15.5 g/dL    Hematocrit 25.5 (L) 34.0 - 48.0 %   POCT Glucose   Result Value Ref Range    Meter Glucose 116 (H) 74 - 99 mg/dL   POCT Glucose   Result Value Ref Range    Meter Glucose 107 (H) 74 - 99 mg/dL   POCT Glucose   Result Value Ref Range    Meter Glucose 140 (H) 74 - 99 mg/dL   TYPE AND SCREEN   Result Value Ref Range    ABO/Rh B POS     Antibody Screen NEG        RADIOLOGY:  Interpreted by Radiologist.  CT ABDOMEN PELVIS WO CONTRAST Additional Contrast? None   Final Result   1. No acute abnormality is seen in the abdomen or the pelvis. 2. Prominent distal colonic diverticulosis without diverticulitis.               ------------------------- NURSING NOTES AND VITALS REVIEWED ---------------------------   The nursing notes within the ED encounter and vital signs as below have been reviewed by myself. /63   Pulse 93   Temp 98.4 °F (36.9 °C) (Oral)   Resp 16   Ht 5' 6\" (1.676 m)   Wt 229 lb 9.6 oz (104.1 kg)   SpO2 97%   BMI 37.06 kg/m²   Oxygen Saturation Interpretation: Normal    The patients available past medical records and past encounters were reviewed.         ------------------------------ ED COURSE/MEDICAL DECISION MAKING----------------------  Medications   sodium chloride flush 0.9 % injection 5-40 mL (5 mLs IntraVENous Given 9/2/22 1115)   sodium chloride flush 0.9 % injection 5-40 mL (has no administration in time range)   0.9 % sodium chloride infusion (has no administration in time range)   polyethylene glycol (GLYCOLAX) packet 17 g (has no administration in time range)   acetaminophen (TYLENOL) tablet 650 mg (has no administration in time range)     Or   acetaminophen (TYLENOL) suppository 650 mg (has no administration in time range)   heparin (porcine) injection 5,000 Units (5,000 Units SubCUTAneous Given 9/2/22 5158)   glucose chewable tablet 16 g (has no administration in time range)   dextrose bolus 10% 125 mL (has no administration in time range)     Or   dextrose bolus 10% 250 mL (has no administration in time range)   glucagon (rDNA) injection 1 mg (has no administration in time range)   dextrose 10 % infusion (has no administration in time range)   0.9 % sodium chloride infusion ( IntraVENous New Bag 9/1/22 2158)   atorvastatin (LIPITOR) tablet 20 mg (20 mg Oral Given 9/1/22 2149)   clopidogrel (PLAVIX) tablet 75 mg (75 mg Oral Given 9/2/22 1125)   DULoxetine (CYMBALTA) extended release capsule 60 mg (60 mg Oral Given 9/2/22 1126)   hydroxychloroquine (PLAQUENIL) tablet 200 mg (200 mg Oral Given 9/2/22 1126)   oxybutynin (DITROPAN-XL) extended release tablet 15 mg (15 mg Oral Given 9/2/22 1125)   verapamil (CALAN SR) extended release tablet 240 mg (240 mg Oral Given 9/2/22 1126)   insulin lispro (HUMALOG) injection vial 0-4 Units (0 Units SubCUTAneous Not Given 9/2/22 1138)   insulin lispro (HUMALOG) injection vial 0-4 Units (0 Units SubCUTAneous Not Given 9/1/22 2149)   latanoprost (XALATAN) 0.005 % ophthalmic solution 1 drop (1 drop Both Eyes Given 9/1/22 2156)   albuterol (PROVENTIL) nebulizer solution 2.5 mg (has no administration in time range)   0.9 % sodium chloride bolus (0 mLs IntraVENous Stopped 9/1/22 1732)             Medical Decision Making:    Time: 3218  Re-evaluation. Patients symptoms show no change  Repeat physical examination is not changed, call received from lab regarding an elevated lactic acid. IV fluids were initiated. Will repeat lactic acid after IV fluids are given. Her physical exam is unchanged. Re-Evaluations:             Re-evaluation. Patients symptoms show no change      Consultations:                 Critical Care: This patient's ED course included: a personal history and physicial examination, multiple bedside re-evaluations, and a personal history and physicial eaxmination    This patient has remained hemodynamically stable and remained unchanged during their ED course. Counseling: The emergency provider has spoken with the patient and discussed todays results, in addition to providing specific details for the plan of care and counseling regarding the diagnosis and prognosis.   Questions are answered at this time and they are agreeable with the plan.       --------------------------------- IMPRESSION AND DISPOSITION ---------------------------------    IMPRESSION  1. Diarrhea, unspecified type    2. Lactic acidosis        DISPOSITION  Disposition: Admit to med/surg floor  Patient condition is stable        NOTE: This report was transcribed using voice recognition software.  Every effort was made to ensure accuracy; however, inadvertent computerized transcription errors may be present          RADHA Garcia - CNP  09/02/22 5311

## 2022-09-02 LAB
ABO/RH: NORMAL
ANION GAP SERPL CALCULATED.3IONS-SCNC: 9 MMOL/L (ref 7–16)
ANTIBODY SCREEN: NORMAL
BASOPHILS ABSOLUTE: 0.04 E9/L (ref 0–0.2)
BASOPHILS RELATIVE PERCENT: 0.7 % (ref 0–2)
BUN BLDV-MCNC: 25 MG/DL (ref 6–23)
CALCIUM SERPL-MCNC: 8.1 MG/DL (ref 8.6–10.2)
CHLORIDE BLD-SCNC: 105 MMOL/L (ref 98–107)
CO2: 23 MMOL/L (ref 22–29)
CREAT SERPL-MCNC: 1.4 MG/DL (ref 0.5–1)
EOSINOPHILS ABSOLUTE: 0.26 E9/L (ref 0.05–0.5)
EOSINOPHILS RELATIVE PERCENT: 4.6 % (ref 0–6)
GFR AFRICAN AMERICAN: 44
GFR NON-AFRICAN AMERICAN: 36 ML/MIN/1.73
GLUCOSE BLD-MCNC: 113 MG/DL (ref 74–99)
HCT VFR BLD CALC: 23.2 % (ref 34–48)
HCT VFR BLD CALC: 23.7 % (ref 34–48)
HCT VFR BLD CALC: 25.5 % (ref 34–48)
HEMOGLOBIN: 7.3 G/DL (ref 11.5–15.5)
HEMOGLOBIN: 7.4 G/DL (ref 11.5–15.5)
HEMOGLOBIN: 8 G/DL (ref 11.5–15.5)
IMMATURE GRANULOCYTES #: 0.02 E9/L
IMMATURE GRANULOCYTES %: 0.4 % (ref 0–5)
LACTIC ACID: 1.7 MMOL/L (ref 0.5–2.2)
LYMPHOCYTES ABSOLUTE: 1.52 E9/L (ref 1.5–4)
LYMPHOCYTES RELATIVE PERCENT: 27 % (ref 20–42)
MCH RBC QN AUTO: 26.1 PG (ref 26–35)
MCHC RBC AUTO-ENTMCNC: 31.2 % (ref 32–34.5)
MCV RBC AUTO: 83.5 FL (ref 80–99.9)
METER GLUCOSE: 107 MG/DL (ref 74–99)
METER GLUCOSE: 130 MG/DL (ref 74–99)
METER GLUCOSE: 134 MG/DL (ref 74–99)
METER GLUCOSE: 140 MG/DL (ref 74–99)
MONOCYTES ABSOLUTE: 0.71 E9/L (ref 0.1–0.95)
MONOCYTES RELATIVE PERCENT: 12.6 % (ref 2–12)
NEUTROPHILS ABSOLUTE: 3.09 E9/L (ref 1.8–7.3)
NEUTROPHILS RELATIVE PERCENT: 54.7 % (ref 43–80)
OCCULT BLOOD SCREENING: NORMAL
PDW BLD-RTO: 15.8 FL (ref 11.5–15)
PLATELET # BLD: 95 E9/L (ref 130–450)
PLATELET CONFIRMATION: NORMAL
PMV BLD AUTO: 9.6 FL (ref 7–12)
POTASSIUM REFLEX MAGNESIUM: 4.7 MMOL/L (ref 3.5–5)
RBC # BLD: 2.84 E12/L (ref 3.5–5.5)
SODIUM BLD-SCNC: 137 MMOL/L (ref 132–146)
WBC # BLD: 5.6 E9/L (ref 4.5–11.5)

## 2022-09-02 PROCEDURE — 86901 BLOOD TYPING SEROLOGIC RH(D): CPT

## 2022-09-02 PROCEDURE — 6370000000 HC RX 637 (ALT 250 FOR IP): Performed by: NURSE PRACTITIONER

## 2022-09-02 PROCEDURE — 89055 LEUKOCYTE ASSESSMENT FECAL: CPT

## 2022-09-02 PROCEDURE — 80048 BASIC METABOLIC PNL TOTAL CA: CPT

## 2022-09-02 PROCEDURE — 85025 COMPLETE CBC W/AUTO DIFF WBC: CPT

## 2022-09-02 PROCEDURE — 82270 OCCULT BLOOD FECES: CPT

## 2022-09-02 PROCEDURE — 87425 ROTAVIRUS AG IA: CPT

## 2022-09-02 PROCEDURE — 86900 BLOOD TYPING SEROLOGIC ABO: CPT

## 2022-09-02 PROCEDURE — 6360000002 HC RX W HCPCS: Performed by: NURSE PRACTITIONER

## 2022-09-02 PROCEDURE — 83605 ASSAY OF LACTIC ACID: CPT

## 2022-09-02 PROCEDURE — 97166 OT EVAL MOD COMPLEX 45 MIN: CPT

## 2022-09-02 PROCEDURE — 36415 COLL VENOUS BLD VENIPUNCTURE: CPT

## 2022-09-02 PROCEDURE — 2580000003 HC RX 258: Performed by: NURSE PRACTITIONER

## 2022-09-02 PROCEDURE — P9016 RBC LEUKOCYTES REDUCED: HCPCS

## 2022-09-02 PROCEDURE — 85018 HEMOGLOBIN: CPT

## 2022-09-02 PROCEDURE — 2060000000 HC ICU INTERMEDIATE R&B

## 2022-09-02 PROCEDURE — 86850 RBC ANTIBODY SCREEN: CPT

## 2022-09-02 PROCEDURE — 85014 HEMATOCRIT: CPT

## 2022-09-02 PROCEDURE — C9113 INJ PANTOPRAZOLE SODIUM, VIA: HCPCS | Performed by: NURSE PRACTITIONER

## 2022-09-02 PROCEDURE — 97535 SELF CARE MNGMENT TRAINING: CPT

## 2022-09-02 PROCEDURE — 2500000003 HC RX 250 WO HCPCS: Performed by: INTERNAL MEDICINE

## 2022-09-02 PROCEDURE — 86923 COMPATIBILITY TEST ELECTRIC: CPT

## 2022-09-02 PROCEDURE — 87329 GIARDIA AG IA: CPT

## 2022-09-02 PROCEDURE — 6370000000 HC RX 637 (ALT 250 FOR IP): Performed by: STUDENT IN AN ORGANIZED HEALTH CARE EDUCATION/TRAINING PROGRAM

## 2022-09-02 PROCEDURE — 82962 GLUCOSE BLOOD TEST: CPT

## 2022-09-02 RX ORDER — PANTOPRAZOLE SODIUM 40 MG/10ML
40 INJECTION, POWDER, LYOPHILIZED, FOR SOLUTION INTRAVENOUS DAILY
Status: DISCONTINUED | OUTPATIENT
Start: 2022-09-02 | End: 2022-09-06 | Stop reason: HOSPADM

## 2022-09-02 RX ADMIN — HYDROXYCHLOROQUINE SULFATE 200 MG: 200 TABLET ORAL at 20:06

## 2022-09-02 RX ADMIN — DULOXETINE HYDROCHLORIDE 60 MG: 60 CAPSULE, DELAYED RELEASE ORAL at 11:26

## 2022-09-02 RX ADMIN — VERAPAMIL HYDROCHLORIDE 240 MG: 240 TABLET, FILM COATED, EXTENDED RELEASE ORAL at 11:26

## 2022-09-02 RX ADMIN — MICONAZOLE NITRATE: 2 POWDER TOPICAL at 20:12

## 2022-09-02 RX ADMIN — Medication 5 ML: at 11:15

## 2022-09-02 RX ADMIN — Medication 10 ML: at 20:07

## 2022-09-02 RX ADMIN — HEPARIN SODIUM 5000 UNITS: 10000 INJECTION INTRAVENOUS; SUBCUTANEOUS at 14:13

## 2022-09-02 RX ADMIN — HEPARIN SODIUM 5000 UNITS: 10000 INJECTION INTRAVENOUS; SUBCUTANEOUS at 03:48

## 2022-09-02 RX ADMIN — OXYBUTYNIN CHLORIDE 15 MG: 5 TABLET, EXTENDED RELEASE ORAL at 11:25

## 2022-09-02 RX ADMIN — CLOPIDOGREL BISULFATE 75 MG: 75 TABLET ORAL at 11:25

## 2022-09-02 RX ADMIN — LATANOPROST 1 DROP: 50 SOLUTION OPHTHALMIC at 20:07

## 2022-09-02 RX ADMIN — Medication 125 MG: at 18:15

## 2022-09-02 RX ADMIN — PANTOPRAZOLE SODIUM 40 MG: 40 INJECTION, POWDER, FOR SOLUTION INTRAVENOUS at 20:22

## 2022-09-02 RX ADMIN — ATORVASTATIN CALCIUM 20 MG: 20 TABLET, FILM COATED ORAL at 20:06

## 2022-09-02 RX ADMIN — HYDROXYCHLOROQUINE SULFATE 200 MG: 200 TABLET ORAL at 11:26

## 2022-09-02 ASSESSMENT — PAIN SCALES - GENERAL
PAINLEVEL_OUTOF10: 0
PAINLEVEL_OUTOF10: 0

## 2022-09-02 NOTE — PLAN OF CARE
Problem: Discharge Planning  Goal: Discharge to home or other facility with appropriate resources  Outcome: Progressing  Flowsheets  Taken 9/2/2022 0030 by Daquan Landry RN  Discharge to home or other facility with appropriate resources: Identify barriers to discharge with patient and caregiver  Taken 9/1/2022 2019 by Luis Garza LPN  Discharge to home or other facility with appropriate resources: Identify barriers to discharge with patient and caregiver     Problem: Skin/Tissue Integrity  Goal: Absence of new skin breakdown  Description: 1. Monitor for areas of redness and/or skin breakdown  2. Assess vascular access sites hourly  3. Every 4-6 hours minimum:  Change oxygen saturation probe site  4. Every 4-6 hours:  If on nasal continuous positive airway pressure, respiratory therapy assess nares and determine need for appliance change or resting period.   Outcome: Progressing     Problem: Safety - Adult  Goal: Free from fall injury  Outcome: Progressing     Problem: Skin/Tissue Integrity - Adult  Goal: Skin integrity remains intact  Outcome: Progressing  Flowsheets (Taken 9/2/2022 0030 by Daquan Landry RN)  Skin Integrity Remains Intact: Monitor for areas of redness and/or skin breakdown     Problem: Gastrointestinal - Adult  Goal: Maintains or returns to baseline bowel function  Outcome: Progressing

## 2022-09-02 NOTE — CONSULTS
[address] [place] [address] [facility]  [facility]    Consultation Note     Admit Date: 9/1/2022 12:39 PM    Reason for Consult:   on oral antibiotics at home, admitted with diarrhea. Attending Physician:  Little Adam DO     Chief Complaint: Diarrhea    HISTORY OF PRESENT ILLNESS:   The patient is a 65 y.o.  female known to the Infectious Diseases service. The patient is well known to me. Pt had right hip arthroplasty in 2016  For right hip femoral head and superolateral acetabular fractures. She had periprosthetic infection in 2017. She had right lateral ankle fracture with ORIF in 10/2020. She had right hip periprosthetic hip fracture underwent ORIF on 5/28/22. Was discharged 5/231/22. She went to ER on 6/9 with wound drainage and wound cx was taken which showed Proteus mirabilis and she was called and antibiotic keflex. She was admitted on 6/16 and underwent debridement on 6/16, it went down all the way to the bone. Cx showed Proteus mirabilis, Enterococcus faecalis, and VRE faecium. She was discharged on Daptomycin and ceftriaxone. She developed rhabdomyolysis from Daptomycin so I switched her antibiotics to linezolid. And continued IV ceftriaxone until 8/11/22. Placed her on linezolid and keflex for suppression. She came in with diarrhea for 2 weeks. Yesterday morning she woke up incontinent of stool all over her bed which was very watery. No abdominal pain or nausea or vomiting or fevers but she feels very weak. Since admission, she is afebrile, HS stable. Labs showed normal white count, hgb of 8, platelet count of 95, cr of 1.4/Bun 25, lactic acid was 5 nw improved to 1.7, LFTs are normal. CT A/P did not show any abnormalities, stool work up in process. I got consulted for further recommendations.      Past Medical History:        Diagnosis Date    Asthma 1993    Cataract, right eye     Cerebral artery occlusion with cerebral infarction ([facility] 07/2017    CKD (chronic kidney disease) stage 4, GFR 15-29 ml/min ([facility]) 2017    Degenerative disc disease     Degenerative joint disease     Diabetes mellitus ([facility] 2008    diet controlled    Diabetic peripheral neuropathy ([facility] [address] 1998    Diffuse cerebral atrophy ([facility] [address] 2012    Fibromyalgia 01/2012    [facility]    Glaucoma     Hypertension     Iron deficiency anemia 10/25/2020    Neurogenic bladder     Neuropathy     Sarcoidosis 1993    Sleep apnea 2009    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      laminectomy l3-4    BREAST SURGERY      biopsy    CARDIAC CATHETERIZATION  2012    MINIMAL CAD    CHOLECYSTECTOMY      COLONOSCOPY  08/2019    TUBULAR ADENOMA x 2    FOOT SURGERY Bilateral     right foot fracture; left foot charcot    HIP FRACTURE SURGERY Right 5/28/2022    OPEN REDUCTION INTERNAL FIXATION RIGHT PERIPROSTHETIC HIP FRACTURE performed by Dex Pathak MD at [address] [facility] Right 6/20/2022    RIGHT HIP INCISION AND DRAINAGE performed by Dex Pathak MD at . [facility] [address])      INTRACAPSULAR CATARACT EXTRACTION Right 7/22/2021    RIGHT EYE CATARACT EXTRACTION IOL IMPLANT performed by Kori Pérez MD at [address]  12/16/2016    right hip arthroplasty    OTHER SURGICAL HISTORY Right 02/06/2017    I & D right hip wound vaccum placement    TOTAL HIP ARTHROPLASTY Right 6/16/2022    IRRIGATION AND DEBRIDEMENT RIGHT HIP WOUND INFECTION WITH ANTIBIOTIC BEAD PLACEMENT performed by Ashlyn Bal DO at [address] Bilateral      Current Medications:   Scheduled Meds:   sodium chloride flush  5-40 mL IntraVENous 2 times per day    heparin (porcine)  5,000 Units SubCUTAneous Q8H    atorvastatin  20 mg Oral Nightly    clopidogrel  75 mg Oral Daily    DULoxetine  60 mg Oral QAM    hydroxychloroquine  200 mg Oral BID    oxybutynin  15 mg Oral Daily    verapamil  240 mg Oral Daily    insulin lispro  0-4 Units SubCUTAneous TID WC    insulin lispro  0-4 Units SubCUTAneous Nightly    latanoprost  1 drop Both Eyes Nightly     Continuous Infusions:   sodium chloride      dextrose      sodium chloride 50 mL/hr at 09/01/22 2158     PRN Meds:sodium chloride flush, sodium chloride, polyethylene glycol, acetaminophen **OR** acetaminophen, glucose, dextrose bolus **OR** dextrose bolus, glucagon (rDNA), dextrose, albuterol    Allergies: Iodides, Iodine, Other, Daptomycin, Pcn [penicillins], and Penicillin g    Social History:   Social History     Socioeconomic History    Marital status: Single     Spouse name: None    Number of children: None    Years of education: None    Highest education level: None   Tobacco Use    Smoking status: Never    Smokeless tobacco: Never   Vaping Use    Vaping Use: Never used   Substance and Sexual Activity    Alcohol use: No     Alcohol/week: 0.0 standard drinks    Drug use: No    Sexual activity: Never       Family History:       Problem Relation Age of Onset    Cancer Mother         pancreatic    Parkinsonism Father     Diabetes Maternal Grandfather     Cancer Brother         esophageal   . Otherwise non-pertinent to the chief complaint. REVIEW OF SYSTEMS:    As mentioned in HPI, all other systems negative. PHYSICAL EXAM:    Vitals:    /63   Pulse 93   Temp 98.4 °F (36.9 °C) (Oral)   Resp 16   Ht 5' 6\" (1.676 m)   Wt 229 lb 9.6 oz (104.1 kg)   SpO2 97%   BMI 37.06 kg/m²   Constitutional: The patient is awake, alert, and oriented. Pale  Skin: Warm and dry. No rashes were noted. No jaundice. HEENT: Eyes show round, and reactive pupils. Moist mucous membranes, no ulcerations, no thrush. Neck: Supple to movements. No lymphadenopathy.    Chest: No use of accessory muscles to breathe. Symmetrical expansion. Auscultation reveals no wheezing, crackles, or rhonchi. Cardiovascular: S1 and S2 are rhythmic and regular. No murmurs appreciated. Abdomen: Soft nontender  Extremities: No clubbing, no cyanosis, no edema.   Musculoskeletal: Wound over right hip has completely healed  Neurological: Alert, oriented x3  Lines: peripheral      CBC+dif:  Recent Labs     09/01/22  1433 09/02/22  0303 09/02/22  1040   WBC 6.0 5.6  --    HGB 9.2* 7.4* 8.0*   HCT 29.6* 23.7* 25.5*   MCV 85.5 83.5  --    PLT 94* 95*  --    NEUTROABS 4.28 3.09  --      Lab Results   Component Value Date    CRP 6.6 (H) 06/09/2022    CRP 1.1 (H) 02/12/2018    CRP 3.1 (H) 02/05/2018     No results found for: CRP  Lab Results   Component Value Date    SEDRATE 90 (H) 06/09/2022    SEDRATE 62 (H) 02/12/2018    SEDRATE 73 (H) 02/05/2018     Lab Results   Component Value Date    ALT 14 09/01/2022    AST 17 09/01/2022    ALKPHOS 102 09/01/2022    BILITOT 0.4 09/01/2022     Lab Results   Component Value Date/Time     09/02/2022 03:03 AM    K 4.7 09/02/2022 03:03 AM     09/02/2022 03:03 AM    CO2 23 09/02/2022 03:03 AM    BUN 25 09/02/2022 03:03 AM    CREATININE 1.4 09/02/2022 03:03 AM    GFRAA 44 09/02/2022 03:03 AM    LABGLOM 36 09/02/2022 03:03 AM    GLUCOSE 113 09/02/2022 03:03 AM    GLUCOSE 82 12/09/2011 12:35 PM    PROT 6.5 09/01/2022 02:33 PM    LABALBU 3.8 09/01/2022 02:33 PM    CALCIUM 8.1 09/02/2022 03:03 AM    BILITOT 0.4 09/01/2022 02:33 PM    ALKPHOS 102 09/01/2022 02:33 PM    AST 17 09/01/2022 02:33 PM    ALT 14 09/01/2022 02:33 PM       Lab Results   Component Value Date/Time    PROTIME 11.9 05/27/2022 08:39 AM    PROTIME 12.0 12/09/2011 12:35 PM    INR 1.1 05/27/2022 08:39 AM       Lab Results   Component Value Date/Time    TSH 2.670 10/25/2020 05:18 AM       Lab Results   Component Value Date/Time    COLORU Yellow 06/16/2022 11:53 AM    PHUR 5.5 06/16/2022 11:53 AM    WBCUA NONE 06/16/2022 11:53 AM physicians.       Electronically signed by Ben Blanton MD on 9/2/2022 at 1:01 PM

## 2022-09-02 NOTE — PROGRESS NOTES
OCCUPATIONAL THERAPY INITIAL EVALUATION     Edilia Florentino [facility] & [place], [place]       Date:2022                                                  Patient Name: Atul Santizo  MRN: [id number]  : 1945  Room: [address] [place] [address]    Evaluating OT: Carole Valdovinos, MOT, OTR/L [id number]  Referring Provider:RADHA Griffiths CNP  Specific Provider Orders: OT eval and treat   Recommended Adaptive Equipment: TBD     Diagnosis: diarrhea   Surgery: none   Pertinent Medical History: asthma, CVA, DM, neuropathy, fibromyalgia, CKF   Precautions:  Fall Risk, contact iso    Assessment of current deficits   [x] Functional mobility  [x]ADLs  [x] Strength               [x]Cognition   [x] Functional transfers   [x] IADLs         [x] Safety Awareness   [x]Endurance   [] Fine Coordination              [x] Balance      [] Vision/perception   [x]Sensation    []Gross Motor Coordination  [] ROM  [] Delirium                   [] Motor Control     OT PLAN OF CARE   OT POC based on physician orders, patient diagnosis and results of clinical assessment    Frequency/Duration: 2-3 days/wk for 2 weeks PRN   Specific OT Treatment to include:   * Instruction/training on adapted ADL techniques and AE recommendations to increase functional independence within precautions       * Training on energy conservation strategies, correct breathing pattern and techniques to improve independence/tolerance for self-care routine  * Functional transfer/mobility training/DME recommendations for increased independence, safety, and fall prevention  * Patient/Family education to increase follow through with safety techniques and functional independence  * Recommendation of environmental modifications for increased safety with functional transfers/mobility and ADLs  * Therapeutic exercise to improve motor endurance, ROM, and functional strength for ADLs/functional transfers  * Therapeutic activities to facilitate/challenge dynamic balance, stand tolerance for increased safety and independence with ADLs  * Neuro-muscular re-education: facilitation of righting/equilibrium reactions, midline orientation, scapular stability/mobility, normalization of muscle tone, and facilitation of volitional active controled movement    Home Living: Pt lives alone in a 1 story home; bed/bath on on main level, sleeps in the recliner sometimes   Bathroom setup: walk in shower   Equipment owned: shower chair, cane  Prior Level of Function: intermittent assist with ADLs/IADLs from a friend and home care; using w/c, and rollator for functional mobility     Pain Level: sacral area  Cognition: A&O: 3/4; Follows 1 step directions, with repetition and increased time   Memory:  good    Sequencing:  good    Problem solving:  fair    Judgement/safety:  fair     Functional Assessment:  AM-PAC Daily Activity Raw Score: 14/24   Initial Eval Status  Date: 9/2/22 Treatment Status  Date: STGs=LTGs  Time Frame: 10-14 days   Feeding SBA (positioning in bed)   IND   Grooming SBA (seated for oral care)   IND   UB Dressing Mod A (due to limited ROM)   Min A   LB Dressing Max A (assist with brief)  Mod A    Bathing Max A (simulated)  Mod A    Toileting Max A (incontinent, assist with hygiene)  Mod A   Bed Mobility  Log roll: Mod A  Supine to sit: Mod A   Sit to supine: NT   Log roll SBA  Supine to sit: SBA   Sit to supine: SBA   Functional Transfers Sit to stand: Mod A   Stand to sit:Mod A  Commode: Mod A  CGA   Functional Mobility NT  Min A   Balance Sitting: SBA  Standing: Mod A     Activity Tolerance fair     Visual/  Perceptual Glasses: yes                UE ROM: BUE: elbow flex WFL, shoulder flex grossly 90'  Strength: RUE: grossly 3/5 LUE: grossly 3/5   Strength: B WFL  Fine Motor Coordination:  WFL     Hearing: WFL  Sensation:  No c/o numbness/tingling   Tone:  WFL  Edema: BLEs                            Comments:Cleared by RN to see pt.  Upon arrival, patient supine in bed and agreeable to OT session. At end of session, patient sitting in chair with call light and phone within reach, all lines and tubes intact. Pt would benefit from continued OT to increase functional independence and quality of life. Treatment: Pt appeared fearful and anxious with OOB activity. Pt required vc's and physical assist for proper technique/safety with hand placement/body mechanics/posture for bed mobility/ADLs/functional tranfers/ww management. Pt required vc's for sequencing/initiation of ADLs/functional transfers. Pt able to  sit EOB ~10 mins to increase core strength/balance/activity tolerance for ease with ADLs. Pt required increased time to complete ADLs/functional transfers due to management of multiple lines, rest breaks, and physical assist. Pt instructed on use of call light for assistance and fall prevention. Pt demo'ing fair understanding of education provided. Continue to educate. Eval Complexity: moderate  History: Expanded chart review of medical records and additional review of physical, cognitive, or psychosocial history related to current functional performance  Exam: 3+ performance deficits  Assistance/Modification: mod/max assistance or modifications required to perform tasks. May have comorbidities that affect occupational performance. Rehab Potential: Good for established goals, pt. assisted in establishment of goals. LTG: maximize independence with ADLs to return to PLOF    Patient  instructed on diagnosis, prognosis/goals and plan of care. Demonstrated fair understanding. [] Malnutrition indicators have been identified and nursing has been notified to ensure a dietitian consult is ordered.      Evaluation time includes thorough review of current medical information, gathering information on past medical & social history & PLOF, completion of standardized testing, informal observation of tasks, consultation with other medical professions/disciplines, assessment of data & development of POC/goals.      Time In: 0830       Time Out: 3577     Total treatment time: 15       Treatment Charges: Mins Units   OT Eval Low 27562     OT Eval Medium 98923 X    OT Eval High 31342     OT Re-Eval [id number]     Ther Ex  40370       Manual Therapy 73248       Thera Activities 99912       ADL/Home Mgt 29284 15 1   Neuro Re-ed 75051       Group Therapy        Orthotic manage/training  29739       Non-Billable Time           Ariana Getting, [facility], OTR/L [id number]

## 2022-09-02 NOTE — PROGRESS NOTES
ago (some edema at that time))  % Weight Change (Calculated): -11.7                    BMI Categories: Obese Class 2 (BMI 35.0 -39.9)    Estimated Daily Nutrient Needs:  Energy Requirements Based On: Formula (Jose Alejandro Caballero)  Weight Used for Energy Requirements: Current  Energy (kcal/day):   Weight Used for Protein Requirements: Ideal  Protein (g/day): 80-90 (1.3-1.5 g/kg as chavez w/CKD 3)  Method Used for Fluid Requirements: 1 ml/kcal  Fluid (ml/day):     Nutrition Diagnosis:   Severe malnutrition, In context of acute illness or injury related to altered GI function as evidenced by Criteria as identified in malnutrition assessment    Nutrition Interventions:   Food and/or Nutrient Delivery: Continue Current Diet, Start Oral Nutrition Supplement (Diabetic ONS BID)  Nutrition Education/Counseling: No recommendation at this time  Coordination of Nutrition Care: Continue to monitor while inpatient       Goals:     Goals: PO intake 50% or greater       Nutrition Monitoring and Evaluation:   Behavioral-Environmental Outcomes: None Identified  Food/Nutrient Intake Outcomes: Food and Nutrient Intake, Supplement Intake  Physical Signs/Symptoms Outcomes: GI Status, Nausea or Vomiting, Biochemical Data, Fluid Status or Edema, Diarrhea, Nutrition Focused Physical Findings, Skin, Weight    Discharge Planning:     Too soon to determine     Vianca Davis RD, [address] , [place]  Contact: [phone]

## 2022-09-02 NOTE — CARE COORDINATION
Met w/ patient. Explained role of  and plan of care. Lives w/ her friend Sanjuana Diaz in a 1 story house- ramp entrance. Has WC rollator, shower chair, BSC. Hx Kwasi, [address], and [place] snfs. Active w/ [facility]- resume order is on chart- notify when pt is discharging. PCP is Dr. Desi Michelle and pharmacy is [address] [place] [address]. PT/OT evals pending. Per pt, plan is to return home on discharge- refusing DONOVAN- states her friend will provide transportation.  Will follow Mily George, VAISHALIcase manager

## 2022-09-02 NOTE — H&P
Hospital Medicine History & Physical      PCP: Salvador Frederick MD    Date of Admission: 9/1/2022    Date of Service: . SEPT 2, 2022    Chief Complaint:  DIARRHEA      History Of Present Illness:     65 y.o. female presented with DIARRHEA, STATES SHE FELL AND BROKE HER RIGHT FEMUR , HAD A   R ASHTYN. MAY 28, 2022. WAS ON ABX AFTER IT BC HER INCISION BECAME INFECTED  INFECTED X 3 AND SHE HAS NOW DEVELOPED WEAKNESS , ANOREXIA, NAUSEA, AND DIARRHEA(MORE LOOSE MUSH BM). WITH CHILLS   WAS ON ABX AT HOME FOR VRE.   STATES SHE  THE DIARRHEA COMES AND SHE DOES NOT KNOW IT, ABOUT 2-3 STOOLS A DAY  Past Medical History:          Diagnosis Date    Asthma 1993    Cataract, right eye     Cerebral artery occlusion with cerebral infarction ([address] 07/2017    CKD (chronic kidney disease) stage 4, GFR 15-29 ml/min ([facility]) 2017    Degenerative disc disease     Degenerative joint disease     Diabetes mellitus ([address] 2008    diet controlled    Diabetic peripheral neuropathy ([address] 1998    Diffuse cerebral atrophy ([address] 2012    Fibromyalgia 01/2012    [facility]    Glaucoma     Hypertension     Iron deficiency anemia 10/25/2020    Neurogenic bladder     Neuropathy     Sarcoidosis 1993    Sleep apnea 2009    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\"       Past Surgical History:          Procedure Laterality Date    ANKLE FRACTURE SURGERY Right 10/24/2020    RIGHT ANKLE OPEN REDUCTION INTERNAL FIXATION performed by Aline Hernández MD at [address]      laminectomy l3-4    BREAST SURGERY      biopsy    CARDIAC CATHETERIZATION  2012    MINIMAL CAD    CHOLECYSTECTOMY      COLONOSCOPY  08/2019    TUBULAR ADENOMA x 2    FOOT SURGERY Bilateral     right foot fracture; left foot charcot    HIP FRACTURE SURGERY Right 5/28/2022    OPEN REDUCTION INTERNAL FIXATION RIGHT PERIPROSTHETIC HIP FRACTURE performed by Lawrence Brown MD at [facility] Right 6/20/2022    RIGHT HIP INCISION AND DRAINAGE performed by Lawrence Brown MD at [facility] ([facility])      INTRACAPSULAR CATARACT EXTRACTION Right 7/22/2021    RIGHT EYE CATARACT EXTRACTION IOL IMPLANT performed by Bg Louis MD at [address]  12/16/2016    right hip arthroplasty    OTHER SURGICAL HISTORY Right 02/06/2017    I & D right hip wound vaccum placement    TOTAL HIP ARTHROPLASTY Right 6/16/2022    IRRIGATION AND DEBRIDEMENT RIGHT HIP WOUND INFECTION WITH ANTIBIOTIC BEAD PLACEMENT performed by Debbe Phalen, DO at [address] Bilateral        Medications Prior to Admission:      Prior to Admission medications    Medication Sig Start Date End Date Taking?  Authorizing Provider   atorvastatin (LIPITOR) 20 MG tablet Take 20 mg by mouth nightly   Yes Historical Provider, MD   clopidogrel (PLAVIX) 75 MG tablet Take 75 mg by mouth daily   Yes Historical Provider, MD   verapamil (CALAN SR) 240 MG extended release tablet Take 240 mg by mouth daily   Yes Historical Provider, MD   linezolid (ZYVOX) 600 MG tablet TAKE 1 TABLET BY MOUTH TWICE DAILY 7/8/22   Historical Provider, MD   albuterol sulfate HFA (PROVENTIL;VENTOLIN;PROAIR) 108 (90 Base) MCG/ACT inhaler Inhale 1 puff into the lungs every 6 hours as needed for Wheezing    Historical Provider, MD   Multiple Vitamins-Minerals (THERAPEUTIC MULTIVITAMIN-MINERALS) tablet Take 1 tablet by mouth three times a week Given Monday,Wednesday,Friday    Historical Provider, MD   bimatoprost (LUMIGAN) 0.01 % SOLN ophthalmic drops Place 1 drop into both eyes nightly    Historical Provider, MD   oxybutynin (DITROPAN XL) 15 MG extended release tablet Take 15 mg by mouth daily    Historical Provider, MD   hydroxychloroquine (PLAQUENIL) 200 MG tablet Take 200 mg by mouth 2 times daily     Historical Provider, MD   DULoxetine (CYMBALTA) 60 MG capsule Take 60 mg by mouth every morning     Historical Provider, MD       Allergies: Iodides, Iodine, Other, Daptomycin, Pcn [penicillins], and Penicillin g    Social History:      The patient currently lives  WITH A FRIEND    TOBACCO:   reports that she has never smoked. She has never used smokeless tobacco.  ETOH:   reports no history of alcohol use. Family History:       Reviewed in detail and negative for DM, CAD, Cancer, CVA. Positive as follows:        Problem Relation Age of Onset    Cancer Mother         pancreatic    Parkinsonism Father     Diabetes Maternal Grandfather     Cancer Brother         esophageal       REVIEW OF SYSTEMS:   Pertinent positives as noted in the HPI. All other systems reviewed and negative. PHYSICAL EXAM:    /63   Pulse 93   Temp 98.4 °F (36.9 °C) (Oral)   Resp 16   Ht 5' 6\" (1.676 m)   Wt 229 lb 9.6 oz (104.1 kg)   SpO2 97%   BMI 37.06 kg/m²     General appearance:  No apparent distress, appears stated age and cooperative. HEENT:  Normal cephalic, atraumatic without obvious deformity. Pupils equal, round, and reactive to light. Extra ocular muscles intact. Conjunctivae/corneas clear. Neck: Supple, with full range of motion. No jugular venous distention. Trachea midline. Respiratory:  Normal respiratory effort. Clear to auscultation, bilaterally without Rales/Wheezes/Rhonchi. Cardiovascular:  Regular rate and rhythm   Abdomen: Soft, non-tender, non-distended with normal bowel sounds. Musculoskeletal:  No clubbing, cyanosis or edema bilaterally. Skin: Skin color, texture, turgor normal.  No rashes or lesions. Neurologic:  Neurovascularly intact without any focal sensory/motor deficits.  Cranial nerves: II-XII intact, grossly non-focal.  Psychiatric:  Alert and oriented, thought content appropriate, normal insight        Labs:     Recent Labs     09/01/22  1433 09/02/22  0303 09/02/22  1040   WBC 6.0 5.6  --    HGB 9.2* 7.4* 8.0*   HCT 29.6* 23.7* 25.5*   PLT 94* 95*  --      Recent Labs     09/01/22  1433 09/02/22  0303    137   K 4.6 4.7    105   CO2 20* 23   BUN 27* 25*   CREATININE 1.4* 1.4*   CALCIUM 8.8 8.1*     Recent Labs     09/01/22  1433   AST 17   ALT 14   BILITOT 0.4   ALKPHOS 102     No results for input(s): INR in the last 72 hours. No results for input(s): Pati Horn in the last 72 hours. Urinalysis:      Lab Results   Component Value Date/Time    NITRU Negative 06/16/2022 11:53 AM    WBCUA NONE 06/16/2022 11:53 AM    BACTERIA RARE 06/16/2022 11:53 AM    RBCUA 1-3 06/16/2022 11:53 AM    BLOODU Negative 06/16/2022 11:53 AM    SPECGRAV >=1.030 06/16/2022 11:53 AM    GLUCOSEU Negative 06/16/2022 11:53 AM       Radiology:       CT ABDOMEN PELVIS WO CONTRAST Additional Contrast? None   Final Result   1. No acute abnormality is seen in the abdomen or the pelvis. 2. Prominent distal colonic diverticulosis without diverticulitis. ASSESSMENT:    Active Hospital Problems    Diagnosis Date Noted    Diarrhea [R19.7] 09/01/2022     Priority: Medium    Unable to care for self [Z78.9] 09/01/2022     Priority: Medium    Lactic acid acidosis [E87.2] 09/01/2022     Priority: Medium    CKD (chronic kidney disease) [N18.9] 09/01/2022     Priority: Medium    Chronic anemia [D64.9] 09/01/2022     Priority: Medium    Obesity (BMI 30-39. 9) [E66.9] 09/01/2022     Priority: Medium    History of CVA (cerebrovascular accident) [Z86.73] 02/02/2021    Diabetes mellitus ([address] [E11.9]     PAULINA (obstructive sleep apnea) [G47.33] 12/15/2016   LACTIC ACIDOSIS        PLAN:  SSI   PLAQUENIL   CYMBALTA   LIPITOR         DVT Prophylaxis: HEPARIN  Diet: ADULT ORAL NUTRITION SUPPLEMENT; Breakfast, Dinner; Diabetic Oral Supplement  ADULT DIET;  Regular; 5 carb choices (75 gm/meal)  Code Status: Full Code    PT/OT Eval Status: ORDERED    Dispo - DONOVAN    Electronically signed by Hoda Montemayor DO on 9/2/2022 at 3:00 PM [facility]       Thank you Layla Rivers MD for the opportunity to be involved in this patient's care.  If you have any questions or concerns please feel free to contact me at [phone]

## 2022-09-02 NOTE — PROGRESS NOTES
P Quality Flow/Interdisciplinary Rounds Progress Note        Quality Flow Rounds held on September 2, 2022    Disciplines Attending:  Bedside Nurse, , , and Nursing Unit Leadership    Bridget Salazar was admitted on 9/1/2022 12:39 PM    Anticipated Discharge Date:       Disposition:    Jeremy Score:  Jeremy Scale Score: 16    Readmission Risk              Risk of Unplanned Readmission:  33           Discussed patient goal for the day, patient clinical progression, and barriers to discharge. The following Goal(s) of the Day/Commitment(s) have been identified:  Monitor Hgb Q6, waiting on stool sample-questionable cdiff infection, check for new consultants.        Braydon Linares RN  September 2, 2022

## 2022-09-03 LAB
ANION GAP SERPL CALCULATED.3IONS-SCNC: 11 MMOL/L (ref 7–16)
BLOOD BANK DISPENSE STATUS: NORMAL
BLOOD BANK PRODUCT CODE: NORMAL
BPU ID: NORMAL
BUN BLDV-MCNC: 22 MG/DL (ref 6–23)
CALCIUM SERPL-MCNC: 8 MG/DL (ref 8.6–10.2)
CHLORIDE BLD-SCNC: 104 MMOL/L (ref 98–107)
CO2: 20 MMOL/L (ref 22–29)
CREAT SERPL-MCNC: 1.3 MG/DL (ref 0.5–1)
DESCRIPTION BLOOD BANK: NORMAL
GFR AFRICAN AMERICAN: 48
GFR NON-AFRICAN AMERICAN: 40 ML/MIN/1.73
GLUCOSE BLD-MCNC: 144 MG/DL (ref 74–99)
HCT VFR BLD CALC: 21.3 % (ref 34–48)
HCT VFR BLD CALC: 26.2 % (ref 34–48)
HEMOGLOBIN: 6.8 G/DL (ref 11.5–15.5)
HEMOGLOBIN: 8.5 G/DL (ref 11.5–15.5)
METER GLUCOSE: 108 MG/DL (ref 74–99)
METER GLUCOSE: 144 MG/DL (ref 74–99)
METER GLUCOSE: 163 MG/DL (ref 74–99)
POTASSIUM SERPL-SCNC: 4.2 MMOL/L (ref 3.5–5)
ROTAVIRUS ANTIGEN: NORMAL
SODIUM BLD-SCNC: 135 MMOL/L (ref 132–146)
WHITE BLOOD CELLS (WBC), STOOL: NORMAL

## 2022-09-03 PROCEDURE — 6360000002 HC RX W HCPCS: Performed by: NURSE PRACTITIONER

## 2022-09-03 PROCEDURE — 85018 HEMOGLOBIN: CPT

## 2022-09-03 PROCEDURE — 82962 GLUCOSE BLOOD TEST: CPT

## 2022-09-03 PROCEDURE — 36430 TRANSFUSION BLD/BLD COMPNT: CPT

## 2022-09-03 PROCEDURE — C9113 INJ PANTOPRAZOLE SODIUM, VIA: HCPCS | Performed by: NURSE PRACTITIONER

## 2022-09-03 PROCEDURE — 2060000000 HC ICU INTERMEDIATE R&B

## 2022-09-03 PROCEDURE — 36415 COLL VENOUS BLD VENIPUNCTURE: CPT

## 2022-09-03 PROCEDURE — 80048 BASIC METABOLIC PNL TOTAL CA: CPT

## 2022-09-03 PROCEDURE — 85014 HEMATOCRIT: CPT

## 2022-09-03 PROCEDURE — 6370000000 HC RX 637 (ALT 250 FOR IP): Performed by: NURSE PRACTITIONER

## 2022-09-03 PROCEDURE — 2580000003 HC RX 258: Performed by: NURSE PRACTITIONER

## 2022-09-03 RX ORDER — SODIUM CHLORIDE 9 MG/ML
INJECTION, SOLUTION INTRAVENOUS PRN
Status: DISCONTINUED | OUTPATIENT
Start: 2022-09-03 | End: 2022-09-06 | Stop reason: HOSPADM

## 2022-09-03 RX ADMIN — HYDROXYCHLOROQUINE SULFATE 200 MG: 200 TABLET ORAL at 07:47

## 2022-09-03 RX ADMIN — HYDROXYCHLOROQUINE SULFATE 200 MG: 200 TABLET ORAL at 21:47

## 2022-09-03 RX ADMIN — SODIUM CHLORIDE: 9 INJECTION, SOLUTION INTRAVENOUS at 06:31

## 2022-09-03 RX ADMIN — ATORVASTATIN CALCIUM 20 MG: 20 TABLET, FILM COATED ORAL at 21:47

## 2022-09-03 RX ADMIN — DULOXETINE HYDROCHLORIDE 60 MG: 60 CAPSULE, DELAYED RELEASE ORAL at 07:47

## 2022-09-03 RX ADMIN — Medication 10 ML: at 21:47

## 2022-09-03 RX ADMIN — LATANOPROST 1 DROP: 50 SOLUTION OPHTHALMIC at 21:48

## 2022-09-03 RX ADMIN — Medication 10 ML: at 07:47

## 2022-09-03 RX ADMIN — Medication 125 MG: at 00:11

## 2022-09-03 RX ADMIN — MICONAZOLE NITRATE: 2 POWDER TOPICAL at 21:49

## 2022-09-03 RX ADMIN — OXYBUTYNIN CHLORIDE 15 MG: 5 TABLET, EXTENDED RELEASE ORAL at 07:47

## 2022-09-03 RX ADMIN — Medication 125 MG: at 06:18

## 2022-09-03 RX ADMIN — VERAPAMIL HYDROCHLORIDE 240 MG: 240 TABLET, FILM COATED, EXTENDED RELEASE ORAL at 07:47

## 2022-09-03 RX ADMIN — PANTOPRAZOLE SODIUM 40 MG: 40 INJECTION, POWDER, FOR SOLUTION INTRAVENOUS at 07:47

## 2022-09-03 RX ADMIN — MICONAZOLE NITRATE: 2 POWDER TOPICAL at 07:48

## 2022-09-03 ASSESSMENT — PAIN SCALES - GENERAL
PAINLEVEL_OUTOF10: 0

## 2022-09-03 NOTE — PLAN OF CARE
Problem: Skin/Tissue Integrity  Goal: Absence of new skin breakdown  Description: 1. Monitor for areas of redness and/or skin breakdown  2. Assess vascular access sites hourly  3. Every 4-6 hours minimum:  Change oxygen saturation probe site  4. Every 4-6 hours:  If on nasal continuous positive airway pressure, respiratory therapy assess nares and determine need for appliance change or resting period.   Outcome: Not Progressing     Problem: Skin/Tissue Integrity - Adult  Goal: Skin integrity remains intact  Outcome: Not Progressing     Problem: Discharge Planning  Goal: Discharge to home or other facility with appropriate resources  Outcome: Progressing  Flowsheets (Taken 9/2/2022 2016 by Deepa Li, VAISHALI)  Discharge to home or other facility with appropriate resources: Identify barriers to discharge with patient and caregiver     Problem: Safety - Adult  Goal: Free from fall injury  Outcome: Progressing     Problem: Chronic Conditions and Co-morbidities  Goal: Patient's chronic conditions and co-morbidity symptoms are monitored and maintained or improved  Outcome: Progressing  Flowsheets (Taken 9/2/2022 2016 by Deepa Li RN)  Care Plan - Patient's Chronic Conditions and Co-Morbidity Symptoms are Monitored and Maintained or Improved: Monitor and assess patient's chronic conditions and comorbid symptoms for stability, deterioration, or improvement     Problem: Nutrition Deficit:  Goal: Optimize nutritional status  9/3/2022 0256 by Oly Zelaya LPN  Outcome: Progressing  9/2/2022 1433 by Yanira Dubose RD, CNSC, LD  Flowsheets (Taken 9/2/2022 1433)  Nutrient intake appropriate for improving, restoring, or maintaining nutritional needs:   Assess nutritional status and recommend course of action   Monitor oral intake, labs, and treatment plans   Recommend appropriate diets, oral nutritional supplements, and vitamin/mineral supplements     Problem: Skin/Tissue Integrity  Goal: Absence of new skin breakdown  Description: 1. Monitor for areas of redness and/or skin breakdown  2. Assess vascular access sites hourly  3. Every 4-6 hours minimum:  Change oxygen saturation probe site  4. Every 4-6 hours:  If on nasal continuous positive airway pressure, respiratory therapy assess nares and determine need for appliance change or resting period.   Outcome: Not Progressing     Problem: Skin/Tissue Integrity - Adult  Goal: Skin integrity remains intact  Outcome: Not Progressing

## 2022-09-03 NOTE — PROGRESS NOTES
[address] [place] [address] [facility]  PIERREIDA  Progress Note    SUBJECTIVE:  Chief Complaint   Patient presents with    Diarrhea     Pt states ongoing diarrhea for the past couple weeks. Pt was previously on antibiotics for post surgical procedure. Emesis     Pt also states persistent nausea and emesis. Patient is tolerating medications. Laying in bed, just got bathed by nursing staff  Not feeling well today but can't explain  States she has not had any bowel movements today  Afebrile     Review of systems:  As stated above in the chief complaint, otherwise negative. Medications:  Scheduled Meds:   pantoprazole  40 mg IntraVENous Daily    miconazole   Topical BID    sodium chloride flush  5-40 mL IntraVENous 2 times per day    [Held by provider] heparin (porcine)  5,000 Units SubCUTAneous Q8H    atorvastatin  20 mg Oral Nightly    [Held by provider] clopidogrel  75 mg Oral Daily    DULoxetine  60 mg Oral QAM    hydroxychloroquine  200 mg Oral BID    oxybutynin  15 mg Oral Daily    verapamil  240 mg Oral Daily    insulin lispro  0-4 Units SubCUTAneous TID WC    insulin lispro  0-4 Units SubCUTAneous Nightly    latanoprost  1 drop Both Eyes Nightly     Continuous Infusions:   sodium chloride      sodium chloride      dextrose      sodium chloride 50 mL/hr at 22 0631     PRN Meds:sodium chloride, sodium chloride flush, sodium chloride, polyethylene glycol, acetaminophen **OR** acetaminophen, glucose, dextrose bolus **OR** dextrose bolus, glucagon (rDNA), dextrose, albuterol    OBJECTIVE:  BP (!) 173/60   Pulse 90   Temp 98.4 °F (36.9 °C) (Axillary)   Resp 18   Ht 5' 6\" (1.676 m)   Wt 232 lb 4.8 oz (105.4 kg)   SpO2 99%   BMI 37.49 kg/m²   Temp  Av.2 °F (36.8 °C)  Min: 97.5 °F (36.4 °C)  Max: 98.6 °F (37 °C)  Constitutional: The patient is awake, alert, and oriented. Laying in bed. Nursing at bedside. Skin: Warm and dry. No rashes were noted. Pale.    HEENT: Round and reactive pupils. Moist mucous membranes. No ulcerations or thrush. Neck: Supple to movements. Chest: No use of accessory muscles to breathe. Symmetrical expansion. No wheezing, crackles or rhonchi. Cardiovascular: S1 and S2 are rhythmic and regular. No murmurs appreciated. Abdomen: Positive bowel sounds to auscultation. Benign to palpation. Extremities: No clubbing, no cyanosis, no edema. Wound over right hip has completely healed.    Lines: peripheral    Laboratory and Tests Review:  Lab Results   Component Value Date    WBC 5.6 09/02/2022    WBC 6.0 09/01/2022    WBC 11.8 (H) 07/07/2022    HGB 8.5 (L) 09/03/2022    HCT 26.2 (L) 09/03/2022    MCV 83.5 09/02/2022    PLT 95 (L) 09/02/2022     Lab Results   Component Value Date    NEUTROABS 3.09 09/02/2022    NEUTROABS 4.28 09/01/2022    NEUTROABS 7.96 (H) 07/07/2022     No results found for: [facility]  Lab Results   Component Value Date    ALT 14 09/01/2022    AST 17 09/01/2022    ALKPHOS 102 09/01/2022    BILITOT 0.4 09/01/2022     Lab Results   Component Value Date/Time     09/02/2022 03:03 AM    K 4.7 09/02/2022 03:03 AM     09/02/2022 03:03 AM    CO2 23 09/02/2022 03:03 AM    BUN 25 09/02/2022 03:03 AM    CREATININE 1.4 09/02/2022 03:03 AM    CREATININE 1.4 09/01/2022 02:33 PM    CREATININE 1.5 07/07/2022 02:25 PM    GFRAA 44 09/02/2022 03:03 AM    LABGLOM 36 09/02/2022 03:03 AM    GLUCOSE 113 09/02/2022 03:03 AM    GLUCOSE 82 12/09/2011 12:35 PM    PROT 6.5 09/01/2022 02:33 PM    LABALBU 3.8 09/01/2022 02:33 PM    CALCIUM 8.1 09/02/2022 03:03 AM    BILITOT 0.4 09/01/2022 02:33 PM    ALKPHOS 102 09/01/2022 02:33 PM    AST 17 09/01/2022 02:33 PM    ALT 14 09/01/2022 02:33 PM     Lab Results   Component Value Date    CRP 6.6 (H) 06/09/2022    CRP 1.1 (H) 02/12/2018    CRP 3.1 (H) 02/05/2018     Lab Results   Component Value Date    SEDRATE 90 (H) 06/09/2022    SEDRATE 62 (H) 02/12/2018    SEDRATE 73 (H) 02/05/2018     Radiology:      Microbiology:   Stool Culture 9/1/22: pending   Giardia pending    ASSESSMENT:  Rule out C. difficile infection: negative   Diarrhea  Thrombocytopenia: Likely linezolid related  Right femur fracture with ORIF with osteomyelitis: Culture showed VRE faecium, Proteus mirabilis, Enterococcus faecalis. She completed daptomycin and ceftriaxone but did not tolerate daptomycin so switched to linezolid and ceftriaxone. Completed IV antibiotics on 8/10 has been on p.o. linezolid and Keflex. PLAN:  Stop p.o. 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 Above discussed agree with advanced practice nurse. Labs, cultures, and radiographs reviewed. Face to Face encounter occurred. Changes made as necessary.      Shay Mullins MD

## 2022-09-03 NOTE — PROGRESS NOTES
Hospitalist Progress Note      PCP: Valerie Ocampo MD    Date of Admission: 9/1/2022        Hospital Course:  65 y.o. female presented with DIARRHEA, [address] BROKE HER RIGHT FEMUR , HAD A   R ASHTYN. MAY 28, 2022. WAS ON ABX AFTER IT BC HER INCISION BECAME INFECTED  INFECTED X 3 AND SHE HAS NOW DEVELOPED WEAKNESS , ANOREXIA, NAUSEA, AND DIARRHEA(MORE LOOSE MUSH BM). WITH CHILLS   WAS ON ABX AT HOME FOR VRE.   STATES SHE  THE DIARRHEA COMES AND SHE DOES NOT KNOW IT, ABOUT 2-3 STOOLS A DAY** NO DIARRHEA SINCE STARTED ON VANC        Subjective:  FEELING BETTER          Medications:  Reviewed    Infusion Medications    sodium chloride      sodium chloride      dextrose      sodium chloride 50 mL/hr at 09/03/22 0631     Scheduled Medications    pantoprazole  40 mg IntraVENous Daily    miconazole   Topical BID    sodium chloride flush  5-40 mL IntraVENous 2 times per day    [Held by provider] heparin (porcine)  5,000 Units SubCUTAneous Q8H    atorvastatin  20 mg Oral Nightly    [Held by provider] clopidogrel  75 mg Oral Daily    DULoxetine  60 mg Oral QAM    hydroxychloroquine  200 mg Oral BID    oxybutynin  15 mg Oral Daily    verapamil  240 mg Oral Daily    insulin lispro  0-4 Units SubCUTAneous TID WC    insulin lispro  0-4 Units SubCUTAneous Nightly    latanoprost  1 drop Both Eyes Nightly     PRN Meds: sodium chloride, sodium chloride flush, sodium chloride, polyethylene glycol, acetaminophen **OR** acetaminophen, glucose, dextrose bolus **OR** dextrose bolus, glucagon (rDNA), dextrose, albuterol      Intake/Output Summary (Last 24 hours) at 9/3/2022 1519  Last data filed at 9/3/2022 0648  Gross per 24 hour   Intake 1265.17 ml   Output 200 ml   Net 1065.17 ml       Exam:    BP (!) 146/60   Pulse 89   Temp 98 °F (36.7 °C) (Oral)   Resp 18   Ht 5' 6\" (1.676 m)   Wt 232 lb 4.8 oz (105.4 kg)   SpO2 98%   BMI 37.49 kg/m²       General appearance:  No apparent distress,  HEENT:  Normal cephalic, atraumatic without obvious deformity. Neck: Supple, with full range of motion. No jugular venous distention. Trachea midline. Respiratory:  Normal respiratory effort. Clear to auscultation,   Cardiovascular:  Regular rate and rhythm   Abdomen: Soft, non-tender, non-distended with normal bowel sounds. Musculoskeletal:  No clubbing, cyanosis POS edema bilaterally. DISCOLARTION OF SKIN RLE   Skin: Skin color, texture, turgor normal.  No rashes or lesions. Neurologic:  Neurovascularly intact   Psychiatric:  Alert and oriented,       Labs:   Recent Labs     09/01/22  1433 09/02/22  0303 09/02/22  1040 09/02/22  1825 09/03/22  0020 09/03/22  0640   WBC 6.0 5.6  --   --   --   --    HGB 9.2* 7.4*   < > 7.3* 6.8* 8.5*   HCT 29.6* 23.7*   < > 23.2* 21.3* 26.2*   PLT 94* 95*  --   --   --   --     < > = values in this interval not displayed. Recent Labs     09/01/22  1433 09/02/22  0303 09/03/22  1155    137 135   K 4.6 4.7 4.2    105 104   CO2 20* 23 20*   BUN 27* 25* 22   CREATININE 1.4* 1.4* 1.3*   CALCIUM 8.8 8.1* 8.0*     Recent Labs     09/01/22  1433   AST 17   ALT 14   BILITOT 0.4   ALKPHOS 102     No results for input(s): INR in the last 72 hours. No results for input(s): [facility] in the last 72 hours.   Recent Labs     09/01/22  1433   AST 17   ALT 14   BILITOT 0.4   ALKPHOS 102     Recent Labs     09/01/22  1433 09/01/22  1703 09/02/22  0303   LACTA 5.0* 2.9* 1.7     Lab Results   Component Value Date    LABURIC 7.1 (H) 10/25/2020     Lab Results   Component Value Date    AMMONIA 11.8 10/26/2020       Assessment:    Active Hospital Problems    Diagnosis Date Noted    Diarrhea [R19.7] 09/01/2022     Priority: Medium    Unable to care for self [Z78.9] 09/01/2022     Priority: Medium    Lactic acid acidosis [E87.2] 09/01/2022     Priority: Medium    CKD (chronic kidney disease) [N18.9] 09/01/2022     Priority: Medium    Chronic anemia [D64.9] 09/01/2022     Priority: Medium    Obesity (BMI 30-39. 9) [E66.9] 09/01/2022     Priority: Medium    History of CVA (cerebrovascular accident) [Z86.73] 02/02/2021    Diabetes mellitus ([address] [E11.9]     PAULINA (obstructive sleep apnea) [G47.33] 12/15/2016       PLAN  VANC  SSI   PLAQUENIL   CYMBALTA   LIPITOR           DVT Prophylaxis: HEPARIN  Diet: ADULT ORAL NUTRITION SUPPLEMENT; Breakfast, Dinner; Diabetic Oral Supplement  ADULT DIET;  Regular; 5 carb choices (75 gm/meal)  Code Status: Full Code     PT/OT Eval Status: ORDERED     Dispo - DONOVAN       Electronically signed by Elvi Vasquez DO on 9/3/2022 at 3:19 PM [facility]

## 2022-09-04 LAB
ANION GAP SERPL CALCULATED.3IONS-SCNC: 8 MMOL/L (ref 7–16)
BUN BLDV-MCNC: 17 MG/DL (ref 6–23)
CALCIUM SERPL-MCNC: 7.6 MG/DL (ref 8.6–10.2)
CHLORIDE BLD-SCNC: 108 MMOL/L (ref 98–107)
CO2: 21 MMOL/L (ref 22–29)
CREAT SERPL-MCNC: 1.3 MG/DL (ref 0.5–1)
GFR AFRICAN AMERICAN: 48
GFR NON-AFRICAN AMERICAN: 40 ML/MIN/1.73
GLUCOSE BLD-MCNC: 137 MG/DL (ref 74–99)
HCT VFR BLD CALC: 27.1 % (ref 34–48)
HEMOGLOBIN: 8.6 G/DL (ref 11.5–15.5)
POTASSIUM SERPL-SCNC: 3.8 MMOL/L (ref 3.5–5)
SODIUM BLD-SCNC: 137 MMOL/L (ref 132–146)

## 2022-09-04 PROCEDURE — 6370000000 HC RX 637 (ALT 250 FOR IP): Performed by: NURSE PRACTITIONER

## 2022-09-04 PROCEDURE — 36415 COLL VENOUS BLD VENIPUNCTURE: CPT

## 2022-09-04 PROCEDURE — 85014 HEMATOCRIT: CPT

## 2022-09-04 PROCEDURE — 2580000003 HC RX 258: Performed by: NURSE PRACTITIONER

## 2022-09-04 PROCEDURE — 80048 BASIC METABOLIC PNL TOTAL CA: CPT

## 2022-09-04 PROCEDURE — 6360000002 HC RX W HCPCS: Performed by: NURSE PRACTITIONER

## 2022-09-04 PROCEDURE — 85018 HEMOGLOBIN: CPT

## 2022-09-04 PROCEDURE — 2060000000 HC ICU INTERMEDIATE R&B

## 2022-09-04 PROCEDURE — C9113 INJ PANTOPRAZOLE SODIUM, VIA: HCPCS | Performed by: NURSE PRACTITIONER

## 2022-09-04 RX ADMIN — VERAPAMIL HYDROCHLORIDE 240 MG: 240 TABLET, FILM COATED, EXTENDED RELEASE ORAL at 09:54

## 2022-09-04 RX ADMIN — MICONAZOLE NITRATE: 2 POWDER TOPICAL at 09:55

## 2022-09-04 RX ADMIN — LATANOPROST 1 DROP: 50 SOLUTION OPHTHALMIC at 20:17

## 2022-09-04 RX ADMIN — HYDROXYCHLOROQUINE SULFATE 200 MG: 200 TABLET ORAL at 09:54

## 2022-09-04 RX ADMIN — OXYBUTYNIN CHLORIDE 15 MG: 5 TABLET, EXTENDED RELEASE ORAL at 09:53

## 2022-09-04 RX ADMIN — HYDROXYCHLOROQUINE SULFATE 200 MG: 200 TABLET ORAL at 20:16

## 2022-09-04 RX ADMIN — PANTOPRAZOLE SODIUM 40 MG: 40 INJECTION, POWDER, FOR SOLUTION INTRAVENOUS at 09:54

## 2022-09-04 RX ADMIN — ATORVASTATIN CALCIUM 20 MG: 20 TABLET, FILM COATED ORAL at 20:16

## 2022-09-04 RX ADMIN — Medication 10 ML: at 09:55

## 2022-09-04 RX ADMIN — MICONAZOLE NITRATE: 2 POWDER TOPICAL at 20:17

## 2022-09-04 RX ADMIN — DULOXETINE HYDROCHLORIDE 60 MG: 60 CAPSULE, DELAYED RELEASE ORAL at 09:54

## 2022-09-04 ASSESSMENT — PAIN SCALES - GENERAL
PAINLEVEL_OUTOF10: 0
PAINLEVEL_OUTOF10: 0

## 2022-09-04 NOTE — PROGRESS NOTES
Hospitalist Progress Note      PCP: Morris Lee MD    Date of Admission: 9/1/2022        Hospital Course:  65 y.o. female presented with DIARRHEA, [address] BROKE HER RIGHT FEMUR , HAD A   R ASHTYN. MAY 28, 2022. WAS ON ABX AFTER IT BC HER INCISION BECAME INFECTED  INFECTED X 3 AND SHE HAS NOW DEVELOPED WEAKNESS , ANOREXIA, NAUSEA, AND DIARRHEA(MORE LOOSE MUSH BM). WITH CHILLS   WAS ON ABX AT HOME FOR VRE.   STATES SHE  THE DIARRHEA COMES AND SHE DOES NOT KNOW IT, ABOUT 2-3 STOOLS A DAY**  HGB DECREASING, WILL REQUEST GI EVAL MONITOR HH        Subjective:  FEELS BETTER           Medications:  Reviewed    Infusion Medications    sodium chloride      sodium chloride      dextrose      sodium chloride 50 mL/hr at 09/03/22 0631     Scheduled Medications    pantoprazole  40 mg IntraVENous Daily    miconazole   Topical BID    sodium chloride flush  5-40 mL IntraVENous 2 times per day    [Held by provider] heparin (porcine)  5,000 Units SubCUTAneous Q8H    atorvastatin  20 mg Oral Nightly    [Held by provider] clopidogrel  75 mg Oral Daily    DULoxetine  60 mg Oral QAM    hydroxychloroquine  200 mg Oral BID    oxybutynin  15 mg Oral Daily    verapamil  240 mg Oral Daily    insulin lispro  0-4 Units SubCUTAneous TID WC    insulin lispro  0-4 Units SubCUTAneous Nightly    latanoprost  1 drop Both Eyes Nightly     PRN Meds: sodium chloride, sodium chloride flush, sodium chloride, polyethylene glycol, acetaminophen **OR** acetaminophen, glucose, dextrose bolus **OR** dextrose bolus, glucagon (rDNA), dextrose, albuterol      Intake/Output Summary (Last 24 hours) at 9/4/2022 1456  Last data filed at 9/4/2022 0403  Gross per 24 hour   Intake --   Output 425 ml   Net -425 ml       Exam:    BP (!) 151/63   Pulse 85   Temp 97.9 °F (36.6 °C) (Oral)   Resp 16   Ht 5' 6\" (1.676 m)   Wt 235 lb 9.6 oz (106.9 kg)   SpO2 95%   BMI 38.03 kg/m²       General appearance:  No apparent distress,  HEENT:  Normal cephalic, a  Neck: Supple, with full range of motion. No jugular venous distention. Trachea midline. Respiratory:  Normal respiratory effort. Clear to auscultation, bilaterally without Rales/Wheezes/Rhonchi. Cardiovascular:  Regular rate and rhythm   Abdomen: Soft, non-tender, non-distended with normal bowel sounds. Musculoskeletal:  No clubbing, cyanosis or edema bilaterally. Skin: Skin color, texture, turgor normal.  No rashes or lesions. Neurologic:  Neurovascularly   Psychiatric:  Alert and oriented,             Labs:   Recent Labs     09/02/22  0303 09/02/22  1040 09/03/22  0020 09/03/22  0640 09/04/22  1055   WBC 5.6  --   --   --   --    HGB 7.4*   < > 6.8* 8.5* 8.6*   HCT 23.7*   < > 21.3* 26.2* 27.1*   PLT 95*  --   --   --   --     < > = values in this interval not displayed. Recent Labs     09/02/22  0303 09/03/22  1155 09/04/22  0520    135 137   K 4.7 4.2 3.8    104 108*   CO2 23 20* 21*   BUN 25* 22 17   CREATININE 1.4* 1.3* 1.3*   CALCIUM 8.1* 8.0* 7.6*     No results for input(s): AST, ALT, BILIDIR, BILITOT, ALKPHOS in the last 72 hours. No results for input(s): INR in the last 72 hours. No results for input(s): Cherre Gash in the last 72 hours. No results for input(s): AST, ALT, ALB, BILIDIR, BILITOT, ALKPHOS in the last 72 hours. Recent Labs     09/01/22  1703 09/02/22  0303   LACTA 2.9* 1.7     Lab Results   Component Value Date    LABURIC 7.1 (H) 10/25/2020     Lab Results   Component Value Date    AMMONIA 11.8 10/26/2020       Assessment:    Active Hospital Problems    Diagnosis Date Noted    Diarrhea [R19.7] 09/01/2022     Priority: Medium    Unable to care for self [Z78.9] 09/01/2022     Priority: Medium    Lactic acid acidosis [E87.2] 09/01/2022     Priority: Medium    CKD (chronic kidney disease) [N18.9] 09/01/2022     Priority: Medium    Chronic anemia [D64.9] 09/01/2022     Priority: Medium    Obesity (BMI 30-39. 9) [E66.9] 09/01/2022     Priority: Medium History of CVA (cerebrovascular accident) [Z86.73] 02/02/2021    Diabetes mellitus ([facility] [E11.9]     PAULINA (obstructive sleep apnea) [G47.33] 12/15/2016   ACUTE ANEMIA ( AWAITING HEMOCCULT .)        PLAN:  SSI   PLAQUENIL   CYMBALTA   LIPITOR           DVT Prophylaxis: HEPARIN  Diet: ADULT ORAL NUTRITION SUPPLEMENT; Breakfast, Dinner; Diabetic Oral Supplement  ADULT DIET;  Regular; 5 carb choices (75 gm/meal)  Code Status: Full Code     PT/OT Eval Status: ORDERED     Dispo - DONOVAN    Electronically signed by Matt Suggs DO on 9/4/2022 at 2:56 PM [facility]

## 2022-09-04 NOTE — CONSULTS
Consult received  Charts/labs reviewed  Extensive review of medical record  See orders  Full consult to follow        Discussed with Dr. Germania Armando, APRN - CNP , APRN NP-C 10:47 AM for Dr. Niki Mcfadden

## 2022-09-05 LAB
ANION GAP SERPL CALCULATED.3IONS-SCNC: 8 MMOL/L (ref 7–16)
BUN BLDV-MCNC: 14 MG/DL (ref 6–23)
CALCIUM SERPL-MCNC: 7.8 MG/DL (ref 8.6–10.2)
CHLORIDE BLD-SCNC: 109 MMOL/L (ref 98–107)
CO2: 22 MMOL/L (ref 22–29)
CREAT SERPL-MCNC: 1.2 MG/DL (ref 0.5–1)
CULTURE, STOOL: ABNORMAL
GFR AFRICAN AMERICAN: 53
GFR NON-AFRICAN AMERICAN: 43 ML/MIN/1.73
GLUCOSE BLD-MCNC: 139 MG/DL (ref 74–99)
HCT VFR BLD CALC: 25.6 % (ref 34–48)
HCT VFR BLD CALC: 26.6 % (ref 34–48)
HEMOGLOBIN: 8.2 G/DL (ref 11.5–15.5)
HEMOGLOBIN: 8.6 G/DL (ref 11.5–15.5)
POTASSIUM SERPL-SCNC: 3.6 MMOL/L (ref 3.5–5)
SODIUM BLD-SCNC: 139 MMOL/L (ref 132–146)

## 2022-09-05 PROCEDURE — 85014 HEMATOCRIT: CPT

## 2022-09-05 PROCEDURE — 6370000000 HC RX 637 (ALT 250 FOR IP): Performed by: NURSE PRACTITIONER

## 2022-09-05 PROCEDURE — 2060000000 HC ICU INTERMEDIATE R&B

## 2022-09-05 PROCEDURE — 85018 HEMOGLOBIN: CPT

## 2022-09-05 PROCEDURE — 36415 COLL VENOUS BLD VENIPUNCTURE: CPT

## 2022-09-05 PROCEDURE — 2580000003 HC RX 258: Performed by: NURSE PRACTITIONER

## 2022-09-05 PROCEDURE — 6370000000 HC RX 637 (ALT 250 FOR IP): Performed by: INTERNAL MEDICINE

## 2022-09-05 PROCEDURE — C9113 INJ PANTOPRAZOLE SODIUM, VIA: HCPCS | Performed by: NURSE PRACTITIONER

## 2022-09-05 PROCEDURE — 6360000002 HC RX W HCPCS: Performed by: NURSE PRACTITIONER

## 2022-09-05 PROCEDURE — 80048 BASIC METABOLIC PNL TOTAL CA: CPT

## 2022-09-05 RX ORDER — POTASSIUM CHLORIDE 20 MEQ/1
40 TABLET, EXTENDED RELEASE ORAL ONCE
Status: COMPLETED | OUTPATIENT
Start: 2022-09-05 | End: 2022-09-05

## 2022-09-05 RX ADMIN — SODIUM CHLORIDE: 9 INJECTION, SOLUTION INTRAVENOUS at 16:13

## 2022-09-05 RX ADMIN — ATORVASTATIN CALCIUM 20 MG: 20 TABLET, FILM COATED ORAL at 20:49

## 2022-09-05 RX ADMIN — Medication 10 ML: at 20:49

## 2022-09-05 RX ADMIN — PANTOPRAZOLE SODIUM 40 MG: 40 INJECTION, POWDER, FOR SOLUTION INTRAVENOUS at 08:19

## 2022-09-05 RX ADMIN — LATANOPROST 1 DROP: 50 SOLUTION OPHTHALMIC at 20:49

## 2022-09-05 RX ADMIN — OXYBUTYNIN CHLORIDE 15 MG: 5 TABLET, EXTENDED RELEASE ORAL at 08:18

## 2022-09-05 RX ADMIN — MICONAZOLE NITRATE: 2 POWDER TOPICAL at 20:49

## 2022-09-05 RX ADMIN — Medication 10 ML: at 08:19

## 2022-09-05 RX ADMIN — POTASSIUM CHLORIDE 40 MEQ: 1500 TABLET, EXTENDED RELEASE ORAL at 09:49

## 2022-09-05 RX ADMIN — DULOXETINE HYDROCHLORIDE 60 MG: 60 CAPSULE, DELAYED RELEASE ORAL at 08:18

## 2022-09-05 RX ADMIN — HYDROXYCHLOROQUINE SULFATE 200 MG: 200 TABLET ORAL at 20:49

## 2022-09-05 RX ADMIN — MICONAZOLE NITRATE: 2 POWDER TOPICAL at 08:24

## 2022-09-05 RX ADMIN — HYDROXYCHLOROQUINE SULFATE 200 MG: 200 TABLET ORAL at 08:19

## 2022-09-05 RX ADMIN — VERAPAMIL HYDROCHLORIDE 240 MG: 240 TABLET, FILM COATED, EXTENDED RELEASE ORAL at 08:21

## 2022-09-05 ASSESSMENT — PAIN SCALES - GENERAL
PAINLEVEL_OUTOF10: 0

## 2022-09-05 NOTE — PLAN OF CARE
Problem: Safety - Adult  Goal: Free from fall injury  Outcome: Progressing     Problem: Nutrition Deficit:  Goal: Optimize nutritional status  Outcome: Progressing

## 2022-09-05 NOTE — PROGRESS NOTES
Hospitalist Progress Note      PCP: Kaz Palm MD    Date of Admission: 9/1/2022        Hospital Course:  65 y.o. female presented with DIARRHEA, [address] BROKE HER RIGHT FEMUR , HAD A   R ASHTYN. MAY 28, 2022. WAS ON ABX AFTER IT BC HER INCISION BECAME INFECTED  INFECTED X 3 AND SHE HAS NOW DEVELOPED WEAKNESS , ANOREXIA, NAUSEA, AND DIARRHEA(MORE LOOSE MUSH BM). WITH CHILLS   WAS ON ABX AT HOME FOR VRE.   STATES SHE  THE DIARRHEA COMES AND SHE DOES NOT KNOW IT, ABOUT 2-3 STOOLS A DAY**  HGB DECREASING, WILL REQUEST GI EVAL MONITOR HH , HEMOCULT POS        Subjective:  FEELING BETTER          Medications:  Reviewed    Infusion Medications    sodium chloride      sodium chloride      dextrose      sodium chloride 50 mL/hr at 09/03/22 0631     Scheduled Medications    pantoprazole  40 mg IntraVENous Daily    miconazole   Topical BID    sodium chloride flush  5-40 mL IntraVENous 2 times per day    [Held by provider] heparin (porcine)  5,000 Units SubCUTAneous Q8H    atorvastatin  20 mg Oral Nightly    [Held by provider] clopidogrel  75 mg Oral Daily    DULoxetine  60 mg Oral QAM    hydroxychloroquine  200 mg Oral BID    oxybutynin  15 mg Oral Daily    verapamil  240 mg Oral Daily    insulin lispro  0-4 Units SubCUTAneous TID WC    insulin lispro  0-4 Units SubCUTAneous Nightly    latanoprost  1 drop Both Eyes Nightly     PRN Meds: sodium chloride, sodium chloride flush, sodium chloride, polyethylene glycol, acetaminophen **OR** acetaminophen, glucose, dextrose bolus **OR** dextrose bolus, glucagon (rDNA), dextrose, albuterol      Intake/Output Summary (Last 24 hours) at 9/5/2022 1424  Last data filed at 9/5/2022 0549  Gross per 24 hour   Intake 470 ml   Output 1000 ml   Net -530 ml       Exam:    BP (!) 167/70   Pulse 88   Temp 97.6 °F (36.4 °C) (Oral)   Resp 16   Ht 5' 6\" (1.676 m)   Wt 239 lb (108.4 kg)   SpO2 99%   BMI 38.58 kg/m²     General appearance:  No apparent distress,  HEENT: Normal cephalic, a  Neck: Supple, with full range of motion. No jugular venous distention. Trachea midline. Respiratory:  Normal respiratory effort. Clear to auscultation, bilaterally without Rales/Wheezes/Rhonchi. Cardiovascular:  Regular rate and rhythm   Abdomen: Soft, non-tender, non-distended with normal bowel sounds. Musculoskeletal:  No clubbing, cyanosis or edema bilaterally. Skin: Skin color, texture, turgor normal.  No rashes or lesions. Neurologic:  Neurovascularly INTACT  Psychiatric:  Alert and oriented,                  Labs:   Recent Labs     09/03/22  0640 09/04/22  1055 09/05/22  0830   HGB 8.5* 8.6* 8.2*   HCT 26.2* 27.1* 25.6*     Recent Labs     09/03/22  1155 09/04/22  0520 09/05/22  0252    137 139   K 4.2 3.8 3.6    108* 109*   CO2 20* 21* 22   BUN 22 17 14   CREATININE 1.3* 1.3* 1.2*   CALCIUM 8.0* 7.6* 7.8*     No results for input(s): AST, ALT, BILIDIR, BILITOT, ALKPHOS in the last 72 hours. No results for input(s): INR in the last 72 hours. No results for input(s): Alethea Horsfall in the last 72 hours. No results for input(s): AST, ALT, ALB, BILIDIR, BILITOT, ALKPHOS in the last 72 hours. No results for input(s): LACTA in the last 72 hours. Lab Results   Component Value Date    LABURIC 7.1 (H) 10/25/2020     Lab Results   Component Value Date    AMMONIA 11.8 10/26/2020       Assessment:    Active Hospital Problems    Diagnosis Date Noted    Diarrhea [R19.7] 09/01/2022     Priority: Medium    Unable to care for self [Z78.9] 09/01/2022     Priority: Medium    Lactic acid acidosis [E87.2] 09/01/2022     Priority: Medium    CKD (chronic kidney disease) [N18.9] 09/01/2022     Priority: Medium    Chronic anemia [D64.9] 09/01/2022     Priority: Medium    Obesity (BMI 30-39. 9) [E66.9] 09/01/2022     Priority: Medium    History of CVA (cerebrovascular accident) [Z86.73] 02/02/2021    Di[facility] ([facility] [E11.9]     PAULINA (obstructive sleep apnea) [G47.33] 12/15/2016 HEME POS STOOL  ACUTE  BLOOD LOSS ANEMIA)        PLAN:  SSI   PLAQUENIL   CYMBALTA   LIPITOR           DVT Prophylaxis: HEPARIN  Diet: ADULT ORAL NUTRITION SUPPLEMENT; Breakfast, Dinner; Diabetic Oral Supplement  ADULT DIET;  Regular; 5 carb choices (75 gm/meal)  Code Status: Full Code     PT/OT Eval Status: ORDERED     Dispo - DONOVAN    Electronically signed by See Ambriz DO on 9/5/2022 at 2:24 PM Maribell sims

## 2022-09-05 NOTE — PROGRESS NOTES
Patient refusing accuchecks.      Electronically signed by Jose A Simmons RN on 9/5/2022 at 11:40 AM

## 2022-09-06 VITALS
BODY MASS INDEX: 38.62 KG/M2 | OXYGEN SATURATION: 97 % | DIASTOLIC BLOOD PRESSURE: 64 MMHG | HEART RATE: 92 BPM | WEIGHT: 240.3 LBS | RESPIRATION RATE: 16 BRPM | HEIGHT: 66 IN | SYSTOLIC BLOOD PRESSURE: 146 MMHG | TEMPERATURE: 98.6 F

## 2022-09-06 LAB
ANION GAP SERPL CALCULATED.3IONS-SCNC: 7 MMOL/L (ref 7–16)
BUN BLDV-MCNC: 12 MG/DL (ref 6–23)
CALCIUM SERPL-MCNC: 7.7 MG/DL (ref 8.6–10.2)
CHLORIDE BLD-SCNC: 107 MMOL/L (ref 98–107)
CO2: 23 MMOL/L (ref 22–29)
CREAT SERPL-MCNC: 1.2 MG/DL (ref 0.5–1)
GFR AFRICAN AMERICAN: 53
GFR NON-AFRICAN AMERICAN: 43 ML/MIN/1.73
GIARDIA ANTIGEN STOOL: NORMAL
GLUCOSE BLD-MCNC: 165 MG/DL (ref 74–99)
HCT VFR BLD CALC: 26.7 % (ref 34–48)
HEMOGLOBIN: 8.8 G/DL (ref 11.5–15.5)
POTASSIUM SERPL-SCNC: 4.1 MMOL/L (ref 3.5–5)
SODIUM BLD-SCNC: 137 MMOL/L (ref 132–146)

## 2022-09-06 PROCEDURE — 6360000002 HC RX W HCPCS: Performed by: NURSE PRACTITIONER

## 2022-09-06 PROCEDURE — 85018 HEMOGLOBIN: CPT

## 2022-09-06 PROCEDURE — 80048 BASIC METABOLIC PNL TOTAL CA: CPT

## 2022-09-06 PROCEDURE — 85014 HEMATOCRIT: CPT

## 2022-09-06 PROCEDURE — 6370000000 HC RX 637 (ALT 250 FOR IP): Performed by: NURSE PRACTITIONER

## 2022-09-06 PROCEDURE — 36415 COLL VENOUS BLD VENIPUNCTURE: CPT

## 2022-09-06 PROCEDURE — C9113 INJ PANTOPRAZOLE SODIUM, VIA: HCPCS | Performed by: NURSE PRACTITIONER

## 2022-09-06 PROCEDURE — 2580000003 HC RX 258: Performed by: NURSE PRACTITIONER

## 2022-09-06 RX ORDER — GLIMEPIRIDE 1 MG/1
1 TABLET ORAL
Qty: 30 TABLET | Refills: 3 | Status: SHIPPED | OUTPATIENT
Start: 2022-09-06

## 2022-09-06 RX ADMIN — ACETAMINOPHEN 650 MG: 325 TABLET ORAL at 00:14

## 2022-09-06 RX ADMIN — MICONAZOLE NITRATE: 2 POWDER TOPICAL at 08:29

## 2022-09-06 RX ADMIN — HYDROXYCHLOROQUINE SULFATE 200 MG: 200 TABLET ORAL at 08:27

## 2022-09-06 RX ADMIN — VERAPAMIL HYDROCHLORIDE 240 MG: 240 TABLET, FILM COATED, EXTENDED RELEASE ORAL at 08:26

## 2022-09-06 RX ADMIN — PANTOPRAZOLE SODIUM 40 MG: 40 INJECTION, POWDER, FOR SOLUTION INTRAVENOUS at 08:27

## 2022-09-06 RX ADMIN — Medication 10 ML: at 08:28

## 2022-09-06 RX ADMIN — OXYBUTYNIN CHLORIDE 15 MG: 5 TABLET, EXTENDED RELEASE ORAL at 08:26

## 2022-09-06 RX ADMIN — DULOXETINE HYDROCHLORIDE 60 MG: 60 CAPSULE, DELAYED RELEASE ORAL at 08:26

## 2022-09-06 ASSESSMENT — PAIN DESCRIPTION - LOCATION: LOCATION: ARM;LEG;FOOT;HAND

## 2022-09-06 ASSESSMENT — PAIN SCALES - GENERAL
PAINLEVEL_OUTOF10: 5
PAINLEVEL_OUTOF10: 2

## 2022-09-06 ASSESSMENT — PAIN DESCRIPTION - ORIENTATION: ORIENTATION: MID

## 2022-09-06 ASSESSMENT — PAIN DESCRIPTION - DESCRIPTORS: DESCRIPTORS: ACHING;SORE

## 2022-09-06 ASSESSMENT — PAIN - FUNCTIONAL ASSESSMENT: PAIN_FUNCTIONAL_ASSESSMENT: ACTIVITIES ARE NOT PREVENTED

## 2022-09-06 NOTE — PROGRESS NOTES
Patient refusing PM and AM blood sugar checks.     Electronically signed by Jeanmarie Burk LPN on 1/5/2613 at 8:16 AM

## 2022-09-06 NOTE — CARE COORDINATION
Social work / Discharge Planning:           [place]  updated on discharge.    Electronically signed by ANTHONY Davalos on 9/6/2022 at 1:40 PM

## 2022-09-06 NOTE — PROGRESS NOTES
Physician Progress Note      PATIENT:               Dariel Velasco  CSN #:                  [id number]  :                       1945  ADMIT DATE:       2022 12:39 PM  [address] DATE:  Garret Matt  PROVIDER #:        Matheus De La O DO          QUERY TEXT:    Patient admitted with diarrhea. Noted documentation of Severe malnutrition by   dietary consultant. If possible, please document in progress notes and   discharge summary:    The medical record reflects the following:  Risk Factors: Diarrhea  Clinical Indicators: per Dietary consult \". Tasha Gip Tasha Gip Severe malnutrition, In context   of acute illness or injury related to altered GI function. Tasha Gip Tasha Gip \"  Treatment: ONS  Options provided:  -- Severe malnutrition confirmed present on admission  -- Severe malnutrition confirmed not present on admission  -- Severe malnutrition ruled out  -- Other - I will add my own diagnosis  -- Disagree - Not applicable / Not valid  -- Disagree - Clinically unable to determine / Unknown  -- Refer to Clinical Documentation Reviewer    PROVIDER RESPONSE TEXT:    The diagnosis of Severe malnutrition was confirmed as present on admission.     Query created by: America Phelan on 2022 2:39 PM      Electronically signed by:  Matheus De La O DO 2022 2:41 PM

## 2022-09-06 NOTE — PROGRESS NOTES
[facility] Quality Flow/Interdisciplinary Rounds Progress Note        Quality Flow Rounds held on September 6, 2022    Disciplines Attending:  Bedside Nurse, , , and Nursing Unit Leadership    Priscilla Cerna was admitted on 9/1/2022 12:39 PM    Anticipated Discharge Date:  Expected Discharge Date: 09/04/22    Disposition:    Jeremy Score:  Jeremy Scale Score: 14    Readmission Risk              Risk of Unplanned Readmission:  33           Discussed patient goal for the day, patient clinical progression, and barriers to discharge. The following Goal(s) of the Day/Commitment(s) have been identified:  EGD vs discharge? Check GI plan.       Melissa Morejon RN  September 6, 2022

## 2022-09-06 NOTE — PROGRESS NOTES
PROGRESS NOTE        Patient Presents with/Seen in Consultation For      *anemia  CHIEF COMPLAINT:  nausea & diarrhea     Subjective:     Patient seen laying in bed in no apparent distress. Denies abdominal pain or nausea. Tolerating diet. Denies diarrhea, states last BM yesterday, denies melena or hematochezia. No gross bleeding reported, hgb stable. POC d/w pt verbalizing understanding. Review of Systems  Aside from what was mentioned in the PMH and HPI, essentially unremarkable, all others negative. Objective:     BP (!) 146/64 Comment: HR 92  Pulse 92   Temp 98.6 °F (37 °C) (Oral)   Resp 16   Ht 5' 6\" (1.676 m)   Wt 240 lb 4.8 oz (109 kg)   SpO2 97%   BMI 38.79 kg/m²     General appearance: alert, awake, laying in bed, and cooperative  Eyes: conjunctiva pale, sclera anicteric. PERRL.   Lungs: clear to auscultation bilaterally  Heart: regular rate and rhythm, no murmur, 2+ pulses; trace edema  Abdomen: soft, non-tender; bowel sounds normal; no masses,  no organomegaly  Extremities: extremities trace edema  Pulses: 2+ and symmetric  Skin: Skin color, texture, turgor normal.   Neurologic: Grossly normal    0.9 % sodium chloride infusion, PRN  pantoprazole (PROTONIX) injection 40 mg, Daily  miconazole (MICOTIN) 2 % powder, BID  sodium chloride flush 0.9 % injection 5-40 mL, 2 times per day  sodium chloride flush 0.9 % injection 5-40 mL, PRN  0.9 % sodium chloride infusion, PRN  polyethylene glycol (GLYCOLAX) packet 17 g, Daily PRN  acetaminophen (TYLENOL) tablet 650 mg, Q6H PRN   Or  acetaminophen (TYLENOL) suppository 650 mg, Q6H PRN  [Held by provider] heparin (porcine) injection 5,000 Units, Q8H  glucose chewable tablet 16 g, PRN  dextrose bolus 10% 125 mL, PRN   Or  dextrose bolus 10% 250 mL, PRN  glucagon (rDNA) injection 1 mg, PRN  dextrose 10 % infusion, Continuous PRN  0.9 % sodium chloride infusion, Continuous  atorvastatin (LIPITOR) tablet 20 mg, Nightly  [Held by provider] clopidogrel 10/25/2020    TRIG 120 07/18/2017       Lab Results   Component Value Date    HDL 62 10/25/2020    HDL 45 07/18/2017       Lab Results   Component Value Date    LDLCALC 71 10/25/2020    LDLCALC 57 07/18/2017       Lab Results   Component Value Date    LABVLDL 13 10/25/2020    LABVLDL 24 07/18/2017      PT/INR:    Lab Results   Component Value Date/Time    PROTIME 11.9 05/27/2022 08:39 AM    PROTIME 12.0 12/09/2011 12:35 PM    INR 1.1 05/27/2022 08:39 AM     IRON:    Lab Results   Component Value Date/Time    IRON 53 12/02/2020 04:10 AM     Iron Saturation:  No components found for: PERCENTFE  FERRITIN:    Lab Results   Component Value Date/Time    FERRITIN 308 12/02/2020 04:10 AM         Assessment:     Active Problems:  Anemia, normocytic   Nausea  Diarrhea- improved per pt   S/P R Total HIp 5/28/22- on antibiotic recently for infected hardware  Weakness   Hx colon polyps TA in 2019  Diverticulosis     Plan:   Hgb stable up from yesterday, no current gross signs of bleeding, EGD to be arranged as outpatient  Continue to medicate for nausea as ordered  Medical management per Primary care, including pain management  Continue Protonix as ordered  Stool studies thus far are negative to include c-diff   Supportive care  Discharge per PCP, ok from GI POV with outpatient scheduling of EGD, office to arrange        Note: This report was completed utilizing computer voice recognition software. Every effort has been made to ensure accuracy, however; inadvertent computerized transcription errors may be present.      Discussed with Dr. Kj Magaña per Dr. Rigo Jones APRN-NP-C 9/6/2022  11:02 AM For Dr. Kervin Schofield Statement Selected

## 2022-09-06 NOTE — PROGRESS NOTES
CLINICAL PHARMACY NOTE: MEDS TO BEDS    Total # of Prescriptions Filled: 1   The following medications were delivered to the patient:  Glimepiride 1mg      Additional Documentation:

## 2022-09-06 NOTE — PROGRESS NOTES
Hospitalist Progress Note      PCP: Stephen Romo MD    Date of Admission: 9/1/2022        Hospital Course:  65 y.o. female presented with DIARRHEA, [address] BROKE HER RIGHT FEMUR , HAD A   R ASHTYN. MAY 28, 2022. WAS ON ABX AFTER IT BC HER INCISION BECAME INFECTED  INFECTED X 3 AND SHE HAS NOW DEVELOPED WEAKNESS , ANOREXIA, NAUSEA, AND DIARRHEA(MORE LOOSE MUSH BM). WITH CHILLS   WAS ON ABX AT HOME FOR VRE.   STATES SHE  THE DIARRHEA COMES AND SHE DOES NOT KNOW IT, ABOUT 2-3 STOOLS A DAY**  HGB DECREASING, WILL REQUEST GI EVAL MONITOR HH , HEMOCULT POS** NO MORE DIARRHEA, COLON EVAL AS AN OP,  WILL DC HOME           Subjective:  WANTS TO GO HOME           Medications:  Reviewed    Infusion Medications    sodium chloride      sodium chloride      dextrose      sodium chloride Stopped (09/06/22 1335)     Scheduled Medications    pantoprazole  40 mg IntraVENous Daily    miconazole   Topical BID    sodium chloride flush  5-40 mL IntraVENous 2 times per day    [Held by provider] heparin (porcine)  5,000 Units SubCUTAneous Q8H    atorvastatin  20 mg Oral Nightly    [Held by provider] clopidogrel  75 mg Oral Daily    DULoxetine  60 mg Oral QAM    hydroxychloroquine  200 mg Oral BID    oxybutynin  15 mg Oral Daily    verapamil  240 mg Oral Daily    insulin lispro  0-4 Units SubCUTAneous TID WC    insulin lispro  0-4 Units SubCUTAneous Nightly    latanoprost  1 drop Both Eyes Nightly     PRN Meds: sodium chloride, sodium chloride flush, sodium chloride, polyethylene glycol, acetaminophen **OR** acetaminophen, glucose, dextrose bolus **OR** dextrose bolus, glucagon (rDNA), dextrose, albuterol      Intake/Output Summary (Last 24 hours) at 9/6/2022 1441  Last data filed at 9/6/2022 0609  Gross per 24 hour   Intake 317.5 ml   Output 1800 ml   Net -1482.5 ml       Exam:    BP (!) 146/64 Comment: HR 92  Pulse 92   Temp 98.6 °F (37 °C) (Oral)   Resp 16   Ht 5' 6\" (1.676 m)   Wt 240 lb 4.8 oz (109 kg)   SpO2 97%   BMI 38.79 kg/m²       General appearance:  No apparent distress,  HEENT:  Normal cephalic, a  Neck: Supple, with full range of motion. No jugular venous distention. Trachea midline. Respiratory:  Normal respiratory effort. Clear to auscultation, bilaterally without Rales/Wheezes/Rhonchi. Cardiovascular:  Regular rate and rhythm   Abdomen: Soft, non-tender, non-distended with normal bowel sounds. Musculoskeletal:  No clubbing, cyanosis or edema bilaterally. Skin: Skin color, texture, turgor normal.  No rashes or lesions. Neurologic:  Neurovascularly INTACT  Psychiatric:  Alert and oriented,             Labs:   Recent Labs     09/05/22  0830 09/05/22  2115 09/06/22  0950   HGB 8.2* 8.6* 8.8*   HCT 25.6* 26.6* 26.7*     Recent Labs     09/04/22  0520 09/05/22  0252 09/06/22  0605    139 137   K 3.8 3.6 4.1   * 109* 107   CO2 21* 22 23   BUN 17 14 12   CREATININE 1.3* 1.2* 1.2*   CALCIUM 7.6* 7.8* 7.7*     No results for input(s): AST, ALT, BILIDIR, BILITOT, ALKPHOS in the last 72 hours. No results for input(s): INR in the last 72 hours. No results for input(s): Francisca Kaska in the last 72 hours. No results for input(s): AST, ALT, ALB, BILIDIR, BILITOT, ALKPHOS in the last 72 hours. No results for input(s): LACTA in the last 72 hours. Lab Results   Component Value Date    LABURIC 7.1 (H) 10/25/2020     Lab Results   Component Value Date    AMMONIA 11.8 10/26/2020       Assessment:    Active Hospital Problems    Diagnosis Date Noted    Diarrhea [R19.7] 09/01/2022     Priority: Medium    Unable to care for self [Z78.9] 09/01/2022     Priority: Medium    Lactic acid acidosis [E87.2] 09/01/2022     Priority: Medium    CKD (chronic kidney disease) [N18.9] 09/01/2022     Priority: Medium    Chronic anemia [D64.9] 09/01/2022     Priority: Medium    Obesity (BMI 30-39. 9) [E66.9] 09/01/2022     Priority: Medium    History of CVA (cerebrovascular accident) [Z86.73] 02/02/2021    Diabetes mellitus ([facility] [address] [E11.9]     PAULINA (obstructive sleep apnea) [G47.33] 12/15/2016   HEME POS STOOL  ACUTE ANEMIA         PLAN:  SSI   PLAQUEJÚNIOR TURNERMBALTA   LIPITOR         Plan:Electronically signed by Elvi Vasquez DO on 9/6/2022 at 2:41 PM Dalila Che

## 2022-09-06 NOTE — CARE COORDINATION
Social work / Discharge Planning:        Initial assessment completed on 9/2/22. Patient refused DONOVAN at that time and plans for discharge home to resume home care with [facility]. Will need BERNICE. PT has not worked with patient yet.    Electronically signed by ANTHONY Chaney on 9/6/2022 at 10:06 AM

## 2022-09-12 NOTE — DISCHARGE SUMMARY
Hospitalist Discharge Summary    Patient ID: Roz Montilla   Patient : 1945  Patient's PCP: Robyn Matias MD    Admit Date: 2022   Admitting Physician: Robyn Matias MD    Discharge Date:  2022   Discharge Physician: Jannet Blount DO   Discharge Condition: Stable  Discharge Disposition: Home      Discharge Diagnoses: Active Hospital Problems    Diagnosis Date Noted    Diarrhea [R19.7] 2022     Priority: Medium    Unable to care for self [Z78.9] 2022     Priority: Medium    Lactic acid acidosis [E87.2] 2022     Priority: Medium    CKD (chronic kidney disease) [N18.9] 2022     Priority: Medium    Chronic anemia [D64.9] 2022     Priority: Medium    Obesity (BMI 30-39. 9) [E66.9] 2022     Priority: Medium    History of CVA (cerebrovascular accident) [Z86.73] 2021    Diabetes mellitus ([facility] [address] [E11.9]     PAULINA (obstructive sleep apnea) [G47.33] 12/15/2016   HEME POS STOOL  ACUTE ANEMIA         Hospital course in brief:    65 y.o. female presented with DIARRHEA, [address] , HAD A   R ASHTYN. MAY 28, 2022. WAS ON ABX AFTER IT BC HER INCISION BECAME INFECTED  INFECTED X 3 AND SHE HAS NOW DEVELOPED WEAKNESS , ANOREXIA, NAUSEA, AND DIARRHEA(MORE LOOSE MUSH BM). WITH CHILLS   WAS ON ABX AT HOME FOR VRE. STATES SHE  THE DIARRHEA COMES AND SHE DOES NOT KNOW IT, ABOUT 2-3 STOOLS A DAY**  HGB DECREASING, WILL REQUEST GI EVAL MONITOR HH , HEMOCULT POS** NO MORE DIARRHEA, COLON EVAL AS AN OP,  WILL DC HOME       PHYSICAL EXAM:    BP (!) 146/64 Comment: HR 92  Pulse 92   Temp 98.6 °F (37 °C) (Oral)   Resp 16   Ht 5' 6\" (1.676 m)   Wt 240 lb 4.8 oz (109 kg)   SpO2 97%   BMI 38.79 kg/m²   General appearance:  No apparent distress,  HEENT:  Normal cephalic, a  Neck: Supple, with full range of motion. No jugular venous distention. Trachea midline. Respiratory:  Normal respiratory effort.  Clear to auscultation, bilaterally without Rales/Wheezes/Rhonchi. Cardiovascular:  Regular rate and rhythm   Abdomen: Soft, non-tender, non-distended with normal bowel sounds. Musculoskeletal:  No clubbing, cyanosis or edema bilaterally. Skin: Skin color, texture, turgor normal.  No rashes or lesions. Neurologic:  Neurovascularly INTACT  Psychiatric:  Alert and oriented,         Prior to Admission medications    Medication Sig Start Date End Date Taking? Authorizing Provider   glimepiride (AMARYL) 1 MG tablet Take 1 tablet by mouth every morning (before breakfast) 9/6/22  Yes Allyson Harrison,    atorvastatin (LIPITOR) 20 MG tablet Take 20 mg by mouth nightly   Yes Historical Provider, MD   clopidogrel (PLAVIX) 75 MG tablet Take 75 mg by mouth daily   Yes Historical Provider, MD   verapamil (CALAN SR) 240 MG extended release tablet Take 240 mg by mouth daily   Yes Historical Provider, MD   albuterol sulfate HFA (PROVENTIL;VENTOLIN;PROAIR) 108 (90 Base) MCG/ACT inhaler Inhale 1 puff into the lungs every 6 hours as needed for Wheezing    Historical Provider, MD   Multiple Vitamins-Minerals (THERAPEUTIC MULTIVITAMIN-MINERALS) tablet Take 1 tablet by mouth three times a week Given Monday,Wednesday,Friday    Historical Provider, MD   bimatoprost (LUMIGAN) 0.01 % SOLN ophthalmic drops Place 1 drop into both eyes nightly    Historical Provider, MD   oxybutynin (DITROPAN XL) 15 MG extended release tablet Take 15 mg by mouth daily    Historical Provider, MD   hydroxychloroquine (PLAQUENIL) 200 MG tablet Take 200 mg by mouth 2 times daily     Historical Provider, MD   DULoxetine (CYMBALTA) 60 MG capsule Take 60 mg by mouth every morning     Historical Provider, MD       Consults:   IP CONSULT TO INTERNAL MEDICINE  IP CONSULT TO SOCIAL WORK  IP CONSULT TO INFECTIOUS DISEASES  IP CONSULT TO GI            Discharge Instructions / Follow up:    No future appointments.     Continued appropriate risk factor modification of blood pressure, diabetes and serum lipids will remain essential to reducing risk of future atherosclerotic development    Activity: activity as tolerated    Significant labs:  CBC:   No results for input(s): WBC, RBC, HGB, HCT, MCV, RDW, PLT in the last 72 hours. BMP: No results for input(s): NA, K, CL, CO2, BUN, CREATININE, CA, MG, PHOS in the last 72 hours. LFT:  No results for input(s): PROT, ALB, ALKPHOS, ALT, AST, BILITOT, AMYLASE, LIPASE in the last 72 hours. PT/INR: No results for input(s): INR, APTT in the last 72 hours. BNP: No results for input(s): BNP in the last 72 hours. Hgb A1C:   Lab Results   Component Value Date    LABA1C 6.9 (H) 02/02/2021     Folate and B12:   Lab Results   Component Value Date    HMUXPAGW45 161 10/24/2020   ,   Lab Results   Component Value Date    FOLATE 14.3 10/24/2020     Thyroid Studies:   Lab Results   Component Value Date    TSH 2.670 10/25/2020       Urinalysis:    Lab Results   Component Value Date/Time    NITRU Negative 06/16/2022 11:53 AM    WBCUA NONE 06/16/2022 11:53 AM    BACTERIA RARE 06/16/2022 11:53 AM    RBCUA 1-3 06/16/2022 11:53 AM    BLOODU Negative 06/16/2022 11:53 AM    SPECGRAV >=1.030 06/16/2022 11:53 AM    GLUCOSEU Negative 06/16/2022 11:53 AM       Imaging:  CT ABDOMEN PELVIS WO CONTRAST Additional Contrast? None    Result Date: 9/1/2022  EXAMINATION: CT OF THE ABDOMEN AND PELVIS WITHOUT CONTRAST 9/1/2022 2:07 pm TECHNIQUE: CT of the abdomen and pelvis was performed without the administration of intravenous contrast. Multiplanar reformatted images are provided for review. Automated exposure control, iterative reconstruction, and/or weight based adjustment of the mA/kV was utilized to reduce the radiation dose to as low as reasonably achievable. COMPARISON: CT of the abdomen and pelvis, 01/28/2020.  HISTORY: ORDERING SYSTEM PROVIDED HISTORY: diarrhea, pain, TECHNOLOGIST PROVIDED HISTORY: Additional Contrast?->None Reason for exam:->diarrhea, pain, Decision Support Exception - unselect if not a suspected or confirmed emergency medical condition->Emergency Medical Condition (MA) FINDINGS: Lower Chest: The lung bases are clear. The heart is normal in size. No pleural or pericardial effusion. Organs: Liver: Unremarkable. Gallbladder: Surgically absent Pancreas: Mild to moderately atrophied. Otherwise, unremarkable. Spleen:  Unremarkable. Adrenals: Unremarkable. Kidneys: Unremarkable. GI/Bowel: Prominent distal colonic diverticulosis without diverticulitis. No bowel wall thickening or obstruction. Pelvis: The urinary bladder is partially obscured by beam hardening artifact from the right hip arthroplasty. The visualized bladder is unremarkable. The uterus is surgically absent. Peritoneum/Retroperitoneum: Mild calcified atherosclerosis is seen in the aorta. No aneurysm. No lymphadenopathy. No free air or free fluid is seen. Bones/Soft Tissues: The visualized bones are intact without fracture or focal lesion. Status post right hip arthroplasty. 1.  No acute abnormality is seen in the abdomen or the pelvis. 2. Prominent distal colonic diverticulosis without diverticulitis.        Discharge Medications:      Medication List        CHANGE how you take these medications      glimepiride 1 MG tablet  Commonly known as: AMARYL  Take 1 tablet by mouth every morning (before breakfast)  What changed: how much to take            CONTINUE taking these medications      albuterol sulfate  (90 Base) MCG/ACT inhaler  Commonly known as: PROVENTIL;VENTOLIN;PROAIR     atorvastatin 20 MG tablet  Commonly known as: LIPITOR     bimatoprost 0.01 % Soln ophthalmic drops  Commonly known as: LUMIGAN     clopidogrel 75 MG tablet  Commonly known as: PLAVIX     DULoxetine 60 MG extended release capsule  Commonly known as: CYMBALTA     hydroxychloroquine 200 MG tablet  Commonly known as: PLAQUENIL     oxybutynin 15 MG extended release tablet  Commonly known as: DITROPAN XL     therapeutic multivitamin-minerals tablet verapamil 240 MG extended release tablet  Commonly known as: CALAN SR            STOP taking these medications      linezolid 600 MG tablet  Commonly known as: Rik Maki               Where to Get Your Medications        These medications were sent to [facility], [address] [facility]  [address], [address] [place] [address]      Phone: [phone]   glimepiride 1 MG tablet         Time Spent on discharge is more than 45 minutes in the examination, evaluation, counseling and review of medications and discharge plan.    +++++++++++++++++++++++++++++++++++++++++++++++++  Earnest Hill, DO  [address], [place]  +++++++++++++++++++++++++++++++++++++++++++++++++  NOTE: This report was transcribed using voice recognition software. Every effort was made to ensure accuracy; however, inadvertent computerized transcription errors may be present.

## 2022-09-14 NOTE — PROGRESS NOTES
Physician Progress Note      PATIENT:               Dariel Velasco  CSN #:                  [id number]  :                       1945  ADMIT DATE:       2022 12:39 PM  [address] DATE:        2022 3:06 PM  RESPONDING  PROVIDER #:        Matheus De La O DO          QUERY TEXT:    Patient admitted with Diarrhea. Patient noted to have Antibiotics use prior to   presentation. If possible, please document in progress notes and discharge   summary the relationship, if any, between Diarrhea and Antibiotic use. The medical record reflects the following:  Risk Factors: Antibiotic use. Clinical Indicators: Per Discharge Summary \". ..R ASHTYN. MAY 28, 2022. WAS ON   ABX AFTER IT BC HER INCISION BECAME INFECTED  INFECTED X 3 AND SHE HAS NOW   DEVELOPED WEAKNESS , ANOREXIA, NAUSEA, AND DIARRHEA. Tasha Vincent \"  Treatment: Vancomycin  Options provided:  -- Diarrhea due to Antibiotics  -- Diarrhea unrelated to Antibiotics  -- Other - I will add my own diagnosis  -- Disagree - Not applicable / Not valid  -- Disagree - Clinically unable to determine / Unknown  -- Refer to Clinical Documentation Reviewer    PROVIDER RESPONSE TEXT:    This patient has Diarrhea due to Antibiotics.     Query created by: America Phelan on 2022 10:29 AM      Electronically signed by:  Matheus De La O DO 2022 6:52 AM

## 2022-09-15 ENCOUNTER — INITIAL CONSULT (OUTPATIENT)
Dept: GASTROENTEROLOGY | Age: 77
End: 2022-09-15
Payer: MEDICARE

## 2022-09-15 VITALS
HEIGHT: 66 IN | TEMPERATURE: 97 F | WEIGHT: 240 LBS | BODY MASS INDEX: 38.57 KG/M2 | OXYGEN SATURATION: 97 % | SYSTOLIC BLOOD PRESSURE: 112 MMHG | RESPIRATION RATE: 16 BRPM | DIASTOLIC BLOOD PRESSURE: 60 MMHG | HEART RATE: 59 BPM

## 2022-09-15 DIAGNOSIS — R19.5 OCCULT BLOOD IN STOOLS: ICD-10-CM

## 2022-09-15 DIAGNOSIS — L29.9 ITCHING: ICD-10-CM

## 2022-09-15 DIAGNOSIS — D64.9 ANEMIA, UNSPECIFIED TYPE: Primary | ICD-10-CM

## 2022-09-15 PROCEDURE — 99202 OFFICE O/P NEW SF 15 MIN: CPT | Performed by: NURSE PRACTITIONER

## 2022-09-15 PROCEDURE — 1123F ACP DISCUSS/DSCN MKR DOCD: CPT | Performed by: NURSE PRACTITIONER

## 2022-09-15 RX ORDER — HYDROXYZINE HYDROCHLORIDE 25 MG/1
TABLET, FILM COATED ORAL
Qty: 30 TABLET | Refills: 0 | Status: SHIPPED | OUTPATIENT
Start: 2022-09-15

## 2022-09-15 RX ORDER — FERROUS SULFATE 325(65) MG
TABLET ORAL
COMMUNITY
Start: 2022-09-09

## 2022-09-15 RX ORDER — MICONAZOLE NITRATE 20.6 MG/G
POWDER TOPICAL
COMMUNITY
Start: 2022-07-28

## 2022-09-15 NOTE — PROGRESS NOTES
Bridget Salazar (:  1945) is a 65 y.o. female, here for evaluation of the following chief complaint(s):  New Patient (ref from dr Marcelo Tran for blood in stool)      SUBJECTIVE/OBJECTIVE:  HPI:    Josesito Wright is a very pleasant 65year old female that presents today for diarrhea and positive stool for OB x 1    Patient c/o vomiting and diarrhea that presented in    Patient had a right hip and femur fracture in May  There were post op complications of infection that resulted in 2 more surgeries  Patient was placed on antibiotics for about 12 weeks    The patient tells me today that the diarrhea stopped about 2 days ago  Prior to this, the patient described stools as \"wet cement\"  Finished antibiotics but cannot tell me when  Looking in EMR, she should have complete antibiotics at the beginning of last week    Patient admits to having excessive bleeding with surgery and after surgery  Taking iron pills for the last few weeks at the direction of her nephrologist  Patient tells me she had lab work a few days ago that showed improvement in anemia since starting on an iron supplement  I do not see this in EMR. I will attempt a copy      CT scan without contrast 22 showed   1. No acute abnormality is seen in the abdomen or the pelvis. 2. Prominent distal colonic diverticulosis without diverticulitis. Colonoscopy in 2019 showed 2 tubular adenomas in the ascending colon. Patient tells me today that she is too weak for any procedures  Cannot walk, currently in physical therapy  Patient complains of itching. Scabs visible over arms     ROS:  General: Patient denies n/v/f/c or weight loss. HEENT: Patient denies persistent postnasal drip, scleral icterus, drooling, persistent bleeding from nose/mouth. Resp: Patient denies SOB, wheezing, productive cough. Cards: Patient denies CP, palpitations, significant edema  GI: As above. Derm: Patient denies jaundice/rashes.    Musc: Patient denies diffuse/irregular joint swelling or myalgias. Objective   Wt Readings from Last 3 Encounters:   09/15/22 240 lb (108.9 kg)   09/06/22 240 lb 4.8 oz (109 kg)   08/04/22 255 lb (115.7 kg)     Temp Readings from Last 3 Encounters:   09/15/22 97 °F (36.1 °C) (Temporal)   09/06/22 98.6 °F (37 °C) (Oral)   06/22/22 97.1 °F (36.2 °C) (Temporal)     BP Readings from Last 3 Encounters:   09/15/22 112/60   09/06/22 (!) 146/64   06/22/22 128/60     Pulse Readings from Last 3 Encounters:   09/15/22 59   09/06/22 92   06/22/22 64        Physical Exam  Abdominal:      Palpations: Abdomen is soft.        Past Medical History:   Diagnosis Date    Asthma 1993    Cataract, right eye     Cerebral artery occlusion with cerebral infarction ([address] 07/2017    CKD (chronic kidney disease) stage 4, GFR 15-29 ml/min ([facility]) 2017    Degenerative disc disease     Degenerative joint disease     Diabetes mellitus ([address] 2008    diet controlled    Diabetic peripheral neuropathy ([address] 1998    Diffuse cerebral atrophy ([address] 2012    Fibromyalgia 01/2012    [facility]    Glaucoma     Hypertension     Iron deficiency anemia 10/25/2020    Neurogenic bladder     Neuropathy     Sarcoidosis 1993    Sleep apnea 2009    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\"      Past Surgical History:   Procedure Laterality Date    ANKLE FRACTURE SURGERY Right 10/24/2020    RIGHT ANKLE OPEN REDUCTION INTERNAL FIXATION performed by Glendora Landau, MD at [facility]      laminectomy l3-4    BREAST SURGERY      biopsy    CARDIAC CATHETERIZATION  2012    MINIMAL CAD    CHOLECYSTECTOMY      COLONOSCOPY  08/2019    TUBULAR ADENOMA x 2    FOOT SURGERY Bilateral     right foot fracture; left foot charcot    HIP FRACTURE SURGERY Right 5/28/2022    OPEN REDUCTION INTERNAL FIXATION RIGHT PERIPROSTHETIC HIP FRACTURE performed by Diego Joiner MD at [facility] Right 6/20/2022    RIGHT HIP INCISION AND DRAINAGE performed by Sd Collier Halina Gonzales MD at [address] [place] [address] (CERVIX STATUS UNKNOWN)      INTRACAPSULAR CATARACT EXTRACTION Right 7/22/2021    RIGHT EYE CATARACT EXTRACTION IOL IMPLANT performed by Anna Arevalo MD at [address]  12/16/2016    right hip arthroplasty    OTHER SURGICAL HISTORY Right 02/06/2017    I & D right hip wound vaccum placement    TOTAL HIP ARTHROPLASTY Right 6/16/2022    IRRIGATION AND DEBRIDEMENT RIGHT HIP WOUND INFECTION WITH ANTIBIOTIC BEAD PLACEMENT performed by Jenny Campoverde DO at [facility] Bilateral       Family History   Problem Relation Age of Onset    Cancer Mother         pancreatic    Parkinsonism Father     Cancer Brother         esophageal    Diabetes Maternal Grandfather         Lab Results   Component Value Date    WBC 5.6 09/02/2022    HGB 8.8 (L) 09/06/2022    HCT 26.7 (L) 09/06/2022    MCV 83.5 09/02/2022    PLT 95 (L) 09/02/2022      Lab Results   Component Value Date     09/06/2022    K 4.1 09/06/2022     09/06/2022    CO2 23 09/06/2022    BUN 12 09/06/2022    CREATININE 1.2 (H) 09/06/2022    GLUCOSE 165 (H) 09/06/2022    CALCIUM 7.7 (L) 09/06/2022    PROT 6.5 09/01/2022    LABALBU 3.8 09/01/2022    BILITOT 0.4 09/01/2022    ALKPHOS 102 09/01/2022    AST 17 09/01/2022    ALT 14 09/01/2022    LABGLOM 43 09/06/2022    GFRAA 53 09/06/2022                       ASSESSMENT/PLAN:    1. Anemia, unspecified type  -     Hepatic Function Panel; Future  -     CBC; Standing    -patient will continue oral iron as directed  -will obtain a cbc every 2 weeks      2. Occult blood in stools    -the colonoscopy reviewed with patient today and recommended. She politely declines because she is feeling weak. Currently in physical therapy  -patient will follow up with Dr. Yi Gates    3. Itching    -hydroxyzine prescribed as needed for itching  -liver panel ordered    Return for Follow up.     An electronic signature was used to authenticate this note.    --Ben Poster, APRN - CNP

## 2022-09-21 DIAGNOSIS — D64.9 ANEMIA, UNSPECIFIED TYPE: ICD-10-CM

## 2023-12-07 ENCOUNTER — HOSPITAL ENCOUNTER (OUTPATIENT)
Dept: GENERAL RADIOLOGY | Age: 78
Discharge: HOME | End: 2023-12-09
Payer: MEDICARE

## 2023-12-07 ENCOUNTER — HOSPITAL ENCOUNTER (OUTPATIENT)
Age: 78
Discharge: HOME | End: 2023-12-09
Payer: MEDICARE

## 2023-12-07 DIAGNOSIS — M25.552 LEFT HIP PAIN: ICD-10-CM

## 2023-12-07 PROCEDURE — 73502 X-RAY EXAM HIP UNI 2-3 VIEWS: CPT

## 2024-05-15 NOTE — PROGRESS NOTES
May 15, 2024     Patient: Juan Antonio Guy  YOB: 1964  Date of Visit: 5/15/2024      To Whom it May Concern:    Juan Antonio Guy is under my professional care. Juan Antonio is medically optimized to undergo surgery.  Although he has multiple underlying conditions, the benefit of the surgery outweighs the risk.  Eliquis to be held 2 days prior to surgery to minimize bleeding risk.    If you have any questions or concerns, please don't hesitate to call.         Sincerely,          Amilcar Appiah PA-C        CC: No Recipients   Physical therapy daily Tx attempted. Aborted due to IV team scanning for new IV site.   Attempt x 2 this AM.

## 2024-05-20 ENCOUNTER — OFFICE VISIT (OUTPATIENT)
Age: 79
End: 2024-05-20
Payer: MEDICARE

## 2024-05-20 VITALS
TEMPERATURE: 97 F | HEART RATE: 88 BPM | WEIGHT: 239 LBS | OXYGEN SATURATION: 97 % | BODY MASS INDEX: 38.58 KG/M2 | SYSTOLIC BLOOD PRESSURE: 106 MMHG | DIASTOLIC BLOOD PRESSURE: 70 MMHG

## 2024-05-20 DIAGNOSIS — R11.10 VOMITING, UNSPECIFIED VOMITING TYPE, UNSPECIFIED WHETHER NAUSEA PRESENT: Primary | ICD-10-CM

## 2024-05-20 DIAGNOSIS — K59.00 CONSTIPATION, UNSPECIFIED CONSTIPATION TYPE: ICD-10-CM

## 2024-05-20 PROCEDURE — 3074F SYST BP LT 130 MM HG: CPT | Performed by: NURSE PRACTITIONER

## 2024-05-20 PROCEDURE — G8417 CALC BMI ABV UP PARAM F/U: HCPCS | Performed by: NURSE PRACTITIONER

## 2024-05-20 PROCEDURE — G8400 PT W/DXA NO RESULTS DOC: HCPCS | Performed by: NURSE PRACTITIONER

## 2024-05-20 PROCEDURE — 99212 OFFICE O/P EST SF 10 MIN: CPT | Performed by: NURSE PRACTITIONER

## 2024-05-20 PROCEDURE — G8428 CUR MEDS NOT DOCUMENT: HCPCS | Performed by: NURSE PRACTITIONER

## 2024-05-20 PROCEDURE — 1090F PRES/ABSN URINE INCON ASSESS: CPT | Performed by: NURSE PRACTITIONER

## 2024-05-20 PROCEDURE — 3078F DIAST BP <80 MM HG: CPT | Performed by: NURSE PRACTITIONER

## 2024-05-20 PROCEDURE — 1123F ACP DISCUSS/DSCN MKR DOCD: CPT | Performed by: NURSE PRACTITIONER

## 2024-05-20 PROCEDURE — 1036F TOBACCO NON-USER: CPT | Performed by: NURSE PRACTITIONER

## 2024-05-20 RX ORDER — SENNOSIDES A AND B 8.6 MG/1
TABLET, FILM COATED ORAL
Qty: 60 TABLET | Refills: 5 | Status: SHIPPED | OUTPATIENT
Start: 2024-05-20

## 2024-05-20 NOTE — PROGRESS NOTES
Utilizes a walker for ambulation   Neurological:      Mental Status: She is alert.         Past Medical History:   Diagnosis Date    Asthma 1993    Cataract, right eye     Cerebral artery occlusion with cerebral infarction (HCC) 07/2017    CKD (chronic kidney disease) stage 4, GFR 15-29 ml/min ([facility]) 2017    Degenerative disc disease     Degenerative joint disease     Diabetes mellitus (HCC) 2008    diet controlled    Diabetic peripheral neuropathy ([facility]) 1998    Diffuse cerebral atrophy ([facility]) 2012    Fibromyalgia 01/2012    [facility]    Glaucoma     Hypertension     Iron deficiency anemia 10/25/2020    Neurogenic bladder     Neuropathy     Peripheral artery disease ([facility])     Sarcoidosis 1993    Sleep apnea 2009    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\"      Past Surgical History:   Procedure Laterality Date    ANKLE FRACTURE SURGERY Right 10/24/2020    RIGHT ANKLE OPEN REDUCTION INTERNAL FIXATION performed by Aron Cornelius MD at [facility] OR    BACK SURGERY      laminectomy l3-4    BREAST SURGERY      biopsy    CARDIAC CATHETERIZATION  2012    MINIMAL CAD    CHOLECYSTECTOMY      COLONOSCOPY  08/2019    TUBULAR ADENOMA x 2    FOOT SURGERY Bilateral     right foot fracture; left foot charcot    HIP FRACTURE SURGERY Right 5/28/2022    OPEN REDUCTION INTERNAL FIXATION RIGHT PERIPROSTHETIC HIP FRACTURE performed by Bret Tran MD at [facility] OR    HIP SURGERY Right 6/20/2022    RIGHT HIP INCISION AND DRAINAGE performed by Bret Tran MD at [facility] OR    HYSTERECTOMY (CERVIX STATUS UNKNOWN)      INTRACAPSULAR CATARACT EXTRACTION Right 7/22/2021    RIGHT EYE CATARACT EXTRACTION IOL IMPLANT performed by Clau Rivas MD at [facility] OR    JOINT REPLACEMENT  12/16/2016    right hip arthroplasty    OTHER SURGICAL HISTORY Right 02/06/2017    I & D right hip wound vaccum placement    TOTAL HIP ARTHROPLASTY Right 6/16/2022    IRRIGATION AND DEBRIDEMENT RIGHT HIP WOUND

## 2024-05-21 ENCOUNTER — HOSPITAL ENCOUNTER (OUTPATIENT)
Age: 79
Discharge: HOME | End: 2024-05-23
Payer: MEDICARE

## 2024-05-21 ENCOUNTER — HOSPITAL ENCOUNTER (OUTPATIENT)
Dept: GENERAL RADIOLOGY | Age: 79
Discharge: HOME | End: 2024-05-23
Payer: MEDICARE

## 2024-05-21 DIAGNOSIS — R11.10 VOMITING, UNSPECIFIED VOMITING TYPE, UNSPECIFIED WHETHER NAUSEA PRESENT: ICD-10-CM

## 2024-05-21 PROCEDURE — 74018 RADEX ABDOMEN 1 VIEW: CPT

## 2024-07-24 ENCOUNTER — OFFICE VISIT (OUTPATIENT)
Age: 79
End: 2024-07-24
Payer: MEDICARE

## 2024-07-24 VITALS
TEMPERATURE: 97 F | DIASTOLIC BLOOD PRESSURE: 64 MMHG | SYSTOLIC BLOOD PRESSURE: 110 MMHG | OXYGEN SATURATION: 92 % | HEIGHT: 66 IN | BODY MASS INDEX: 36.64 KG/M2 | RESPIRATION RATE: 16 BRPM | HEART RATE: 100 BPM | WEIGHT: 228 LBS

## 2024-07-24 DIAGNOSIS — D64.9 ANEMIA, UNSPECIFIED TYPE: ICD-10-CM

## 2024-07-24 DIAGNOSIS — K59.00 CONSTIPATION, UNSPECIFIED CONSTIPATION TYPE: ICD-10-CM

## 2024-07-24 DIAGNOSIS — R11.10 VOMITING, UNSPECIFIED VOMITING TYPE, UNSPECIFIED WHETHER NAUSEA PRESENT: Primary | ICD-10-CM

## 2024-07-24 PROCEDURE — 1036F TOBACCO NON-USER: CPT | Performed by: NURSE PRACTITIONER

## 2024-07-24 PROCEDURE — G8427 DOCREV CUR MEDS BY ELIG CLIN: HCPCS | Performed by: NURSE PRACTITIONER

## 2024-07-24 PROCEDURE — 3078F DIAST BP <80 MM HG: CPT | Performed by: NURSE PRACTITIONER

## 2024-07-24 PROCEDURE — 3074F SYST BP LT 130 MM HG: CPT | Performed by: NURSE PRACTITIONER

## 2024-07-24 PROCEDURE — G8417 CALC BMI ABV UP PARAM F/U: HCPCS | Performed by: NURSE PRACTITIONER

## 2024-07-24 PROCEDURE — 1090F PRES/ABSN URINE INCON ASSESS: CPT | Performed by: NURSE PRACTITIONER

## 2024-07-24 PROCEDURE — 1123F ACP DISCUSS/DSCN MKR DOCD: CPT | Performed by: NURSE PRACTITIONER

## 2024-07-24 PROCEDURE — 99212 OFFICE O/P EST SF 10 MIN: CPT | Performed by: NURSE PRACTITIONER

## 2024-07-24 PROCEDURE — G8400 PT W/DXA NO RESULTS DOC: HCPCS | Performed by: NURSE PRACTITIONER

## 2024-07-24 NOTE — PROGRESS NOTES
07/24/24 100   05/20/24 88   09/15/22 58        Physical Exam  Constitutional:       Appearance: Normal appearance.   Musculoskeletal:      Comments: Utilizes a walker for ambulation   Neurological:      Mental Status: She is alert.         Past Medical History:   Diagnosis Date    Asthma 1993    Cataract, right eye     Cerebral artery occlusion with cerebral infarction (HCC) 07/2017    CKD (chronic kidney disease) stage 4, GFR 15-29 ml/min ([facility]) 2017    Degenerative disc disease     Degenerative joint disease     Diabetes mellitus ([facility]) 2008    diet controlled    Diabetic peripheral neuropathy ([facility]) 1998    Diffuse cerebral atrophy ([facility]) 2012    Fibromyalgia 01/2012    [facility]    Glaucoma     Hypertension     Iron deficiency anemia 10/25/2020    Neurogenic bladder     Neuropathy     Peripheral artery disease ([facility])     Sarcoidosis 1993    Sleep apnea 2009    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\"      Past Surgical History:   Procedure Laterality Date    ANKLE FRACTURE SURGERY Right 10/24/2020    RIGHT ANKLE OPEN REDUCTION INTERNAL FIXATION performed by Aron Cornelius MD at [facility] OR    BACK SURGERY      laminectomy l3-4    BREAST SURGERY      biopsy    CARDIAC CATHETERIZATION  2012    MINIMAL CAD    CHOLECYSTECTOMY      COLONOSCOPY  08/2019    TUBULAR ADENOMA x 2    FOOT SURGERY Bilateral     right foot fracture; left foot charcot    HIP FRACTURE SURGERY Right 5/28/2022    OPEN REDUCTION INTERNAL FIXATION RIGHT PERIPROSTHETIC HIP FRACTURE performed by Bret Tran MD at [facility] OR    HIP SURGERY Right 6/20/2022    RIGHT HIP INCISION AND DRAINAGE performed by Bret Tran MD at [facility] OR    HYSTERECTOMY (CERVIX STATUS UNKNOWN)      INTRACAPSULAR CATARACT EXTRACTION Right 7/22/2021    RIGHT EYE CATARACT EXTRACTION IOL IMPLANT performed by Clau Rivas MD at [facility] OR    JOINT REPLACEMENT  12/16/2016    right hip arthroplasty    OTHER SURGICAL HISTORY

## 2024-11-06 ENCOUNTER — OFFICE VISIT (OUTPATIENT)
Age: 79
End: 2024-11-06
Payer: MEDICARE

## 2024-11-06 VITALS
HEIGHT: 66 IN | RESPIRATION RATE: 16 BRPM | OXYGEN SATURATION: 97 % | DIASTOLIC BLOOD PRESSURE: 70 MMHG | HEART RATE: 79 BPM | WEIGHT: 227 LBS | TEMPERATURE: 97 F | BODY MASS INDEX: 36.48 KG/M2 | SYSTOLIC BLOOD PRESSURE: 122 MMHG

## 2024-11-06 DIAGNOSIS — R11.10 VOMITING, UNSPECIFIED VOMITING TYPE, UNSPECIFIED WHETHER NAUSEA PRESENT: Primary | ICD-10-CM

## 2024-11-06 PROCEDURE — 1090F PRES/ABSN URINE INCON ASSESS: CPT | Performed by: NURSE PRACTITIONER

## 2024-11-06 PROCEDURE — G8417 CALC BMI ABV UP PARAM F/U: HCPCS | Performed by: NURSE PRACTITIONER

## 2024-11-06 PROCEDURE — G8484 FLU IMMUNIZE NO ADMIN: HCPCS | Performed by: NURSE PRACTITIONER

## 2024-11-06 PROCEDURE — 3074F SYST BP LT 130 MM HG: CPT | Performed by: NURSE PRACTITIONER

## 2024-11-06 PROCEDURE — 99212 OFFICE O/P EST SF 10 MIN: CPT | Performed by: NURSE PRACTITIONER

## 2024-11-06 PROCEDURE — 1159F MED LIST DOCD IN RCRD: CPT | Performed by: NURSE PRACTITIONER

## 2024-11-06 PROCEDURE — 1123F ACP DISCUSS/DSCN MKR DOCD: CPT | Performed by: NURSE PRACTITIONER

## 2024-11-06 PROCEDURE — 3078F DIAST BP <80 MM HG: CPT | Performed by: NURSE PRACTITIONER

## 2024-11-06 PROCEDURE — G8427 DOCREV CUR MEDS BY ELIG CLIN: HCPCS | Performed by: NURSE PRACTITIONER

## 2024-11-06 PROCEDURE — 1036F TOBACCO NON-USER: CPT | Performed by: NURSE PRACTITIONER

## 2024-11-06 PROCEDURE — G8400 PT W/DXA NO RESULTS DOC: HCPCS | Performed by: NURSE PRACTITIONER

## 2024-11-06 NOTE — PROGRESS NOTES
Janett Roper (:  1945) is a 79 y.o. female, here for evaluation of the following chief complaint(s):  Follow-up (3 month follow up for vomiting and weight loss )      SUBJECTIVE/OBJECTIVE:  HPI:    Janett is a very pleasant 79 year old female that presents today   For follow up on vomiting and weight loss    Patient tells me that vomiting and diarrhea stopped til about a few days ago  Presents after breakfast, after eating  Usually lasts  for about 10 minutes then it resolves  No recent weight loss or heartburn.  Appetite has improved  Not a smoker, no routine NSAID use, and no alcohol  Patients brother had esophageal cancer    Patient states that her bowels are improving; moving once or twice a week   Denies BRBPR, melena, or abdominal pain   Anemia resolved.  Lab work - HGB 16.6 and HCT 51.8        ROS:  General: Patient denies n/v/f/c or weight loss.  HEENT: Patient denies persistent postnasal drip, scleral icterus, drooling, persistent bleeding from nose/mouth.  Resp: Patient denies SOB, wheezing, productive cough.  Cards: Patient denies CP, palpitations, significant edema  GI: As above.  Derm: Patient denies jaundice/rashes.   Musc: Patient denies diffuse/irregular joint swelling or myalgias.      Objective   Wt Readings from Last 3 Encounters:   24 103 kg (227 lb)   24 103.4 kg (228 lb)   24 108.4 kg (239 lb)     Temp Readings from Last 3 Encounters:   24 97 °F (36.1 °C) (Temporal)   24 97 °F (36.1 °C) (Temporal)   24 97 °F (36.1 °C)     BP Readings from Last 3 Encounters:   24 122/70   24 110/64   24 106/70     Pulse Readings from Last 3 Encounters:   24 79   24 100   24 88        Physical Exam  Constitutional:       Appearance: Normal appearance.   Musculoskeletal:      Comments: Utilizes a walker   Neurological:      Mental Status: She is alert.         Past Medical History:   Diagnosis Date    Asthma 1993    Cataract,

## 2025-02-11 ENCOUNTER — HOSPITAL ENCOUNTER (OUTPATIENT)
Dept: GENERAL RADIOLOGY | Age: 80
Discharge: HOME | End: 2025-02-13
Payer: MEDICARE

## 2025-02-11 ENCOUNTER — HOSPITAL ENCOUNTER (OUTPATIENT)
Age: 80
Discharge: HOME | End: 2025-02-13
Payer: MEDICARE

## 2025-02-11 DIAGNOSIS — R05.9 COMPLAINING OF COUGH: ICD-10-CM

## 2025-02-11 DIAGNOSIS — J20.9 ACUTE BRONCHITIS WITH BRONCHOSPASM: ICD-10-CM

## 2025-02-11 PROCEDURE — 71046 X-RAY EXAM CHEST 2 VIEWS: CPT

## 2025-07-14 ENCOUNTER — HOSPITAL ENCOUNTER (OUTPATIENT)
Dept: MAMMOGRAPHY | Age: 80
Discharge: HOME | End: 2025-07-16
Payer: MEDICARE

## 2025-07-14 VITALS — WEIGHT: 233 LBS | HEIGHT: 66 IN | BODY MASS INDEX: 37.45 KG/M2

## 2025-07-14 DIAGNOSIS — Z12.31 OTHER SCREENING MAMMOGRAM: ICD-10-CM

## 2025-07-14 PROCEDURE — 77063 BREAST TOMOSYNTHESIS BI: CPT

## (undated) DEVICE — STANDARD HYPODERMIC NEEDLE,POLYPROPYLENE HUB: Brand: MONOJECT

## (undated) DEVICE — STRYKER PERFORMANCE SERIES SAGITTAL BLADE: Brand: STRYKER PERFORMANCE SERIES

## (undated) DEVICE — BANDAGE COMPR W4INXL10YD WHITE/BEIGE E MTRX HK LOOP CLSR

## (undated) DEVICE — SOLUTION IV IRRIG POUR BRL 0.9% SODIUM CHL 2F7124

## (undated) DEVICE — TAPE ADH W3INXL10YD WHT COT WVN BK POWERFUL RUB BASE HIGHLY

## (undated) DEVICE — INTENDED FOR TISSUE SEPARATION, AND OTHER PROCEDURES THAT REQUIRE A SHARP SURGICAL BLADE TO PUNCTURE OR CUT.: Brand: BARD-PARKER ® STAINLESS STEEL BLADES

## (undated) DEVICE — GLOVE ORANGE PI 7   MSG9070

## (undated) DEVICE — 2.5MM DRILL BIT

## (undated) DEVICE — Z CONVERTED USE 2273263 SWABSTICK CLN SZ 1S 7.5% PVP IOD SCRB DUO-SWAB

## (undated) DEVICE — SCREWDRIVER BLADE T10 AO, SELF RETAINING: Brand: VARIAX

## (undated) DEVICE — GLOVE ORANGE PI 8   MSG9080

## (undated) DEVICE — BLADE OPHTH GRN ROUNDED TIP 1 SIDE SHRP GRINDLESS MINI-BLDE

## (undated) DEVICE — GLOVE SURG SZ 8 L12IN FNGR THK79MIL GRN LTX FREE

## (undated) DEVICE — PAD,ABDOMINAL,5"X9",ST,LF,25/BX: Brand: MEDLINE INDUSTRIES, INC.

## (undated) DEVICE — PROBE HEMSTAT 18GA ERAS BVL TIP STR BPLR WET-FIELD

## (undated) DEVICE — Device

## (undated) DEVICE — SPONGE LAP W18XL18IN WHT COT 4 PLY FLD STRUNG RADPQ DISP ST

## (undated) DEVICE — 3M(TM) MEDIPORE(TM) +PAD SOFT CLOTH ADHESIVE WOUND DRESSING 3566: Brand: 3M™ MEDIPORE™

## (undated) DEVICE — CANNULA OPHTH 25GA 7 8IN ORNG 45DEG ANG 4MM FR END DOME SHP

## (undated) DEVICE — NEEDLE HYPO 21GA L1.5IN GRN POLYPR HUB S STL REG BVL STR

## (undated) DEVICE — SOLUTION IRRIG BSS ST 500ML

## (undated) DEVICE — DRESSING,GAUZE,XEROFORM,CURAD,1"X8",ST: Brand: CURAD

## (undated) DEVICE — Z DISCONTINUED PER MEDLINE USE 2741942 DRESSING AQUACEL 6 IN ALG W9XL15CM SIL CVR WTRPRF VIR BACT BARR ANTIMIC

## (undated) DEVICE — BNDG,ELSTC,MATRIX,STRL,6"X5YD,LF,HOOK&LP: Brand: MEDLINE

## (undated) DEVICE — DRESSING HYDROFIBER AQUACEL AG ADVANTAGE 3.5X14 IN

## (undated) DEVICE — SOLUTION IRRIGATION BAL SALT SOLUTION 15 ML STRL BSS

## (undated) DEVICE — 1060 S-DRAPE SPCL INCISE 10/BX 4BX/CS: Brand: STERI-DRAPE™

## (undated) DEVICE — HANDPIECE SET WITH BONE CLEANING TIP AND SUCTION TUBE: Brand: INTERPULSE

## (undated) DEVICE — DOUBLE BASIN SET: Brand: MEDLINE INDUSTRIES, INC.

## (undated) DEVICE — 1 ML TUBERCULIN SYRINGE,DETACHABLE NEEDLE: Brand: MONOJECT

## (undated) DEVICE — BOWL MED L 32OZ PLAS W/ MOLD GRAD EZ OPN PEEL PCH

## (undated) DEVICE — KIT EVAC 400CC DIA1/8IN H PAT 12.5IN 3 SPR RND SHP PVC DRN

## (undated) DEVICE — TRAP,MUCUS SPECIMEN,40CC: Brand: MEDLINE

## (undated) DEVICE — GLASSES SAFETY PROTCT GRN

## (undated) DEVICE — GLOVE ORTHO 8   MSG9480

## (undated) DEVICE — THE MONARCH® "D" CARTRIDGE IS A SINGLE-USE POLYPROPYLENE CARTRIDGE FOR POSTERIOR CHAMBER IOL DELIVERY: Brand: MONARCH® III

## (undated) DEVICE — TOTAL HIP-LF: Brand: MEDLINE INDUSTRIES, INC.

## (undated) DEVICE — BRUNNS CURRETTES

## (undated) DEVICE — TRAY DRILL SYSTEM 4 REUSABLE

## (undated) DEVICE — PAD PREP L 2 PLY 70% ISO ALC NONWOVEN SFT ABSRB TOP ANTISEP

## (undated) DEVICE — GLOVE SURG SZ 65 CRM LTX FREE POLYISOPRENE POLYMER BEAD ANTI

## (undated) DEVICE — TRAY EYE EXTRAS REUSABLE

## (undated) DEVICE — APPLICATOR PREP 26ML 0.7% IOD POVACRYLEX 74% ISO ALC ST

## (undated) DEVICE — NEEDLE FLTR 18GA L1.5IN MEM THK5UM BLNT DISP

## (undated) DEVICE — SATINCRESCENT® KNIFE ANGLED BEVEL UP: Brand: SATINCRESCENT®

## (undated) DEVICE — PACK PROCEDURE SURG SURG CATARACT CUSTOM

## (undated) DEVICE — KIRSCHNER WIRE
Type: IMPLANTABLE DEVICE | Site: ANKLE | Status: NON-FUNCTIONAL
Removed: 2020-10-24

## (undated) DEVICE — SOLUTION IV IRRIG WATER 1000ML POUR BRL 2F7114

## (undated) DEVICE — CONTROL SYRINGE LUER-LOCK TIP: Brand: MONOJECT

## (undated) DEVICE — 4-PORT MANIFOLD: Brand: NEPTUNE 2

## (undated) DEVICE — TUBING SUCT 12FR MAL ALUM SHFT FN CAP VENT UNIV CONN W/ OBT

## (undated) DEVICE — BLADE, TONGUE, 6", STERILE: Brand: MEDLINE

## (undated) DEVICE — SOLUTION IRRIG 3000ML 0.9% SOD CHL USP UROMATIC PLAS CONT

## (undated) DEVICE — TOWEL,OR,DSP,ST,BLUE,STD,6/PK,12PK/CS: Brand: MEDLINE

## (undated) DEVICE — ELECTRODE PT RET AD L9FT HI MOIST COND ADH HYDRGEL CORDED

## (undated) DEVICE — PADDING,UNDERCAST,COTTON, 4"X4YD STERILE: Brand: MEDLINE

## (undated) DEVICE — BIT DRL 230/200MM CALIB DIA3.2MM 3 FLUT QUIK CPL

## (undated) DEVICE — DRESSING HYDROFIBER AQUACEL AG ADVANTAGE 3.5X10 IN

## (undated) DEVICE — RETRACTOR SENN

## (undated) DEVICE — NEEDLE HYPO 18GA L1.5IN PNK POLYPR HUB S STL REG BVL STR

## (undated) DEVICE — INSTRUMENT SYSTEM 4 BATTERY REUSABLE

## (undated) DEVICE — CLEARCUT® SLIT KNIFE INTREPID MICRO-COAXIAL SYSTEM 2.4 SB: Brand: CLEARCUT®; INTREPID

## (undated) DEVICE — SHIELD EYE W3XL2.5IN UNIV CLR PLAS LTWT

## (undated) DEVICE — TOWEL,OR,DSP,ST,BLUE,DLX,10/PK,8PK/CS: Brand: MEDLINE

## (undated) DEVICE — PADDING CAST W6INXL4YD COT LO LINTING WYTEX

## (undated) DEVICE — 40436 HEAD REST OCULAR: Brand: 40436 HEAD REST OCULAR

## (undated) DEVICE — DRESSING HYDROFIBER AQUACEL AG ADVANTAGE 3.5X12 IN

## (undated) DEVICE — PREVENA PLUS PEEL & PLACE SYSTEM KIT- 35 CM: Brand: PREVENA  PLUS™ PEEL & PLACE™

## (undated) DEVICE — COVER,TABLE,60X90,STERILE: Brand: MEDLINE

## (undated) DEVICE — ZIMMER® STERILE DISPOSABLE TOURNIQUET CUFF WITH PLC, DUAL PORT, SINGLE BLADDER, 34 IN. (86 CM)

## (undated) DEVICE — C-ARMOR C-ARM EQUIPMENT COVERS CLEAR STERILE UNIVERSAL FIT 12 PER CASE: Brand: C-ARMOR

## (undated) DEVICE — READY WET SKIN SCRUB TRAY-LF: Brand: MEDLINE INDUSTRIES, INC.

## (undated) DEVICE — APPLICATOR MEDICATED 26 CC SOLUTION HI LT ORNG CHLORAPREP

## (undated) DEVICE — SET ORTHO MINI STD

## (undated) DEVICE — 3M™ IOBAN™ 2 ANTIMICROBIAL INCISE DRAPE 6651EZ: Brand: IOBAN™ 2

## (undated) DEVICE — SYRINGE, LUER LOCK, 10ML: Brand: MEDLINE

## (undated) DEVICE — DRAPE C ARM W41XL74IN UNIV MOB W RUBBERBAND CLP

## (undated) DEVICE — PACK PROC FLD MGMT SYS CENTURION CUST

## (undated) DEVICE — PACK PROCEDURE SURG GEN CUST

## (undated) DEVICE — 3M™ COBAN™ NL STERILE NON-LATEX SELF-ADHERENT WRAP, 2084S, 4 IN X 5 YD (10 CM X 4,5 M), 18 ROLLS/CASE: Brand: 3M™ COBAN™

## (undated) DEVICE — DRILL BIT, AO DIA2.6MM X 135MM, SCALED: Brand: VARIAX

## (undated) DEVICE — COVER HNDL LT DISP

## (undated) DEVICE — GUIDEWIRE ORTH DIA2.5MM LOK SMOOTH DRL TIP FLX THRD FOR

## (undated) DEVICE — COVER MICROSCOPE KNOB LG

## (undated) DEVICE — 2.8MM DRILL BIT

## (undated) DEVICE — FRACTURE TABLE: Brand: MEDLINE INDUSTRIES, INC.

## (undated) DEVICE — PEEL-AWAY HOOD: Brand: FLYTE, SURGICOOL

## (undated) DEVICE — SCALPEL 15 DEG NO 715

## (undated) DEVICE — NEEDLE HYPO 22GA L1.5IN BLK POLYPR HUB S STL REG BVL STR

## (undated) DEVICE — GOWN,BREATHABLE SLV,AURORA,XLG,STRL: Brand: MEDLINE

## (undated) DEVICE — GAUZE,SPONGE,4"X4",8PLY,STRL,LF,10/TRAY: Brand: MEDLINE

## (undated) DEVICE — 450 ML BOTTLE OF 0.05% CHLORHEXIDINE GLUCONATE IN 99.95% STERILE WATER FOR IRRIGATION, USP AND APPLICATOR.: Brand: IRRISEPT ANTIMICROBIAL WOUND LAVAGE

## (undated) DEVICE — GOWN,AURORA,BRTHSLV,2XL,18/CS: Brand: MEDLINE

## (undated) DEVICE — TOTAL KNEE PK

## (undated) DEVICE — NEEDLE HYPO 25GA L0.625IN BLU POLYPR HUB S STL REG BVL STR

## (undated) DEVICE — TRAY PHACO REUSABLE

## (undated) DEVICE — SYRINGE MED 50ML LUERLOCK TIP

## (undated) DEVICE — SINGLE USE MEDICAL DEVICE FOR OPHTHALMIC SURGERY: Brand: SIL. COATED I/A 45 MIL 12/B